# Patient Record
Sex: MALE | Race: WHITE | Employment: OTHER | URBAN - METROPOLITAN AREA
[De-identification: names, ages, dates, MRNs, and addresses within clinical notes are randomized per-mention and may not be internally consistent; named-entity substitution may affect disease eponyms.]

---

## 2018-08-13 ENCOUNTER — TELEPHONE (OUTPATIENT)
Dept: ADMINISTRATIVE | Age: 63
End: 2018-08-13

## 2018-08-13 DIAGNOSIS — E78.5 HYPERLIPIDEMIA, UNSPECIFIED HYPERLIPIDEMIA TYPE: Primary | Chronic | ICD-10-CM

## 2018-08-24 ENCOUNTER — HOSPITAL ENCOUNTER (OUTPATIENT)
Age: 63
Discharge: HOME | End: 2018-08-24
Payer: COMMERCIAL

## 2018-08-24 DIAGNOSIS — E78.5 HYPERLIPIDEMIA, UNSPECIFIED HYPERLIPIDEMIA TYPE: Chronic | ICD-10-CM

## 2018-08-24 LAB
ALBUMIN SERPL-MCNC: 3.9 G/DL (ref 3.5–5.2)
ALP BLD-CCNC: 75 U/L (ref 40–129)
ALT SERPL-CCNC: 24 U/L (ref 0–40)
ANION GAP SERPL CALCULATED.3IONS-SCNC: 9 MMOL/L (ref 7–16)
AST SERPL-CCNC: 21 U/L (ref 0–39)
BILIRUB SERPL-MCNC: 0.5 MG/DL (ref 0–1.2)
BUN BLDV-MCNC: 14 MG/DL (ref 8–23)
CALCIUM SERPL-MCNC: 8.9 MG/DL (ref 8.6–10.2)
CHLORIDE BLD-SCNC: 104 MMOL/L (ref 98–107)
CHOLESTEROL, TOTAL: 305 MG/DL (ref 0–199)
CO2: 27 MMOL/L (ref 22–29)
CREAT SERPL-MCNC: 0.9 MG/DL (ref 0.7–1.2)
GFR AFRICAN AMERICAN: >60
GFR NON-AFRICAN AMERICAN: >60 ML/MIN/1.73
GLUCOSE BLD-MCNC: 105 MG/DL (ref 74–109)
HDLC SERPL-MCNC: 65 MG/DL
LDL CHOLESTEROL CALCULATED: 221 MG/DL (ref 0–99)
POTASSIUM SERPL-SCNC: 4.3 MMOL/L (ref 3.5–5)
SODIUM BLD-SCNC: 140 MMOL/L (ref 132–146)
TOTAL PROTEIN: 6.3 G/DL (ref 6.4–8.3)
TRIGL SERPL-MCNC: 97 MG/DL (ref 0–149)
TSH SERPL DL<=0.05 MIU/L-ACNC: 2.51 UIU/ML (ref 0.27–4.2)
VLDLC SERPL CALC-MCNC: 19 MG/DL

## 2018-08-24 PROCEDURE — 80061 LIPID PANEL: CPT

## 2018-08-24 PROCEDURE — 84443 ASSAY THYROID STIM HORMONE: CPT

## 2018-08-24 PROCEDURE — 80053 COMPREHEN METABOLIC PANEL: CPT

## 2018-08-24 PROCEDURE — 36415 COLL VENOUS BLD VENIPUNCTURE: CPT

## 2018-08-31 ENCOUNTER — HOSPITAL ENCOUNTER (OUTPATIENT)
Age: 63
Discharge: HOME | End: 2018-09-02
Payer: COMMERCIAL

## 2018-08-31 ENCOUNTER — OFFICE VISIT (OUTPATIENT)
Dept: FAMILY MEDICINE CLINIC | Age: 63
End: 2018-08-31
Payer: COMMERCIAL

## 2018-08-31 VITALS
BODY MASS INDEX: 23.34 KG/M2 | HEIGHT: 70 IN | DIASTOLIC BLOOD PRESSURE: 86 MMHG | TEMPERATURE: 97.4 F | RESPIRATION RATE: 16 BRPM | SYSTOLIC BLOOD PRESSURE: 125 MMHG | OXYGEN SATURATION: 98 % | WEIGHT: 163 LBS

## 2018-08-31 DIAGNOSIS — E78.5 HYPERLIPIDEMIA, UNSPECIFIED HYPERLIPIDEMIA TYPE: Chronic | ICD-10-CM

## 2018-08-31 DIAGNOSIS — N28.1 ACQUIRED RENAL CYST OF LEFT KIDNEY: ICD-10-CM

## 2018-08-31 DIAGNOSIS — N42.9 PROSTATIC DISORDER: ICD-10-CM

## 2018-08-31 DIAGNOSIS — M17.11 PRIMARY OSTEOARTHRITIS OF RIGHT KNEE: Primary | ICD-10-CM

## 2018-08-31 DIAGNOSIS — Z23 NEED FOR SHINGLES VACCINE: ICD-10-CM

## 2018-08-31 LAB
BACTERIA: NORMAL /HPF
BILIRUBIN URINE: NEGATIVE
BLOOD, URINE: ABNORMAL
CLARITY: CLEAR
COLOR: YELLOW
GLUCOSE URINE: NEGATIVE MG/DL
KETONES, URINE: NEGATIVE MG/DL
LEUKOCYTE ESTERASE, URINE: NEGATIVE
NITRITE, URINE: NEGATIVE
PH UA: 5.5 (ref 5–9)
PROTEIN UA: NEGATIVE MG/DL
RBC UA: NORMAL /HPF (ref 0–2)
SPECIFIC GRAVITY UA: 1.01 (ref 1–1.03)
UROBILINOGEN, URINE: 0.2 E.U./DL
WBC UA: NORMAL /HPF (ref 0–5)

## 2018-08-31 PROCEDURE — 81001 URINALYSIS AUTO W/SCOPE: CPT

## 2018-08-31 PROCEDURE — 81003 URINALYSIS AUTO W/O SCOPE: CPT | Performed by: FAMILY MEDICINE

## 2018-08-31 PROCEDURE — 99214 OFFICE O/P EST MOD 30 MIN: CPT | Performed by: FAMILY MEDICINE

## 2018-08-31 RX ORDER — TAMSULOSIN HYDROCHLORIDE 0.4 MG/1
0.4 CAPSULE ORAL DAILY
Qty: 90 CAPSULE | Refills: 3 | Status: ON HOLD
Start: 2018-08-31 | End: 2021-02-16 | Stop reason: HOSPADM

## 2018-08-31 RX ORDER — ROSUVASTATIN CALCIUM 40 MG/1
40 TABLET, COATED ORAL DAILY
Qty: 90 TABLET | Refills: 3 | Status: SHIPPED | OUTPATIENT
Start: 2018-08-31 | End: 2019-11-06 | Stop reason: SDUPTHER

## 2018-08-31 ASSESSMENT — PATIENT HEALTH QUESTIONNAIRE - PHQ9
SUM OF ALL RESPONSES TO PHQ QUESTIONS 1-9: 0
2. FEELING DOWN, DEPRESSED OR HOPELESS: 0
1. LITTLE INTEREST OR PLEASURE IN DOING THINGS: 0
SUM OF ALL RESPONSES TO PHQ9 QUESTIONS 1 & 2: 0
SUM OF ALL RESPONSES TO PHQ QUESTIONS 1-9: 0

## 2018-08-31 NOTE — PROGRESS NOTES
History :   Social History     Social History    Marital status:      Spouse name: N/A    Number of children: N/A    Years of education: N/A     Occupational History    Not on file. Social History Main Topics    Smoking status: Former Smoker     Packs/day: 0.10     Types: Cigars     Start date: 8/31/1980     Quit date: 8/31/1990    Smokeless tobacco: Never Used    Alcohol use No    Drug use: No    Sexual activity: Not on file     Other Topics Concern    Not on file     Social History Narrative    No narrative on file        Current Medications    Current Outpatient Prescriptions on File Prior to Visit   Medication Sig Dispense Refill    ibuprofen (ADVIL;MOTRIN) 600 MG tablet Take 1 tablet by mouth every 6 hours as needed for Pain 60 tablet 1    fexofenadine-pseudoephedrine (ALLEGRA-D 12 HOUR)  MG per tablet Take 1 tablet by mouth 2 times daily as needed. 60 tablet 2     No current facility-administered medications on file prior to visit. Allergies : Allergies   Allergen Reactions    Codeine Itching    Lipitor      Muscle cramps    Vytorin [Ezetimibe-Simvastatin]      Muscle cramps         Review of Systems :    Review of Systems - History obtained from the patient  General ROS: negative  Respiratory ROS: no cough, shortness of breath, or wheezing  Cardiovascular ROS: no chest pain or dyspnea on exertion  Gastrointestinal ROS: no abdominal pain, change in bowel habits, or black or bloody stools  Genito-Urinary ROS: positive for - urinary frequency/urgency  negative for - dysuria or nocturia  Musculoskeletal ROS: positive for - joint pain  Neurological ROS: positive for - numbness/tingling      Physical Exam :    VITAL SIGNS : Blood pressure 125/86, temperature 97.4 °F (36.3 °C), temperature source Oral, resp. rate 16, height 5' 10\" (1.778 m), weight 163 lb (73.9 kg), SpO2 98 %.     GENERAL APPEARANCE : NAD, cooperative, appears stated age  EYES : PERRL, EOM's intact, anicteric agreed to try flomax. He was informed of risks and side-effects. We did talk about PSA, and he has chosen to forgo this testing for the time being. His likely arthritis of knee, carpal tunnel, and STS of index finger were discussed, but he will wait before choosing to treat since these are minimally bothersome at this time. I did check urinalysis and will repeat kidney ultrasound for evaluation of previously noted renal cyst.  Also he will obtain shingles vaccine at the pharmacy,    Reviewed indicated health maintenance and answered questions as needed. Advised patient of risks of foregoing needed health maintenance. Patient advised of all findings and recommendations. Questions answered, and instructions provided where indicated, including reasons for contacting office or returning sooner than requested. Reviewed indications and potential side-effects of medications, including any new medications ordered today. No future appointments.     Errol Bruno MD

## 2018-09-11 ENCOUNTER — HOSPITAL ENCOUNTER (OUTPATIENT)
Dept: ULTRASOUND IMAGING | Age: 63
Discharge: HOME | End: 2018-09-11
Payer: COMMERCIAL

## 2018-09-11 DIAGNOSIS — N28.1 ACQUIRED RENAL CYST OF LEFT KIDNEY: ICD-10-CM

## 2018-09-11 PROCEDURE — 76775 US EXAM ABDO BACK WALL LIM: CPT

## 2019-04-28 ENCOUNTER — NURSE TRIAGE (OUTPATIENT)
Dept: OTHER | Facility: CLINIC | Age: 64
End: 2019-04-28

## 2019-04-28 ENCOUNTER — APPOINTMENT (OUTPATIENT)
Dept: CT IMAGING | Age: 64
End: 2019-04-28
Payer: COMMERCIAL

## 2019-04-28 ENCOUNTER — HOSPITAL ENCOUNTER (EMERGENCY)
Age: 64
Discharge: HOME | End: 2019-04-28
Payer: COMMERCIAL

## 2019-04-28 VITALS
HEIGHT: 70 IN | SYSTOLIC BLOOD PRESSURE: 128 MMHG | BODY MASS INDEX: 22.9 KG/M2 | WEIGHT: 160 LBS | HEART RATE: 87 BPM | TEMPERATURE: 97.5 F | RESPIRATION RATE: 16 BRPM | DIASTOLIC BLOOD PRESSURE: 84 MMHG | OXYGEN SATURATION: 97 %

## 2019-04-28 DIAGNOSIS — S32.009A CLOSED FRACTURE OF TRANSVERSE PROCESS OF LUMBAR VERTEBRA, INITIAL ENCOUNTER (HCC): Primary | ICD-10-CM

## 2019-04-28 LAB
BACTERIA: NORMAL /HPF
BASOPHILS ABSOLUTE: 0.05 E9/L (ref 0–0.2)
BASOPHILS RELATIVE PERCENT: 0.9 % (ref 0–2)
BILIRUBIN URINE: NEGATIVE
BLOOD, URINE: ABNORMAL
CLARITY: CLEAR
CO2: 29 MMOL/L (ref 22–29)
COLOR: YELLOW
EOSINOPHILS ABSOLUTE: 0.17 E9/L (ref 0.05–0.5)
EOSINOPHILS RELATIVE PERCENT: 3.1 % (ref 0–6)
GFR AFRICAN AMERICAN: >60
GFR NON-AFRICAN AMERICAN: >60 ML/MIN/1.73
GLUCOSE BLD-MCNC: 84 MG/DL (ref 74–99)
GLUCOSE URINE: NEGATIVE MG/DL
HCT VFR BLD CALC: 47 % (ref 37–54)
HEMOGLOBIN: 15.6 G/DL (ref 12.5–16.5)
IMMATURE GRANULOCYTES #: 0.01 E9/L
IMMATURE GRANULOCYTES %: 0.2 % (ref 0–5)
KETONES, URINE: NEGATIVE MG/DL
LEUKOCYTE ESTERASE, URINE: NEGATIVE
LYMPHOCYTES ABSOLUTE: 1.83 E9/L (ref 1.5–4)
LYMPHOCYTES RELATIVE PERCENT: 33.4 % (ref 20–42)
MCH RBC QN AUTO: 29.7 PG (ref 26–35)
MCHC RBC AUTO-ENTMCNC: 33.2 % (ref 32–34.5)
MCV RBC AUTO: 89.4 FL (ref 80–99.9)
MONOCYTES ABSOLUTE: 0.47 E9/L (ref 0.1–0.95)
MONOCYTES RELATIVE PERCENT: 8.6 % (ref 2–12)
NEUTROPHILS ABSOLUTE: 2.95 E9/L (ref 1.8–7.3)
NEUTROPHILS RELATIVE PERCENT: 53.8 % (ref 43–80)
NITRITE, URINE: NEGATIVE
PDW BLD-RTO: 11.6 FL (ref 11.5–15)
PH UA: 6 (ref 5–9)
PLATELET # BLD: 258 E9/L (ref 130–450)
PMV BLD AUTO: 9.6 FL (ref 7–12)
POC ANION GAP: 8 MMOL/L (ref 7–16)
POC BUN: 13 MG/DL (ref 8–23)
POC CHLORIDE: 104 MMOL/L (ref 100–108)
POC CREATININE: 0.9 MG/DL (ref 0.7–1.2)
POC POTASSIUM: 4.3 MMOL/L (ref 3.5–5)
POC SODIUM: 141 MMOL/L (ref 132–146)
PROTEIN UA: ABNORMAL MG/DL
RBC # BLD: 5.26 E12/L (ref 3.8–5.8)
RBC UA: NORMAL /HPF (ref 0–2)
SPECIFIC GRAVITY UA: <=1.005 (ref 1–1.03)
UROBILINOGEN, URINE: 0.2 E.U./DL
WBC # BLD: 5.5 E9/L (ref 4.5–11.5)
WBC UA: NORMAL /HPF (ref 0–5)

## 2019-04-28 PROCEDURE — 84520 ASSAY OF UREA NITROGEN: CPT

## 2019-04-28 PROCEDURE — 85025 COMPLETE CBC W/AUTO DIFF WBC: CPT

## 2019-04-28 PROCEDURE — 99284 EMERGENCY DEPT VISIT MOD MDM: CPT

## 2019-04-28 PROCEDURE — 74177 CT ABD & PELVIS W/CONTRAST: CPT

## 2019-04-28 PROCEDURE — 81001 URINALYSIS AUTO W/SCOPE: CPT

## 2019-04-28 PROCEDURE — 36415 COLL VENOUS BLD VENIPUNCTURE: CPT

## 2019-04-28 PROCEDURE — 80051 ELECTROLYTE PANEL: CPT

## 2019-04-28 PROCEDURE — 6360000004 HC RX CONTRAST MEDICATION: Performed by: RADIOLOGY

## 2019-04-28 PROCEDURE — 82947 ASSAY GLUCOSE BLOOD QUANT: CPT

## 2019-04-28 PROCEDURE — 82565 ASSAY OF CREATININE: CPT

## 2019-04-28 RX ORDER — HYDROCODONE BITARTRATE AND ACETAMINOPHEN 5; 325 MG/1; MG/1
1 TABLET ORAL EVERY 6 HOURS PRN
Qty: 12 TABLET | Refills: 0 | Status: SHIPPED | OUTPATIENT
Start: 2019-04-28 | End: 2019-04-28

## 2019-04-28 RX ADMIN — IOPAMIDOL 80 ML: 755 INJECTION, SOLUTION INTRAVENOUS at 13:13

## 2019-04-28 ASSESSMENT — PAIN DESCRIPTION - PAIN TYPE: TYPE: ACUTE PAIN

## 2019-04-28 ASSESSMENT — PAIN DESCRIPTION - LOCATION: LOCATION: BACK

## 2019-04-28 ASSESSMENT — PAIN DESCRIPTION - ORIENTATION: ORIENTATION: LOWER

## 2019-04-28 ASSESSMENT — PAIN SCALES - GENERAL: PAINLEVEL_OUTOF10: 8

## 2019-04-28 ASSESSMENT — PAIN DESCRIPTION - DESCRIPTORS: DESCRIPTORS: ACHING

## 2019-04-28 NOTE — TELEPHONE ENCOUNTER
Reason for Disposition   [1] SEVERE PAIN in kidney area (flank) AND [2] follows direct blow to that site    Protocols used: BACK INJURY-ADULT-    Patient fell 4/24/19 and hit his back on the steps. He states he I hit about 6-8 inches up from his butt on the left side of back. His pain is now 8/10 despite taking pain meds. It hurts \"even to take a breath\". It hurt too much even to get dressed this morning. Patient is speaking in strong complete sentences. He will proceed to ER.

## 2019-04-28 NOTE — ED PROVIDER NOTES
[facility]     Department of Emergency Medicine   ED  Provider Note  Admit Date/RoomTime: 4/28/2019 11:54 AM  ED Room: 18/18  Chief Complaint:   Back Pain (lower back pain after falling last Wednesday)    History of Present Illness   Source of history provided by:  patient and spouse/SO. History/Exam Limitations: none. Gerardo Ma is a 60 y.o. old male who has a past medical history of:   Past Medical History:   Diagnosis Date    Derangement of medial meniscus of right knee 12/2016    MRI Right Knee DMXI 2/4/2016    Hyperlipidemia 12/2/2010    Situational syncope 12/2/2010    presents to the emergency department by private vehicle, for low back pain. Patient states that he fell approximately 10 days ago. He states that he slipped on the bottom stair, fell, striking his back on a carpeted wooden stair. He states that he continues to have pain since then. Denies any head injury, no loss of consciousness. Denies being on any blood thinners. He reports severe pain with any movement, nothing makes it feel better. Denies any loss of control of his bowels or bladder, no numbness in the groin area. No fevers or chills. No hematuria. Denies any prior injury to this area. ROS   Pertinent positives and negatives are stated within HPI, all other systems reviewed and are negative. Past Surgical History:   Procedure Laterality Date    HERNIA REPAIR      KNEE SURGERY Right 02/19/2016    SHOULDER ARTHROSCOPY Left 03/23/2001    left    VENTRAL HERNIA REPAIR  1981   Social History:  reports that he quit smoking about 28 years ago. His smoking use included cigars. He started smoking about 38 years ago. He smoked 0.10 packs per day. He has never used smokeless tobacco. He reports that he drinks alcohol. He reports that he does not use drugs. Family History: family history includes Cancer in his mother; Heart Disease in his father and mother; Stroke in his father.   Allergies: Codeine; Lipitor; and Vytorin [ezetimibe-simvastatin]    Physical Exam           ED Triage Vitals   BP Temp Temp src Pulse Resp SpO2 Height Weight   04/28/19 1153 04/28/19 1153 -- 04/28/19 1153 04/28/19 1153 04/28/19 1153 04/28/19 1148 04/28/19 1148   128/84 97.5 °F (36.4 °C)  87 16 97 % 5' 10\" (1.778 m) 160 lb (72.6 kg)      Oxygen Saturation Interpretation: Normal.    Constitutional:  Alert, development consistent with age. HEENT:  NC/NT. Airway patent. Neck:  Normal ROM. Supple. Respiratory:  Clear to auscultation and breath sounds equal.  CV:  Regular rate and rhythm, normal heart sounds, without pathological murmurs, ectopy, gallops, or rubs. GI:  Abdomen Soft, nontender, good bowel sounds. No firm or pulsatile mass. Back: middle lumbar spine left sided. Tenderness: Severe. Swelling: no.              Range of Motion: full range with pain. CVA Tenderness: No.            Straight leg raising:  Bilateral negative. Skin:  Small abrasion present over mid lumbar midline. No surrounding bruising present. Distal Function:              Motor deficit: none. Sensory deficit: none. Pulse deficit: none. 2+ dorsalis pedis pulses b/l. Calf Tenderness:  No Bilateral.               Edema:  none Both lower extremity(s). Reflexes: Bilateral patellar normal.  Gait:  normal.  Integument:  Normal turgor. Warm, dry, without visible rash. Lymphatics: No lymphangitis or adenopathy noted. Neurological:  Oriented. Motor functions intact.     Lab / Imaging Results   (All laboratory and radiology results have been personally reviewed by myself)  Labs:  Results for orders placed or performed during the hospital encounter of 04/28/19   CBC Auto Differential   Result Value Ref Range    WBC 5.5 4.5 - 11.5 E9/L    RBC 5.26 3.80 - 5.80 E12/L    Hemoglobin 15.6 12.5 - 16.5 g/dL    Hematocrit 47.0 37.0 - 54.0 %    MCV 89.4 80.0 - 99.9 fL    MCH 29.7 26.0 - 35.0 pg MCHC 33.2 32.0 - 34.5 %    RDW 11.6 11.5 - 15.0 fL    Platelets 232 803 - 478 E9/L    MPV 9.6 7.0 - 12.0 fL    Neutrophils % 53.8 43.0 - 80.0 %    Immature Granulocytes % 0.2 0.0 - 5.0 %    Lymphocytes % 33.4 20.0 - 42.0 %    Monocytes % 8.6 2.0 - 12.0 %    Eosinophils % 3.1 0.0 - 6.0 %    Basophils % 0.9 0.0 - 2.0 %    Neutrophils # 2.95 1.80 - 7.30 E9/L    Immature Granulocytes # 0.01 E9/L    Lymphocytes # 1.83 1.50 - 4.00 E9/L    Monocytes # 0.47 0.10 - 0.95 E9/L    Eosinophils # 0.17 0.05 - 0.50 E9/L    Basophils # 0.05 0.00 - 0.20 E9/L   Urinalysis   Result Value Ref Range    Color, UA Yellow Straw/Yellow    Clarity, UA Clear Clear    Glucose, Ur Negative Negative mg/dL    Bilirubin Urine Negative Negative    Ketones, Urine Negative Negative mg/dL    Specific Gravity, UA <=1.005 1.005 - 1.030    Blood, Urine TRACE (A) Negative    pH, UA 6.0 5.0 - 9.0    Protein, UA TRACE Negative mg/dL    Urobilinogen, Urine 0.2 <2.0 E.U./dL    Nitrite, Urine Negative Negative    Leukocyte Esterase, Urine Negative Negative   Microscopic Urinalysis   Result Value Ref Range    WBC, UA 0-1 0 - 5 /HPF    RBC, UA 0-1 0 - 2 /HPF    Bacteria, UA NONE /HPF   POCT Venous   Result Value Ref Range    POC Sodium 141 132 - 146 mmol/L    POC Potassium 4.3 3.5 - 5.0 mmol/L    POC Chloride 104 100 - 108 mmol/L    CO2 29 22 - 29 mmol/L    POC Anion Gap 8 7 - 16 mmol/L    POC Glucose 84 74 - 99 mg/dl    POC BUN 13 8 - 23 mg/dL    POC Creatinine 0.9 0.7 - 1.2 mg/dL    GFR Non-African American >60 >=60 mL/min/1.73    GFR  >60        Imaging: All Radiology results interpreted by Radiologist unless otherwise noted. CT ABDOMEN PELVIS W IV CONTRAST Additional Contrast? None   Final Result   1. No renal injury. 2. Nondisplaced fracture of the left L2 transverse process.              ED Course / Medical Decision Making     Medications   iopamidol (ISOVUE-370) 76 % injection 80 mL (80 mLs Intravenous Given 4/28/19 1313) Re-examination:  4/28/19       Time: 1345    Patient updated on results. Continues to refuse anything for pain. .    Consult(s):   None    Procedure(s):   none    Medical Decision Making:    Patient denies any loss of control of bowels or bladder, saddle anesthesia, no Senokot equine's syndrome. Patient has no neurological deficit at this time. Able to ambulate without difficulty. Refuses anything for pain, states that he has good pain control. There is a transverse process fracture of L2 seen on CT scan today. Otherwise, syndrome unremarkable. We'll discharge him at this time. Advised return to ER for any worsening symptoms. Otherwise to follow up with neurosurgery. Counseling: The emergency provider has spoken with the patient and spouse and discussed todays results, in addition to providing specific details for the plan of care and counseling regarding the diagnosis and prognosis. Questions are answered at this time and they are agreeable with the plan. Assessment      1. Closed fracture of transverse process of lumbar vertebra, initial encounter [facility])      Plan   Discharge to home  Patient condition is good    New Medications     Discharge Medication List as of 4/28/2019  1:52 PM        Electronically signed by ALYCE Cramer   DD: 4/28/19  **This report was transcribed using voice recognition software. Every effort was made to ensure accuracy; however, inadvertent computerized transcription errors may be present.   END OF ED PROVIDER NOTE       Enid Cramer  04/28/19 0355

## 2019-05-03 ENCOUNTER — CARE COORDINATION (OUTPATIENT)
Dept: CARE COORDINATION | Age: 64
End: 2019-05-03

## 2019-05-03 NOTE — CARE COORDINATION
Ambulatory Care Coordination ED Follow up Call       Reason for ED Visit:  Back pain  Care Management Risk Score: CMRS 1  How are you feeling? :     improved  Patient Reports the following:  Patient reports he was sore this morning. Now he is moving better and walking good. Spoke with Dr. Krystal Esquivel and was told he really didn't need to make an appointment with him. Did you call your PCP prior to going to the ED? Yes          Post Discharge Status:  What health concerns since you left the Emergency Room? None    Do you have wounds that you are caring for at home? No    Do you have a follow up appt scheduled? no    Review of Instructions:                                 Do you have any questions regarding your discharge instructions?:  No  Medications:    What questions do you have about your medications? None  Are you taking your medications as directed? If not - why? Yes   Can you afford your medications? yes  ADLS:  Do you need assistance of any kind at home? No   What assistance is needed? None      FU appts/Provider:    No future appointments. Health Maintenance Due   Topic Date Due    HIV screen  01/23/1972    Shingles Vaccine (1 of 2) 01/23/2007     Patient advised to contact PCP office to have HM items/records faxed to PCP Office directly?   N/A

## 2019-05-31 ENCOUNTER — EMPLOYEE WELLNESS (OUTPATIENT)
Dept: OTHER | Age: 64
End: 2019-05-31

## 2019-05-31 LAB
CHOLESTEROL, TOTAL: 320 MG/DL (ref 0–199)
GLUCOSE BLD-MCNC: 96 MG/DL (ref 74–107)
HDLC SERPL-MCNC: 74 MG/DL
LDL CHOLESTEROL CALCULATED: 227 MG/DL (ref 0–99)
TRIGL SERPL-MCNC: 93 MG/DL (ref 0–149)

## 2019-06-10 VITALS — WEIGHT: 159 LBS | BODY MASS INDEX: 22.81 KG/M2

## 2019-11-06 DIAGNOSIS — E78.5 HYPERLIPIDEMIA, UNSPECIFIED HYPERLIPIDEMIA TYPE: Chronic | ICD-10-CM

## 2019-11-06 RX ORDER — ROSUVASTATIN CALCIUM 40 MG/1
40 TABLET, COATED ORAL DAILY
Qty: 90 TABLET | Refills: 3 | Status: SHIPPED | OUTPATIENT
Start: 2019-11-06 | End: 2019-11-08 | Stop reason: SDUPTHER

## 2019-11-07 ENCOUNTER — TELEPHONE (OUTPATIENT)
Dept: FAMILY MEDICINE CLINIC | Age: 64
End: 2019-11-07

## 2019-11-08 DIAGNOSIS — E78.5 HYPERLIPIDEMIA, UNSPECIFIED HYPERLIPIDEMIA TYPE: Chronic | ICD-10-CM

## 2019-11-08 RX ORDER — ROSUVASTATIN CALCIUM 40 MG/1
40 TABLET, COATED ORAL DAILY
Qty: 90 TABLET | Refills: 3 | Status: SHIPPED
Start: 2019-11-08 | End: 2020-12-21

## 2019-11-26 ENCOUNTER — OFFICE VISIT (OUTPATIENT)
Dept: ORTHOPEDIC SURGERY | Age: 64
End: 2019-11-26
Payer: COMMERCIAL

## 2019-11-26 VITALS
HEIGHT: 70 IN | BODY MASS INDEX: 22.05 KG/M2 | SYSTOLIC BLOOD PRESSURE: 121 MMHG | TEMPERATURE: 97 F | HEART RATE: 99 BPM | DIASTOLIC BLOOD PRESSURE: 81 MMHG | WEIGHT: 154 LBS

## 2019-11-26 DIAGNOSIS — M25.511 RIGHT SHOULDER PAIN, UNSPECIFIED CHRONICITY: Primary | ICD-10-CM

## 2019-11-26 DIAGNOSIS — S49.91XD INJURY OF RIGHT SHOULDER, SUBSEQUENT ENCOUNTER: ICD-10-CM

## 2019-11-26 PROCEDURE — 99203 OFFICE O/P NEW LOW 30 MIN: CPT | Performed by: ORTHOPAEDIC SURGERY

## 2019-11-26 PROCEDURE — 20610 DRAIN/INJ JOINT/BURSA W/O US: CPT | Performed by: ORTHOPAEDIC SURGERY

## 2019-11-26 RX ORDER — M-VIT,TX,IRON,MINS/CALC/FOLIC 27MG-0.4MG
1 TABLET ORAL DAILY
COMMUNITY

## 2019-11-26 RX ORDER — BUPIVACAINE HYDROCHLORIDE 2.5 MG/ML
2 INJECTION, SOLUTION INFILTRATION; PERINEURAL ONCE
Status: COMPLETED | OUTPATIENT
Start: 2019-11-26 | End: 2019-11-26

## 2019-11-26 RX ORDER — TRIAMCINOLONE ACETONIDE 40 MG/ML
40 INJECTION, SUSPENSION INTRA-ARTICULAR; INTRAMUSCULAR ONCE
Status: COMPLETED | OUTPATIENT
Start: 2019-11-26 | End: 2019-11-26

## 2019-11-26 RX ADMIN — TRIAMCINOLONE ACETONIDE 40 MG: 40 INJECTION, SUSPENSION INTRA-ARTICULAR; INTRAMUSCULAR at 11:00

## 2019-11-26 RX ADMIN — BUPIVACAINE HYDROCHLORIDE 5 MG: 2.5 INJECTION, SOLUTION INFILTRATION; PERINEURAL at 11:00

## 2020-01-07 ENCOUNTER — TELEPHONE (OUTPATIENT)
Dept: ORTHOPEDIC SURGERY | Age: 65
End: 2020-01-07

## 2020-01-07 NOTE — TELEPHONE ENCOUNTER
Patient called to report that injection Rt shoulder 11/26/2019 did not help shoulder pain. [place]. Order pended in [address] [place] [address] Rd.

## 2020-01-13 ENCOUNTER — EVALUATION (OUTPATIENT)
Dept: PHYSICAL THERAPY | Age: 65
End: 2020-01-13
Payer: COMMERCIAL

## 2020-01-13 PROCEDURE — 97163 PT EVAL HIGH COMPLEX 45 MIN: CPT | Performed by: PHYSICAL THERAPIST

## 2020-01-13 NOTE — PROGRESS NOTES
[facility] OUTPATIENT REHABILITATION  PHYSICAL THERAPY INITIAL EVALUATION         Date:  2020   Patient: Sukhdeep Shaikh  : 1955  MRN: [id number]  Referring Provider: Debo Retana MD  [address] [place] [address]     Medical Diagnosis:   M25.511 (ICD-10-CM) - Right shoulder pain, unspecified chronicity     SUBJECTIVE:     Onset date: 2019    Onset[de-identified] day after having flu shot doing flat bench      Patient is left handed. Previous PT: none    Medical Management for Current Problem: injections, Kenalog into [address] joint relieved 50% of pain for 2 weeks    Chief complaint: pain    Behavior: condition is staying the same    Pain: intermittent  Current: 0/10     Best: 0/10     Worst:8/10    Symptom Type/Quality: aching  Location[de-identified] superior, mid-deltoid region      Aggravated by: reaching overhead, reaching out    Relieved by: rest, placing arm in sling position       Imaging results: No results found.     Past Medical History:  Past Medical History:   Diagnosis Date    Derangement of medial meniscus of right knee 2016    MRI Right Knee DMXI 2016    Hyperlipidemia 2010    Situational syncope 2010     Past Surgical History:   Procedure Laterality Date    KNEE SURGERY Right 2016    SHOULDER ARTHROSCOPY Left 2001    left    VENTRAL HERNIA REPAIR         Medications:   Current Outpatient Medications   Medication Sig Dispense Refill    VITAMIN E PO Take 1 capsule by mouth daily      Multiple Vitamins-Minerals (THERAPEUTIC MULTIVITAMIN-MINERALS) tablet Take 1 tablet by mouth daily      Ascorbic Acid (VITAMIN C PO) Take 1 tablet by mouth daily      VITAMIN D PO Take 1 capsule by mouth daily      aspirin 81 MG tablet Take 81 mg by mouth daily      rosuvastatin (CRESTOR) 40 MG tablet Take 1 tablet by mouth daily 90 tablet 3    tamsulosin (FLOMAX) 0.4 MG capsule Take 1 capsule by mouth daily 90 capsule 3    ibuprofen (ADVIL;MOTRIN) 600 MG include. Domestic Concerns:  [x] No  [] Yes:    Short Term goals (2-3 weeks)   Decrease reported pain to 4/10   Increase ROM to WNL w/o pain   QuickDASH (Disorders of the Arm, Shoulder, and Hand) 34% disability    Long Term goals (4-6 weeks)   Decrease reported pain to 2/10   Increase ROM to WNL w/o pain   Increase Strength to 5-/5    QuickDASH 20% disability   Independent with Home Exercise Programs    Rehab Potential: [x] Good  [] Fair  [] Poor    PLAN       Treatment Plan:   [x] Therapeutic Exercise  [x] Therapeutic Activity  [x] Neuromuscular Re-education   [] Gait Training  [] Balance Training  [] Aerobic conditioning  [] Manual Therapy  [] Massage/Fascial release   [x] Work/Sport specific activities    [] Pain Neuroscience [x] Cold/hotpack  [] Vasocompression  [x] Electrical Stimulation  [] Lumbar/Cervical Traction  [] Ultrasound   [] Iontophoresis: 4 mg/mL Dexamethasone Sodium Phosphate 40-80 mAmin        [x] Instruction in HEP      []  Medication allergies reviewed for use of Dexamethasone Sodium Phosphate 4mg/ml  with iontophoresis treatments. Patient is not allergic. The following CPT codes are likely to be used in the care of this patient: 65697 Therapeutic Exercise , 64803 Therapeutic Activities       Suggested Professional Referral: [x] No  [] Yes:     Patient Education:  [x] Plans/Goals, Risks/Benefits discussed  [x] Home exercise program  Method of Education: [x] Verbal  [x] Demo  [x] Written  Comprehension of Education:  [x] Verbalizes understanding. [x] Demonstrates understanding. [] Needs Review. [] Demonstrates/verbalizes understanding of HEP/Ed previously given. Frequency:  1-2 days per week for 4-6 weeks    Patient understands diagnosis/prognosis and consents to treatment, plan and goals: [x] Yes    [] No     Thank you for the opportunity to work with your patient. If you have questions or comments, please contact me at [phone]; fax: [phone].     Electronically signed by: Arlyn Collins, PT    Medicare Patients Only     Please sign Physician's Certification and return to: [facility]  [address] [address]  Dept: [phone]  Dept Fax: [phone] Certification / Comments     Frequency/Duration 1-2 days per week for 4-6 weeks. Certification period from 1/13/2020  to 4/1/2020. I have reviewed the Plan of Care established for skilled therapy services and certify that the services are required and that they will be provided while the patient is under my care.     Physician's Comments/Revisions:               Physician's Printed Name:                                           [de-identified] Signature:                                                               Date:

## 2020-01-15 ENCOUNTER — TREATMENT (OUTPATIENT)
Dept: PHYSICAL THERAPY | Age: 65
End: 2020-01-15
Payer: COMMERCIAL

## 2020-01-15 PROBLEM — M25.511 RIGHT SHOULDER PAIN: Status: ACTIVE | Noted: 2020-01-15

## 2020-01-15 PROCEDURE — 97110 THERAPEUTIC EXERCISES: CPT | Performed by: PHYSICAL THERAPIST

## 2020-01-20 ENCOUNTER — TREATMENT (OUTPATIENT)
Dept: PHYSICAL THERAPY | Age: 65
End: 2020-01-20
Payer: COMMERCIAL

## 2020-01-20 PROCEDURE — 97110 THERAPEUTIC EXERCISES: CPT | Performed by: PHYSICAL THERAPIST

## 2020-01-20 NOTE — PROGRESS NOTES
Physical Therapy Daily Treatment Note    Date: 2020  Patient Name: Erica Marcano  : 1955   MRN: [id number]  DOInjury: DOSx:   Referring Provider:  Greg Marcum MD    Medical Diagnosis:   Medical Diagnosis:   M25.511 (ICD-10-CM) - Right shoulder pain, unspecified chronicity      Outcome Measure:  QuickDASH (Disorders of the Arm, Shoulder, and Hand) % disability    S: pt states shoulder continues to be painful when he moves it above shoulder height and away from his body with exercise  O: Pt given written HEP  Time 5858-0274     Visit  Repeat outcome measure at mid point and end. Pain /10     ROM      Modalities      ice X 15 min     Manual                  Stretch      Table slides      Wall Flexion      Wall ER stretch      Towel IR stretch      IR reaching behind back      Exercise      Shrugs AROM      Pendulum Ex      UBE 2/2 min     Pulleys - flex      Pulleys-IR      Supine wand chest press 2 x 15     Supine wand flex      Supine wand ER/IR      Supine flexion      S-lying ER      Standing wand flex      Standing flexion             Functional activities  To aid in ROM and strength needed for reaching , lifting ,pushing and pulling at home/work    ROWS: H Blue x 20 \"    ROWS: M Blue x 20 \"    ROWS: L      ER Green 2 x 10 \"    IR Green 2 x 10 \"                A:  Tolerated well. Above added to written HEP.   P: Continue with rehab plan  Neil Salamanca PTA    Treatment Charges: Mins Units   Initial Evaluation     Re-Evaluation     Ther Exercise         TE 35 2   Manual Therapy     MT     Ther Activities        TA     Gait Training          GT     Neuro Re-education NR     Modalities ICE  15 min 0   Non-Billable Service Time     Other     Total Time/Units 50 2

## 2020-01-22 ENCOUNTER — TREATMENT (OUTPATIENT)
Dept: PHYSICAL THERAPY | Age: 65
End: 2020-01-22
Payer: COMMERCIAL

## 2020-01-22 PROCEDURE — 97110 THERAPEUTIC EXERCISES: CPT | Performed by: PHYSICAL THERAPIST

## 2020-01-27 ENCOUNTER — TREATMENT (OUTPATIENT)
Dept: PHYSICAL THERAPY | Age: 65
End: 2020-01-27
Payer: COMMERCIAL

## 2020-01-27 PROCEDURE — 97110 THERAPEUTIC EXERCISES: CPT

## 2020-01-27 NOTE — PROGRESS NOTES
Physical Therapy Daily Treatment Note    Date: 2020  Patient Name: Katie Jiang  : 1955   MRN: [id number]  DOInjury: DOSx:   Referring Provider:  Rupert Pollock MD    Medical Diagnosis:   Medical Diagnosis:   M25.511 (ICD-10-CM) - Right shoulder pain, unspecified chronicity      Outcome Measure:  QuickDASH (Disorders of the Arm, Shoulder, and Hand) % disability    S: pt states shoulder continues to be painful when he moves above 45* and away from his body unless he moves slowly and finds the right angle to move UE without pain  O: Pt given written HEP  Time 9287-2677     Visit  Repeat outcome measure at mid point and end. Pain /10     ROM      Modalities      ice X 15 min     Manual                  Stretch      Table slides Flex x 20, hor Abd x 20     Wall Flexion      Wall ER stretch      Towel IR stretch      IR reaching behind back      Exercise      Shrugs AROM      Pendulum Ex      UBE 2/2 min     Pulleys - flex      Pulleys-IR      Supine wand chest press 2 x 20     Supine wand flex 2 x 20     Supine wand ER/IR      Supine flexion      S-lying ER      Standing wand flex      Standing flexion             Functional activities  To aid in ROM and strength needed for reaching , lifting ,pushing and pulling at home/work    ROWS: H Blue 2 x 20 \"    ROWS: M Blue 2 x 20 \"    ROWS: L      ER Green 2 x 20 \"    IR Green 2 x 20 \"                A:  Tolerated well. Above added to written HEP.   P: Continue with rehab plan  Brittney Lizama PTA    Treatment Charges: Mins Units   Initial Evaluation     Re-Evaluation     Ther Exercise         TE 40 3   Manual Therapy     MT     Ther Activities        TA     Gait Training          GT     Neuro Re-education NR     Modalities ICE  15 min 0   Non-Billable Service Time     Other     Total Time/Units 55 3

## 2020-01-29 ENCOUNTER — TREATMENT (OUTPATIENT)
Dept: PHYSICAL THERAPY | Age: 65
End: 2020-01-29
Payer: COMMERCIAL

## 2020-01-29 PROCEDURE — 97530 THERAPEUTIC ACTIVITIES: CPT

## 2020-01-29 PROCEDURE — 97110 THERAPEUTIC EXERCISES: CPT

## 2020-02-04 ENCOUNTER — TREATMENT (OUTPATIENT)
Dept: PHYSICAL THERAPY | Age: 65
End: 2020-02-04
Payer: COMMERCIAL

## 2020-02-04 PROCEDURE — 97530 THERAPEUTIC ACTIVITIES: CPT | Performed by: PHYSICAL THERAPIST

## 2020-02-04 PROCEDURE — 97110 THERAPEUTIC EXERCISES: CPT | Performed by: PHYSICAL THERAPIST

## 2020-02-04 NOTE — PROGRESS NOTES
Physical Therapy Daily Treatment Note    Date: 2020  Patient Name: Zayda Shine  : 1955   MRN: [id number]  DOInjury: DOSx:   Referring Provider:  Maximo Pierre MD    Medical Diagnosis:   Medical Diagnosis:   M25.511 (ICD-10-CM) - Right shoulder pain, unspecified chronicity      Outcome Measure:  QuickDASH (Disorders of the Arm, Shoulder, and Hand) % disability    S:  Pt states shoulder continues to be painful when he moves above 45* and away from his body unless he finds the \"sweet spot\" then he can move UE without pain. O: Pt given written HEP  Time 4257-1731     Visit  Repeat outcome measure at mid point and end. Pain /10     ROM AROM: 125° Forward elevation,  90° ER,  IR to L4  PROM: 140° Forward elevation,  90° ER,  25° IR     Modalities      ice X 15 min     Manual                  Stretch      Table slides  HEP    Wall Flexion      Wall ER stretch      Towel IR stretch      IR reaching behind back      Exercise      Shrugs AROM      Pendulum Ex      UBE 2/2 min     Pulleys - flex      Pulleys-IR      Supine wand chest press 2 x 20     Supine wand flex 2 x 20     Supine wand ER/IR      Supine flexion      S-lying ER      Standing wand flex      Standing flexion             Functional activities  To aid in ROM and strength needed for reaching , lifting ,pushing and pulling at home/work    ROWS: H Blue 2 x 20 \"    ROWS: M Blue 2 x 20 \"    ROWS: L      ER Green 2 x 20 \"    IR Green 2 x 20 \"                A:  Tolerated well.   Pt demonstrates improved pain-free supine ROM when he finds \"the sweet spot\" to move in.  P: Continue with rehab plan  Vijay Worthington PTA    Treatment Charges: Mins Units   Initial Evaluation     Re-Evaluation     Ther Exercise         TE 10 1   Manual Therapy     MT     Ther Activities        TA 20 1   Gait Training          GT     Neuro Re-education NR     Modalities ICE  15 min 0   Non-Billable Service Time     Other     Total Time/Units 45 2

## 2020-02-06 ENCOUNTER — TREATMENT (OUTPATIENT)
Dept: PHYSICAL THERAPY | Age: 65
End: 2020-02-06
Payer: COMMERCIAL

## 2020-02-06 PROCEDURE — 97110 THERAPEUTIC EXERCISES: CPT | Performed by: PHYSICAL THERAPIST

## 2020-02-06 PROCEDURE — 97530 THERAPEUTIC ACTIVITIES: CPT | Performed by: PHYSICAL THERAPIST

## 2020-02-06 NOTE — PROGRESS NOTES
Physical Therapy Daily Treatment Note    Date: 2020  Patient Name: Kimberly Woodard  : 1955   MRN: [id number]  DOInjury: DOSx:   Referring Provider:  Tiara Alfaro MD    Medical Diagnosis:   Medical Diagnosis:   M25.511 (ICD-10-CM) - Right shoulder pain, unspecified chronicity      Outcome Measure:  QuickDASH (Disorders of the Arm, Shoulder, and Hand) 50% disability     QuickDASH 45% disability (midpoint 20)  S:  Pt states shoulder continues to be painful when he moves above 45* and away from his body unless he finds the \"sweet spot\" then he can move UE without pain. O: Pt given written HEP  Time [id number]     Visit  Repeat outcome measure at mid point and end. Pain /10     ROM AROM: 125° Forward elevation,  90° ER,  IR to L4  PROM: 140° Forward elevation,  90° ER,  25° IR     Modalities      ice X 15 min     Manual                  Stretch      Table slides  HEP    Wall Flexion      Wall ER stretch      Towel IR stretch      IR reaching behind back      Exercise      Shrugs AROM      Pendulum Ex      UBE 2/2 min     Pulleys - flex      Pulleys-IR      Supine wand chest press 2 x 20     Supine wand flex 2 x 20     Supine wand ER/IR      Supine flexion      S-lying ER      Standing wand flex      Standing flexion             Functional activities  To aid in ROM and strength needed for reaching , lifting ,pushing and pulling at home/work    ROWS: H 20# 2 x 20 \"    ROWS: M 20# 2 x 20 \"    ROWS: L      ER Green 2 x 20 \"    IR Green 2 x 20 \"                A:  Tolerated well.     P: Continue with rehab plan  Dulce Thrasher PTA    Treatment Charges: Mins Units   Initial Evaluation     Re-Evaluation     Ther Exercise         TE 20 1   Manual Therapy     MT     Ther Activities        TA 15 1   Gait Training          GT     Neuro Re-education NR     Modalities ICE  15 min 0   Non-Billable Service Time     Other     Total Time/Units 50 2

## 2020-02-10 ENCOUNTER — TREATMENT (OUTPATIENT)
Dept: PHYSICAL THERAPY | Age: 65
End: 2020-02-10
Payer: COMMERCIAL

## 2020-02-10 PROCEDURE — 97110 THERAPEUTIC EXERCISES: CPT | Performed by: PHYSICAL THERAPIST

## 2020-02-10 PROCEDURE — 97530 THERAPEUTIC ACTIVITIES: CPT | Performed by: PHYSICAL THERAPIST

## 2020-02-10 NOTE — PROGRESS NOTES
Physical Therapy Daily Treatment Note    Date: 2/10/2020  Patient Name: Erica Marcano  : 1955   MRN: [id number]  DOInjury: DOSx:   Referring Provider:  Greg Marcum MD    Medical Diagnosis:   Medical Diagnosis:   M25.511 (ICD-10-CM) - Right shoulder pain, unspecified chronicity      Outcome Measure:  QuickDASH (Disorders of the Arm, Shoulder, and Hand) 50% disability     QuickDASH 45% disability (midpoint 20)  S:  Pt states shoulder continues to be painful when he moves above 45* and away from his body unless he finds the \"sweet spot\" then he can move UE without pain. O: Pt given written HEP  Time [id number]     Visit  Repeat outcome measure at mid point and end. Pain /10     ROM AROM: 125° Forward elevation,  90° ER,  IR to L4  PROM: 140° Forward elevation,  90° ER,  25° IR     Modalities      ice X 15 min     Manual                  Stretch      Table slides  HEP    Wall Flexion      Wall ER stretch      Towel IR stretch      IR reaching behind back      Exercise      Shrugs AROM      Pendulum Ex      UBE 2/2 min     Pulleys - flex      Pulleys-IR      Supine wand chest press 2 x 20 blk bar     Supine wand flex 2 x 20 blk bar     Supine wand ER/IR      Supine flexion      S-lying ER      Standing wand flex      Standing flexion             Functional activities  To aid in ROM and strength needed for reaching , lifting ,pushing and pulling at home/work    ROWS: H 25# 2 x 20 \"    ROWS: M 25# 2 x 20 \"    ROWS: L      ER Green 2 x 20 \"    IR Green 2 x 20 \"                A:  Tolerated well.     P: Continue with rehab plan  Neil Salamanca PTA    Treatment Charges: Mins Units   Initial Evaluation     Re-Evaluation     Ther Exercise         TE 20 1   Manual Therapy     MT     Ther Activities        TA 15 1   Gait Training          GT     Neuro Re-education NR     Modalities ICE  15 min 0   Non-Billable Service Time     Other     Total Time/Units 50 2

## 2020-02-12 ENCOUNTER — TREATMENT (OUTPATIENT)
Dept: PHYSICAL THERAPY | Age: 65
End: 2020-02-12
Payer: COMMERCIAL

## 2020-02-12 PROCEDURE — 97110 THERAPEUTIC EXERCISES: CPT | Performed by: PHYSICAL THERAPIST

## 2020-02-12 PROCEDURE — 97530 THERAPEUTIC ACTIVITIES: CPT | Performed by: PHYSICAL THERAPIST

## 2020-02-17 ENCOUNTER — TREATMENT (OUTPATIENT)
Dept: PHYSICAL THERAPY | Age: 65
End: 2020-02-17
Payer: COMMERCIAL

## 2020-02-17 PROCEDURE — 97530 THERAPEUTIC ACTIVITIES: CPT | Performed by: PHYSICAL THERAPIST

## 2020-02-17 PROCEDURE — 97110 THERAPEUTIC EXERCISES: CPT | Performed by: PHYSICAL THERAPIST

## 2020-02-17 NOTE — PROGRESS NOTES
Physical Therapy Daily Treatment Note    Date: 2020  Patient Name: Regino Wright  : 1955   MRN: [id number]  DOInjury: DOSx:   Referring Provider:  Adam Delgado MD    Medical Diagnosis:   Medical Diagnosis:   M25.511 (ICD-10-CM) - Right shoulder pain, unspecified chronicity      Outcome Measure:  QuickDASH (Disorders of the Arm, Shoulder, and Hand) 50% disability     QuickDASH 45% disability (midpoint 20)  S:  Pt states shoulder continues to be painful when he moves above 45* and away from his body unless he finds the \"sweet spot\" then he can move UE without pain. O: Pt given written HEP  Time [id number]     Visit  Repeat outcome measure at mid point and end. Pain /10     ROM AROM: 125° Forward elevation,  90° ER,  IR to L4  PROM: 140° Forward elevation,  90° ER,  25° IR     Modalities      ice X 15 min     Manual                  Stretch      Table slides  HEP    Wall Flexion      Wall ER stretch      Towel IR stretch      IR reaching behind back      Exercise      Shrugs AROM      Pendulum Ex      UBE 2/2 min     Pulleys - flex      Pulleys-IR      Supine wand chest press 2 x 20 blk bar     Supine wand flex 2 x 20 blk bar     Close arm press up 8# 2 x 10     Supine wand ER/IR      Supine flexion      S-lying ER      Standing wand flex      Standing flexion             Functional activities  To aid in ROM and strength needed for reaching , lifting ,pushing and pulling at home/work    ROWS: H 25# 2 x 20 \"    ROWS: M 25# 2 x 20 \"    ROWS: L 20# 2 x 20     ER Green 2 x 20 \"    IR Blue 2 x 20 \"                A:  Tolerated well.     P: Continue with rehab plan  Kathy Kwok PTA    Treatment Charges: Mins Units   Initial Evaluation     Re-Evaluation     Ther Exercise         TE 20 1   Manual Therapy     MT     Ther Activities        TA 15 1   Gait Training          GT     Neuro Re-education NR     Modalities ICE  15 min 0   Non-Billable Service Time     Other     Total Time/Units 50 2

## 2020-02-19 ENCOUNTER — TREATMENT (OUTPATIENT)
Dept: PHYSICAL THERAPY | Age: 65
End: 2020-02-19
Payer: COMMERCIAL

## 2020-02-19 PROCEDURE — 97530 THERAPEUTIC ACTIVITIES: CPT

## 2020-02-19 PROCEDURE — 97110 THERAPEUTIC EXERCISES: CPT

## 2020-02-19 NOTE — PROGRESS NOTES
Physical Therapy Daily Treatment Note    Date: 2020  Patient Name: Shital Palomino  : 1955   MRN: [id number]  DOInjury: DOSx:   Referring Provider:  Krissy Taylor MD    Medical Diagnosis:   Medical Diagnosis:   M25.511 (ICD-10-CM) - Right shoulder pain, unspecified chronicity      Outcome Measure:  QuickDASH (Disorders of the Arm, Shoulder, and Hand) 50% disability     QuickDASH 45% disability (midpoint 20)  S:  Pt states shoulder continues to be painful when he moves above 45* and away from his body unless he finds the \"sweet spot\" then he can move UE without pain. O: Pt given written HEP  Time [id number]     Visit  Repeat outcome measure at mid point and end. Pain /10     ROM AROM: 125° Forward elevation,  90° ER,  IR to L4  PROM: 140° Forward elevation,  90° ER,  25° IR     Modalities      ice      Manual                  Stretch      Table slides  HEP    Wall Flexion      Wall ER stretch      Towel IR stretch      IR reaching behind back      Exercise      Shrugs AROM      Pendulum Ex      UBE 2/2 min     Pulleys - flex      Pulleys-IR      Supine wand chest press 2 x 20 blk bar     Supine wand flex 2 x 20 blk bar     Close arm press up 8# 2 x 10     Supine wand ER/IR      Supine flexion      S-lying ER      Standing wand flex      Standing flexion             Functional activities  To aid in ROM and strength needed for reaching , lifting ,pushing and pulling at home/work    ROWS: H 25# 2 x 20 \"    ROWS: M 25# 2 x 20 \"    ROWS: L 25# 2 x 20     ER Green 2 x 20 \"    IR Blue 2 x 20 \"                A:  Tolerated well.     P: Continue with rehab plan  Olivia Reilly PTA    Treatment Charges: Mins Units   Initial Evaluation     Re-Evaluation     Ther Exercise         TE 20 1   Manual Therapy     MT     Ther Activities        TA 15 1   Gait Training          GT     Neuro Re-education NR     Modalities ICE    0   Non-Billable Service Time     Other     Total Time/Units 35 2

## 2020-02-24 ENCOUNTER — TREATMENT (OUTPATIENT)
Dept: PHYSICAL THERAPY | Age: 65
End: 2020-02-24
Payer: COMMERCIAL

## 2020-02-24 PROCEDURE — 97530 THERAPEUTIC ACTIVITIES: CPT

## 2020-02-24 PROCEDURE — 97110 THERAPEUTIC EXERCISES: CPT

## 2020-02-24 NOTE — PROGRESS NOTES
Physical Therapy Daily Treatment Note    Date: 2020  Patient Name: Emelyn Aguirre  : 1955   MRN: [id number]  DOInjury: DOSx:   Referring Provider:  Marylee Connors, MD    Medical Diagnosis:   Medical Diagnosis:   M25.511 (ICD-10-CM) - Right shoulder pain, unspecified chronicity      Outcome Measure:  QuickDASH (Disorders of the Arm, Shoulder, and Hand) 50% disability     QuickDASH 45% disability (midpoint 20)  S:  Pt states shoulder continues to be painful when he moves above 45* and away from his body unless he finds the \"sweet spot\" then he can move UE without pain. O: Pt given written HEP  Time [id number]     Visit  Repeat outcome measure at mid point and end. Pain /10     ROM AROM: 125° Forward elevation,  90° ER,  IR to L4  PROM: 140° Forward elevation,  90° ER,  25° IR     Modalities      ice      Manual                  Stretch      Table slides  HEP    Wall Flexion      Wall ER stretch      Towel IR stretch      IR reaching behind back      Exercise      Shrugs AROM      Pendulum Ex      UBE 2/2 min     Pulleys - flex      Pulleys-IR      Supine wand chest press 2 x 20 blk bar     Supine wand flex 2 x 20 blk bar     Close arm press up 8# 2 x 10     Supine wand ER/IR      Supine flexion      S-lying ER      Standing wand flex      Standing flexion             Functional activities  To aid in ROM and strength needed for reaching , lifting ,pushing and pulling at home/work    ROWS: H 25# 2 x 20 \"    ROWS: M 25# 2 x 20 \"    ROWS: L 25# 2 x 20     ER Green 2 x 20 \"    IR Blue 2 x 20 \"                A:  Tolerated well.     P: Continue with rehab plan  Adryan Comment, PTA    Treatment Charges: Mins Units   Initial Evaluation     Re-Evaluation     Ther Exercise         TE 25 2   Manual Therapy     MT     Ther Activities        TA 15 1   Gait Training          GT     Neuro Re-education NR     Modalities ICE    0   Non-Billable Service Time     Other     Total Time/Units 40 3

## 2020-02-25 ENCOUNTER — TREATMENT (OUTPATIENT)
Dept: PHYSICAL THERAPY | Age: 65
End: 2020-02-25
Payer: COMMERCIAL

## 2020-02-25 PROCEDURE — 97110 THERAPEUTIC EXERCISES: CPT | Performed by: PHYSICAL THERAPIST

## 2020-02-25 PROCEDURE — 97530 THERAPEUTIC ACTIVITIES: CPT | Performed by: PHYSICAL THERAPIST

## 2020-02-25 NOTE — PROGRESS NOTES
Physical Therapy Daily Treatment Note    Date: 2020  Patient Name: Kinsey Mc  : 1955   MRN: [id number]  DOInjury: DOSx:   Referring Provider:  Bridger Torres MD    Medical Diagnosis:   Medical Diagnosis:   M25.511 (ICD-10-CM) - Right shoulder pain, unspecified chronicity      Outcome Measure:  QuickDASH (Disorders of the Arm, Shoulder, and Hand) 50% disability     QuickDASH 45% disability (midpoint 20)     QuickDASH 47% disability (last visit 20)  S:  Pt reports 30% improvement in shoulder function since starting PT however; Pt states shoulder continues to be painful when he moves above 45* and away from his body unless he finds the \"sweet spot\" then he can move UE without pain. O: Pt given written HEP  Time [id number]     Visit  Repeat outcome measure at mid point and end. Pain /10     ROM AROM: 125° Forward elevation,  90° ER,  IR to L4  PROM: 140° Forward elevation,  90° ER,  25° IR     Modalities      ice      Manual                  Stretch      Table slides  HEP    Wall Flexion      Wall ER stretch      Towel IR stretch      IR reaching behind back      Exercise      Shrugs AROM      Pendulum Ex      UBE 2/2 min     Pulleys - flex      Pulleys-IR      Supine wand chest press 2 x 20 blk bar     Supine wand flex 2 x 20 blk bar     Close arm press up 8# 2 x 10     Supine wand ER/IR      Supine flexion      S-lying ER      Standing wand flex      Standing flexion             Functional activities  To aid in ROM and strength needed for reaching , lifting ,pushing and pulling at home/work    ROWS: H 25# 2 x 20 \"    ROWS: M 25# 2 x 20 \"    ROWS: L 25# 2 x 20     ER Green 2 x 20 \"    IR Blue 2 x 20 \"    D1 Blue x 20 ea dir in pain-free ROM           A:  Tolerated well.     P: Last visit  Kaelyn Torres PTA    Treatment Charges: Mins Units   Initial Evaluation     Re-Evaluation     Ther Exercise         TE 25 2   Manual Therapy     MT     Ther Activities        TA 15 1   Gait Training GT     Neuro Re-education NR     Modalities ICE    0   Non-Billable Service Time     Other     Total Time/Units 40 3

## 2020-12-28 ENCOUNTER — OFFICE VISIT (OUTPATIENT)
Dept: FAMILY MEDICINE CLINIC | Age: 65
End: 2020-12-28
Payer: COMMERCIAL

## 2020-12-28 VITALS
HEART RATE: 104 BPM | HEIGHT: 70 IN | BODY MASS INDEX: 23.05 KG/M2 | SYSTOLIC BLOOD PRESSURE: 114 MMHG | TEMPERATURE: 97.3 F | DIASTOLIC BLOOD PRESSURE: 81 MMHG | OXYGEN SATURATION: 95 % | WEIGHT: 161 LBS

## 2020-12-28 DIAGNOSIS — Z11.4 SCREENING FOR HIV (HUMAN IMMUNODEFICIENCY VIRUS): ICD-10-CM

## 2020-12-28 DIAGNOSIS — E78.5 HYPERLIPIDEMIA, UNSPECIFIED HYPERLIPIDEMIA TYPE: Chronic | ICD-10-CM

## 2020-12-28 PROBLEM — M15.9 PRIMARY OSTEOARTHRITIS INVOLVING MULTIPLE JOINTS: Status: ACTIVE | Noted: 2020-12-28

## 2020-12-28 PROBLEM — M15.0 PRIMARY OSTEOARTHRITIS INVOLVING MULTIPLE JOINTS: Status: ACTIVE | Noted: 2020-12-28

## 2020-12-28 LAB
ALBUMIN SERPL-MCNC: 2.9 G/DL (ref 3.5–5.2)
ALP BLD-CCNC: 82 U/L (ref 40–129)
ALT SERPL-CCNC: 30 U/L (ref 0–40)
ANION GAP SERPL CALCULATED.3IONS-SCNC: 4 MMOL/L (ref 7–16)
AST SERPL-CCNC: 32 U/L (ref 0–39)
BILIRUB SERPL-MCNC: <0.2 MG/DL (ref 0–1.2)
BUN BLDV-MCNC: 20 MG/DL (ref 8–23)
CALCIUM SERPL-MCNC: 9.2 MG/DL (ref 8.6–10.2)
CHLORIDE BLD-SCNC: 105 MMOL/L (ref 98–107)
CHOLESTEROL, TOTAL: 380 MG/DL (ref 0–199)
CO2: 30 MMOL/L (ref 22–29)
CREAT SERPL-MCNC: 1.6 MG/DL (ref 0.7–1.2)
GFR AFRICAN AMERICAN: 53
GFR NON-AFRICAN AMERICAN: 44 ML/MIN/1.73
GLUCOSE BLD-MCNC: 74 MG/DL (ref 74–99)
HDLC SERPL-MCNC: 74 MG/DL
LDL CHOLESTEROL CALCULATED: 266 MG/DL (ref 0–99)
POTASSIUM SERPL-SCNC: 5.1 MMOL/L (ref 3.5–5)
SODIUM BLD-SCNC: 139 MMOL/L (ref 132–146)
TOTAL PROTEIN: 5.7 G/DL (ref 6.4–8.3)
TRIGL SERPL-MCNC: 198 MG/DL (ref 0–149)
VLDLC SERPL CALC-MCNC: 40 MG/DL

## 2020-12-28 PROCEDURE — 36415 COLL VENOUS BLD VENIPUNCTURE: CPT | Performed by: FAMILY MEDICINE

## 2020-12-28 PROCEDURE — 99214 OFFICE O/P EST MOD 30 MIN: CPT | Performed by: FAMILY MEDICINE

## 2020-12-28 PROCEDURE — 99212 OFFICE O/P EST SF 10 MIN: CPT | Performed by: FAMILY MEDICINE

## 2020-12-28 RX ORDER — ZOSTER VACCINE RECOMBINANT, ADJUVANTED 50 MCG/0.5
0.5 KIT INTRAMUSCULAR SEE ADMIN INSTRUCTIONS
Qty: 0.5 ML | Refills: 0 | Status: ON HOLD | OUTPATIENT
Start: 2020-12-28 | End: 2021-02-16 | Stop reason: HOSPADM

## 2020-12-28 RX ORDER — FLUTICASONE PROPIONATE 50 MCG
1 SPRAY, SUSPENSION (ML) NASAL DAILY
Qty: 2 BOTTLE | Refills: 1 | Status: SHIPPED
Start: 2020-12-28 | End: 2022-11-03

## 2020-12-28 ASSESSMENT — PATIENT HEALTH QUESTIONNAIRE - PHQ9
2. FEELING DOWN, DEPRESSED OR HOPELESS: 0
SUM OF ALL RESPONSES TO PHQ9 QUESTIONS 1 & 2: 0
SUM OF ALL RESPONSES TO PHQ QUESTIONS 1-9: 0
1. LITTLE INTEREST OR PLEASURE IN DOING THINGS: 0
SUM OF ALL RESPONSES TO PHQ QUESTIONS 1-9: 0
SUM OF ALL RESPONSES TO PHQ QUESTIONS 1-9: 0

## 2020-12-28 NOTE — PROGRESS NOTES
[facility]  FAMILY MEDICINE RESIDENCY PROGRAM   OFFICE PROGRESS NOTE  DATE OF VISIT : 2020    Patient : Chava Santoyo   Sex : male   Age : 63 y.o.  : 1955   MRN : [id number]            Chief Complaint :   Chief Complaint   Patient presents with    Hyperlipidemia     F/U       HPI:   Chava Santoyo comes to clinic today for     F/U of chronic problem(s)     Chronic problems reviewed today include:     Hyperlipidemia and Osteoarthritis    Current status of this/these condition(s):  stable    Tolerating meds: Yes    Additional history: Tolerating Crestor without problems. Doing well, but has frequent nasal drainage and congestion. Occasionally takes OTC antihistamine or decongestant. Also has some irritation of corners of mouth chronically. Has used vaseline and neosporin without improvement. Slight ARTHUR, but improves after walking more. Denies headaches, visual changes, cough, chest pain, palpitations, stomach or bowel complaints.      Patient Active Problem List   Diagnosis    Hyperlipidemia    Situational syncope    Sciatica    Primary osteoarthritis of right knee    Right shoulder pain    Primary osteoarthritis involving multiple joints       Past Medical History:   Diagnosis Date    Derangement of medial meniscus of right knee 2016    MRI Right Knee DMXI 2016    Hyperlipidemia 2010    Situational syncope 2010       Current Outpatient Medications on File Prior to Visit   Medication Sig Dispense Refill    rosuvastatin (CRESTOR) 40 MG tablet TAKE ONE TABLET BY MOUTH DAILY 90 tablet 0    VITAMIN E PO Take 1 capsule by mouth daily      Multiple Vitamins-Minerals (THERAPEUTIC MULTIVITAMIN-MINERALS) tablet Take 1 tablet by mouth daily      Ascorbic Acid (VITAMIN C PO) Take 1 tablet by mouth daily      VITAMIN D PO Take 1 capsule by mouth daily      aspirin 81 MG tablet Take 81 mg by mouth daily      ibuprofen (ADVIL;MOTRIN) 600 MG tablet Take 1 tablet by mouth every 6 hours as needed for Pain 60 tablet 1    fexofenadine-pseudoephedrine (ALLEGRA-D 12 HOUR)  MG per tablet Take 1 tablet by mouth 2 times daily as needed. 60 tablet 2    tamsulosin (FLOMAX) 0.4 MG capsule Take 1 capsule by mouth daily (Patient not taking: Reported on 2020) 90 capsule 3     No current facility-administered medications on file prior to visit. Allergies   Allergen Reactions    Codeine Itching    Lipitor      Muscle cramps    Vytorin [Ezetimibe-Simvastatin]      Muscle cramps         Family History   Problem Relation Age of Onset    Cancer Mother         breast    Heart Disease Mother     Heart Disease Father     Stroke Father        Past Surgical History:   Procedure Laterality Date    KNEE SURGERY Right 2016    SHOULDER ARTHROSCOPY Left 2001    left    VENTRAL HERNIA REPAIR         Social History     Tobacco Use    Smoking status: Former Smoker     Packs/day: 0.10     Types: Cigars     Start date: 1980     Quit date: 1990     Years since quittin.3    Smokeless tobacco: Never Used   Substance Use Topics    Alcohol use: Yes     Comment: occasionally    Drug use: No       Review of Systems - Negative except as per HPI    Objective:    VS:  Blood pressure 114/81, pulse 104, temperature 97.3 °F (36.3 °C), temperature source Temporal, height 5' 10\" (1.778 m), weight 161 lb (73 kg), SpO2 95 %. General Appearance: Well developed, awake, alert, oriented, no acute distress  ENT: Nasal mucosa slightly swollen with minimal mucus discharge. Throat is normal, TMs are clear. Slight irritation noted at the angles of the mouth bilaterally. Neck: Supple, symmetrical, trachea midline. No JVD. Thyroid smooth, not enlarged. Chest wall/Lung: Clear to auscultation bilaterally,  respirations unlabored. No rhonchi/wheezing/rales  Heart[de-identified]  Regular rate and rhythm, S1and S2 normal, no murmur, rub or gallop.   Abdomen: Soft, non-tender, bowel sounds normoactive, no masses, no organomegaly  Extremities:  Extremities normal, atraumatic, no cyanosis. no edema. Skin: angular cheilitis noted (mild(  Musculoskeletal: ROM grossly normal in all joints of extremities, no obvious joint swelling. Lymph nodes: no lymph node enlargement appreciated  Neurologic:    Alert, oriented. Normal gait and coordination   No focal neurologic deficits noted. Psychiatric: has a normal mood and affect. Behavior is normal.     Most recent labs and imaging reviewed. Findings include:     N/A    Naina Dior was seen today for hyperlipidemia. Diagnoses and all orders for this visit:    Hyperlipidemia, unspecified hyperlipidemia type  -     Comprehensive Metabolic Panel; Future  -     Lipid Panel; Future    Primary osteoarthritis involving multiple joints    Angular cheilitis    Non-seasonal allergic rhinitis, unspecified trigger    Screening for HIV (human immunodeficiency virus)  -     HIV Screen; Future    Other orders  -     zoster recombinant adjuvanted vaccine [facility]) 50 MCG/0.5ML SUSR injection; Inject 0.5 mLs into the muscle See Admin Instructions 1 dose now and repeat in 2-6 months  -     fluticasone (FLONASE) 50 MCG/ACT nasal spray; 1 spray by Each Nostril route daily  -     miconazole (MICOTIN) 2 % cream; Apply topically 2 times daily. Additional Plan: We gave him his shingles shot today. I did obtain laboratory testing on him and will notify him of these results. I am going to have him use some miconazole cream on his angular cheilitis to see if this helps him. I did encourage him to follow-up with his dentist as well to see if this might have anything to do with his partial plate, although he denies any recent problems with it. We talked about the options for his chronic rhinitis. He is going to try Flonase nasal spray. I did warn him of the potential risks and side effects.   An alternative would be to use an over-the-counter antihistamine and/or decongestant which is worked well for him in the past.    Bob Cap in in 6 month(s) or sooner for any persistent, new, or worsening symptoms. Please see Patient Instructions for further counseling and information given. Advised to be adherent to the treatment plans discussed today, and please call with any questions or concerns, letting the office know of any reasons that the plans may not be followed. The risks of untreated conditions include worsening illness, injury, disability, and possibly, death. Please call if symptoms change in any way, worsen, or fail to completely resolve, as this could necessitate a change to treatment plans. Patient and/or caregiver expressed understanding. Indications and proper use of medication(s) reviewed. Potential side-effects and risks of medication(s) also explained. Patient and/or caregiver was instructed to call if any new symptoms develop prior to next visit. Health risk factors discussed and addressed. I have personally reviewed labs and/or imaging (if any).       Signed by : Marielle Cid M.D.

## 2020-12-29 ENCOUNTER — TELEPHONE (OUTPATIENT)
Dept: FAMILY MEDICINE CLINIC | Age: 65
End: 2020-12-29

## 2020-12-29 LAB — HIV-1 AND HIV-2 ANTIBODIES: NORMAL

## 2020-12-31 ENCOUNTER — NURSE ONLY (OUTPATIENT)
Dept: FAMILY MEDICINE CLINIC | Age: 65
End: 2020-12-31
Payer: COMMERCIAL

## 2020-12-31 DIAGNOSIS — N17.9 AKI (ACUTE KIDNEY INJURY) (HCC): ICD-10-CM

## 2020-12-31 DIAGNOSIS — E77.8 HYPOPROTEINEMIA (HCC): ICD-10-CM

## 2020-12-31 LAB
ANION GAP SERPL CALCULATED.3IONS-SCNC: 5 MMOL/L (ref 7–16)
BACTERIA: ABNORMAL /HPF
BILIRUBIN URINE: NEGATIVE
BLOOD, URINE: ABNORMAL
BUN BLDV-MCNC: 15 MG/DL (ref 8–23)
CALCIUM SERPL-MCNC: 8.9 MG/DL (ref 8.6–10.2)
CHLORIDE BLD-SCNC: 102 MMOL/L (ref 98–107)
CLARITY: CLEAR
CO2: 29 MMOL/L (ref 22–29)
COLOR: YELLOW
CREAT SERPL-MCNC: 1 MG/DL (ref 0.7–1.2)
CREATININE URINE: 33 MG/DL (ref 40–278)
GFR AFRICAN AMERICAN: >60
GFR NON-AFRICAN AMERICAN: >60 ML/MIN/1.73
GLUCOSE BLD-MCNC: 95 MG/DL (ref 74–99)
GLUCOSE URINE: NEGATIVE MG/DL
KETONES, URINE: NEGATIVE MG/DL
LEUKOCYTE ESTERASE, URINE: NEGATIVE
MAGNESIUM: 2.1 MG/DL (ref 1.6–2.6)
MICROALBUMIN UR-MCNC: 1101.3 MG/L
MICROALBUMIN/CREAT UR-RTO: 3337.3 (ref 0–30)
NITRITE, URINE: NEGATIVE
PH UA: 6.5 (ref 5–9)
POTASSIUM SERPL-SCNC: 4.3 MMOL/L (ref 3.5–5)
PROTEIN UA: 100 MG/DL
RBC UA: ABNORMAL /HPF (ref 0–2)
SODIUM BLD-SCNC: 136 MMOL/L (ref 132–146)
SPECIFIC GRAVITY UA: 1.01 (ref 1–1.03)
UROBILINOGEN, URINE: 0.2 E.U./DL
WBC UA: ABNORMAL /HPF (ref 0–5)

## 2020-12-31 PROCEDURE — 36415 COLL VENOUS BLD VENIPUNCTURE: CPT | Performed by: FAMILY MEDICINE

## 2021-01-04 ENCOUNTER — HOSPITAL ENCOUNTER (OUTPATIENT)
Dept: ULTRASOUND IMAGING | Age: 66
Discharge: HOME | End: 2021-01-04
Payer: COMMERCIAL

## 2021-01-04 DIAGNOSIS — E77.8 HYPOPROTEINEMIA (HCC): Primary | ICD-10-CM

## 2021-01-04 DIAGNOSIS — N17.9 AKI (ACUTE KIDNEY INJURY) (HCC): ICD-10-CM

## 2021-01-04 PROCEDURE — 76775 US EXAM ABDO BACK WALL LIM: CPT

## 2021-01-07 ENCOUNTER — HOSPITAL ENCOUNTER (OUTPATIENT)
Age: 66
Setting detail: SPECIMEN
Discharge: HOME | End: 2021-01-07
Payer: COMMERCIAL

## 2021-01-07 DIAGNOSIS — E77.8 HYPOPROTEINEMIA (HCC): ICD-10-CM

## 2021-01-07 LAB
24HR URINE VOLUME (ML): 2575 ML
CREATININE 24 HOUR URINE: 1501 MG/24H (ref 980–2200)
Lab: 21 HOURS
PROTEIN 24 HOUR URINE: 5.65 G/24HR (ref 0–0.14)

## 2021-01-07 PROCEDURE — 84156 ASSAY OF PROTEIN URINE: CPT

## 2021-01-08 ENCOUNTER — TELEPHONE (OUTPATIENT)
Dept: FAMILY MEDICINE CLINIC | Age: 66
End: 2021-01-08

## 2021-01-08 DIAGNOSIS — R80.9 PROTEINURIA, UNSPECIFIED TYPE: Primary | ICD-10-CM

## 2021-01-08 NOTE — TELEPHONE ENCOUNTER
Spoke with patient about his nephrotic range proteinuria. He has no symptoms and feels well. We agreed to arrange for nephrology referral, and will call him with the info.

## 2021-01-11 ENCOUNTER — TELEPHONE (OUTPATIENT)
Dept: FAMILY MEDICINE CLINIC | Age: 66
End: 2021-01-11

## 2021-01-11 NOTE — TELEPHONE ENCOUNTER
I spoke with patient to get permission to speak with his wife, as she wanted to speak with me. He agreed that I could speak freely with her. I then called Clerance Sandifer (wife) and explained diagnosis. We have not yet heard back from the nephrology office, but have left messages on their machine. If we don't hear back soon, I will attempt to contact nephrologist personally. I did explain my concerns and reasoning behind the consult, and answered all of her questions with out current understanding.

## 2021-01-25 ENCOUNTER — HOSPITAL ENCOUNTER (OUTPATIENT)
Age: 66
Discharge: HOME | End: 2021-01-25
Payer: COMMERCIAL

## 2021-01-25 LAB
ALBUMIN SERPL-MCNC: 2.9 G/DL (ref 3.5–5.2)
ALP BLD-CCNC: 90 U/L (ref 40–129)
ALT SERPL-CCNC: 33 U/L (ref 0–40)
ANION GAP SERPL CALCULATED.3IONS-SCNC: 7 MMOL/L (ref 7–16)
AST SERPL-CCNC: 32 U/L (ref 0–39)
BACTERIA: ABNORMAL /HPF
BILIRUB SERPL-MCNC: 0.3 MG/DL (ref 0–1.2)
BILIRUBIN URINE: NEGATIVE
BLOOD, URINE: ABNORMAL
BUN BLDV-MCNC: 19 MG/DL (ref 8–23)
C3 COMPLEMENT: 157 MG/DL (ref 90–180)
C4 COMPLEMENT: 28 MG/DL (ref 10–40)
CALCIUM SERPL-MCNC: 8.7 MG/DL (ref 8.6–10.2)
CHLORIDE BLD-SCNC: 106 MMOL/L (ref 98–107)
CHOLESTEROL, FASTING: 401 MG/DL (ref 0–199)
CLARITY: ABNORMAL
CO2: 26 MMOL/L (ref 22–29)
COLOR: YELLOW
CREAT SERPL-MCNC: 1 MG/DL (ref 0.7–1.2)
CREATININE URINE: 34 MG/DL (ref 40–278)
EPITHELIAL CELLS, UA: ABNORMAL /HPF
GFR AFRICAN AMERICAN: >60
GFR NON-AFRICAN AMERICAN: >60 ML/MIN/1.73
GLUCOSE FASTING: 85 MG/DL (ref 74–99)
GLUCOSE URINE: NEGATIVE MG/DL
HBA1C MFR BLD: 5.4 % (ref 4–5.6)
HDLC SERPL-MCNC: 84 MG/DL
KETONES, URINE: NEGATIVE MG/DL
LDL CHOLESTEROL CALCULATED: 294 MG/DL (ref 0–99)
LEUKOCYTE ESTERASE, URINE: NEGATIVE
MICROALBUMIN UR-MCNC: 1147 MG/L
MICROALBUMIN/CREAT UR-RTO: 3373.5 (ref 0–30)
NITRITE, URINE: NEGATIVE
PH UA: 6.5 (ref 5–9)
POTASSIUM SERPL-SCNC: 4.1 MMOL/L (ref 3.5–5)
PROTEIN PROTEIN: 146 MG/DL (ref 0–12)
PROTEIN UA: 100 MG/DL
PROTEIN/CREAT RATIO: 4.3
PROTEIN/CREAT RATIO: 4.3 (ref 0–0.2)
RBC UA: ABNORMAL /HPF (ref 0–2)
SEDIMENTATION RATE, ERYTHROCYTE: 33 MM/HR (ref 0–15)
SODIUM BLD-SCNC: 139 MMOL/L (ref 132–146)
SPECIFIC GRAVITY UA: 1.01 (ref 1–1.03)
TOTAL PROTEIN: 5.6 G/DL (ref 6.4–8.3)
TRIGLYCERIDE, FASTING: 113 MG/DL (ref 0–149)
UROBILINOGEN, URINE: 0.2 E.U./DL
VLDLC SERPL CALC-MCNC: 23 MG/DL
WBC UA: ABNORMAL /HPF (ref 0–5)

## 2021-01-25 PROCEDURE — 86162 COMPLEMENT TOTAL (CH50): CPT

## 2021-01-25 PROCEDURE — 80053 COMPREHEN METABOLIC PANEL: CPT

## 2021-01-25 PROCEDURE — 81001 URINALYSIS AUTO W/SCOPE: CPT

## 2021-01-25 PROCEDURE — 84156 ASSAY OF PROTEIN URINE: CPT

## 2021-01-25 PROCEDURE — 80061 LIPID PANEL: CPT

## 2021-01-25 PROCEDURE — 85651 RBC SED RATE NONAUTOMATED: CPT

## 2021-01-25 PROCEDURE — 82570 ASSAY OF URINE CREATININE: CPT

## 2021-01-25 PROCEDURE — 83036 HEMOGLOBIN GLYCOSYLATED A1C: CPT

## 2021-01-25 PROCEDURE — 82044 UR ALBUMIN SEMIQUANTITATIVE: CPT

## 2021-01-25 PROCEDURE — 87340 HEPATITIS B SURFACE AG IA: CPT

## 2021-01-25 PROCEDURE — 86334 IMMUNOFIX E-PHORESIS SERUM: CPT

## 2021-01-25 PROCEDURE — 84165 PROTEIN E-PHORESIS SERUM: CPT

## 2021-01-25 PROCEDURE — 84166 PROTEIN E-PHORESIS/URINE/CSF: CPT

## 2021-01-25 PROCEDURE — 86803 HEPATITIS C AB TEST: CPT

## 2021-01-25 PROCEDURE — 83516 IMMUNOASSAY NONANTIBODY: CPT

## 2021-01-25 PROCEDURE — 86038 ANTINUCLEAR ANTIBODIES: CPT

## 2021-01-25 PROCEDURE — 36415 COLL VENOUS BLD VENIPUNCTURE: CPT

## 2021-01-25 PROCEDURE — 86706 HEP B SURFACE ANTIBODY: CPT

## 2021-01-25 PROCEDURE — 86160 COMPLEMENT ANTIGEN: CPT

## 2021-01-25 PROCEDURE — 86255 FLUORESCENT ANTIBODY SCREEN: CPT

## 2021-01-26 ENCOUNTER — HOSPITAL ENCOUNTER (OUTPATIENT)
Age: 66
Discharge: HOME | End: 2021-01-26
Payer: COMMERCIAL

## 2021-01-26 LAB
24HR URINE VOLUME (ML): 2300 ML
ANTI-NUCLEAR ANTIBODY (ANA): NEGATIVE
BETA GLOBULIN: 0 AU/ML (ref 0–19)
COMPLEMENT ACTIVITY, TOTAL TURBIDIMETRIC: 53.6 U/ML (ref 38.7–89.9)
CREATININE 24 HOUR URINE: 1541 MG/24H (ref 980–2200)
HBV SURFACE AB TITR SER: NORMAL {TITER}
HEPATITIS B SURFACE ANTIGEN INTERPRETATION: NORMAL
HEPATITIS C ANTIBODY INTERPRETATION: NORMAL
Lab: 24 HOURS
PROTEIN 24 HOUR URINE: 5.27 G/24HR (ref 0–0.14)

## 2021-01-26 PROCEDURE — 84156 ASSAY OF PROTEIN URINE: CPT

## 2021-01-27 LAB
ADDENDUM ELECTROPHORESIS URINE RANDOM: NORMAL
ALBUMIN SERPL-MCNC: 2.2 G/DL (ref 3.5–4.7)
ALPHA-1-GLOBULIN: 0.2 G/DL (ref 0.2–0.4)
ALPHA-2-GLOBULIN: 1.1 G/FL (ref 0.5–1)
ANCA IFA: NORMAL
BETA GLOBULIN: 1 G/DL (ref 0.8–1.3)
ELECTROPHORESIS: ABNORMAL
GAMMA GLOBULIN: 0.4 G/DL (ref 0.7–1.6)
IMMUNOFIXATION RESULT, SERUM: NORMAL
IMMUNOFIXATION URINE: NORMAL

## 2021-01-28 LAB
Lab: NORMAL
REPORT: NORMAL
THIS TEST SENT TO: NORMAL

## 2021-02-03 DIAGNOSIS — Z01.818 PREOP TESTING: Primary | ICD-10-CM

## 2021-02-04 ENCOUNTER — HOSPITAL ENCOUNTER (OUTPATIENT)
Age: 66
Discharge: HOME | End: 2021-02-06
Payer: COMMERCIAL

## 2021-02-04 DIAGNOSIS — Z01.818 PREOP TESTING: ICD-10-CM

## 2021-02-04 PROCEDURE — U0003 INFECTIOUS AGENT DETECTION BY NUCLEIC ACID (DNA OR RNA); SEVERE ACUTE RESPIRATORY SYNDROME CORONAVIRUS 2 (SARS-COV-2) (CORONAVIRUS DISEASE [COVID-19]), AMPLIFIED PROBE TECHNIQUE, MAKING USE OF HIGH THROUGHPUT TECHNOLOGIES AS DESCRIBED BY CMS-2020-01-R: HCPCS

## 2021-02-06 LAB
SARS-COV-2: NOT DETECTED
SOURCE: NORMAL

## 2021-02-15 ENCOUNTER — HOSPITAL ENCOUNTER (OUTPATIENT)
Dept: CT IMAGING | Age: 66
Discharge: HOME | End: 2021-02-17
Payer: COMMERCIAL

## 2021-02-15 ENCOUNTER — APPOINTMENT (OUTPATIENT)
Dept: CT IMAGING | Age: 66
End: 2021-02-15
Payer: COMMERCIAL

## 2021-02-15 ENCOUNTER — HOSPITAL ENCOUNTER (OUTPATIENT)
Age: 66
Setting detail: OBSERVATION
Discharge: HOME | End: 2021-02-16
Attending: EMERGENCY MEDICINE | Admitting: FAMILY MEDICINE
Payer: COMMERCIAL

## 2021-02-15 VITALS
RESPIRATION RATE: 16 BRPM | DIASTOLIC BLOOD PRESSURE: 60 MMHG | HEART RATE: 78 BPM | WEIGHT: 158 LBS | BODY MASS INDEX: 22.62 KG/M2 | HEIGHT: 70 IN | OXYGEN SATURATION: 94 % | TEMPERATURE: 97.6 F | SYSTOLIC BLOOD PRESSURE: 85 MMHG

## 2021-02-15 DIAGNOSIS — E88.09 HYPOALBUMINEMIA: ICD-10-CM

## 2021-02-15 DIAGNOSIS — R41.89 SEDATED DUE TO MULTIPLE MEDICATIONS: Primary | ICD-10-CM

## 2021-02-15 DIAGNOSIS — S37.019A PERINEPHRIC HEMATOMA: ICD-10-CM

## 2021-02-15 DIAGNOSIS — R09.02 HYPOXIA: ICD-10-CM

## 2021-02-15 DIAGNOSIS — E78.5 DYSLIPIDEMIA: ICD-10-CM

## 2021-02-15 DIAGNOSIS — R80.9 PROTEINURIA, UNSPECIFIED TYPE: ICD-10-CM

## 2021-02-15 PROBLEM — R55 NEUROCARDIOGENIC PRE-SYNCOPE: Status: ACTIVE | Noted: 2021-02-15

## 2021-02-15 PROBLEM — D47.2 MONOCLONAL GAMMOPATHY: Status: ACTIVE | Noted: 2021-02-15

## 2021-02-15 LAB
ABO/RH: NORMAL
ALBUMIN SERPL-MCNC: 2.2 G/DL (ref 3.5–5.2)
ALP BLD-CCNC: 74 U/L (ref 40–129)
ALT SERPL-CCNC: 31 U/L (ref 0–40)
ANION GAP SERPL CALCULATED.3IONS-SCNC: 6 MMOL/L (ref 7–16)
ANTIBODY SCREEN: NORMAL
AST SERPL-CCNC: 30 U/L (ref 0–39)
BASOPHILS ABSOLUTE: 0.03 E9/L (ref 0–0.2)
BASOPHILS ABSOLUTE: 0.05 E9/L (ref 0–0.2)
BASOPHILS RELATIVE PERCENT: 0.3 % (ref 0–2)
BASOPHILS RELATIVE PERCENT: 0.8 % (ref 0–2)
BILIRUB SERPL-MCNC: 0.2 MG/DL (ref 0–1.2)
BUN BLDV-MCNC: 11 MG/DL (ref 8–23)
CALCIUM SERPL-MCNC: 8 MG/DL (ref 8.6–10.2)
CHLORIDE BLD-SCNC: 106 MMOL/L (ref 98–107)
CO2: 25 MMOL/L (ref 22–29)
CREAT SERPL-MCNC: 0.9 MG/DL (ref 0.7–1.2)
EOSINOPHILS ABSOLUTE: 0.02 E9/L (ref 0.05–0.5)
EOSINOPHILS ABSOLUTE: 0.19 E9/L (ref 0.05–0.5)
EOSINOPHILS RELATIVE PERCENT: 0.2 % (ref 0–6)
EOSINOPHILS RELATIVE PERCENT: 2.9 % (ref 0–6)
GFR AFRICAN AMERICAN: >60
GFR NON-AFRICAN AMERICAN: >60 ML/MIN/1.73
GLUCOSE BLD-MCNC: 120 MG/DL (ref 74–99)
HCT VFR BLD CALC: 43.4 % (ref 37–54)
HCT VFR BLD CALC: 45.9 % (ref 37–54)
HCT VFR BLD CALC: 50.5 % (ref 37–54)
HEMOGLOBIN: 14.5 G/DL (ref 12.5–16.5)
HEMOGLOBIN: 14.6 G/DL (ref 12.5–16.5)
HEMOGLOBIN: 16.6 G/DL (ref 12.5–16.5)
IMMATURE GRANULOCYTES #: 0.01 E9/L
IMMATURE GRANULOCYTES #: 0.05 E9/L
IMMATURE GRANULOCYTES %: 0.2 % (ref 0–5)
IMMATURE GRANULOCYTES %: 0.5 % (ref 0–5)
INR BLD: 0.9
INR BLD: 1
LACTIC ACID: 1.6 MMOL/L (ref 0.5–2.2)
LYMPHOCYTES ABSOLUTE: 1.44 E9/L (ref 1.5–4)
LYMPHOCYTES ABSOLUTE: 1.85 E9/L (ref 1.5–4)
LYMPHOCYTES RELATIVE PERCENT: 13.2 % (ref 20–42)
LYMPHOCYTES RELATIVE PERCENT: 28.3 % (ref 20–42)
MCH RBC QN AUTO: 29.1 PG (ref 26–35)
MCH RBC QN AUTO: 29.7 PG (ref 26–35)
MCHC RBC AUTO-ENTMCNC: 31.8 % (ref 32–34.5)
MCHC RBC AUTO-ENTMCNC: 32.9 % (ref 32–34.5)
MCV RBC AUTO: 90.3 FL (ref 80–99.9)
MCV RBC AUTO: 91.6 FL (ref 80–99.9)
METER GLUCOSE: 95 MG/DL (ref 74–99)
MONOCYTES ABSOLUTE: 0.33 E9/L (ref 0.1–0.95)
MONOCYTES ABSOLUTE: 0.47 E9/L (ref 0.1–0.95)
MONOCYTES RELATIVE PERCENT: 3 % (ref 2–12)
MONOCYTES RELATIVE PERCENT: 7.2 % (ref 2–12)
NEUTROPHILS ABSOLUTE: 3.96 E9/L (ref 1.8–7.3)
NEUTROPHILS ABSOLUTE: 9.02 E9/L (ref 1.8–7.3)
NEUTROPHILS RELATIVE PERCENT: 60.6 % (ref 43–80)
NEUTROPHILS RELATIVE PERCENT: 82.8 % (ref 43–80)
PDW BLD-RTO: 12 FL (ref 11.5–15)
PDW BLD-RTO: 12 FL (ref 11.5–15)
PLATELET # BLD: 263 E9/L (ref 130–450)
PLATELET # BLD: 287 E9/L (ref 130–450)
PMV BLD AUTO: 10.1 FL (ref 7–12)
PMV BLD AUTO: 10.2 FL (ref 7–12)
POTASSIUM SERPL-SCNC: 4.6 MMOL/L (ref 3.5–5)
PROTHROMBIN TIME: 10.2 SEC (ref 9.3–12.4)
PROTHROMBIN TIME: 10.7 SEC (ref 9.3–12.4)
RBC # BLD: 5.01 E12/L (ref 3.8–5.8)
RBC # BLD: 5.59 E12/L (ref 3.8–5.8)
SODIUM BLD-SCNC: 137 MMOL/L (ref 132–146)
TOTAL PROTEIN: 4.6 G/DL (ref 6.4–8.3)
TROPONIN: <0.01 NG/ML (ref 0–0.03)
WBC # BLD: 10.9 E9/L (ref 4.5–11.5)
WBC # BLD: 6.5 E9/L (ref 4.5–11.5)

## 2021-02-15 PROCEDURE — 77012 CT SCAN FOR NEEDLE BIOPSY: CPT | Performed by: RADIOLOGY

## 2021-02-15 PROCEDURE — 85610 PROTHROMBIN TIME: CPT

## 2021-02-15 PROCEDURE — 84484 ASSAY OF TROPONIN QUANT: CPT

## 2021-02-15 PROCEDURE — 36415 COLL VENOUS BLD VENIPUNCTURE: CPT

## 2021-02-15 PROCEDURE — 99284 EMERGENCY DEPT VISIT MOD MDM: CPT

## 2021-02-15 PROCEDURE — 2500000003 HC RX 250 WO HCPCS: Performed by: RADIOLOGY

## 2021-02-15 PROCEDURE — 86900 BLOOD TYPING SEROLOGIC ABO: CPT

## 2021-02-15 PROCEDURE — 6360000004 HC RX CONTRAST MEDICATION: Performed by: RADIOLOGY

## 2021-02-15 PROCEDURE — 86901 BLOOD TYPING SEROLOGIC RH(D): CPT

## 2021-02-15 PROCEDURE — G0378 HOSPITAL OBSERVATION PER HR: HCPCS

## 2021-02-15 PROCEDURE — 93005 ELECTROCARDIOGRAM TRACING: CPT | Performed by: INTERNAL MEDICINE

## 2021-02-15 PROCEDURE — 85018 HEMOGLOBIN: CPT

## 2021-02-15 PROCEDURE — 2580000003 HC RX 258: Performed by: RADIOLOGY

## 2021-02-15 PROCEDURE — 6370000000 HC RX 637 (ALT 250 FOR IP): Performed by: INTERNAL MEDICINE

## 2021-02-15 PROCEDURE — 6370000000 HC RX 637 (ALT 250 FOR IP): Performed by: FAMILY MEDICINE

## 2021-02-15 PROCEDURE — 50200 RENAL BIOPSY PERQ: CPT | Performed by: RADIOLOGY

## 2021-02-15 PROCEDURE — 2580000003 HC RX 258: Performed by: INTERNAL MEDICINE

## 2021-02-15 PROCEDURE — 85025 COMPLETE CBC W/AUTO DIFF WBC: CPT

## 2021-02-15 PROCEDURE — 83605 ASSAY OF LACTIC ACID: CPT

## 2021-02-15 PROCEDURE — 82962 GLUCOSE BLOOD TEST: CPT

## 2021-02-15 PROCEDURE — 77012 CT SCAN FOR NEEDLE BIOPSY: CPT

## 2021-02-15 PROCEDURE — 6360000002 HC RX W HCPCS: Performed by: RADIOLOGY

## 2021-02-15 PROCEDURE — 74177 CT ABD & PELVIS W/CONTRAST: CPT

## 2021-02-15 PROCEDURE — 2580000003 HC RX 258: Performed by: EMERGENCY MEDICINE

## 2021-02-15 PROCEDURE — 80053 COMPREHEN METABOLIC PANEL: CPT

## 2021-02-15 PROCEDURE — 2709999900 CT BIOPSY RENAL

## 2021-02-15 PROCEDURE — 85014 HEMATOCRIT: CPT

## 2021-02-15 PROCEDURE — 86850 RBC ANTIBODY SCREEN: CPT

## 2021-02-15 RX ORDER — ACETAMINOPHEN 650 MG/1
650 SUPPOSITORY RECTAL EVERY 6 HOURS PRN
Status: DISCONTINUED | OUTPATIENT
Start: 2021-02-15 | End: 2021-02-16 | Stop reason: HOSPADM

## 2021-02-15 RX ORDER — SODIUM CHLORIDE 9 MG/ML
INJECTION, SOLUTION INTRAVENOUS CONTINUOUS
Status: DISCONTINUED | OUTPATIENT
Start: 2021-02-15 | End: 2021-02-16 | Stop reason: HOSPADM

## 2021-02-15 RX ORDER — LIDOCAINE 4 G/G
1 PATCH TOPICAL DAILY
Status: DISCONTINUED | OUTPATIENT
Start: 2021-02-15 | End: 2021-02-16 | Stop reason: HOSPADM

## 2021-02-15 RX ORDER — ONDANSETRON 2 MG/ML
4 INJECTION INTRAMUSCULAR; INTRAVENOUS EVERY 6 HOURS PRN
Status: DISCONTINUED | OUTPATIENT
Start: 2021-02-15 | End: 2021-02-16 | Stop reason: HOSPADM

## 2021-02-15 RX ORDER — SODIUM CHLORIDE 0.9 % (FLUSH) 0.9 %
10 SYRINGE (ML) INJECTION PRN
Status: DISCONTINUED | OUTPATIENT
Start: 2021-02-15 | End: 2021-02-16 | Stop reason: HOSPADM

## 2021-02-15 RX ORDER — MIDAZOLAM HYDROCHLORIDE 1 MG/ML
INJECTION INTRAMUSCULAR; INTRAVENOUS
Status: COMPLETED | OUTPATIENT
Start: 2021-02-15 | End: 2021-02-15

## 2021-02-15 RX ORDER — ROSUVASTATIN CALCIUM 20 MG/1
40 TABLET, COATED ORAL DAILY
Status: DISCONTINUED | OUTPATIENT
Start: 2021-02-15 | End: 2021-02-16 | Stop reason: HOSPADM

## 2021-02-15 RX ORDER — FENTANYL CITRATE 50 UG/ML
INJECTION, SOLUTION INTRAMUSCULAR; INTRAVENOUS
Status: COMPLETED | OUTPATIENT
Start: 2021-02-15 | End: 2021-02-15

## 2021-02-15 RX ORDER — PROMETHAZINE HYDROCHLORIDE 25 MG/1
12.5 TABLET ORAL EVERY 6 HOURS PRN
Status: DISCONTINUED | OUTPATIENT
Start: 2021-02-15 | End: 2021-02-16 | Stop reason: HOSPADM

## 2021-02-15 RX ORDER — ACETAMINOPHEN 325 MG/1
650 TABLET ORAL ONCE
Status: COMPLETED | OUTPATIENT
Start: 2021-02-15 | End: 2021-02-15

## 2021-02-15 RX ORDER — POLYETHYLENE GLYCOL 3350 17 G/17G
17 POWDER, FOR SOLUTION ORAL DAILY PRN
Status: DISCONTINUED | OUTPATIENT
Start: 2021-02-15 | End: 2021-02-16 | Stop reason: HOSPADM

## 2021-02-15 RX ORDER — ACETAMINOPHEN 325 MG/1
650 TABLET ORAL EVERY 6 HOURS PRN
Status: DISCONTINUED | OUTPATIENT
Start: 2021-02-15 | End: 2021-02-16 | Stop reason: HOSPADM

## 2021-02-15 RX ORDER — LIDOCAINE HYDROCHLORIDE 20 MG/ML
INJECTION, SOLUTION INFILTRATION; PERINEURAL
Status: COMPLETED | OUTPATIENT
Start: 2021-02-15 | End: 2021-02-15

## 2021-02-15 RX ORDER — SODIUM CHLORIDE 0.9 % (FLUSH) 0.9 %
10 SYRINGE (ML) INJECTION EVERY 12 HOURS SCHEDULED
Status: DISCONTINUED | OUTPATIENT
Start: 2021-02-15 | End: 2021-02-16 | Stop reason: HOSPADM

## 2021-02-15 RX ORDER — 0.9 % SODIUM CHLORIDE 0.9 %
1000 INTRAVENOUS SOLUTION INTRAVENOUS ONCE
Status: COMPLETED | OUTPATIENT
Start: 2021-02-15 | End: 2021-02-15

## 2021-02-15 RX ADMIN — ROSUVASTATIN 40 MG: 20 TABLET, FILM COATED ORAL at 22:09

## 2021-02-15 RX ADMIN — FENTANYL CITRATE 50 MCG: 50 INJECTION, SOLUTION INTRAMUSCULAR; INTRAVENOUS at 12:25

## 2021-02-15 RX ADMIN — ACETAMINOPHEN 650 MG: 325 TABLET ORAL at 22:09

## 2021-02-15 RX ADMIN — Medication 10 ML: at 16:18

## 2021-02-15 RX ADMIN — MIDAZOLAM 1 MG: 1 INJECTION INTRAMUSCULAR; INTRAVENOUS at 12:25

## 2021-02-15 RX ADMIN — IOPAMIDOL 90 ML: 755 INJECTION, SOLUTION INTRAVENOUS at 16:18

## 2021-02-15 RX ADMIN — SODIUM CHLORIDE 1000 ML: 9 INJECTION, SOLUTION INTRAVENOUS at 16:42

## 2021-02-15 RX ADMIN — FENTANYL CITRATE 50 MCG: 50 INJECTION, SOLUTION INTRAMUSCULAR; INTRAVENOUS at 12:42

## 2021-02-15 RX ADMIN — SODIUM CHLORIDE: 9 INJECTION, SOLUTION INTRAVENOUS at 19:47

## 2021-02-15 RX ADMIN — MIDAZOLAM 1 MG: 1 INJECTION INTRAMUSCULAR; INTRAVENOUS at 12:42

## 2021-02-15 RX ADMIN — LIDOCAINE HYDROCHLORIDE 12 ML: 20 INJECTION, SOLUTION INFILTRATION; PERINEURAL at 12:37

## 2021-02-15 RX ADMIN — SODIUM CHLORIDE 1000 ML: 9 INJECTION, SOLUTION INTRAVENOUS at 17:30

## 2021-02-15 ASSESSMENT — PAIN SCALES - GENERAL
PAINLEVEL_OUTOF10: 4
PAINLEVEL_OUTOF10: 4

## 2021-02-15 NOTE — PRE SEDATION
Sedation Pre-Procedure Note    Patient Name: Daisy Lozano   YOB: 1955  Room/Bed: Room/bed info not found  Medical Record Number: [id number]  Date: 2/15/2021   Time: 12:31 PM       Indication:  60 yo M with proteinuria. Here for CT guided renal cortex biopsy. Consent: I have discussed with the patient and/or the patient representative the indication, alternatives, and the possible risks and/or complications of the planned procedure and the anesthesia methods. The patient and/or patient representative appear to understand and agree to proceed. Vital Signs:   Vitals:    02/15/21 1219   BP:    Pulse: 106   Resp: 24   Temp:    SpO2: 100%       Past Medical History:   has a past medical history of Arthritis, Derangement of medial meniscus of right knee, Hyperlipidemia, and Situational syncope. Past Surgical History:   has a past surgical history that includes Shoulder arthroscopy (Left, 03/23/2001); ventral hernia repair (1981); and knee surgery (Right, 02/19/2016). Medications:   Scheduled Meds:   Continuous Infusions:   PRN Meds:   Home Meds:   Prior to Admission medications    Medication Sig Start Date End Date Taking?  Authorizing Provider   rosuvastatin (CRESTOR) 40 MG tablet TAKE ONE TABLET BY MOUTH DAILY 12/21/20  Yes Kaylin Feliciano MD   VITAMIN E PO Take 1 capsule by mouth daily   Yes Historical Provider, MD   Multiple Vitamins-Minerals (THERAPEUTIC MULTIVITAMIN-MINERALS) tablet Take 1 tablet by mouth daily   Yes Historical Provider, MD   Ascorbic Acid (VITAMIN C PO) Take 1 tablet by mouth daily   Yes Historical Provider, MD   VITAMIN D PO Take 1 capsule by mouth daily   Yes Historical Provider, MD   zoster recombinant adjuvanted vaccine [facility]) 50 MCG/0.5ML SUSR injection Inject 0.5 mLs into the muscle See Admin Instructions 1 dose now and repeat in 2-6 months 12/28/20 6/26/21  Kaylin Feliciano MD   fluticasone (FLONASE) 50 MCG/ACT nasal spray 1 spray by Each Nostril route daily 12/28/20   Stefanie Valle MD   miconazole (MICOTIN) 2 % cream Apply topically 2 times daily. 12/28/20   Stefanie Valle MD   aspirin 81 MG tablet Take 81 mg by mouth daily    Historical Provider, MD   tamsulosin (FLOMAX) 0.4 MG capsule Take 1 capsule by mouth daily  Patient not taking: Reported on 12/28/2020 8/31/18   Stefanie Valle MD   ibuprofen (ADVIL;MOTRIN) 600 MG tablet Take 1 tablet by mouth every 6 hours as needed for Pain 7/5/17   Stefanie Valle MD   fexofenadine-pseudoephedrine (ALLEGRA-D 12 HOUR)  MG per tablet Take 1 tablet by mouth 2 times daily as needed. 2/5/13   Raman Smith MD     Coumadin Use Last 7 Days:  no  Antiplatelet drug therapy use last 7 days: no  Other anticoagulant use last 7 days: no  Additional Medication Information:  n/a      Pre-Sedation Documentation and Exam:   I have personally completed a history, physical exam & review of systems for this patient (see notes).     Mallampati Airway Assessment:  Mallampati Class II - (soft palate, fauces & uvula are visible)    Prior History of Anesthesia Complications:   none    ASA Classification:  Class 2 - A normal healthy patient with mild systemic disease    Sedation/ Anesthesia Plan:   intravenous sedation    Medications Planned:   midazolam (Versed) intravenously and fentanyl intravenously    Patient is an appropriate candidate for plan of sedation: yes    Electronically signed by Olivia Mcfadden MD on 2/15/2021 at 12:31 PM

## 2021-02-15 NOTE — H&P
Interventional Radiology  Attending Pre-operative History and Physical    DIAGNOSIS:    Patient Active Problem List   Diagnosis    Hyperlipidemia    Situational syncope    Sciatica    Primary osteoarthritis of right knee    Right shoulder pain    Primary osteoarthritis involving multiple joints       CHIEF COMPLAINT: 60 yo M with proteinuria. Here for CT guided renal cortex biopsy. Current Outpatient Medications:     rosuvastatin (CRESTOR) 40 MG tablet, TAKE ONE TABLET BY MOUTH DAILY, Disp: 90 tablet, Rfl: 0    VITAMIN E PO, Take 1 capsule by mouth daily, Disp: , Rfl:     Multiple Vitamins-Minerals (THERAPEUTIC MULTIVITAMIN-MINERALS) tablet, Take 1 tablet by mouth daily, Disp: , Rfl:     Ascorbic Acid (VITAMIN C PO), Take 1 tablet by mouth daily, Disp: , Rfl:     VITAMIN D PO, Take 1 capsule by mouth daily, Disp: , Rfl:     zoster recombinant adjuvanted vaccine (SHINGRIX) 50 MCG/0.5ML SUSR injection, Inject 0.5 mLs into the muscle See Admin Instructions 1 dose now and repeat in 2-6 months, Disp: 0.5 mL, Rfl: 0    fluticasone (FLONASE) 50 MCG/ACT nasal spray, 1 spray by Each Nostril route daily, Disp: 2 Bottle, Rfl: 1    miconazole (MICOTIN) 2 % cream, Apply topically 2 times daily. , Disp: 28 g, Rfl: 1    aspirin 81 MG tablet, Take 81 mg by mouth daily, Disp: , Rfl:     tamsulosin (FLOMAX) 0.4 MG capsule, Take 1 capsule by mouth daily (Patient not taking: Reported on 12/28/2020), Disp: 90 capsule, Rfl: 3    ibuprofen (ADVIL;MOTRIN) 600 MG tablet, Take 1 tablet by mouth every 6 hours as needed for Pain, Disp: 60 tablet, Rfl: 1    fexofenadine-pseudoephedrine (ALLEGRA-D 12 HOUR)  MG per tablet, Take 1 tablet by mouth 2 times daily as needed. , Disp: 60 tablet, Rfl: 2    Allergies   Allergen Reactions    Codeine Itching    Lipitor      Muscle cramps    Vytorin [Ezetimibe-Simvastatin]      Muscle cramps         Past Medical History:   Diagnosis Date    Arthritis     Derangement of medial meniscus of right knee 2016    MRI Right Knee DMXI 2016    Hyperlipidemia 2010    Situational syncope 2010       Past Surgical History:   Procedure Laterality Date    KNEE SURGERY Right 2016    SHOULDER ARTHROSCOPY Left 2001    left    VENTRAL HERNIA REPAIR  1981       Family History   Problem Relation Age of Onset    Cancer Mother         breast    Heart Disease Mother     Heart Disease Father     Stroke Father        Social History     Socioeconomic History    Marital status:      Spouse name: Not on file    Number of children: Not on file    Years of education: Not on file    Highest education level: Not on file   Occupational History    Not on file   Social Needs    Financial resource strain: Not on file    Food insecurity     Worry: Not on file     Inability: Not on file    Transportation needs     Medical: Not on file     Non-medical: Not on file   Tobacco Use    Smoking status: Former Smoker     Packs/day: 0.10     Types: Cigars     Start date: 1980     Quit date: 1990     Years since quittin.4    Smokeless tobacco: Never Used   Substance and Sexual Activity    Alcohol use: Yes     Comment: occasionally    Drug use: No    Sexual activity: Not on file   Lifestyle    Physical activity     Days per week: Not on file     Minutes per session: Not on file    Stress: Not on file   Relationships    Social connections     Talks on phone: Not on file     Gets together: Not on file     Attends Baptism service: Not on file     Active member of club or organization: Not on file     Attends meetings of clubs or organizations: Not on file     Relationship status: Not on file    Intimate partner violence     Fear of current or ex partner: Not on file     Emotionally abused: Not on file     Physically abused: Not on file     Forced sexual activity: Not on file   Other Topics Concern    Not on file   Social History Narrative    Not on file

## 2021-02-15 NOTE — ED PROVIDER NOTES
reports current alcohol use. He reports that he does not use drugs. Family History: family history includes Cancer in his mother; Heart Disease in his father and mother; Stroke in his father. The patients home medications have been reviewed. Allergies: Codeine, Lipitor, and Vytorin [ezetimibe-simvastatin]        ---------------------------------------------------PHYSICAL EXAM--------------------------------------    Constitutional/General: Alert and oriented x3, well appearing, non toxic in NAD  Head: Normocephalic and atraumatic  Eyes: PERRL, EOMI, conjunctive normal, sclera non icteric  Mouth: Oropharynx clear, handling secretions, no trismus, no asymmetry of the posterior oropharynx or uvular edema  Neck: Supple, full ROM, non tender to palpation in the midline, no stridor, no crepitus, no meningeal signs  Respiratory: Lungs clear to auscultation bilaterally, no wheezes, rales, or rhonchi. Not in respiratory distress  Cardiovascular:  Regular rate. Regular rhythm. No murmurs, gallops, or rubs. 2+ distal pulses  Chest: No chest wall tenderness  GI:  Abdomen Soft, Non tender, Non distended. +BS. No organomegaly, no palpable masses,  No rebound, guarding, or rigidity. L flank biopsy site dressed, clean/dry/intact, no surrounding ecchymosis. Musculoskeletal: Moves all extremities x 4. Warm and well perfused, no clubbing, cyanosis, or edema. Capillary refill <3 seconds  Integument: skin warm and dry. No rashes. Lymphatic: no lymphadenopathy noted  Neurologic: GCS 15, no focal deficits, symmetric strength 5/5 in the upper and lower extremities bilaterally  Psychiatric: Normal Affect    -------------------------------------------------- RESULTS -------------------------------------------------  I have personally reviewed all laboratory and imaging results for this patient. Results are listed below.      LABS:  Results for orders placed or performed during the hospital encounter of 02/15/21   COMPREHENSIVE METABOLIC PANEL   Result Value Ref Range    Sodium 137 132 - 146 mmol/L    Potassium 4.6 3.5 - 5.0 mmol/L    Chloride 106 98 - 107 mmol/L    CO2 25 22 - 29 mmol/L    Anion Gap 6 (L) 7 - 16 mmol/L    Glucose 120 (H) 74 - 99 mg/dL    BUN 11 8 - 23 mg/dL    CREATININE 0.9 0.7 - 1.2 mg/dL    GFR Non-African American >60 >=60 mL/min/1.73    GFR African American >60     Calcium 8.0 (L) 8.6 - 10.2 mg/dL    Total Protein 4.6 (L) 6.4 - 8.3 g/dL    Albumin 2.2 (L) 3.5 - 5.2 g/dL    Total Bilirubin 0.2 0.0 - 1.2 mg/dL    Alkaline Phosphatase 74 40 - 129 U/L    ALT 31 0 - 40 U/L    AST 30 0 - 39 U/L   CBC WITH AUTO DIFFERENTIAL   Result Value Ref Range    WBC 10.9 4.5 - 11.5 E9/L    RBC 5.01 3.80 - 5.80 E12/L    Hemoglobin 14.6 12.5 - 16.5 g/dL    Hematocrit 45.9 37.0 - 54.0 %    MCV 91.6 80.0 - 99.9 fL    MCH 29.1 26.0 - 35.0 pg    MCHC 31.8 (L) 32.0 - 34.5 %    RDW 12.0 11.5 - 15.0 fL    Platelets 378 373 - 965 E9/L    MPV 10.2 7.0 - 12.0 fL    Neutrophils % 82.8 (H) 43.0 - 80.0 %    Immature Granulocytes % 0.5 0.0 - 5.0 %    Lymphocytes % 13.2 (L) 20.0 - 42.0 %    Monocytes % 3.0 2.0 - 12.0 %    Eosinophils % 0.2 0.0 - 6.0 %    Basophils % 0.3 0.0 - 2.0 %    Neutrophils Absolute 9.02 (H) 1.80 - 7.30 E9/L    Immature Granulocytes # 0.05 E9/L    Lymphocytes Absolute 1.44 (L) 1.50 - 4.00 E9/L    Monocytes Absolute 0.33 0.10 - 0.95 E9/L    Eosinophils Absolute 0.02 (L) 0.05 - 0.50 E9/L    Basophils Absolute 0.03 0.00 - 0.20 E9/L   Troponin   Result Value Ref Range    Troponin <0.01 0.00 - 0.03 ng/mL   Lactic acid, plasma   Result Value Ref Range    Lactic Acid 1.6 0.5 - 2.2 mmol/L   Protime-INR   Result Value Ref Range    Protime 10.7 9.3 - 12.4 sec    INR 1.0    POCT Glucose   Result Value Ref Range    Meter Glucose 95 74 - 99 mg/dL   EKG 12 Lead   Result Value Ref Range    Ventricular Rate 84 BPM    Atrial Rate 84 BPM    P-R Interval 186 ms    QRS Duration 80 ms    Q-T Interval 398 ms    QTc Calculation (Bazett) 470 ms    P Axis 66 degrees    R Axis 35 degrees    T Axis 53 degrees   TYPE AND SCREEN   Result Value Ref Range    ABO/Rh O POS     Antibody Screen NEG        RADIOLOGY:  Interpreted by Radiologist.  CT ABDOMEN PELVIS W IV CONTRAST Additional Contrast? None   Final Result   1. Small left perinephric hematoma at the biopsy site measures up to 5.1 cm. No evidence of active contrast extravasation or mass effect on the kidney. 2. Mild left pleural effusion appears simple. 3. Trace fluid in the pelvis appears simple. 4. Trace pericholecystic fluid/edema is nonspecific without pericholecystic   stranding or calcified gallstones to suggest concern for developing   cholecystitis. 5. Mild prostatomegaly. ------------------------- NURSING NOTES AND VITALS REVIEWED ---------------------------   The nursing notes within the ED encounter and vital signs as below have been reviewed by myself. /70   Pulse 97   Temp 97.8 °F (36.6 °C)   Resp 18   Ht 5' 10\" (1.778 m)   Wt 158 lb (71.7 kg)   SpO2 97%   BMI 22.67 kg/m²   Oxygen Saturation Interpretation: Normal    The patients available past medical records and past encounters were reviewed.         ------------------------------ ED COURSE/MEDICAL DECISION MAKING----------------------  Medications   sodium chloride flush 0.9 % injection 10 mL (10 mLs Intravenous Given 2/15/21 1618)   0.9 % sodium chloride infusion ( Intravenous New Bag 2/15/21 1947)   acetaminophen (TYLENOL) tablet 650 mg (has no administration in time range)   lidocaine 4 % external patch 1 patch (has no administration in time range)   0.9 % sodium chloride bolus (1,000 mLs Intravenous New Bag 2/15/21 1642)   iopamidol (ISOVUE-370) 76 % injection 90 mL (90 mLs Intravenous Given 2/15/21 1618)   0.9 % sodium chloride bolus (1,000 mLs Intravenous New Bag 2/15/21 1730)         ED COURSE:       Medical Decision Making:    Patient presenting with post-operative hypoxia, hypotension and altered mental status after receiving fentanyl and Versed intraoperatively. Patient remained hemodynamically stable during ED course and maintained SpO2 of 99% on room air. Abdominal examination without any acute findings. Patient remained alert and oriented and stable for discharge home at this time. Patient was ambulated and subsequently became bradycardic. CT abdomen/pelvis with contrast was obtained revealing small L perinephric hematoma measuring 5.1cm, but no retroperitoneal hemorrhage. Patient was administered 2L IV NS due to borderline blood pressure. Repeat CBC demonstrated slight decline in hemoglobin to 14.6 after IV fluids. Serial abdominal examinations benign. Case discussed with family medicine, they will admit the patient. This patient's ED course included: a personal history and physicial examination, re-evaluation prior to disposition, cardiac monitoring and continuous pulse oximetry    This patient has remained hemodynamically stable during their ED course. Re-Evaluations:             Re-evaluation. Patients symptoms are improving    Re-examination  2/15/21   2:09 PM EST          Vital Signs:   Vitals:    02/15/21 1605 02/15/21 1654 02/15/21 1656 02/15/21 1730   BP: 88/64   101/70   Pulse: (!) 39  99 97   Resp:    18   Temp:  97.7 °F (36.5 °C)  97.8 °F (36.6 °C)   SpO2:    97%   Weight:       Height:         Card/Pulm:  Rhythm: normal rate. Heart Sounds: Normal S1, S2 and no murmurs, gallops, or rubs. clear to auscultation, no wheezes or rales and unlabored breathing. Capillary Refill: normal.  Radial Pulse:  present 2+. Skin:  Dry and Warm. Counseling: The emergency provider has spoken with the patient and spouse/SO and discussed todays results, in addition to providing specific details for the plan of care and counseling regarding the diagnosis and prognosis.   Questions are answered at this time and they are agreeable with the plan.       --------------------------------- IMPRESSION AND DISPOSITION ---------------------------------    IMPRESSION  1. Sedated due to multiple medications    2. Hypoxia    3. Perinephric hematoma        DISPOSITION  Disposition: Discharge to home  Patient condition is stable    NOTE: This report was transcribed using voice recognition software.  Every effort was made to ensure accuracy; however, inadvertent computerized transcription errors may be present       Judith Starks MD  Resident  02/15/21 2000

## 2021-02-15 NOTE — H&P
[facility] - Family Medicine Resident Inpatient  History and Physical    CC: syncope s/p renal biopsy    HPI: History obtained from patient. Shayy Contreras is a 61 y.o. male with a PMH of HLD, Situational Syncope and Osteoarthritis who presents to ED for Altered Mental Status (sent rom angio pt was over sedated with versed and fentanyl pt now a&O x4 after narcan)  Patient initially presented for renal biopsy secondary to proteinuria. Patient tolerated procedure well and then transferred to ED for recovery. When attempting to get up and dressed afterwards, patient had an episode of SOB, nausea, diaphoresis and syncope. RRT was called. At the time SPo2 <90% and BP: 88/64 mmHg. During RRT was given Flumazenil and Narcan    ED Course: The patient remained hemodynamically stable. Patient was given IV bolus 1L NS. EKG Rhythm strip sinus tachycardia, unchanged from previous tracings. The significant laboratory data obtained by ED work up was CT abdomen which showed: Small left perinephric hematoma at the biopsy site measures up to 5.1 cm. No evidence of active contrast extravasation or mass effect on the kidney. Labs pending.     Medications   0.9 % sodium chloride bolus (1,000 mLs Intravenous New Bag 2/15/21 1642)   sodium chloride flush 0.9 % injection 10 mL (10 mLs Intravenous Given 2/15/21 1618)   0.9 % sodium chloride bolus (has no administration in time range)   0.9 % sodium chloride infusion (has no administration in time range)   iopamidol (ISOVUE-370) 76 % injection 90 mL (90 mLs Intravenous Given 2/15/21 1618)        ED orders :   Orders Placed This Encounter   Procedures    CT ABDOMEN PELVIS W IV CONTRAST Additional Contrast? None    COMPREHENSIVE METABOLIC PANEL    CBC WITH AUTO DIFFERENTIAL    Troponin    Lactic acid, plasma    Protime-INR    Ambulate in room    Telemetry Monitoring    Inpatient consult to Family Practice    POCT Glucose    EKG 12 Lead    TYPE AND SCREEN    PATIENT STATUS (FROM ED OR OR/PROCEDURAL) Observation       PMH:  has a past medical history of Arthritis, Derangement of medial meniscus of right knee, Hyperlipidemia, and Situational syncope. PSH:  has a past surgical history that includes Shoulder arthroscopy (Left, 03/23/2001); ventral hernia repair (1981); knee surgery (Right, 02/19/2016); and CT BIOPSY RENAL (2/15/2021). FH: family history includes Cancer in his mother; Heart Disease in his father and mother; Stroke in his father. Social:  reports that he quit smoking about 30 years ago. His smoking use included cigars. He started smoking about 40 years ago. He smoked 0.10 packs per day. He has never used smokeless tobacco. He reports current alcohol use. He reports that he does not use drugs. Allergies: Allergies   Allergen Reactions    Codeine Itching    Lipitor      Muscle cramps    Vytorin [Ezetimibe-Simvastatin]      Muscle cramps          Home Medications:   No current facility-administered medications on file prior to encounter. Current Outpatient Medications on File Prior to Encounter   Medication Sig Dispense Refill    zoster recombinant adjuvanted vaccine [facility]) 50 MCG/0.5ML SUSR injection Inject 0.5 mLs into the muscle See Admin Instructions 1 dose now and repeat in 2-6 months 0.5 mL 0    fluticasone (FLONASE) 50 MCG/ACT nasal spray 1 spray by Each Nostril route daily 2 Bottle 1    miconazole (MICOTIN) 2 % cream Apply topically 2 times daily.  28 g 1    rosuvastatin (CRESTOR) 40 MG tablet TAKE ONE TABLET BY MOUTH DAILY 90 tablet 0    VITAMIN E PO Take 1 capsule by mouth daily      Multiple Vitamins-Minerals (THERAPEUTIC MULTIVITAMIN-MINERALS) tablet Take 1 tablet by mouth daily      Ascorbic Acid (VITAMIN C PO) Take 1 tablet by mouth daily      VITAMIN D PO Take 1 capsule by mouth daily      aspirin 81 MG tablet Take 81 mg by mouth daily      tamsulosin (FLOMAX) 0.4 MG capsule Take 1 capsule by mouth daily (Patient not taking: Reported on 12/28/2020) 90 capsule 3    ibuprofen (ADVIL;MOTRIN) 600 MG tablet Take 1 tablet by mouth every 6 hours as needed for Pain 60 tablet 1    fexofenadine-pseudoephedrine (ALLEGRA-D 12 HOUR)  MG per tablet Take 1 tablet by mouth 2 times daily as needed. 60 tablet 2       ROS:  Const: Experiencing chills. No fever, night sweats, no recent unexplained weight gain/loss  HEENT: No blurred vision, double vision; no URI symptoms  Resp: No cough, no sputum, no pleuritic chest pain, no sob  Cardio: No chest pain, no exertional dyspnea, no PND, no orthopnea, no palpitation, no leg swelling. GI: No dysphagia, no reflux; no abdominal pain, no n/v; no c/d. No hematochezia    : No dysuria, no frequency, hesitancy; no hematuria  MSK: Left flank soreness secondary to procedure. No joint pain, no myalgia, no change in ROM  Neuro: no focal weakness, no slurred speech, no double vision, no numbness or tingling in extremities  Endo: no heat/cold intolerance, no polyphagia, polydipsia or polyuria  Hem: no increased bleeding, no bruising, no lymphadenopathy  Skin: no skin changes  Psych: no depressed mood, no suicidal ideation    PE:  Blood pressure 101/70, pulse 97, temperature 97.7 °F (36.5 °C), resp. rate 18, height 5' 10\" (1.778 m), weight 158 lb (71.7 kg), SpO2 97 %. General: Alert, cooperative, no acute distress. HEENT: Normocephalic, atraumatic. PERRLA, conjunctiva/corneas clear, EOM's intact, no pallor or icterus. Neck: Supple, symmetrical, trachea midline, no cervical LAD. No carotid bruit or JVD  Chest: No tenderness or deformity, full & symmetric excursion  Lung: Clear to auscultation bilaterally,  respirations unlabored. No rales/wheezing/rubs  Heart: RRR, S1 and S2 normal, no murmur, rub or gallop. DP pulses 2/4  Abdomen: SNTND, no masses, no organomegaly, no guarding, rebound or rigidity. Genital/Rectal: deferred  Extremities:  Extremities normal, atraumatic, no cyanosis or edema.  Distal pulses equal bilaterally  Skin: Skin color, texture, turgor normal, no rashes or lesions  Musculoskeletal: No joint swelling, no muscle tenderness. Normal ROM in extremities. Lymph nodes: no lymph node enlargement appreciated  Neurologic: Alert & Oriented; CNII-XII intact;  Normal and symmetric strength in UEs and LEs; DTRs 2+ and symmetric; Sensation intact    Labs:   Results for orders placed or performed during the hospital encounter of 02/15/21   COMPREHENSIVE METABOLIC PANEL   Result Value Ref Range    Sodium 137 132 - 146 mmol/L    Potassium 4.6 3.5 - 5.0 mmol/L    Chloride 106 98 - 107 mmol/L    CO2 25 22 - 29 mmol/L    Anion Gap 6 (L) 7 - 16 mmol/L    Glucose 120 (H) 74 - 99 mg/dL    BUN 11 8 - 23 mg/dL    CREATININE 0.9 0.7 - 1.2 mg/dL    GFR Non-African American >60 >=60 mL/min/1.73    GFR African American >60     Calcium 8.0 (L) 8.6 - 10.2 mg/dL    Total Protein 4.6 (L) 6.4 - 8.3 g/dL    Albumin 2.2 (L) 3.5 - 5.2 g/dL    Total Bilirubin 0.2 0.0 - 1.2 mg/dL    Alkaline Phosphatase 74 40 - 129 U/L    ALT 31 0 - 40 U/L    AST 30 0 - 39 U/L   CBC WITH AUTO DIFFERENTIAL   Result Value Ref Range    WBC 10.9 4.5 - 11.5 E9/L    RBC 5.01 3.80 - 5.80 E12/L    Hemoglobin 14.6 12.5 - 16.5 g/dL    Hematocrit 45.9 37.0 - 54.0 %    MCV 91.6 80.0 - 99.9 fL    MCH 29.1 26.0 - 35.0 pg    MCHC 31.8 (L) 32.0 - 34.5 %    RDW 12.0 11.5 - 15.0 fL    Platelets 068 343 - 517 E9/L    MPV 10.2 7.0 - 12.0 fL    Neutrophils % 82.8 (H) 43.0 - 80.0 %    Immature Granulocytes % 0.5 0.0 - 5.0 %    Lymphocytes % 13.2 (L) 20.0 - 42.0 %    Monocytes % 3.0 2.0 - 12.0 %    Eosinophils % 0.2 0.0 - 6.0 %    Basophils % 0.3 0.0 - 2.0 %    Neutrophils Absolute 9.02 (H) 1.80 - 7.30 E9/L    Immature Granulocytes # 0.05 E9/L    Lymphocytes Absolute 1.44 (L) 1.50 - 4.00 E9/L    Monocytes Absolute 0.33 0.10 - 0.95 E9/L    Eosinophils Absolute 0.02 (L) 0.05 - 0.50 E9/L    Basophils Absolute 0.03 0.00 - 0.20 E9/L   Troponin   Result Value Ref Range    Troponin <0.01 0.00 - 0.03 ng/mL   Lactic acid, plasma   Result Value Ref Range    Lactic Acid 1.6 0.5 - 2.2 mmol/L   Protime-INR   Result Value Ref Range    Protime 10.7 9.3 - 12.4 sec    INR 1.0    POCT Glucose   Result Value Ref Range    Meter Glucose 95 74 - 99 mg/dL   EKG 12 Lead   Result Value Ref Range    Ventricular Rate 84 BPM    Atrial Rate 84 BPM    P-R Interval 186 ms    QRS Duration 80 ms    Q-T Interval 398 ms    QTc Calculation (Bazett) 470 ms    P Axis 66 degrees    R Axis 35 degrees    T Axis 53 degrees       Imaging:  Ct Guided Needle Placement    Result Date: 2/15/2021  Patient MRN:  [id number] : 1955 Age: 61 years Gender: Male Order Date:  2/15/2021 12:41 PM EXAM: CT GUIDED NEEDLE PLACEMENT, CT BIOPSY RENAL NUMBER OF IMAGES:  166 INDICATION: R80.9 Proteinuria, unspecified type What reading provider will be dictating this exam?->[facility] COMPARISON: None After obtaining informed consent and following the routine sterile prep and drape. With CT guidance the appropriate entrance for the biopsy was marked. Lidocaine was then injected into this site for local anesthesia. A biopsy needle was inserted into the left renal cortex with CT guidance. 5 core specimens were obtained. The patient tolerated the procedure well. Postprocedure images were unremarkable The procedure was performed under local anesthesia and moderate sedation with 2 mg of versed IV and 100 mcg of fentanyl IV. A timeout was performed at 12:39 PM hours . Patient was monitored for 60 minutes by registered nurse. Successful uncomplicated CT guided biopsy of the left renal cortex. If there are any physician concerns regarding this report, a Radiologist can be reached by calling the following number [phone].     Ct Abdomen Pelvis W Iv Contrast Additional Contrast? None    Result Date: 2/15/2021  EXAMINATION: CT OF THE ABDOMEN AND PELVIS WITH CONTRAST 2/15/2021 1:18 pm TECHNIQUE: CT of the abdomen and pelvis was performed with the administration of intravenous contrast. Multiplanar reformatted images are provided for review. Dose modulation, iterative reconstruction, and/or weight based adjustment of the mA/kV was utilized to reduce the radiation dose to as low as reasonably achievable. COMPARISON: CT-guided left renal biopsy 02/15/2021. CT abdomen/pelvis with contrast 04/28/2019. HISTORY: ORDERING SYSTEM PROVIDED HISTORY: s/p renal biopsy, r/o bleed TECHNOLOGIST PROVIDED HISTORY: Reason for exam:->s/p renal biopsy, r/o bleed Additional Contrast?->None Decision Support Exception->Emergency Medical Condition (MA) FINDINGS: Lower Chest: The visualized portions of the heart appear normal without significant pericardial effusion. Mild left pleural effusion. Atelectasis/scarring is present in the lung bases. Organs: Liver lesions ranging in size from 0.4 cm to 1.0 cm demonstrate benign imaging features (sharp margins and homogeneous low attenuation </=20 HU); no follow-up is necessary. There is trace pericholecystic fluid/edema. The gallbladder otherwise appears normal.  No biliary ductal dilation. The pancreas appears normal.  The adrenal glands appear normal without identified nodules. The spleen appears normal.  The kidneys appear normal in size without hydronephrosis or nephrolithiasis. A homogeneous structure in the right kidney measures up to 7.1 cm and is compatible with likely benign cyst (with thin/imperceptible wall, no evidence of fat, mural nodularity, septation or calcification, and density measuring -10-20 HU); no follow-up is necessary. Tiny homogeneous structures in the kidneys are too small to characterize (measuring <2L slice thickness); subjectively, these appear to likely represent benign cysts, no follow-up is necessary. A tract of air extending from the left posterior flank to the posterior lower pole of the left kidney is compatible with the recent percutaneous biopsy tract.   A poorly defined hyperattenuating fluid collection adjacent to the kidney at the site of the biopsy is compatible with a perinephric hematoma, measuring 5.1 x 2.8 x 1.8 cm. There is trace adjacent simple appearing fluid. No retroperitoneal hemorrhage is identified elsewhere. No subcapsular hematoma or mass effect on the kidney. No active contrast extravasation is identified. The ureters appear normal. GI/Bowel: No evidence of bowel obstruction or significant inflammatory changes. Mild-moderate volume of fecal material is present in the colon. The appendix is visualized and appears normal. Pelvis: The bladder is mildly distended without significant wall thickening or intraluminal calculi. The prostate measures 4.5 x 4.0 x 3.4 cm with estimated volume of 32 mL (normal is </= 30 mL) with up to 0.8 cm protrusion of the median lobe into the bladder base. Peritoneum/Retroperitoneum: No enlarged mesenteric or retroperitoneal lymph nodes are identified. Trace fluid is present in the pelvis. There is mild-moderate atherosclerosis. No aortic aneurysm or evidence of an acute aortic syndrome. Bones/Soft Tissues: A tiny, fat-containing umbilical hernia is present. Mild osseous degenerative changes are present. 1. Small left perinephric hematoma at the biopsy site measures up to 5.1 cm. No evidence of active contrast extravasation or mass effect on the kidney. 2. Mild left pleural effusion appears simple. 3. Trace fluid in the pelvis appears simple. 4. Trace pericholecystic fluid/edema is nonspecific without pericholecystic stranding or calcified gallstones to suggest concern for developing cholecystitis. 5. Mild prostatomegaly.     Ct Biopsy Renal    Result Date: 2/15/2021  Patient MRN:  [id number] : 1955 Age: 61 years Gender: Male Order Date:  2/15/2021 12:41 PM EXAM: CT GUIDED NEEDLE PLACEMENT, CT BIOPSY RENAL NUMBER OF IMAGES:  166 INDICATION: R80.9 Proteinuria, unspecified type What reading provider will be dictating this exam?->[facility] COMPARISON: None After obtaining informed consent and following the routine sterile prep and drape. With CT guidance the appropriate entrance for the biopsy was marked. Lidocaine was then injected into this site for local anesthesia. A biopsy needle was inserted into the left renal cortex with CT guidance. 5 core specimens were obtained. The patient tolerated the procedure well. Postprocedure images were unremarkable The procedure was performed under local anesthesia and moderate sedation with 2 mg of versed IV and 100 mcg of fentanyl IV. A timeout was performed at 12:39 PM hours . Patient was monitored for 60 minutes by registered nurse. Successful uncomplicated CT guided biopsy of the left renal cortex. If there are any physician concerns regarding this report, a Radiologist can be reached by calling the following number [phone]. Assessment and Plan  Active Problems:    Neurocardiogenic pre-syncope    Monoclonal gammopathy  Resolved Problems:    * No resolved hospital problems. *    Pre-Syncope  Neurocardiogenic vs Secondary to Anesthesia   Patient given Midazolam and Fentanyl for Renal Biopsy  Hypotensive and tachycardic in the ED; BP 88/ 64 mmHg  Given 1L IV bolus in ED , Narcan and Flumazenil  Observation for now  Hemoglobin 16.6 --> 14.6  Repeat H/H to monitor for possible retroperitoneal bleed  F/u troponins, CMP, lactic acid  Telemetry     Monoclonal Gammopathy  Of Renal Significance  Renal biopsy performed today ; pending pathology  CT abdomen: Small left perinephric hematoma at the biopsy site measures up to 5.1 cm. No evidence of active contrast extravasation or mass effect on the kidney.      DVT / GI prophylaxis: Hold for concerns of Retroperitoneal Bleed and Protonix    Dispo - Observation, Telemetry      Electronically signed by Yaima Lopez MD on 2/15/2021 at 5:39 PM  This case was discussed with attending physician, Dr. Hakan Pringle

## 2021-02-15 NOTE — ED NOTES
Pts dressing from procedure is dry and intact. No ecchymosis or erythema.        Holland Hager RN  02/15/21 7918

## 2021-02-15 NOTE — ED NOTES
Name: Denisse Castillo  : 1955  MRN: [id number]    Date: 2/15/2021    Benefits of immediately proceeding with Radiology exam outweigh the risks and therefore the following is being waived:      [] Pregnancy test    [] Protocol for Iodine allergy    [] MRI questionnaire    [x] BUN/Creatinine        MD Beto Melissa MD  02/15/21 0580

## 2021-02-15 NOTE — PROGRESS NOTES
1030Patient arrived   to Radiology department for  Renal biopsy           . Allergies, home medications, H&P and fasting instructions reviewed with patient. Vital signs taken. IV placed, blood obtained, IV flushed and prn adapter attached. Blood sample sent to lab for ordered tests. Procedural instructions given, questions answered, understanding expressed and consent signed.  Patient given fluoroscopy education, no questions at this time

## 2021-02-15 NOTE — SIGNIFICANT EVENT
Rapid Response Team Note  Date of event: 2/15/2021   Time of event: [address] [place] [address] 61y.o. year old male   YOB: 1955   Admit date:  2/15/2021   Location: 05/05   Witnessed? : [x]Yes  [] No  Monitored? : [x]Yes  [] No  Code status: [x] Full  [] DNR-CCA  []DNR-CC  ______________________________________________________________________  Reason for RRT:    [] RR < 8     [] RR > 28   [x] SpO2 <90%   [] HR < 40 bpm   [] HR > 130 bpm  [x] SBP < 90 mmHg    [x] SpO2 <90%   [] LOC   [] Seizures    [] Significant Bleeding Event    [] Other: AMS    Subjective:   CTSP regarding the above event, patient had a renal biopsy this afternoon and developed altered mental status as well as hypoxia and hypotension after the procedure. RRT was called due to the symptoms. Upon discussion with the interventional radiologist as well as nursing assistance at bedside it was found that patient had received opiates as well as benzodiazepines during the procedure. The patient received both flumazenil as well as Narcan which reversed the patient's altered mental status, hypoxia, and hypotension. This was given by the providers prior to the author arriving at the rapid response. When the author arrived at the rapid response the patient was alert and oriented with vital signs in stable position which are recorded below.   Objective:   Vital signs: Temp: 98.4/BP: 110/75/RR: 14/HR: 98  Initial Condition:  Conscious   [x] Yes  [] No     Breathing [x] Yes  [] No     Pulse  [x] Yes  [] No    Airway:   [x] Open/ Clear     Intervention: [x] None  [] Pooled secretions     [] Suctioned  [] Stridor      [] Intubation    Lungs:   [x] Symmetrical chest rise/ CTABL Intervention: [] None  [] Use of accessory muscles    [] NIV (CPAP/BiPAP)  [] Cyanosis      [x] Nasal Oxygen/Mask  [] Wheezing       [] ABG             [] CXR  [] Other:     Circulation:   Rhythm:  [x] Sinus [] Other:   Intervention: [x] None            [] IV Access  [] [] Transfer to monitor floor  [x] Transfer to: [] MICU [] NICU [] CCU [] SICU [x] Emergency Room    Patients family updated: [x] Yes  [] No   Discussed with:  [x] Critical Care Intensivist: Dr. Tadeo Jones      [] Primary Care Provider:       [] Other: ?    De Hua DO PGY-3  2/15/2021 2:26 PM  Attending Physician: Dr Tadeo Jones

## 2021-02-15 NOTE — ED NOTES
Pt started complaining of nausea and weakness. He became diaphoretic, cold and clammy. He is bradycardic and hypotensive. Dr. Sarah Archuleta notified and at bedside.       Jennifer Mendieta RN  02/15/21 5715

## 2021-02-15 NOTE — BRIEF OP NOTE
Brief Postoperative Note    Niki Salcedo  YOB: 1955  [id number]    Pre-operative Diagnosis and Procedure: 60 yo M with proteinuria. Here for CT guided renal cortex biopsy. Post-operative Diagnosis: Same    Anesthesia: Local    Estimated Blood Loss: < 10 cc    Surgeon: Vero Dangelo MD    Complications: none    Specimen obtained: 5 cores    Findings: Successful CT guided renal core biopsy.     Vero Dangelo MD   2/15/2021 12:32 PM

## 2021-02-15 NOTE — PROGRESS NOTES
1316 pt became unresponsive, shallow breathing with a pulse . RRT called . Placed on NRB mask. Documentation per RRT flowsheet.

## 2021-02-16 VITALS
WEIGHT: 158 LBS | HEIGHT: 70 IN | TEMPERATURE: 98 F | BODY MASS INDEX: 22.62 KG/M2 | DIASTOLIC BLOOD PRESSURE: 64 MMHG | OXYGEN SATURATION: 98 % | HEART RATE: 93 BPM | RESPIRATION RATE: 16 BRPM | SYSTOLIC BLOOD PRESSURE: 100 MMHG

## 2021-02-16 LAB
ANION GAP SERPL CALCULATED.3IONS-SCNC: 7 MMOL/L (ref 7–16)
BASOPHILS ABSOLUTE: 0.02 E9/L (ref 0–0.2)
BASOPHILS RELATIVE PERCENT: 0.2 % (ref 0–2)
BUN BLDV-MCNC: 11 MG/DL (ref 8–23)
CALCIUM SERPL-MCNC: 7.9 MG/DL (ref 8.6–10.2)
CHLORIDE BLD-SCNC: 108 MMOL/L (ref 98–107)
CO2: 26 MMOL/L (ref 22–29)
CREAT SERPL-MCNC: 0.9 MG/DL (ref 0.7–1.2)
EKG ATRIAL RATE: 84 BPM
EKG P AXIS: 66 DEGREES
EKG P-R INTERVAL: 186 MS
EKG Q-T INTERVAL: 398 MS
EKG QRS DURATION: 80 MS
EKG QTC CALCULATION (BAZETT): 470 MS
EKG R AXIS: 35 DEGREES
EKG T AXIS: 53 DEGREES
EKG VENTRICULAR RATE: 84 BPM
EOSINOPHILS ABSOLUTE: 0.06 E9/L (ref 0.05–0.5)
EOSINOPHILS RELATIVE PERCENT: 0.6 % (ref 0–6)
GFR AFRICAN AMERICAN: >60
GFR NON-AFRICAN AMERICAN: >60 ML/MIN/1.73
GLUCOSE BLD-MCNC: 85 MG/DL (ref 74–99)
HCT VFR BLD CALC: 41.7 % (ref 37–54)
HEMOGLOBIN: 13.3 G/DL (ref 12.5–16.5)
IMMATURE GRANULOCYTES #: 0.04 E9/L
IMMATURE GRANULOCYTES %: 0.4 % (ref 0–5)
LYMPHOCYTES ABSOLUTE: 1.74 E9/L (ref 1.5–4)
LYMPHOCYTES RELATIVE PERCENT: 17.6 % (ref 20–42)
MCH RBC QN AUTO: 29.6 PG (ref 26–35)
MCHC RBC AUTO-ENTMCNC: 31.9 % (ref 32–34.5)
MCV RBC AUTO: 92.9 FL (ref 80–99.9)
MONOCYTES ABSOLUTE: 1.01 E9/L (ref 0.1–0.95)
MONOCYTES RELATIVE PERCENT: 10.2 % (ref 2–12)
NEUTROPHILS ABSOLUTE: 7 E9/L (ref 1.8–7.3)
NEUTROPHILS RELATIVE PERCENT: 71 % (ref 43–80)
PDW BLD-RTO: 12.3 FL (ref 11.5–15)
PLATELET # BLD: 232 E9/L (ref 130–450)
PMV BLD AUTO: 10.6 FL (ref 7–12)
POTASSIUM REFLEX MAGNESIUM: 4 MMOL/L (ref 3.5–5)
RBC # BLD: 4.49 E12/L (ref 3.8–5.8)
SODIUM BLD-SCNC: 141 MMOL/L (ref 132–146)
WBC # BLD: 9.9 E9/L (ref 4.5–11.5)

## 2021-02-16 PROCEDURE — 93010 ELECTROCARDIOGRAM REPORT: CPT | Performed by: INTERNAL MEDICINE

## 2021-02-16 PROCEDURE — 99235 HOSP IP/OBS SAME DATE MOD 70: CPT | Performed by: FAMILY MEDICINE

## 2021-02-16 PROCEDURE — 85025 COMPLETE CBC W/AUTO DIFF WBC: CPT

## 2021-02-16 PROCEDURE — G0378 HOSPITAL OBSERVATION PER HR: HCPCS

## 2021-02-16 PROCEDURE — 36415 COLL VENOUS BLD VENIPUNCTURE: CPT

## 2021-02-16 PROCEDURE — 6370000000 HC RX 637 (ALT 250 FOR IP): Performed by: INTERNAL MEDICINE

## 2021-02-16 PROCEDURE — 6370000000 HC RX 637 (ALT 250 FOR IP): Performed by: FAMILY MEDICINE

## 2021-02-16 PROCEDURE — 80048 BASIC METABOLIC PNL TOTAL CA: CPT

## 2021-02-16 RX ADMIN — ROSUVASTATIN 40 MG: 20 TABLET, FILM COATED ORAL at 10:03

## 2021-02-16 RX ADMIN — ACETAMINOPHEN 650 MG: 325 TABLET ORAL at 10:04

## 2021-02-16 ASSESSMENT — PAIN SCALES - GENERAL: PAINLEVEL_OUTOF10: 4

## 2021-02-16 ASSESSMENT — PAIN DESCRIPTION - PAIN TYPE: TYPE: SURGICAL PAIN

## 2021-02-16 NOTE — PROGRESS NOTES
[facility] Medicine Inpatient   Resident Progress Note    S:  Hospital day: 0   Brief Synopsis: Daisy Lozano is a 61 y.o. male with pmh of monoclonal gammopathy, situational syncope and osteoarthritis who presented for renal biopsy. After procedure patient was found to be hypotensive and diaphoretic ; RRT was called. Patient was kept overnight for observation to rule out forming hematoma vs vasovagal syncope vs secondary effects of anesthesia. Patient seen and examined this AM  Doing well , complaints of left flank soreness secondary to procedure. EKG wnl    Cont meds:    sodium chloride 100 mL/hr at 02/15/21 1947     Scheduled meds:    rosuvastatin  40 mg Oral Daily    sodium chloride flush  10 mL Intravenous 2 times per day    lidocaine  1 patch Transdermal Daily     PRN meds: sodium chloride flush, sodium chloride flush, promethazine **OR** ondansetron, polyethylene glycol, acetaminophen **OR** acetaminophen     I reviewed the patient's past medical and surgical history, Medications and Allergies. O:  /70   Pulse 92   Temp 98.3 °F (36.8 °C) (Temporal)   Resp 16   Ht 5' 10\" (1.778 m)   Wt 158 lb (71.7 kg)   SpO2 95%   BMI 22.67 kg/m²   24 hour I&O: I/O last 3 completed shifts: In: 1005 [I.V.:1005]  Out: -   No intake/output data recorded. Physical Exam   Constitutional: He is oriented to person, place, and time. He appears well-developed and well-nourished. HENT:   Head: Normocephalic and atraumatic. Cardiovascular: Normal rate, regular rhythm, normal heart sounds and intact distal pulses. Exam reveals no gallop and no friction rub. No murmur heard. Pulmonary/Chest: Effort normal and breath sounds normal. No respiratory distress. He has no wheezes. He has no rales. He exhibits no tenderness. Abdominal: Soft. Bowel sounds are normal. He exhibits no distension. There is no abdominal tenderness. Musculoskeletal: Normal range of motion.          General: No tenderness, deformity or edema. Comments: Left flank ; non tender. Bandages clean dry and intact   Neurological: He is alert and oriented to person, place, and time. Skin: Skin is warm and dry. No rash noted. No erythema. No pallor. Psychiatric: He has a normal mood and affect. His behavior is normal. Judgment and thought content normal.     Labs:  Na/K/Cl/CO2:  137/4.6/106/25 (02/15 1641)  BUN/Cr/glu/ALT/AST/amyl/lip:  11/0.9/--/31/30/--/-- (02/15 1641)  WBC/Hgb/Hct/Plts:  --/14.5/43.4/-- (02/15 2111)  estimated creatinine clearance is 84 mL/min (based on SCr of 0.9 mg/dL). Other pertinent labs as noted below    Radiology:  CT ABDOMEN PELVIS W IV CONTRAST Additional Contrast? None   Final Result   1. Small left perinephric hematoma at the biopsy site measures up to 5.1 cm. No evidence of active contrast extravasation or mass effect on the kidney. 2. Mild left pleural effusion appears simple. 3. Trace fluid in the pelvis appears simple. 4. Trace pericholecystic fluid/edema is nonspecific without pericholecystic   stranding or calcified gallstones to suggest concern for developing   cholecystitis. 5. Mild prostatomegaly. A/P:  Active Problems:    Neurocardiogenic pre-syncope    Monoclonal gammopathy  Resolved Problems:    * No resolved hospital problems. *       Pre-Syncope  Neurocardiogenic vs Secondary to Anesthesia   Patient given Midazolam and Fentanyl for Renal Biopsy  Hypotensive and tachycardic in the ED; BP 88/ 64 mmHg  Given 1L IV bolus in ED , Narcan and Flumazenil  Observation for now  Hemoglobin 16.6 --> 14.6  Troponins negative. Labs wnl, H/h stable. Telemetry      Monoclonal Gammopathy  Of Renal Significance  Renal biopsy performed today ; pending pathology  CT abdomen: Small left perinephric hematoma at the biopsy site measures up to 5.1 cm. No evidence of active contrast extravasation or mass effect on the kidney.     Hyperlipidemia  Continue home meds    GI/DVT ppx: Protonix, PCDs  Diet: General diet    Electronically signed by Yaima Lopez  PGY-1 on 2/16/2021 at 7:10 AM  This case was discussed with attending physician: Dr. Hakan Pringle

## 2021-02-16 NOTE — PLAN OF CARE
Problem: Pain:  Goal: Pain level will decrease  Description: Pain level will decrease  2/16/2021 1030 by Jael Mueller RN  Outcome: Met This Shift  2/16/2021 0219 by Humera Curtis RN  Outcome: Met This Shift  2/15/2021 2041 by Humera Curtis RN  Outcome: Met This Shift  Goal: Control of acute pain  Description: Control of acute pain  2/16/2021 1030 by Jael Mueller RN  Outcome: Met This Shift  2/16/2021 0219 by Humera Curtis RN  Outcome: Met This Shift  2/15/2021 2041 by Humera Curtis RN  Outcome: Met This Shift  Goal: Control of chronic pain  Description: Control of chronic pain  2/16/2021 1030 by Jael Mueller RN  Outcome: Met This Shift  2/16/2021 0219 by Humera Curtis RN  Outcome: Met This Shift  2/15/2021 2041 by Humera Curtis RN  Outcome: Met This Shift     Problem: Falls - Risk of:  Goal: Will remain free from falls  Description: Will remain free from falls  Outcome: Met This Shift  Goal: Absence of physical injury  Description: Absence of physical injury  Outcome: Met This Shift

## 2021-02-16 NOTE — PLAN OF CARE
Problem: Pain:  Goal: Pain level will decrease  Description: Pain level will decrease  2/16/2021 0219 by Julita Lemon RN  Outcome: Met This Shift  2/15/2021 2041 by Julita Lemon RN  Outcome: Met This Shift  Goal: Control of acute pain  Description: Control of acute pain  2/16/2021 0219 by Julita Lemon RN  Outcome: Met This Shift  2/15/2021 2041 by Julita Lemon RN  Outcome: Met This Shift  Goal: Control of chronic pain  Description: Control of chronic pain  2/16/2021 0219 by Julita Lemon RN  Outcome: Met This Shift  2/15/2021 2041 by Julita Lemon RN  Outcome: Met This Shift

## 2021-02-16 NOTE — PROGRESS NOTES
[address] [facility]  Family Medicine Attending    S: 61 y.o. male with PMH of HLD, Situational Syncope and Osteoarthritis who presents to ED for Altered Mental Status. Recently noted to have nephrotic range proteinuria with monoclonal gammopathy and had renal biopsy PTA. Developed hypotension in recovery and admitted for observation. Has had previous episodes of cardiogenic syncope, and this current episode appears similar. Was given flumazenil and narcan in ER. This morning, feels fine. No lightheadedness with going to bathroom. Some flank pain    O: VS- Blood pressure 105/70, pulse 92, temperature 98.3 °F (36.8 °C), temperature source Temporal, resp. rate 16, height 5' 10\" (1.778 m), weight 158 lb (71.7 kg), SpO2 95 %. Exam is as noted by resident with the following changes, additions or corrections:  Awake, alert, NAD  Heart - RRR without murmurs  Lungs- clear    Impressions: Active Problems:    Neurocardiogenic pre-syncope    Monoclonal gammopathy  Resolved Problems:    * No resolved hospital problems. *      Plan: Will have him walk in the hallway. If no further symptoms, OK for discharge     Attending Physician Statement  I have reviewed the chart and seen the patient with the resident(s). I personally reviewed images, EKG's and similar tests, if present. I personally reviewed and performed key elements of the history and exam.  I have reviewed and confirmed student and/or resident history and exam with changes as indicated above. I agree with the assessment, plan and orders as documented by the resident. Please refer to the resident and/or student note for additional information.       Samuel Harrington

## 2021-02-17 NOTE — DISCHARGE SUMMARY
Discharge Summary    Vitor Hobson  :  1955  MRN:  [id number]    Admit date:  2/15/2021  Discharge date:  2021    Admitting Physician:  Javier Núñez MD      Admission Condition:  fair    Discharged Condition:  good    Discharge Diagnoses:   Patient Active Problem List   Diagnosis    Hyperlipidemia    Situational syncope    Sciatica    Primary osteoarthritis of right knee    Right shoulder pain    Primary osteoarthritis involving multiple joints    Neurocardiogenic pre-syncope    Monoclonal gammopathy       Hospital Course:     Vitor Hobson is a 61 y.o. male with pmh of monoclonal gammopathy, osteoarthritis, situational syncope, hyperlipidemia who presented to [place] [id number] for renal biopsy of left kidney. Patient was recovering in the ED, when he became short of breath, hypotensive and diaphoretic. RRT was called and patient was bolused with 1 L NS, given Narcan and Flumazenil. CT abdomen revealed small left perinephric hematoma at the biopsy site which measured 5.1 cm. Troponins, H/H and electrolytes were normal. The patient's course was otherwise uneventful. He progressed well, pain was controlled on PO medications. He was tolerating a regular diet with no nausea or vomiting, and was in a suitable condition for discharge to home with appropriate follow ups with PCP. Discharge Medications:         Medication List      CONTINUE taking these medications    aspirin 81 MG tablet     fexofenadine-pseudoephedrine  MG per extended release tablet  Commonly known as: Allegra-D 12 Hour  Take 1 tablet by mouth 2 times daily as needed. fluticasone 50 MCG/ACT nasal spray  Commonly known as: FLONASE  1 spray by Each Nostril route daily     ibuprofen 600 MG tablet  Commonly known as: ADVIL;MOTRIN  Take 1 tablet by mouth every 6 hours as needed for Pain     miconazole 2 % cream  Commonly known as: MICOTIN  Apply topically 2 times daily.      rosuvastatin 40 MG tablet  Commonly known as: CRESTOR  TAKE ONE TABLET BY MOUTH DAILY     therapeutic multivitamin-minerals tablet     VITAMIN C PO     VITAMIN D PO     VITAMIN E PO        STOP taking these medications    Shingrix 50 MCG/0.5ML Susr injection  Generic drug: zoster recombinant adjuvanted vaccine     tamsulosin 0.4 MG capsule  Commonly known as: FLOMAX            Consults:  none    Significant Diagnostic Studies:      Labs:  Na/K/Cl/CO2:  141/4.0/108/26 ( 6641)  BUN/Cr/glu/ALT/AST/amyl/lip:  11/0.9/--/--/--/--/-- ( 5710)  WBC/Hgb/Hct/Plts:  9.9/13.3/41.7/232 (2162)  [unfilled]  estimated creatinine clearance is 84 mL/min (based on SCr of 0.9 mg/dL). New Imaging:  Ct Guided Needle Placement    Result Date: 2/15/2021  Patient MRN:  [id number] : 1955 Age: 61 years Gender: Male Order Date:  2/15/2021 12:41 PM EXAM: CT GUIDED NEEDLE PLACEMENT, CT BIOPSY RENAL NUMBER OF IMAGES:  166 INDICATION: R80.9 Proteinuria, unspecified type What reading provider will be dictating this exam?->[facility] COMPARISON: None After obtaining informed consent and following the routine sterile prep and drape. With CT guidance the appropriate entrance for the biopsy was marked. Lidocaine was then injected into this site for local anesthesia. A biopsy needle was inserted into the left renal cortex with CT guidance. 5 core specimens were obtained. The patient tolerated the procedure well. Postprocedure images were unremarkable The procedure was performed under local anesthesia and moderate sedation with 2 mg of versed IV and 100 mcg of fentanyl IV. A timeout was performed at 12:39 PM hours . Patient was monitored for 60 minutes by registered nurse. Successful uncomplicated CT guided biopsy of the left renal cortex. If there are any physician concerns regarding this report, a Radiologist can be reached by calling the following number [phone].     Ct Abdomen Pelvis W Iv Contrast Additional Contrast? None    Result Date: 2/15/2021  EXAMINATION: CT OF THE ABDOMEN AND PELVIS WITH CONTRAST 2/15/2021 1:18 pm TECHNIQUE: CT of the abdomen and pelvis was performed with the administration of intravenous contrast. Multiplanar reformatted images are provided for review. Dose modulation, iterative reconstruction, and/or weight based adjustment of the mA/kV was utilized to reduce the radiation dose to as low as reasonably achievable. COMPARISON: CT-guided left renal biopsy 02/15/2021. CT abdomen/pelvis with contrast 04/28/2019. HISTORY: ORDERING SYSTEM PROVIDED HISTORY: s/p renal biopsy, r/o bleed TECHNOLOGIST PROVIDED HISTORY: Reason for exam:->s/p renal biopsy, r/o bleed Additional Contrast?->None Decision Support Exception->Emergency Medical Condition (MA) FINDINGS: Lower Chest: The visualized portions of the heart appear normal without significant pericardial effusion. Mild left pleural effusion. Atelectasis/scarring is present in the lung bases. Organs: Liver lesions ranging in size from 0.4 cm to 1.0 cm demonstrate benign imaging features (sharp margins and homogeneous low attenuation </=20 HU); no follow-up is necessary. There is trace pericholecystic fluid/edema. The gallbladder otherwise appears normal.  No biliary ductal dilation. The pancreas appears normal.  The adrenal glands appear normal without identified nodules. The spleen appears normal.  The kidneys appear normal in size without hydronephrosis or nephrolithiasis. A homogeneous structure in the right kidney measures up to 7.1 cm and is compatible with likely benign cyst (with thin/imperceptible wall, no evidence of fat, mural nodularity, septation or calcification, and density measuring -10-20 HU); no follow-up is necessary. Tiny homogeneous structures in the kidneys are too small to characterize (measuring <1Q slice thickness); subjectively, these appear to likely represent benign cysts, no follow-up is necessary.   A tract of air extending from the left posterior flank to the posterior lower pole of the left kidney is compatible with the recent percutaneous biopsy tract. A poorly defined hyperattenuating fluid collection adjacent to the kidney at the site of the biopsy is compatible with a perinephric hematoma, measuring 5.1 x 2.8 x 1.8 cm. There is trace adjacent simple appearing fluid. No retroperitoneal hemorrhage is identified elsewhere. No subcapsular hematoma or mass effect on the kidney. No active contrast extravasation is identified. The ureters appear normal. GI/Bowel: No evidence of bowel obstruction or significant inflammatory changes. Mild-moderate volume of fecal material is present in the colon. The appendix is visualized and appears normal. Pelvis: The bladder is mildly distended without significant wall thickening or intraluminal calculi. The prostate measures 4.5 x 4.0 x 3.4 cm with estimated volume of 32 mL (normal is </= 30 mL) with up to 0.8 cm protrusion of the median lobe into the bladder base. Peritoneum/Retroperitoneum: No enlarged mesenteric or retroperitoneal lymph nodes are identified. Trace fluid is present in the pelvis. There is mild-moderate atherosclerosis. No aortic aneurysm or evidence of an acute aortic syndrome. Bones/Soft Tissues: A tiny, fat-containing umbilical hernia is present. Mild osseous degenerative changes are present. 1. Small left perinephric hematoma at the biopsy site measures up to 5.1 cm. No evidence of active contrast extravasation or mass effect on the kidney. 2. Mild left pleural effusion appears simple. 3. Trace fluid in the pelvis appears simple. 4. Trace pericholecystic fluid/edema is nonspecific without pericholecystic stranding or calcified gallstones to suggest concern for developing cholecystitis. 5. Mild prostatomegaly.     Ct Biopsy Renal    Result Date: 2/15/2021  Patient MRN:  [id number] : 1955 Age: 61 years Gender: Male Order Date:  2/15/2021 12:41 PM EXAM: CT GUIDED NEEDLE PLACEMENT, CT BIOPSY RENAL NUMBER OF IMAGES:  166 INDICATION: R80.9 Proteinuria, unspecified type What reading provider will be dictating this exam?->[facility] COMPARISON: None After obtaining informed consent and following the routine sterile prep and drape. With CT guidance the appropriate entrance for the biopsy was marked. Lidocaine was then injected into this site for local anesthesia. A biopsy needle was inserted into the left renal cortex with CT guidance. 5 core specimens were obtained. The patient tolerated the procedure well. Postprocedure images were unremarkable The procedure was performed under local anesthesia and moderate sedation with 2 mg of versed IV and 100 mcg of fentanyl IV. A timeout was performed at 12:39 PM hours . Patient was monitored for 60 minutes by registered nurse. Successful uncomplicated CT guided biopsy of the left renal cortex. If there are any physician concerns regarding this report, a Radiologist can be reached by calling the following number [phone]. Treatments:   IV hydration and Narcan and Flumazenil    Discharge Exam:  Per progress note day of discharge    Disposition:   home    Future Appointments   Date Time Provider Ana Simms   2/22/2021  2:00 PM MD [facility]   3/1/2021 11:00 AM [address] [place] [address] [address]   3/1/2021 11:00 AM Kevin Wheatley MD [facility]       More than 30 minutes was spent in preparation of this patient's discharge including, but not limited to, examination, preparation of documents, prescription preparation, counseling and coordination.     Signed:  Lillard Goldmann, MD  2/16/2021, 8:12 PM

## 2021-02-22 ENCOUNTER — OFFICE VISIT (OUTPATIENT)
Dept: FAMILY MEDICINE CLINIC | Age: 66
End: 2021-02-22
Payer: COMMERCIAL

## 2021-02-22 VITALS
HEART RATE: 91 BPM | WEIGHT: 162.3 LBS | HEIGHT: 70 IN | SYSTOLIC BLOOD PRESSURE: 104 MMHG | OXYGEN SATURATION: 96 % | TEMPERATURE: 98 F | DIASTOLIC BLOOD PRESSURE: 65 MMHG | BODY MASS INDEX: 23.24 KG/M2

## 2021-02-22 DIAGNOSIS — D47.2 MONOCLONAL GAMMOPATHY: ICD-10-CM

## 2021-02-22 DIAGNOSIS — E78.5 HYPERLIPIDEMIA, UNSPECIFIED HYPERLIPIDEMIA TYPE: Chronic | ICD-10-CM

## 2021-02-22 DIAGNOSIS — M15.9 PRIMARY OSTEOARTHRITIS INVOLVING MULTIPLE JOINTS: ICD-10-CM

## 2021-02-22 DIAGNOSIS — R55 SITUATIONAL SYNCOPE: Primary | ICD-10-CM

## 2021-02-22 PROCEDURE — 99212 OFFICE O/P EST SF 10 MIN: CPT | Performed by: FAMILY MEDICINE

## 2021-02-22 PROCEDURE — 99215 OFFICE O/P EST HI 40 MIN: CPT | Performed by: FAMILY MEDICINE

## 2021-02-22 PROCEDURE — 1111F DSCHRG MED/CURRENT MED MERGE: CPT | Performed by: FAMILY MEDICINE

## 2021-02-22 ASSESSMENT — PATIENT HEALTH QUESTIONNAIRE - PHQ9
SUM OF ALL RESPONSES TO PHQ QUESTIONS 1-9: 0
SUM OF ALL RESPONSES TO PHQ9 QUESTIONS 1 & 2: 0

## 2021-02-22 NOTE — PROGRESS NOTES
rosuvastatin (CRESTOR) 40 MG tablet  TAKE ONE TABLET BY MOUTH DAILY             VITAMIN D PO  Take 1 capsule by mouth daily             VITAMIN E PO  Take 1 capsule by mouth daily                   Medications marked \"taking\" at this time  Outpatient Medications Marked as Taking for the 2/22/21 encounter (Office Visit) with Aleshia King MD   Medication Sig Dispense Refill    fluticasone (FLONASE) 50 MCG/ACT nasal spray 1 spray by Each Nostril route daily 2 Bottle 1    miconazole (MICOTIN) 2 % cream Apply topically 2 times daily. 28 g 1    rosuvastatin (CRESTOR) 40 MG tablet TAKE ONE TABLET BY MOUTH DAILY 90 tablet 0    VITAMIN E PO Take 1 capsule by mouth daily      Multiple Vitamins-Minerals (THERAPEUTIC MULTIVITAMIN-MINERALS) tablet Take 1 tablet by mouth daily      Ascorbic Acid (VITAMIN C PO) Take 1 tablet by mouth daily      VITAMIN D PO Take 1 capsule by mouth daily      aspirin 81 MG tablet Take 81 mg by mouth daily      ibuprofen (ADVIL;MOTRIN) 600 MG tablet Take 1 tablet by mouth every 6 hours as needed for Pain 60 tablet 1    fexofenadine-pseudoephedrine (ALLEGRA-D 12 HOUR)  MG per tablet Take 1 tablet by mouth 2 times daily as needed. 60 tablet 2        Medications patient taking as of now reconciled against medications ordered at time of hospital discharge: Yes    Chief Complaint   Patient presents with   [address] from Hospital       History of Present illness - Follow up of Hospital diagnosis(es): syncope after kidney biopsy    Inpatient course: Discharge summary reviewed- see chart. Interval history/Current status: Feels fine,  Notes increase swelling in ankles and hands,  No dizziness or lightheadedness    A comprehensive review of systems was negative.      Vitals:    02/22/21 1353   BP: 104/65   Site: Left Upper Arm   Position: Sitting   Cuff Size: Medium Adult   Pulse: 91   Temp: 98 °F (36.7 °C)   TempSrc: Temporal   SpO2: 96%   Weight: 162 lb 4.8 oz (73.6 kg) Height: 5' 10\" (1.778 m)     Body mass index is 23.29 kg/m². Wt Readings from Last 3 Encounters:   02/22/21 162 lb 4.8 oz (73.6 kg)   02/15/21 158 lb (71.7 kg)   02/15/21 158 lb (71.7 kg)     BP Readings from Last 3 Encounters:   02/22/21 104/65   02/15/21 85/60   02/16/21 100/64        Physical Exam:  General Appearance: alert and oriented to person, place and time, well-developed and well-nourished, in no acute distress  Skin: warm and dry, no rash or erythema  Pulmonary/Chest: clear to auscultation bilaterally- no wheezes, rales or rhonchi, normal air movement, no respiratory distress  Cardiovascular: normal rate, normal S1 and S2, no gallops, intact distal pulses and no carotid bruits  Extremities: no cyanosis, no clubbing and 2 + edema-  bilateral , worse on left leg. Right hand with slight swelling    Assessment/Plan:  1. Situational syncope  Likely related to medications, underlying hypotension  No further symptoms    2. Primary osteoarthritis involving multiple joints  stable    3. Hyperlipidemia, unspecified hyperlipidemia type  Stable, but not adequately treated    4. Monoclonal gammopathy  Await kidney biopsy results, appointment with hematology next week        Medical Decision Making: moderate complexity     I spoke with patient and wife, explaining recent hospitalization and current differential.  I explained the potential differential and probable course of exam/treatment.

## 2021-03-01 ENCOUNTER — HOSPITAL ENCOUNTER (OUTPATIENT)
Dept: INFUSION THERAPY | Age: 66
Discharge: HOME | End: 2021-03-01
Payer: COMMERCIAL

## 2021-03-01 ENCOUNTER — HOSPITAL ENCOUNTER (OUTPATIENT)
Age: 66
Discharge: HOME | End: 2021-03-01
Payer: COMMERCIAL

## 2021-03-01 ENCOUNTER — OFFICE VISIT (OUTPATIENT)
Dept: ONCOLOGY | Age: 66
End: 2021-03-01
Payer: COMMERCIAL

## 2021-03-01 VITALS
DIASTOLIC BLOOD PRESSURE: 77 MMHG | BODY MASS INDEX: 22.86 KG/M2 | WEIGHT: 159.7 LBS | RESPIRATION RATE: 18 BRPM | TEMPERATURE: 97.8 F | OXYGEN SATURATION: 94 % | HEIGHT: 70 IN | SYSTOLIC BLOOD PRESSURE: 121 MMHG | HEART RATE: 102 BPM

## 2021-03-01 DIAGNOSIS — E85.81 LIGHT CHAIN (AL) AMYLOIDOSIS (HCC): Primary | ICD-10-CM

## 2021-03-01 DIAGNOSIS — E85.81 LIGHT CHAIN (AL) AMYLOIDOSIS (HCC): ICD-10-CM

## 2021-03-01 LAB
ALBUMIN SERPL-MCNC: 2.6 G/DL (ref 3.5–5.2)
ALP BLD-CCNC: 90 U/L (ref 40–129)
ALT SERPL-CCNC: 33 U/L (ref 0–40)
ANION GAP SERPL CALCULATED.3IONS-SCNC: 9 MMOL/L (ref 7–16)
AST SERPL-CCNC: 47 U/L (ref 0–39)
BASOPHILS ABSOLUTE: 0.05 E9/L (ref 0–0.2)
BASOPHILS RELATIVE PERCENT: 0.8 % (ref 0–2)
BILIRUB SERPL-MCNC: <0.2 MG/DL (ref 0–1.2)
BUN BLDV-MCNC: 14 MG/DL (ref 8–23)
CALCIUM SERPL-MCNC: 8.8 MG/DL (ref 8.6–10.2)
CHLORIDE BLD-SCNC: 107 MMOL/L (ref 98–107)
CO2: 25 MMOL/L (ref 22–29)
CREAT SERPL-MCNC: 0.9 MG/DL (ref 0.7–1.2)
EOSINOPHILS ABSOLUTE: 0.07 E9/L (ref 0.05–0.5)
EOSINOPHILS RELATIVE PERCENT: 1.1 % (ref 0–6)
GFR AFRICAN AMERICAN: >60
GFR NON-AFRICAN AMERICAN: >60 ML/MIN/1.73
GLUCOSE BLD-MCNC: 91 MG/DL (ref 74–99)
HCT VFR BLD CALC: 45.6 % (ref 37–54)
HEMOGLOBIN: 15.3 G/DL (ref 12.5–16.5)
IMMATURE GRANULOCYTES #: 0.01 E9/L
IMMATURE GRANULOCYTES %: 0.2 % (ref 0–5)
INR BLD: 0.9
LACTATE DEHYDROGENASE: 266 U/L (ref 135–225)
LYMPHOCYTES ABSOLUTE: 1.54 E9/L (ref 1.5–4)
LYMPHOCYTES RELATIVE PERCENT: 25 % (ref 20–42)
MCH RBC QN AUTO: 30.2 PG (ref 26–35)
MCHC RBC AUTO-ENTMCNC: 33.6 % (ref 32–34.5)
MCV RBC AUTO: 90.1 FL (ref 80–99.9)
MONOCYTES ABSOLUTE: 0.44 E9/L (ref 0.1–0.95)
MONOCYTES RELATIVE PERCENT: 7.1 % (ref 2–12)
NEUTROPHILS ABSOLUTE: 4.06 E9/L (ref 1.8–7.3)
NEUTROPHILS RELATIVE PERCENT: 65.8 % (ref 43–80)
PDW BLD-RTO: 11.9 FL (ref 11.5–15)
PLATELET # BLD: 310 E9/L (ref 130–450)
PMV BLD AUTO: 9.5 FL (ref 7–12)
POTASSIUM SERPL-SCNC: 4.5 MMOL/L (ref 3.5–5)
PRO-BNP: 491 PG/ML (ref 0–125)
PROTHROMBIN TIME: 10.7 SEC (ref 9.3–12.4)
RBC # BLD: 5.06 E12/L (ref 3.8–5.8)
SODIUM BLD-SCNC: 141 MMOL/L (ref 132–146)
TROPONIN: 0.01 NG/ML (ref 0–0.03)
TSH SERPL DL<=0.05 MIU/L-ACNC: 3.88 UIU/ML (ref 0.27–4.2)
WBC # BLD: 6.2 E9/L (ref 4.5–11.5)

## 2021-03-01 PROCEDURE — 85610 PROTHROMBIN TIME: CPT

## 2021-03-01 PROCEDURE — 83883 ASSAY NEPHELOMETRY NOT SPEC: CPT

## 2021-03-01 PROCEDURE — 36415 COLL VENOUS BLD VENIPUNCTURE: CPT

## 2021-03-01 PROCEDURE — 84484 ASSAY OF TROPONIN QUANT: CPT

## 2021-03-01 PROCEDURE — 86334 IMMUNOFIX E-PHORESIS SERUM: CPT

## 2021-03-01 PROCEDURE — 84443 ASSAY THYROID STIM HORMONE: CPT

## 2021-03-01 PROCEDURE — 83615 LACTATE (LD) (LDH) ENZYME: CPT

## 2021-03-01 PROCEDURE — 80053 COMPREHEN METABOLIC PANEL: CPT

## 2021-03-01 PROCEDURE — 83880 ASSAY OF NATRIURETIC PEPTIDE: CPT

## 2021-03-01 PROCEDURE — 99214 OFFICE O/P EST MOD 30 MIN: CPT

## 2021-03-01 PROCEDURE — 82232 ASSAY OF BETA-2 PROTEIN: CPT

## 2021-03-01 PROCEDURE — 85260 CLOT FACTOR X STUART-POWER: CPT

## 2021-03-01 PROCEDURE — 84165 PROTEIN E-PHORESIS SERUM: CPT

## 2021-03-01 PROCEDURE — 82784 ASSAY IGA/IGD/IGG/IGM EACH: CPT

## 2021-03-01 PROCEDURE — 85025 COMPLETE CBC W/AUTO DIFF WBC: CPT

## 2021-03-01 NOTE — PROGRESS NOTES
[address]  [address] [place] [address]   Hematology/Oncology  Consult      Patient Name: Taina Soliman  YOB: 1955  PCP: Stefanie Valle MD   Referring Provider:      Reason for Consultation:   Chief Complaint   Patient presents with    New Patient     Amyloidsis -AL Lambda Light Chain Type        History of Present Illness: Here for consultation  This patient was referred by Dr. Renata Vaughan with recent diagnosis of amyloidosis AL Lambda Light Chain Type. He was referred to the kidney group for evaluation regarding overt proteinuria. He has a history of benign prostatic hypertrophy. He has osteoarthritis and chronic pain and uses NSAIDs. Lately he started complaining of swelling around the ankles. 24-hour urine collection showed nephrotic range proteinuria with 5.27 g of protein in a 24-hour. He had renal biopsy left kidney on February 15, 2021 and afterwards he became somewhat hypotensive diaphoretic. He had to be revived with Narcan then he was sent to the ER. A CT of the abdomen revealed small left perinephrotic hematoma at the biopsy site. PATHOLOGY:  2/15/2021  Kidney biopsy: Amyloidosis, AL Lambda Light Chain Type.      Comment:    [facility] see outside report for additional details         Diagnostic Data:     Past Medical History:   Diagnosis Date    Arthritis     Derangement of medial meniscus of right knee 12/2016    MRI Right Knee DMXI 2/4/2016    Hyperlipidemia 12/2/2010    Situational syncope 12/2/2010       Patient Active Problem List    Diagnosis Date Noted    Neurocardiogenic pre-syncope 02/15/2021    Monoclonal gammopathy 02/15/2021    Primary osteoarthritis involving multiple joints 12/28/2020    Right shoulder pain 01/15/2020    Primary osteoarthritis of right knee 09/13/2016    Sciatica 12/06/2010    Hyperlipidemia 12/02/2010    Situational syncope 12/02/2010 [Ezetimibe-Simvastatin]      Muscle cramps         Review of Systems:    Constitutional:  No fever chills or rigors. Eyes: No changes in vision, discharge, or pain  ENT: No Headaches, hearing loss or vertigo. No mouth sores or sore throat. No change in taste or smell. Cardiovascular: No chest discomfort, dyspnea on exertion, palpitations or loss of consciousness. or phlebitis. Respiratory: Has no cough or wheezing, Has no sputum production. Has no hemoptysis, Has no pleuritic pain, . Gastrointestinal: No abdominal pain, appetite loss, blood in stools. No change in bowel habits. No hematemesis   Genitourinary: Patient acknowledges no dysuria, trouble voiding, or hematuria. No nocturia or increased frequency. Musculoskeletal: No gait disturbance, weakness or joint complaints. Integumentary: No rash or pruritis. Neurological: No headache, diplopia, change in muscle strength, numbness or tingling. No change in gait, balance, coordination, mood, affect, memory, mentation, behavior. Psychiatric: No anxiety, or depression. Endocrine: No temperature intolerance. No excessive thirst, fluid intake, or urination. No tremor. Hematologic/Lymphatic: No abnormal bruising or bleeding, blood clots or swollen lymph nodes. Allergic/Immunologic: No nasal congestion or hives. Objective  /77   Pulse 102   Temp 97.8 °F (36.6 °C)   Resp 18   Ht 5' 10\" (1.778 m)   Wt 159 lb 11.2 oz (72.4 kg)   SpO2 94%   BMI 22.91 kg/m²     Physical Exam:  General: AAO to person, place, time, cooperative, in no acute distress. Head and neck: PERRLA, EOMI. Sclera non icteric. Oropharynx: Clear. Neck: no JVD,  no adenopathy,   BREASTS : No lumps or palpable masses. Heart: Regular rate and regular rhythm, no murmur. Lungs: Clear to auscultation. Abdomen: Soft, non-tender;no masses, no organomegaly. Extremities: 2+ edema,no cyanosis, no clubbing. Neurologic:Cranial nerves grossly intact.  No focal motor or sensory deficits . Skin:  No rash. Recent Laboratory Data-   Lab Results   Component Value Date    WBC 9.9 2021    HGB 13.3 2021    HCT 41.7 2021    MCV 92.9 2021     2021    LYMPHOPCT 17.6 (L) 2021    RBC 4.49 2021    MCH 29.6 2021    MCHC 31.9 (L) 2021    RDW 12.3 2021    NEUTOPHILPCT 71.0 2021    MONOPCT 10.2 2021    BASOPCT 0.2 2021    NEUTROABS 7.00 2021    LYMPHSABS 1.74 2021    MONOSABS 1.01 (H) 2021    EOSABS 0.06 2021    BASOSABS 0.02 2021       Lab Results   Component Value Date     2021    K 4.0 2021     (H) 2021    CO2 26 2021    BUN 11 2021    CREATININE 0.9 2021    GLUCOSE 85 2021    CALCIUM 7.9 (L) 2021    PROT 4.6 (L) 02/15/2021    LABALBU 2.2 (L) 02/15/2021    BILITOT 0.2 02/15/2021    ALKPHOS 74 02/15/2021    AST 30 02/15/2021    ALT 31 02/15/2021    LABGLOM >60 2021    GFRAA >60 2021       No results found for: IRON, TIBC, FERRITIN        Radiology-  CT Guided Needle Placement  Result Date: 2/15/2021  Patient MRN:  [id number] : 1955 Age: 61 years Gender: Male Order Date:  2/15/2021 12:41 PM EXAM: CT GUIDED NEEDLE PLACEMENT, CT BIOPSY RENAL NUMBER OF IMAGES:  166 INDICATION: R80.9 Proteinuria, unspecified type What reading provider will be dictating this exam?->[facility] COMPARISON: None After obtaining informed consent and following the routine sterile prep and drape. With CT guidance the appropriate entrance for the biopsy was marked. Lidocaine was then injected into this site for local anesthesia. A biopsy needle was inserted into the left renal cortex with CT guidance. 5 core specimens were obtained. The patient tolerated the procedure well. Postprocedure images were unremarkable The procedure was performed under local anesthesia and moderate sedation with 2 mg of versed IV and 100 mcg of fentanyl IV.  A timeout was performed at 12:39 PM hours . Patient was monitored for 60 minutes by registered nurse. Successful uncomplicated CT guided biopsy of the left renal cortex. If there are any physician concerns regarding this report, a Radiologist can be reached by calling the following number [phone]. CT Abdomen Pelvis W Iv Contrast Additional Contrast? None  Result Date: 2/15/2021  EXAMINATION: CT OF THE ABDOMEN AND PELVIS WITH CONTRAST 2/15/2021 1:18 pm TECHNIQUE: CT of the abdomen and pelvis was performed with the administration of intravenous contrast. Multiplanar reformatted images are provided for review. Dose modulation, iterative reconstruction, and/or weight based adjustment of the mA/kV was utilized to reduce the radiation dose to as low as reasonably achievable. COMPARISON: CT-guided left renal biopsy 02/15/2021. CT abdomen/pelvis with contrast 04/28/2019. HISTORY: ORDERING SYSTEM PROVIDED HISTORY: s/p renal biopsy, r/o bleed TECHNOLOGIST PROVIDED HISTORY: Reason for exam:->s/p renal biopsy, r/o bleed Additional Contrast?->None Decision Support Exception->Emergency Medical Condition (MA) FINDINGS: Lower Chest: The visualized portions of the heart appear normal without significant pericardial effusion. Mild left pleural effusion. Atelectasis/scarring is present in the lung bases. Organs: Liver lesions ranging in size from 0.4 cm to 1.0 cm demonstrate benign imaging features (sharp margins and homogeneous low attenuation </=20 HU); no follow-up is necessary. There is trace pericholecystic fluid/edema. The gallbladder otherwise appears normal.  No biliary ductal dilation. The pancreas appears normal.  The adrenal glands appear normal without identified nodules. The spleen appears normal.  The kidneys appear normal in size without hydronephrosis or nephrolithiasis.   A homogeneous structure in the right kidney measures up to 7.1 cm and is compatible with likely benign cyst (with thin/imperceptible wall, no evidence of fat, mural nodularity, septation or calcification, and density measuring -10-20 HU); no follow-up is necessary. Tiny homogeneous structures in the kidneys are too small to characterize (measuring <2C slice thickness); subjectively, these appear to likely represent benign cysts, no follow-up is necessary. A tract of air extending from the left posterior flank to the posterior lower pole of the left kidney is compatible with the recent percutaneous biopsy tract. A poorly defined hyperattenuating fluid collection adjacent to the kidney at the site of the biopsy is compatible with a perinephric hematoma, measuring 5.1 x 2.8 x 1.8 cm. There is trace adjacent simple appearing fluid. No retroperitoneal hemorrhage is identified elsewhere. No subcapsular hematoma or mass effect on the kidney. No active contrast extravasation is identified. The ureters appear normal. GI/Bowel: No evidence of bowel obstruction or significant inflammatory changes. Mild-moderate volume of fecal material is present in the colon. The appendix is visualized and appears normal. Pelvis: The bladder is mildly distended without significant wall thickening or intraluminal calculi. The prostate measures 4.5 x 4.0 x 3.4 cm with estimated volume of 32 mL (normal is </= 30 mL) with up to 0.8 cm protrusion of the median lobe into the bladder base. Peritoneum/Retroperitoneum: No enlarged mesenteric or retroperitoneal lymph nodes are identified. Trace fluid is present in the pelvis. There is mild-moderate atherosclerosis. No aortic aneurysm or evidence of an acute aortic syndrome. Bones/Soft Tissues: A tiny, fat-containing umbilical hernia is present. Mild osseous degenerative changes are present. 1. Small left perinephric hematoma at the biopsy site measures up to 5.1 cm. No evidence of active contrast extravasation or mass effect on the kidney. 2. Mild left pleural effusion appears simple.    3. Trace fluid in the pelvis done to exclude cardiac amyloidosis. TSH  Factor X assay as well as PT and APTT will be done    Will be scheduled for a bone marrow aspirate and biopsy    Once work-up completed options of therapy with bortezomib/daratumumab will be addressed    He is encouraged to receive the COVID-19 vaccine              Meagan Tijerinaor. Deandre Vásquez M.D., F.A.C.P.   Electronically signed 3/1/2021 at 7:09 AM

## 2021-03-03 ENCOUNTER — HOSPITAL ENCOUNTER (OUTPATIENT)
Age: 66
Discharge: HOME | End: 2021-03-03
Payer: COMMERCIAL

## 2021-03-03 ENCOUNTER — IMMUNIZATION (OUTPATIENT)
Dept: PRIMARY CARE CLINIC | Age: 66
End: 2021-03-03
Payer: COMMERCIAL

## 2021-03-03 LAB
ALBUMIN SERPL-MCNC: 2 G/DL (ref 3.5–4.7)
ALPHA-1-GLOBULIN: 0.2 G/DL (ref 0.2–0.4)
ALPHA-2-GLOBULIN: 1.1 G/FL (ref 0.5–1)
BETA GLOBULIN: 1 G/DL (ref 0.8–1.3)
ELECTROPHORESIS: ABNORMAL
GAMMA GLOBULIN: 0.4 G/DL (ref 0.7–1.6)
IGA: 141 MG/DL (ref 70–400)
IGG: 265 MG/DL (ref 700–1600)
IGM: 46 MG/DL (ref 40–230)
IMMUNOFIXATION RESULT, SERUM: NORMAL
TOTAL PROTEIN: 4.7 G/DL (ref 6.4–8.3)

## 2021-03-03 PROCEDURE — 84166 PROTEIN E-PHORESIS/URINE/CSF: CPT

## 2021-03-03 PROCEDURE — 0001A COVID-19, PFIZER VACCINE 30MCG/0.3ML DOSE: CPT | Performed by: NURSE PRACTITIONER

## 2021-03-03 PROCEDURE — 86334 IMMUNOFIX E-PHORESIS SERUM: CPT

## 2021-03-03 PROCEDURE — 91300 COVID-19, PFIZER VACCINE 30MCG/0.3ML DOSE: CPT | Performed by: NURSE PRACTITIONER

## 2021-03-04 LAB
BETA-2 MICROGLOBULIN: 13.6 MG/L (ref 0.6–2.4)
KAPPA FREE LIGHT CHAINS QNT: 14.11 MG/L (ref 3.3–19.4)
KAPPA/LAMBDA FREE LIGHT CHAIN RATIO: 0.05 (ref 0.26–1.65)
LAMBDA FREE LIGHT CHAINS QNT: 311.53 MG/L (ref 5.71–26.3)

## 2021-03-05 LAB
ADDENDUM ELECTROPHORESIS URINE RANDOM: NORMAL
FACTOR X ACTIVITY: 108 % (ref 81–157)
IMMUNOFIXATION URINE: NORMAL

## 2021-03-08 ENCOUNTER — TELEPHONE (OUTPATIENT)
Dept: ONCOLOGY | Age: 66
End: 2021-03-08

## 2021-03-08 NOTE — TELEPHONE ENCOUNTER
Spoke to patient gave appt date 3/24/21 @ [facility] for Echo,advised no lotion or powder to chest area. Patient verbally understood.

## 2021-03-11 ENCOUNTER — HOSPITAL ENCOUNTER (OUTPATIENT)
Age: 66
Discharge: HOME | End: 2021-03-13
Payer: COMMERCIAL

## 2021-03-11 PROCEDURE — U0003 INFECTIOUS AGENT DETECTION BY NUCLEIC ACID (DNA OR RNA); SEVERE ACUTE RESPIRATORY SYNDROME CORONAVIRUS 2 (SARS-COV-2) (CORONAVIRUS DISEASE [COVID-19]), AMPLIFIED PROBE TECHNIQUE, MAKING USE OF HIGH THROUGHPUT TECHNOLOGIES AS DESCRIBED BY CMS-2020-01-R: HCPCS

## 2021-03-12 LAB
SARS-COV-2: NOT DETECTED
SOURCE: NORMAL

## 2021-03-15 ENCOUNTER — OFFICE VISIT (OUTPATIENT)
Dept: ONCOLOGY | Age: 66
End: 2021-03-15
Payer: COMMERCIAL

## 2021-03-15 VITALS
DIASTOLIC BLOOD PRESSURE: 76 MMHG | RESPIRATION RATE: 18 BRPM | SYSTOLIC BLOOD PRESSURE: 113 MMHG | OXYGEN SATURATION: 97 % | HEIGHT: 70 IN | TEMPERATURE: 97.8 F | BODY MASS INDEX: 22.52 KG/M2 | HEART RATE: 99 BPM | WEIGHT: 157.3 LBS

## 2021-03-15 DIAGNOSIS — E85.81 LIGHT CHAIN (AL) AMYLOIDOSIS (HCC): Primary | ICD-10-CM

## 2021-03-15 PROCEDURE — 99212 OFFICE O/P EST SF 10 MIN: CPT

## 2021-03-15 NOTE — PROGRESS NOTES
[address] [facility]        Pt Name: Shan Bran: 1955  Date of evaluation: 3/15/2021  Primary Care Physician: Selena Welch MD  Reason for evaluation:   Chief Complaint   Patient presents with    Other     Light Chain Amyloidosis    Follow-up        Subjective:  Here for follow-up. Doing well, no complaints except for moderate neuropathy in feet. He has received his first dose of the Covid vaccine    OBJECTIVE:  VITALS:  height is 5' 10\" (1.778 m) and weight is 157 lb 4.8 oz (71.4 kg). His temperature is 97.8 °F (36.6 °C). His blood pressure is 113/76 and his pulse is 99. His respiration is 18 and oxygen saturation is 97%. Physical Exam:  Performance Status: Well developed, well nourished male  General: AAO to person, place, time, cooperative, in no acute distress. Head and neck: PERRLA, EOMI. Sclera non icteric. Oropharynx:  Clear. Neck: no JVD,  no adenopathy. BREASTS : No lumps or palpable masses. Heart: Regular rate and regular rhythm, no murmur. Lungs: Clear to auscultation. Abdomen: Soft, non-tender;no masses, no organomegaly. Extremities: 2+ edema,no cyanosis, no clubbing. Neurologic:Cranial nerves grossly intact. No focal motor or sensory deficits . Skin:  No rash. Medications  Prior to Admission medications    Medication Sig Start Date End Date Taking? Authorizing Provider   fluticasone (FLONASE) 50 MCG/ACT nasal spray 1 spray by Each Nostril route daily 12/28/20   Selena Welch MD   miconazole (MICOTIN) 2 % cream Apply topically 2 times daily.  12/28/20   Selena Welch MD   rosuvastatin (CRESTOR) 40 MG tablet TAKE ONE TABLET BY MOUTH DAILY 12/21/20   Selena Welch MD   VITAMIN E PO Take 1 capsule by mouth daily    Historical Provider, MD   Multiple Vitamins-Minerals (THERAPEUTIC MULTIVITAMIN-MINERALS) tablet Take 1 tablet by mouth daily    Historical Provider, MD   Ascorbic Acid (VITAMIN C PO) Take 1 tablet by mouth daily    Historical Provider, MD   VITAMIN D PO Take 1 capsule by mouth daily    Historical Provider, MD   aspirin 81 MG tablet Take 81 mg by mouth daily    Historical Provider, MD   ibuprofen (ADVIL;MOTRIN) 600 MG tablet Take 1 tablet by mouth every 6 hours as needed for Pain 17   Kaylin Feliciano MD   fexofenadine-pseudoephedrine (ALLEGRA-D 12 HOUR)  MG per tablet Take 1 tablet by mouth 2 times daily as needed.  13   Rene Bowens MD    Scheduled Meds:  Continuous Infusions:  PRN Meds:.        Recent Laboratory Data-     Lab Results   Component Value Date    WBC 6.2 2021    HGB 15.3 2021    HCT 45.6 2021    MCV 90.1 2021     2021    LYMPHOPCT 25.0 2021    RBC 5.06 2021    MCH 30.2 2021    MCHC 33.6 2021    RDW 11.9 2021    NEUTOPHILPCT 65.8 2021    MONOPCT 7.1 2021    BASOPCT 0.8 2021    NEUTROABS 4.06 2021    LYMPHSABS 1.54 2021    MONOSABS 0.44 2021    EOSABS 0.07 2021    BASOSABS 0.05 2021       Lab Results   Component Value Date     2021    K 4.5 2021     2021    CO2 25 2021    BUN 14 2021    CREATININE 0.9 2021    GLUCOSE 91 2021    CALCIUM 8.8 2021    PROT 4.7 (L) 2021    LABALBU 2.0 (L) 2021    LABALBU 2.6 (L) 2021    BILITOT <0.2 2021    ALKPHOS 90 2021    AST 47 (H) 2021    ALT 33 2021    LABGLOM >60 2021    GFRAA >60 2021             Radiology-  CT Guided Needle Placement  Result Date: 2/15/2021  Patient MRN:  [id number] : 1955 Age: 61 years Gender: Male Order Date:  2/15/2021 12:41 PM EXAM: CT GUIDED NEEDLE PLACEMENT, CT BIOPSY RENAL NUMBER OF IMAGES:  166 INDICATION: R80.9 Proteinuria, unspecified type What reading provider will be dictating this exam?->[facility] COMPARISON: None After obtaining informed consent and following the routine sterile prep and drape. With CT guidance the appropriate entrance for the biopsy was marked. Lidocaine was then injected into this site for local anesthesia. A biopsy needle was inserted into the left renal cortex with CT guidance. 5 core specimens were obtained. The patient tolerated the procedure well. Postprocedure images were unremarkable The procedure was performed under local anesthesia and moderate sedation with 2 mg of versed IV and 100 mcg of fentanyl IV. A timeout was performed at 12:39 PM hours . Patient was monitored for 60 minutes by registered nurse. Successful uncomplicated CT guided biopsy of the left renal cortex. If there are any physician concerns regarding this report, a Radiologist can be reached by calling the following number [phone]. CT Abdomen Pelvis W Iv Contrast Additional Contrast? None  Result Date: 2/15/2021  EXAMINATION: CT OF THE ABDOMEN AND PELVIS WITH CONTRAST 2/15/2021 1:18 pm TECHNIQUE: CT of the abdomen and pelvis was performed with the administration of intravenous contrast. Multiplanar reformatted images are provided for review. Dose modulation, iterative reconstruction, and/or weight based adjustment of the mA/kV was utilized to reduce the radiation dose to as low as reasonably achievable. COMPARISON: CT-guided left renal biopsy 02/15/2021. CT abdomen/pelvis with contrast 04/28/2019. HISTORY: ORDERING SYSTEM PROVIDED HISTORY: s/p renal biopsy, r/o bleed TECHNOLOGIST PROVIDED HISTORY: Reason for exam:->s/p renal biopsy, r/o bleed Additional Contrast?->None Decision Support Exception->Emergency Medical Condition (MA) FINDINGS: Lower Chest: The visualized portions of the heart appear normal without significant pericardial effusion. Mild left pleural effusion. Atelectasis/scarring is present in the lung bases.  Organs: Liver lesions ranging in size from 0.4 cm to 1.0 cm demonstrate benign imaging features (sharp margins and homogeneous low attenuation </=20 HU); no follow-up is necessary. There is trace pericholecystic fluid/edema. The gallbladder otherwise appears normal.  No biliary ductal dilation. The pancreas appears normal.  The adrenal glands appear normal without identified nodules. The spleen appears normal.  The kidneys appear normal in size without hydronephrosis or nephrolithiasis. A homogeneous structure in the right kidney measures up to 7.1 cm and is compatible with likely benign cyst (with thin/imperceptible wall, no evidence of fat, mural nodularity, septation or calcification, and density measuring -10-20 HU); no follow-up is necessary. Tiny homogeneous structures in the kidneys are too small to characterize (measuring <7K slice thickness); subjectively, these appear to likely represent benign cysts, no follow-up is necessary. A tract of air extending from the left posterior flank to the posterior lower pole of the left kidney is compatible with the recent percutaneous biopsy tract. A poorly defined hyperattenuating fluid collection adjacent to the kidney at the site of the biopsy is compatible with a perinephric hematoma, measuring 5.1 x 2.8 x 1.8 cm. There is trace adjacent simple appearing fluid. No retroperitoneal hemorrhage is identified elsewhere. No subcapsular hematoma or mass effect on the kidney. No active contrast extravasation is identified. The ureters appear normal. GI/Bowel: No evidence of bowel obstruction or significant inflammatory changes. Mild-moderate volume of fecal material is present in the colon. The appendix is visualized and appears normal. Pelvis: The bladder is mildly distended without significant wall thickening or intraluminal calculi. The prostate measures 4.5 x 4.0 x 3.4 cm with estimated volume of 32 mL (normal is </= 30 mL) with up to 0.8 cm protrusion of the median lobe into the bladder base. Peritoneum/Retroperitoneum: No enlarged mesenteric or retroperitoneal lymph nodes are identified.   Trace fluid is present in the pelvis. There is mild-moderate atherosclerosis. No aortic aneurysm or evidence of an acute aortic syndrome. Bones/Soft Tissues: A tiny, fat-containing umbilical hernia is present. Mild osseous degenerative changes are present. 1. Small left perinephric hematoma at the biopsy site measures up to 5.1 cm. No evidence of active contrast extravasation or mass effect on the kidney. 2. Mild left pleural effusion appears simple. 3. Trace fluid in the pelvis appears simple. 4. Trace pericholecystic fluid/edema is nonspecific without pericholecystic stranding or calcified gallstones to suggest concern for developing cholecystitis. 5. Mild prostatomegaly. CT Biopsy Renal  Result Date: 2/15/2021  Patient MRN:  [id number] : 1955 Age: 61 years Gender: Male Order Date:  2/15/2021 12:41 PM EXAM: CT GUIDED NEEDLE PLACEMENT, CT BIOPSY RENAL NUMBER OF IMAGES:  166 INDICATION: R80.9 Proteinuria, unspecified type What reading provider will be dictating this exam?->[facility] COMPARISON: None After obtaining informed consent and following the routine sterile prep and drape. With CT guidance the appropriate entrance for the biopsy was marked. Lidocaine was then injected into this site for local anesthesia. A biopsy needle was inserted into the left renal cortex with CT guidance. 5 core specimens were obtained. The patient tolerated the procedure well. Postprocedure images were unremarkable The procedure was performed under local anesthesia and moderate sedation with 2 mg of versed IV and 100 mcg of fentanyl IV. A timeout was performed at 12:39 PM hours . Patient was monitored for 60 minutes by registered nurse. Successful uncomplicated CT guided biopsy of the left renal cortex. If there are any physician concerns regarding this report, a Radiologist can be reached by calling the following number [phone].           ASSESSMENT/PLAN:   A 82-year-old man with:    Amyloidosis AL lambda light chain status post left kidney biopsy  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 His work-up will be completed to include the following:    CBC, CMP, LDH, B2 microglobulin, CRP, serum protein electrophoresis with reflex to immunofixation, serum free kappa and lambda light chains with ratio, quantitative immunoglobulins, 24-hour urine collection for quantitative immunoglobulins and immunofixation. Serum troponin, proBNP and 2D echo will be done to exclude cardiac amyloidosis. TSH  Factor X assay as well as PT and APTT will be done    Will be scheduled for a bone marrow aspirate and biopsy    Once work-up completed options of therapy with bortezomib/daratumumab will be addressed    He is encouraged to receive the COVID-19 vaccine      3/15/2021  Awaiting his bone marrow aspirate and biopsy and his 2D echo which are both pending. His CBC was in the normal range with a hemoglobin of 15.3, normal WBC count of 6.2, normal MCV of 90 with a normal platelet count of 078. His TSH was normal at 3.88  Serum LDH was elevated at 266. B2 microglobulin was markedly elevated at 13.6. Quantitative immunoglobulins showed suppression of IgG with normal IgA and IgM. Serum electrophoresis showed an M spike faint measuring 0.1. Serum FLC's showed markedly elevated free lambda light chain at 311 with abnormal ratio of 0.05  Serum troponin was normal but proBNP was elevated at 491. 24-hour urine collection showed nephrotic range proteinuria with total proteins of 6.6 g in a 24-hour period with a faint M spike in the urine measuring 1263 mg in a 24-hour timeframe    He will be recommended induction with Daratumumab/Velcade/dexamethasone  His bone marrow aspirate and biopsy will be reviewed. All potential side effects were discussed. Ariana Rayo. Malena Jones M.D., F.A.C.P.   Electronically signed 3/15/2021 at 2:02 PM

## 2021-03-17 ENCOUNTER — HOSPITAL ENCOUNTER (OUTPATIENT)
Dept: CT IMAGING | Age: 66
Discharge: HOME | End: 2021-03-17
Payer: COMMERCIAL

## 2021-03-17 VITALS
HEIGHT: 70 IN | BODY MASS INDEX: 22.49 KG/M2 | SYSTOLIC BLOOD PRESSURE: 109 MMHG | HEART RATE: 85 BPM | RESPIRATION RATE: 16 BRPM | TEMPERATURE: 97.2 F | OXYGEN SATURATION: 96 % | WEIGHT: 157.1 LBS | DIASTOLIC BLOOD PRESSURE: 73 MMHG

## 2021-03-17 DIAGNOSIS — E85.81 LIGHT CHAIN (AL) AMYLOIDOSIS (HCC): ICD-10-CM

## 2021-03-17 LAB
APTT: 28.2 SEC (ref 24.5–35.1)
HCT VFR BLD CALC: 46.1 % (ref 37–54)
HEMOGLOBIN: 15.4 G/DL (ref 12.5–16.5)
INR BLD: 0.9
MCH RBC QN AUTO: 29.9 PG (ref 26–35)
MCHC RBC AUTO-ENTMCNC: 33.4 % (ref 32–34.5)
MCV RBC AUTO: 89.5 FL (ref 80–99.9)
PDW BLD-RTO: 11.9 FL (ref 11.5–15)
PLATELET # BLD: 249 E9/L (ref 130–450)
PMV BLD AUTO: 9.7 FL (ref 7–12)
PROTHROMBIN TIME: 10.5 SEC (ref 9.3–12.4)
RBC # BLD: 5.15 E12/L (ref 3.8–5.8)
WBC # BLD: 7.3 E9/L (ref 4.5–11.5)

## 2021-03-17 PROCEDURE — 38222 DX BONE MARROW BX & ASPIR: CPT

## 2021-03-17 PROCEDURE — 85730 THROMBOPLASTIN TIME PARTIAL: CPT

## 2021-03-17 PROCEDURE — 36415 COLL VENOUS BLD VENIPUNCTURE: CPT

## 2021-03-17 PROCEDURE — 6360000002 HC RX W HCPCS: Performed by: STUDENT IN AN ORGANIZED HEALTH CARE EDUCATION/TRAINING PROGRAM

## 2021-03-17 PROCEDURE — 77012 CT SCAN FOR NEEDLE BIOPSY: CPT

## 2021-03-17 PROCEDURE — 7100000010 HC PHASE II RECOVERY - FIRST 15 MIN

## 2021-03-17 PROCEDURE — 85027 COMPLETE CBC AUTOMATED: CPT

## 2021-03-17 PROCEDURE — 85610 PROTHROMBIN TIME: CPT

## 2021-03-17 PROCEDURE — 7100000011 HC PHASE II RECOVERY - ADDTL 15 MIN

## 2021-03-17 RX ORDER — ACETAMINOPHEN 325 MG/1
650 TABLET ORAL EVERY 4 HOURS PRN
Status: DISCONTINUED | OUTPATIENT
Start: 2021-03-17 | End: 2021-03-18 | Stop reason: HOSPADM

## 2021-03-17 RX ORDER — DIPHENHYDRAMINE HYDROCHLORIDE 50 MG/ML
INJECTION INTRAMUSCULAR; INTRAVENOUS
Status: COMPLETED | OUTPATIENT
Start: 2021-03-17 | End: 2021-03-17

## 2021-03-17 RX ORDER — DIPHENHYDRAMINE HCL 25 MG
25 TABLET ORAL ONCE
Status: DISCONTINUED | OUTPATIENT
Start: 2021-03-17 | End: 2021-03-17 | Stop reason: CLARIF

## 2021-03-17 RX ORDER — SODIUM CHLORIDE 0.9 % (FLUSH) 0.9 %
10 SYRINGE (ML) INJECTION PRN
Status: DISCONTINUED | OUTPATIENT
Start: 2021-03-17 | End: 2021-03-18 | Stop reason: HOSPADM

## 2021-03-17 RX ADMIN — DIPHENHYDRAMINE HYDROCHLORIDE 25 MG: 50 INJECTION INTRAMUSCULAR; INTRAVENOUS at 08:29

## 2021-03-17 ASSESSMENT — PAIN SCALES - GENERAL
PAINLEVEL_OUTOF10: 0
PAINLEVEL_OUTOF10: 0

## 2021-03-17 ASSESSMENT — PAIN - FUNCTIONAL ASSESSMENT: PAIN_FUNCTIONAL_ASSESSMENT: 0-10

## 2021-03-17 NOTE — H&P
66M w/ hx of ALL presented to IR for bone marrow biopsy from his hematologist to eval for poss multiple myeloma. Notably he recently had an RRT event for renal biopsy. PSH  Shoulder arthroscopy (Left, 03/23/2001); ventral hernia repair (1981); knee surgery (Right, 02/19/2016); and CT BIOPSY RENAL (2/15/2021). Past Medical History:   Diagnosis Date    Arthritis     Derangement of medial meniscus of right knee 12/2016    MRI Right Knee DMXI 2/4/2016    Hyperlipidemia 12/2/2010    Light chain (AL) amyloidosis (HCC) 3/15/2021    Situational syncope 12/2/2010     Current Outpatient Medications   Medication Sig Dispense Refill    fexofenadine-pseudoephedrine (ALLEGRA-D 12 HOUR)  MG per tablet Take 1 tablet by mouth 2 times daily as needed. 60 tablet 2    fluticasone (FLONASE) 50 MCG/ACT nasal spray 1 spray by Each Nostril route daily 2 Bottle 1    miconazole (MICOTIN) 2 % cream Apply topically 2 times daily.  28 g 1    rosuvastatin (CRESTOR) 40 MG tablet TAKE ONE TABLET BY MOUTH DAILY 90 tablet 0    VITAMIN E PO Take 1 capsule by mouth daily      Multiple Vitamins-Minerals (THERAPEUTIC MULTIVITAMIN-MINERALS) tablet Take 1 tablet by mouth daily      Ascorbic Acid (VITAMIN C PO) Take 1 tablet by mouth daily      VITAMIN D PO Take 1 capsule by mouth daily      aspirin 81 MG tablet Take 81 mg by mouth daily      ibuprofen (ADVIL;MOTRIN) 600 MG tablet Take 1 tablet by mouth every 6 hours as needed for Pain 60 tablet 1     Current Facility-Administered Medications   Medication Dose Route Frequency Provider Last Rate Last Admin    sodium chloride flush 0.9 % injection 10 mL  10 mL Intravenous PRN Zuleika Trujillo MD        diphenhydrAMINE (BENADRYL) tablet 25 mg  25 mg Oral Once Zuleika Trujillo MD           Vitals:    03/17/21 0720   BP: 113/74   Pulse: 98   Resp: 14   Temp:    SpO2: 95%         66M w/ ALL, IR consulted for bone marrow biopsy  -will proceed w/ CT guided bone marrow bx  -will admin 25mg IV benadryl to start, will start slow w/ sedation kenny Edmonds MD  Vascular and Interventional Radiology ([facility])    Daytime direct number to [phone]: [address]:      [address] [place] [address] Core: [phone]  Outpatient IR [phone]

## 2021-03-17 NOTE — PRE SEDATION
Sedation Pre-Procedure Note    Patient Name: Christiano Jensen   YOB: 1955  Room/Bed: Room/bed info not found  Medical Record Number: [id number]  Date: 3/17/2021   Time: 8:25 AM       Indication:  CT guided bone marrow bx    Consent: I have discussed with the patient and/or the patient representative the indication, alternatives, and the possible risks and/or complications of the planned procedure and the anesthesia methods. The patient and/or patient representative appear to understand and agree to proceed. Vital Signs:   Vitals:    03/17/21 0720   BP: 113/74   Pulse: 98   Resp: 14   Temp:    SpO2: 95%       Past Medical History:   has a past medical history of Arthritis, Derangement of medial meniscus of right knee, Hyperlipidemia, Light chain (AL) amyloidosis ([facility] [address] and Situational syncope. Past Surgical History:   has a past surgical history that includes Shoulder arthroscopy (Left, 03/23/2001); ventral hernia repair (1981); knee surgery (Right, 02/19/2016); and CT BIOPSY RENAL (2/15/2021). Medications:   Scheduled Meds:    diphenhydrAMINE  25 mg Oral Once     Continuous Infusions:   PRN Meds: sodium chloride flush  Home Meds:   Prior to Admission medications    Medication Sig Start Date End Date Taking? Authorizing Provider   fexofenadine-pseudoephedrine (ALLEGRA-D 12 HOUR)  MG per tablet Take 1 tablet by mouth 2 times daily as needed. 2/5/13  Yes Toni Hassan MD   fluticasone [facility]) 50 MCG/ACT nasal spray 1 spray by Each Nostril route daily 12/28/20   Laila Montero MD   miconazole (MICOTIN) 2 % cream Apply topically 2 times daily.  12/28/20   Laila Montero MD   rosuvastatin (CRESTOR) 40 MG tablet TAKE ONE TABLET BY MOUTH DAILY 12/21/20   Laila Montero MD   VITAMIN E PO Take 1 capsule by mouth daily    Historical Provider, MD   Multiple Vitamins-Minerals (THERAPEUTIC MULTIVITAMIN-MINERALS) tablet Take 1 tablet by mouth daily    Historical Provider, MD   Ascorbic Acid (VITAMIN C PO) Take 1 tablet by mouth daily    Historical Provider, MD   VITAMIN D PO Take 1 capsule by mouth daily    Historical Provider, MD   aspirin 81 MG tablet Take 81 mg by mouth daily    Historical Provider, MD   ibuprofen (ADVIL;MOTRIN) 600 MG tablet Take 1 tablet by mouth every 6 hours as needed for Pain 7/5/17   Tim Spaulding MD     Coumadin Use Last 7 Days:  no  Antiplatelet drug therapy use last 7 days: ASA - stopped 6 days ago  Other anticoagulant use last 7 days: no  Additional Medication Information:  none      Pre-Sedation Documentation and Exam:   I have personally completed a history, physical exam & review of systems for this patient (see notes).     Mallampati Airway Assessment:  Mallampati Class I - (soft palate, fauces, uvula & anterior/posterior tonsillar pillars are visible)    Prior History of Anesthesia Complications:   none    ASA Classification:  Class 2 - A normal healthy patient with mild systemic disease    Sedation/ Anesthesia Plan:   intravenous sedation    Medications Planned:   midazolam (Versed) intravenously    Patient is an appropriate candidate for plan of sedation: yes    Electronically signed by Serjio Cottrell MD on 3/17/2021 at 8:25 AM

## 2021-03-17 NOTE — PROGRESS NOTES
Brief Post IR Note      Patient: Jai Gaytan  YOB: 1955  MRN: [id number]    Date of Procedure: 3/17/2021    Diagnosis: ALL    Procedure: CT bone marrow bx    Findings: samples obtained from R iliac bone    Specimen: bone marrow    EBL: min    Complication: none    Plan:   -will hold pt for 1hr, then anticipate d/c home      Helene Duff MD  Vascular and Interventional Radiology ([facility])    Daytime direct number to [phone]: [address]:      [address] [place] [address] Core: [phone]  Outpatient IR [phone]        Electronically signed by Helene Duff MD on 3/17/2021 at 8:40 AM

## 2021-03-24 ENCOUNTER — HOSPITAL ENCOUNTER (OUTPATIENT)
Dept: NON INVASIVE DIAGNOSTICS | Age: 66
Discharge: HOME | End: 2021-03-24
Payer: COMMERCIAL

## 2021-03-24 DIAGNOSIS — E85.81 LIGHT CHAIN (AL) AMYLOIDOSIS (HCC): ICD-10-CM

## 2021-03-24 LAB
LV EF: 66 %
LVEF MODALITY: NORMAL

## 2021-03-24 PROCEDURE — 93306 TTE W/DOPPLER COMPLETE: CPT

## 2021-03-25 DIAGNOSIS — E85.81 LIGHT CHAIN (AL) AMYLOIDOSIS (HCC): Primary | ICD-10-CM

## 2021-03-25 RX ORDER — PROCHLORPERAZINE MALEATE 10 MG
10 TABLET ORAL EVERY 6 HOURS PRN
Qty: 40 TABLET | Refills: 3 | Status: SHIPPED | OUTPATIENT
Start: 2021-03-25 | End: 2022-04-21 | Stop reason: ALTCHOICE

## 2021-03-26 ENCOUNTER — IMMUNIZATION (OUTPATIENT)
Dept: PRIMARY CARE CLINIC | Age: 66
End: 2021-03-26
Payer: COMMERCIAL

## 2021-03-26 ENCOUNTER — HOSPITAL ENCOUNTER (OUTPATIENT)
Dept: INFUSION THERAPY | Age: 66
Discharge: HOME | End: 2021-03-26
Payer: COMMERCIAL

## 2021-03-26 DIAGNOSIS — E85.81 LIGHT CHAIN (AL) AMYLOIDOSIS (HCC): ICD-10-CM

## 2021-03-26 LAB
ALBUMIN SERPL-MCNC: 2.5 G/DL (ref 3.5–5.2)
ALP BLD-CCNC: 79 U/L (ref 40–129)
ALT SERPL-CCNC: 32 U/L (ref 0–40)
ANION GAP SERPL CALCULATED.3IONS-SCNC: 8 MMOL/L (ref 7–16)
AST SERPL-CCNC: 34 U/L (ref 0–39)
BASOPHILS ABSOLUTE: 0.06 E9/L (ref 0–0.2)
BASOPHILS RELATIVE PERCENT: 0.8 % (ref 0–2)
BILIRUB SERPL-MCNC: <0.2 MG/DL (ref 0–1.2)
BUN BLDV-MCNC: 17 MG/DL (ref 8–23)
CALCIUM SERPL-MCNC: 8.3 MG/DL (ref 8.6–10.2)
CHLORIDE BLD-SCNC: 106 MMOL/L (ref 98–107)
CO2: 26 MMOL/L (ref 22–29)
CREAT SERPL-MCNC: 0.9 MG/DL (ref 0.7–1.2)
EOSINOPHILS ABSOLUTE: 0.32 E9/L (ref 0.05–0.5)
EOSINOPHILS RELATIVE PERCENT: 4.1 % (ref 0–6)
GFR AFRICAN AMERICAN: >60
GFR NON-AFRICAN AMERICAN: >60 ML/MIN/1.73
GLUCOSE BLD-MCNC: 118 MG/DL (ref 74–99)
HCT VFR BLD CALC: 44.2 % (ref 37–54)
HEMOGLOBIN: 14.7 G/DL (ref 12.5–16.5)
IMMATURE GRANULOCYTES #: 0.02 E9/L
IMMATURE GRANULOCYTES %: 0.3 % (ref 0–5)
LYMPHOCYTES ABSOLUTE: 1.96 E9/L (ref 1.5–4)
LYMPHOCYTES RELATIVE PERCENT: 25.3 % (ref 20–42)
MCH RBC QN AUTO: 30.4 PG (ref 26–35)
MCHC RBC AUTO-ENTMCNC: 33.3 % (ref 32–34.5)
MCV RBC AUTO: 91.3 FL (ref 80–99.9)
MONOCYTES ABSOLUTE: 0.64 E9/L (ref 0.1–0.95)
MONOCYTES RELATIVE PERCENT: 8.3 % (ref 2–12)
NEUTROPHILS ABSOLUTE: 4.74 E9/L (ref 1.8–7.3)
NEUTROPHILS RELATIVE PERCENT: 61.2 % (ref 43–80)
PDW BLD-RTO: 12 FL (ref 11.5–15)
PLATELET # BLD: 262 E9/L (ref 130–450)
PMV BLD AUTO: 9.8 FL (ref 7–12)
POTASSIUM SERPL-SCNC: 4.3 MMOL/L (ref 3.5–5)
RBC # BLD: 4.84 E12/L (ref 3.8–5.8)
SODIUM BLD-SCNC: 140 MMOL/L (ref 132–146)
TOTAL PROTEIN: 4.8 G/DL (ref 6.4–8.3)
WBC # BLD: 7.7 E9/L (ref 4.5–11.5)

## 2021-03-26 PROCEDURE — 85025 COMPLETE CBC W/AUTO DIFF WBC: CPT

## 2021-03-26 PROCEDURE — 80053 COMPREHEN METABOLIC PANEL: CPT

## 2021-03-26 PROCEDURE — 91300 COVID-19, PFIZER VACCINE 30MCG/0.3ML DOSE: CPT | Performed by: PHYSICIAN ASSISTANT

## 2021-03-26 PROCEDURE — 0001A COVID-19, PFIZER VACCINE 30MCG/0.3ML DOSE: CPT | Performed by: PHYSICIAN ASSISTANT

## 2021-03-26 PROCEDURE — 36415 COLL VENOUS BLD VENIPUNCTURE: CPT

## 2021-03-26 NOTE — PROGRESS NOTES
Spoke with patient about treatment medications (daratumumab, cyclophosphamide, bortezomib). We went over the protocol to be used, what to expect as far as side effects, and when to call with problems. This was intended to reinforce the education done by Dr. Angle Roblero. Prescriptions were sent to patient's local pharmacy for prochlorperazine. Patient was provided medication handouts to take home and patient was told to call if there are any questions once they had a chance to absorb the information. Patient had the opportunity to ask questions with all questions being answered to their satisfaction. The patient expressed understanding and acceptance of instructions.

## 2021-03-26 NOTE — PROGRESS NOTES
[address] [facility]        Pt Name: Belen Feliciano: 1955  Date of evaluation: 3/29/2021  Primary Care Physician: Arun Ramirez MD  Reason for evaluation:   Chief Complaint   Patient presents with    Other     Light chain (AL) amyloidosis    Follow-up    Treatment        Subjective:  Here for chemotherapy and follow-up. Doing well, no complaints except for moderate neuropathy in feet. Very anxious about starting his treatment    He has received his second dose of the Covid vaccine        OBJECTIVE:  VITALS:  height is 5' 10\" (1.778 m) and weight is 158 lb 12.8 oz (72 kg). His temperature is 98.2 °F (36.8 °C). His blood pressure is 116/76 and his pulse is 96. His respiration is 18 and oxygen saturation is 97%. Physical Exam:  Performance Status: Well developed, well nourished male  General: AAO to person, place, time, cooperative, in no acute distress. Head and neck: PERRLA, EOMI. Sclera non icteric. Oropharynx:  Clear. Neck: no JVD,  no adenopathy. BREASTS : No lumps or palpable masses. Heart: Regular rate and regular rhythm, no murmur. Lungs: Clear to auscultation. Abdomen: Soft, non-tender;no masses, no organomegaly. Extremities: 2+ edema,no cyanosis, no clubbing. Neurologic:Cranial nerves grossly intact. No focal motor or sensory deficits . Skin:  No rash. Medications  Prior to Admission medications    Medication Sig Start Date End Date Taking? Authorizing Provider   prochlorperazine (COMPAZINE) 10 MG tablet Take 1 tablet by mouth every 6 hours as needed (nausea/vomiting) 3/25/21  Yes Stefanie Hart MD   fluticasone [facility]) 50 MCG/ACT nasal spray 1 spray by Each Nostril route daily 12/28/20  Yes Arun Ramirez MD   miconazole (MICOTIN) 2 % cream Apply topically 2 times daily.  12/28/20  Yes Arun Ramirez MD   rosuvastatin (CRESTOR) 40 MG tablet TAKE ONE TABLET BY MOUTH DAILY 12/21/20  Yes Arun Ramirez MD VITAMIN E PO Take 1 capsule by mouth daily   Yes Historical Provider, MD   Multiple Vitamins-Minerals (THERAPEUTIC MULTIVITAMIN-MINERALS) tablet Take 1 tablet by mouth daily   Yes Historical Provider, MD   Ascorbic Acid (VITAMIN C PO) Take 1 tablet by mouth daily   Yes Historical Provider, MD   VITAMIN D PO Take 1 capsule by mouth daily   Yes Historical Provider, MD   aspirin 81 MG tablet Take 81 mg by mouth daily   Yes Historical Provider, MD   ibuprofen (ADVIL;MOTRIN) 600 MG tablet Take 1 tablet by mouth every 6 hours as needed for Pain 17  Yes Darryl Strauss MD   fexofenadine-pseudoephedrine (ALLEGRA-D 12 HOUR)  MG per tablet Take 1 tablet by mouth 2 times daily as needed.  13  Yes Rupali Zarate MD    Scheduled Meds:  Continuous Infusions:  PRN Meds:.        Recent Laboratory Data-     Lab Results   Component Value Date    WBC 7.7 2021    HGB 14.7 2021    HCT 44.2 2021    MCV 91.3 2021     2021    LYMPHOPCT 25.3 2021    RBC 4.84 2021    MCH 30.4 2021    MCHC 33.3 2021    RDW 12.0 2021    NEUTOPHILPCT 61.2 2021    MONOPCT 8.3 2021    BASOPCT 0.8 2021    NEUTROABS 4.74 2021    LYMPHSABS 1.96 2021    MONOSABS 0.64 2021    EOSABS 0.32 2021    BASOSABS 0.06 2021       Lab Results   Component Value Date     2021    K 4.3 2021     2021    CO2 26 2021    BUN 17 2021    CREATININE 0.9 2021    GLUCOSE 118 (H) 2021    CALCIUM 8.3 (L) 2021    PROT 4.8 (L) 2021    LABALBU 2.5 (L) 2021    BILITOT <0.2 2021    ALKPHOS 79 2021    AST 34 2021    ALT 32 2021    LABGLOM >60 2021    GFRAA >60 2021             Radiology-  CT Guided Needle Placement  Result Date: 2/15/2021  Patient MRN:  [id number] : 1955 Age: 61 years Gender: Male Order Date:  2/15/2021 12:41 PM EXAM: CT GUIDED NEEDLE PLACEMENT, CT BIOPSY RENAL NUMBER OF IMAGES:  166 INDICATION: R80.9 Proteinuria, unspecified type What reading provider will be dictating this exam?->[facility] COMPARISON: None After obtaining informed consent and following the routine sterile prep and drape. With CT guidance the appropriate entrance for the biopsy was marked. Lidocaine was then injected into this site for local anesthesia. A biopsy needle was inserted into the left renal cortex with CT guidance. 5 core specimens were obtained. The patient tolerated the procedure well. Postprocedure images were unremarkable The procedure was performed under local anesthesia and moderate sedation with 2 mg of versed IV and 100 mcg of fentanyl IV. A timeout was performed at 12:39 PM hours . Patient was monitored for 60 minutes by registered nurse. Successful uncomplicated CT guided biopsy of the left renal cortex. If there are any physician concerns regarding this report, a Radiologist can be reached by calling the following number [phone]. CT Abdomen Pelvis W Iv Contrast Additional Contrast? None  Result Date: 2/15/2021  EXAMINATION: CT OF THE ABDOMEN AND PELVIS WITH CONTRAST 2/15/2021 1:18 pm TECHNIQUE: CT of the abdomen and pelvis was performed with the administration of intravenous contrast. Multiplanar reformatted images are provided for review. Dose modulation, iterative reconstruction, and/or weight based adjustment of the mA/kV was utilized to reduce the radiation dose to as low as reasonably achievable. COMPARISON: CT-guided left renal biopsy 02/15/2021. CT abdomen/pelvis with contrast 04/28/2019. HISTORY: ORDERING SYSTEM PROVIDED HISTORY: s/p renal biopsy, r/o bleed TECHNOLOGIST PROVIDED HISTORY: Reason for exam:->s/p renal biopsy, r/o bleed Additional Contrast?->None Decision Support Exception->Emergency Medical Condition (MA) FINDINGS: Lower Chest: The visualized portions of the heart appear normal without significant pericardial effusion. Mild left pleural effusion. Atelectasis/scarring is present in the lung bases. Organs: Liver lesions ranging in size from 0.4 cm to 1.0 cm demonstrate benign imaging features (sharp margins and homogeneous low attenuation </=20 HU); no follow-up is necessary. There is trace pericholecystic fluid/edema. The gallbladder otherwise appears normal.  No biliary ductal dilation. The pancreas appears normal.  The adrenal glands appear normal without identified nodules. The spleen appears normal.  The kidneys appear normal in size without hydronephrosis or nephrolithiasis. A homogeneous structure in the right kidney measures up to 7.1 cm and is compatible with likely benign cyst (with thin/imperceptible wall, no evidence of fat, mural nodularity, septation or calcification, and density measuring -10-20 HU); no follow-up is necessary. Tiny homogeneous structures in the kidneys are too small to characterize (measuring <6J slice thickness); subjectively, these appear to likely represent benign cysts, no follow-up is necessary. A tract of air extending from the left posterior flank to the posterior lower pole of the left kidney is compatible with the recent percutaneous biopsy tract. A poorly defined hyperattenuating fluid collection adjacent to the kidney at the site of the biopsy is compatible with a perinephric hematoma, measuring 5.1 x 2.8 x 1.8 cm. There is trace adjacent simple appearing fluid. No retroperitoneal hemorrhage is identified elsewhere. No subcapsular hematoma or mass effect on the kidney. No active contrast extravasation is identified. The ureters appear normal. GI/Bowel: No evidence of bowel obstruction or significant inflammatory changes. Mild-moderate volume of fecal material is present in the colon. The appendix is visualized and appears normal. Pelvis: The bladder is mildly distended without significant wall thickening or intraluminal calculi.   The prostate measures 4.5 x 4.0 x 3.4 cm with estimated volume of 32 mL (normal is </= 30 mL) with up to 0.8 cm protrusion of the median lobe into the bladder base. Peritoneum/Retroperitoneum: No enlarged mesenteric or retroperitoneal lymph nodes are identified. Trace fluid is present in the pelvis. There is mild-moderate atherosclerosis. No aortic aneurysm or evidence of an acute aortic syndrome. Bones/Soft Tissues: A tiny, fat-containing umbilical hernia is present. Mild osseous degenerative changes are present. 1. Small left perinephric hematoma at the biopsy site measures up to 5.1 cm. No evidence of active contrast extravasation or mass effect on the kidney. 2. Mild left pleural effusion appears simple. 3. Trace fluid in the pelvis appears simple. 4. Trace pericholecystic fluid/edema is nonspecific without pericholecystic stranding or calcified gallstones to suggest concern for developing cholecystitis. 5. Mild prostatomegaly. CT Biopsy Renal  Result Date: 2/15/2021  Patient MRN:  [id number] : 1955 Age: 61 years Gender: Male Order Date:  2/15/2021 12:41 PM EXAM: CT GUIDED NEEDLE PLACEMENT, CT BIOPSY RENAL NUMBER OF IMAGES:  166 INDICATION: R80.9 Proteinuria, unspecified type What reading provider will be dictating this exam?->[facility] COMPARISON: None After obtaining informed consent and following the routine sterile prep and drape. With CT guidance the appropriate entrance for the biopsy was marked. Lidocaine was then injected into this site for local anesthesia. A biopsy needle was inserted into the left renal cortex with CT guidance. 5 core specimens were obtained. The patient tolerated the procedure well. Postprocedure images were unremarkable The procedure was performed under local anesthesia and moderate sedation with 2 mg of versed IV and 100 mcg of fentanyl IV. A timeout was performed at 12:39 PM hours . Patient was monitored for 60 minutes by registered nurse.     Successful uncomplicated CT guided biopsy of the left renal cortex. If there are any physician concerns regarding this report, a Radiologist can be reached by calling the following number [phone]. 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 His work-up will be completed to include the following:    CBC, CMP, LDH, B2 microglobulin, CRP, serum protein electrophoresis with reflex to immunofixation, serum free kappa and lambda light chains with ratio, quantitative immunoglobulins, 24-hour urine collection for quantitative immunoglobulins and immunofixation. Serum troponin, proBNP and 2D echo will be done to exclude cardiac amyloidosis. TSH  Factor X assay as well as PT and APTT will be done    Will be scheduled for a bone marrow aspirate and biopsy    Once work-up completed options of therapy with bortezomib/daratumumab will be addressed    He is encouraged to receive the COVID-19 vaccine      3/15/2021  Awaiting his bone marrow aspirate and biopsy and his 2D echo which are both pending. His CBC was in the normal range with a hemoglobin of 15.3, normal WBC count of 6.2, normal MCV of 90 with a normal platelet count of 761. His TSH was normal at 3.88  Serum LDH was elevated at 266. B2 microglobulin was markedly elevated at 13.6. Quantitative immunoglobulins showed suppression of IgG with normal IgA and IgM. Serum electrophoresis showed an M spike faint measuring 0.1. Serum FLC's showed markedly elevated free lambda light chain at 311 with abnormal ratio of 0.05  Serum troponin was normal but proBNP was elevated at 491.   24-hour urine collection showed nephrotic range proteinuria with total proteins of 6.6 g in a 24-hour period with a faint M spike in the urine measuring 1263 mg in a 24-hour timeframe    He will be recommended induction with Daratumumab/Velcade/dexamethasone  His bone marrow aspirate and biopsy will be reviewed. All potential side effects were discussed. 3/29/2021  He will be given acyclovir for VZV prophylaxis and Bactrim DS for PCP prophylaxis and as needed Compazine for nausea. He will be initiated today on daratumumab/Velcade/cyclophosphamide/dexamethasone regimen. All potential side effects were discussed. His baby aspirin has been held. Francisco Cruz. Buffy Rosenbaum M.D., F.A.C.P.   Electronically signed 3/29/2021 at 8:27 AM

## 2021-03-29 ENCOUNTER — HOSPITAL ENCOUNTER (OUTPATIENT)
Dept: INFUSION THERAPY | Age: 66
Discharge: HOME | End: 2021-03-29
Payer: COMMERCIAL

## 2021-03-29 ENCOUNTER — OFFICE VISIT (OUTPATIENT)
Dept: ONCOLOGY | Age: 66
End: 2021-03-29
Payer: COMMERCIAL

## 2021-03-29 VITALS
SYSTOLIC BLOOD PRESSURE: 116 MMHG | TEMPERATURE: 98.2 F | HEIGHT: 70 IN | RESPIRATION RATE: 18 BRPM | BODY MASS INDEX: 22.73 KG/M2 | OXYGEN SATURATION: 97 % | DIASTOLIC BLOOD PRESSURE: 76 MMHG | WEIGHT: 158.8 LBS | HEART RATE: 96 BPM

## 2021-03-29 VITALS
DIASTOLIC BLOOD PRESSURE: 65 MMHG | HEART RATE: 100 BPM | RESPIRATION RATE: 16 BRPM | TEMPERATURE: 98.6 F | SYSTOLIC BLOOD PRESSURE: 105 MMHG

## 2021-03-29 DIAGNOSIS — E85.81 LIGHT CHAIN (AL) AMYLOIDOSIS (HCC): Primary | ICD-10-CM

## 2021-03-29 PROCEDURE — 96413 CHEMO IV INFUSION 1 HR: CPT

## 2021-03-29 PROCEDURE — 96401 CHEMO ANTI-NEOPL SQ/IM: CPT

## 2021-03-29 PROCEDURE — 2580000003 HC RX 258: Performed by: INTERNAL MEDICINE

## 2021-03-29 PROCEDURE — 96417 CHEMO IV INFUS EACH ADDL SEQ: CPT

## 2021-03-29 PROCEDURE — 6360000002 HC RX W HCPCS: Performed by: INTERNAL MEDICINE

## 2021-03-29 PROCEDURE — 96375 TX/PRO/DX INJ NEW DRUG ADDON: CPT

## 2021-03-29 PROCEDURE — 96415 CHEMO IV INFUSION ADDL HR: CPT

## 2021-03-29 PROCEDURE — 6370000000 HC RX 637 (ALT 250 FOR IP)

## 2021-03-29 RX ORDER — SODIUM CHLORIDE 9 MG/ML
20 INJECTION, SOLUTION INTRAVENOUS ONCE
Status: CANCELLED | OUTPATIENT
Start: 2021-04-19 | End: 2021-04-19

## 2021-03-29 RX ORDER — DIPHENHYDRAMINE HYDROCHLORIDE 50 MG/ML
50 INJECTION INTRAMUSCULAR; INTRAVENOUS ONCE
Status: CANCELLED | OUTPATIENT
Start: 2021-04-05 | End: 2021-04-05

## 2021-03-29 RX ORDER — HEPARIN SODIUM (PORCINE) LOCK FLUSH IV SOLN 100 UNIT/ML 100 UNIT/ML
500 SOLUTION INTRAVENOUS PRN
Status: CANCELLED | OUTPATIENT
Start: 2021-04-19

## 2021-03-29 RX ORDER — SODIUM CHLORIDE 0.9 % (FLUSH) 0.9 %
10 SYRINGE (ML) INJECTION PRN
Status: CANCELLED | OUTPATIENT
Start: 2021-04-12

## 2021-03-29 RX ORDER — HEPARIN SODIUM (PORCINE) LOCK FLUSH IV SOLN 100 UNIT/ML 100 UNIT/ML
500 SOLUTION INTRAVENOUS PRN
Status: CANCELLED | OUTPATIENT
Start: 2021-04-12

## 2021-03-29 RX ORDER — ACETAMINOPHEN 325 MG/1
650 TABLET ORAL ONCE
Status: CANCELLED | OUTPATIENT
Start: 2021-03-30

## 2021-03-29 RX ORDER — METHYLPREDNISOLONE SODIUM SUCCINATE 125 MG/2ML
125 INJECTION, POWDER, LYOPHILIZED, FOR SOLUTION INTRAMUSCULAR; INTRAVENOUS ONCE
Status: CANCELLED | OUTPATIENT
Start: 2021-03-30 | End: 2021-03-30

## 2021-03-29 RX ORDER — HEPARIN SODIUM (PORCINE) LOCK FLUSH IV SOLN 100 UNIT/ML 100 UNIT/ML
500 SOLUTION INTRAVENOUS PRN
Status: CANCELLED | OUTPATIENT
Start: 2021-03-30

## 2021-03-29 RX ORDER — METHYLPREDNISOLONE SODIUM SUCCINATE 125 MG/2ML
125 INJECTION, POWDER, LYOPHILIZED, FOR SOLUTION INTRAMUSCULAR; INTRAVENOUS ONCE
Status: CANCELLED | OUTPATIENT
Start: 2021-03-29 | End: 2021-03-29

## 2021-03-29 RX ORDER — ACETAMINOPHEN 325 MG/1
650 TABLET ORAL ONCE
Status: CANCELLED | OUTPATIENT
Start: 2021-04-19

## 2021-03-29 RX ORDER — SODIUM CHLORIDE 9 MG/ML
INJECTION, SOLUTION INTRAVENOUS CONTINUOUS
Status: CANCELLED | OUTPATIENT
Start: 2021-03-30

## 2021-03-29 RX ORDER — METHYLPREDNISOLONE SODIUM SUCCINATE 125 MG/2ML
60 INJECTION, POWDER, LYOPHILIZED, FOR SOLUTION INTRAMUSCULAR; INTRAVENOUS ONCE
Status: CANCELLED | OUTPATIENT
Start: 2021-03-30

## 2021-03-29 RX ORDER — DIPHENHYDRAMINE HYDROCHLORIDE 50 MG/ML
50 INJECTION INTRAMUSCULAR; INTRAVENOUS ONCE
Status: CANCELLED | OUTPATIENT
Start: 2021-03-30 | End: 2021-03-30

## 2021-03-29 RX ORDER — SODIUM CHLORIDE 9 MG/ML
20 INJECTION, SOLUTION INTRAVENOUS ONCE
Status: CANCELLED | OUTPATIENT
Start: 2021-04-05 | End: 2021-04-05

## 2021-03-29 RX ORDER — ACETAMINOPHEN 325 MG/1
TABLET ORAL
Status: COMPLETED
Start: 2021-03-29 | End: 2021-03-29

## 2021-03-29 RX ORDER — DIPHENHYDRAMINE HYDROCHLORIDE 50 MG/ML
25 INJECTION INTRAMUSCULAR; INTRAVENOUS ONCE
Status: CANCELLED | OUTPATIENT
Start: 2021-04-05

## 2021-03-29 RX ORDER — DIPHENHYDRAMINE HYDROCHLORIDE 50 MG/ML
25 INJECTION INTRAMUSCULAR; INTRAVENOUS ONCE
Status: CANCELLED | OUTPATIENT
Start: 2021-03-30

## 2021-03-29 RX ORDER — METHYLPREDNISOLONE SODIUM SUCCINATE 125 MG/2ML
60 INJECTION, POWDER, LYOPHILIZED, FOR SOLUTION INTRAMUSCULAR; INTRAVENOUS ONCE
Status: CANCELLED | OUTPATIENT
Start: 2021-03-29

## 2021-03-29 RX ORDER — ACETAMINOPHEN 325 MG/1
650 TABLET ORAL ONCE
Status: COMPLETED | OUTPATIENT
Start: 2021-03-29 | End: 2021-03-29

## 2021-03-29 RX ORDER — SODIUM CHLORIDE 9 MG/ML
INJECTION, SOLUTION INTRAVENOUS CONTINUOUS
Status: CANCELLED | OUTPATIENT
Start: 2021-03-29

## 2021-03-29 RX ORDER — SODIUM CHLORIDE 0.9 % (FLUSH) 0.9 %
5 SYRINGE (ML) INJECTION PRN
Status: CANCELLED | OUTPATIENT
Start: 2021-04-19

## 2021-03-29 RX ORDER — DIPHENHYDRAMINE HYDROCHLORIDE 50 MG/ML
INJECTION INTRAMUSCULAR; INTRAVENOUS
Status: DISCONTINUED
Start: 2021-03-29 | End: 2021-03-29 | Stop reason: WASHOUT

## 2021-03-29 RX ORDER — SODIUM CHLORIDE 9 MG/ML
20 INJECTION, SOLUTION INTRAVENOUS ONCE
Status: COMPLETED | OUTPATIENT
Start: 2021-03-29 | End: 2021-03-29

## 2021-03-29 RX ORDER — SODIUM CHLORIDE 0.9 % (FLUSH) 0.9 %
5 SYRINGE (ML) INJECTION PRN
Status: CANCELLED | OUTPATIENT
Start: 2021-04-05

## 2021-03-29 RX ORDER — DIPHENHYDRAMINE HYDROCHLORIDE 50 MG/ML
25 INJECTION INTRAMUSCULAR; INTRAVENOUS ONCE
Status: COMPLETED | OUTPATIENT
Start: 2021-03-29 | End: 2021-03-29

## 2021-03-29 RX ORDER — HEPARIN SODIUM (PORCINE) LOCK FLUSH IV SOLN 100 UNIT/ML 100 UNIT/ML
500 SOLUTION INTRAVENOUS PRN
Status: CANCELLED | OUTPATIENT
Start: 2021-03-29

## 2021-03-29 RX ORDER — ACYCLOVIR 400 MG/1
400 TABLET ORAL 2 TIMES DAILY
Qty: 180 TABLET | Refills: 0 | Status: SHIPPED
Start: 2021-03-29 | End: 2021-06-21 | Stop reason: SDUPTHER

## 2021-03-29 RX ORDER — PALONOSETRON HYDROCHLORIDE 0.05 MG/ML
0.25 INJECTION, SOLUTION INTRAVENOUS ONCE
Status: COMPLETED | OUTPATIENT
Start: 2021-03-29 | End: 2021-03-29

## 2021-03-29 RX ORDER — SULFAMETHOXAZOLE AND TRIMETHOPRIM 800; 160 MG/1; MG/1
1 TABLET ORAL
Qty: 30 TABLET | Refills: 2 | Status: SHIPPED
Start: 2021-03-29 | End: 2021-07-07 | Stop reason: SDUPTHER

## 2021-03-29 RX ORDER — SODIUM CHLORIDE 9 MG/ML
20 INJECTION, SOLUTION INTRAVENOUS ONCE
Status: CANCELLED | OUTPATIENT
Start: 2021-03-29 | End: 2021-03-29

## 2021-03-29 RX ORDER — DIPHENHYDRAMINE HYDROCHLORIDE 50 MG/ML
50 INJECTION INTRAMUSCULAR; INTRAVENOUS ONCE
Status: CANCELLED | OUTPATIENT
Start: 2021-04-12 | End: 2021-04-12

## 2021-03-29 RX ORDER — SODIUM CHLORIDE 0.9 % (FLUSH) 0.9 %
10 SYRINGE (ML) INJECTION PRN
Status: CANCELLED | OUTPATIENT
Start: 2021-03-30

## 2021-03-29 RX ORDER — ACETAMINOPHEN 325 MG/1
650 TABLET ORAL ONCE
Status: CANCELLED | OUTPATIENT
Start: 2021-04-12

## 2021-03-29 RX ORDER — SODIUM CHLORIDE 0.9 % (FLUSH) 0.9 %
10 SYRINGE (ML) INJECTION PRN
Status: CANCELLED | OUTPATIENT
Start: 2021-04-05

## 2021-03-29 RX ORDER — DEXAMETHASONE 4 MG/1
TABLET ORAL
Qty: 40 TABLET | Refills: 2 | Status: SHIPPED
Start: 2021-03-29 | End: 2021-07-07 | Stop reason: SDUPTHER

## 2021-03-29 RX ORDER — DIPHENHYDRAMINE HYDROCHLORIDE 50 MG/ML
25 INJECTION INTRAMUSCULAR; INTRAVENOUS ONCE
Status: CANCELLED | OUTPATIENT
Start: 2021-04-19

## 2021-03-29 RX ORDER — SODIUM CHLORIDE 0.9 % (FLUSH) 0.9 %
5 SYRINGE (ML) INJECTION PRN
Status: CANCELLED | OUTPATIENT
Start: 2021-03-29

## 2021-03-29 RX ORDER — SODIUM CHLORIDE 9 MG/ML
INJECTION, SOLUTION INTRAVENOUS CONTINUOUS
Status: CANCELLED | OUTPATIENT
Start: 2021-04-12

## 2021-03-29 RX ORDER — SODIUM CHLORIDE 9 MG/ML
INJECTION, SOLUTION INTRAVENOUS CONTINUOUS
Status: CANCELLED | OUTPATIENT
Start: 2021-04-05

## 2021-03-29 RX ORDER — DIPHENHYDRAMINE HYDROCHLORIDE 50 MG/ML
50 INJECTION INTRAMUSCULAR; INTRAVENOUS ONCE
Status: CANCELLED | OUTPATIENT
Start: 2021-03-29 | End: 2021-03-29

## 2021-03-29 RX ORDER — METHYLPREDNISOLONE SODIUM SUCCINATE 125 MG/2ML
125 INJECTION, POWDER, LYOPHILIZED, FOR SOLUTION INTRAMUSCULAR; INTRAVENOUS ONCE
Status: CANCELLED | OUTPATIENT
Start: 2021-04-19 | End: 2021-04-19

## 2021-03-29 RX ORDER — SODIUM CHLORIDE 0.9 % (FLUSH) 0.9 %
5 SYRINGE (ML) INJECTION PRN
Status: CANCELLED | OUTPATIENT
Start: 2021-03-30

## 2021-03-29 RX ORDER — EPINEPHRINE 1 MG/ML
0.3 INJECTION, SOLUTION, CONCENTRATE INTRAVENOUS PRN
Status: CANCELLED | OUTPATIENT
Start: 2021-04-05

## 2021-03-29 RX ORDER — SODIUM CHLORIDE 0.9 % (FLUSH) 0.9 %
10 SYRINGE (ML) INJECTION PRN
Status: CANCELLED | OUTPATIENT
Start: 2021-03-29

## 2021-03-29 RX ORDER — EPINEPHRINE 1 MG/ML
0.3 INJECTION, SOLUTION, CONCENTRATE INTRAVENOUS PRN
Status: CANCELLED | OUTPATIENT
Start: 2021-03-30

## 2021-03-29 RX ORDER — METHYLPREDNISOLONE SODIUM SUCCINATE 125 MG/2ML
60 INJECTION, POWDER, LYOPHILIZED, FOR SOLUTION INTRAMUSCULAR; INTRAVENOUS ONCE
Status: CANCELLED | OUTPATIENT
Start: 2021-04-12

## 2021-03-29 RX ORDER — EPINEPHRINE 1 MG/ML
0.3 INJECTION, SOLUTION, CONCENTRATE INTRAVENOUS PRN
Status: CANCELLED | OUTPATIENT
Start: 2021-03-29

## 2021-03-29 RX ORDER — DIPHENHYDRAMINE HYDROCHLORIDE 50 MG/ML
25 INJECTION INTRAMUSCULAR; INTRAVENOUS ONCE
Status: CANCELLED | OUTPATIENT
Start: 2021-04-12

## 2021-03-29 RX ORDER — DIPHENHYDRAMINE HYDROCHLORIDE 50 MG/ML
25 INJECTION INTRAMUSCULAR; INTRAVENOUS ONCE
Status: CANCELLED | OUTPATIENT
Start: 2021-03-29

## 2021-03-29 RX ORDER — SODIUM CHLORIDE 9 MG/ML
20 INJECTION, SOLUTION INTRAVENOUS ONCE
Status: CANCELLED | OUTPATIENT
Start: 2021-04-12 | End: 2021-04-12

## 2021-03-29 RX ORDER — METHYLPREDNISOLONE SODIUM SUCCINATE 125 MG/2ML
60 INJECTION, POWDER, LYOPHILIZED, FOR SOLUTION INTRAMUSCULAR; INTRAVENOUS ONCE
Status: COMPLETED | OUTPATIENT
Start: 2021-03-29 | End: 2021-03-29

## 2021-03-29 RX ORDER — SODIUM CHLORIDE 0.9 % (FLUSH) 0.9 %
5 SYRINGE (ML) INJECTION PRN
Status: CANCELLED | OUTPATIENT
Start: 2021-04-12

## 2021-03-29 RX ORDER — SODIUM CHLORIDE 9 MG/ML
20 INJECTION, SOLUTION INTRAVENOUS ONCE
Status: CANCELLED | OUTPATIENT
Start: 2021-03-30 | End: 2021-03-30

## 2021-03-29 RX ORDER — SODIUM CHLORIDE 0.9 % (FLUSH) 0.9 %
10 SYRINGE (ML) INJECTION PRN
Status: CANCELLED | OUTPATIENT
Start: 2021-04-19

## 2021-03-29 RX ORDER — PALONOSETRON HYDROCHLORIDE 0.05 MG/ML
0.25 INJECTION, SOLUTION INTRAVENOUS ONCE
Status: CANCELLED
Start: 2021-03-29

## 2021-03-29 RX ORDER — EPINEPHRINE 1 MG/ML
0.3 INJECTION, SOLUTION, CONCENTRATE INTRAVENOUS PRN
Status: CANCELLED | OUTPATIENT
Start: 2021-04-19

## 2021-03-29 RX ORDER — SODIUM CHLORIDE 0.9 % (FLUSH) 0.9 %
10 SYRINGE (ML) INJECTION PRN
Status: DISCONTINUED | OUTPATIENT
Start: 2021-03-29 | End: 2021-03-30 | Stop reason: HOSPADM

## 2021-03-29 RX ORDER — HEPARIN SODIUM (PORCINE) LOCK FLUSH IV SOLN 100 UNIT/ML 100 UNIT/ML
500 SOLUTION INTRAVENOUS PRN
Status: CANCELLED | OUTPATIENT
Start: 2021-04-05

## 2021-03-29 RX ORDER — METHYLPREDNISOLONE SODIUM SUCCINATE 125 MG/2ML
125 INJECTION, POWDER, LYOPHILIZED, FOR SOLUTION INTRAMUSCULAR; INTRAVENOUS ONCE
Status: CANCELLED | OUTPATIENT
Start: 2021-04-05 | End: 2021-04-05

## 2021-03-29 RX ORDER — DIPHENHYDRAMINE HYDROCHLORIDE 50 MG/ML
50 INJECTION INTRAMUSCULAR; INTRAVENOUS ONCE
Status: CANCELLED | OUTPATIENT
Start: 2021-04-19 | End: 2021-04-19

## 2021-03-29 RX ORDER — EPINEPHRINE 1 MG/ML
0.3 INJECTION, SOLUTION, CONCENTRATE INTRAVENOUS PRN
Status: CANCELLED | OUTPATIENT
Start: 2021-04-12

## 2021-03-29 RX ORDER — METHYLPREDNISOLONE SODIUM SUCCINATE 125 MG/2ML
60 INJECTION, POWDER, LYOPHILIZED, FOR SOLUTION INTRAMUSCULAR; INTRAVENOUS ONCE
Status: CANCELLED | OUTPATIENT
Start: 2021-04-05

## 2021-03-29 RX ORDER — ACETAMINOPHEN 325 MG/1
650 TABLET ORAL ONCE
Status: CANCELLED | OUTPATIENT
Start: 2021-03-29

## 2021-03-29 RX ORDER — SODIUM CHLORIDE 9 MG/ML
INJECTION, SOLUTION INTRAVENOUS CONTINUOUS
Status: CANCELLED | OUTPATIENT
Start: 2021-04-19

## 2021-03-29 RX ORDER — ACETAMINOPHEN 325 MG/1
650 TABLET ORAL ONCE
Status: CANCELLED | OUTPATIENT
Start: 2021-04-05

## 2021-03-29 RX ORDER — METHYLPREDNISOLONE SODIUM SUCCINATE 125 MG/2ML
60 INJECTION, POWDER, LYOPHILIZED, FOR SOLUTION INTRAMUSCULAR; INTRAVENOUS ONCE
Status: CANCELLED | OUTPATIENT
Start: 2021-04-19

## 2021-03-29 RX ORDER — METHYLPREDNISOLONE SODIUM SUCCINATE 125 MG/2ML
125 INJECTION, POWDER, LYOPHILIZED, FOR SOLUTION INTRAMUSCULAR; INTRAVENOUS ONCE
Status: CANCELLED | OUTPATIENT
Start: 2021-04-12 | End: 2021-04-12

## 2021-03-29 RX ADMIN — ACETAMINOPHEN 650 MG: 325 TABLET ORAL at 08:54

## 2021-03-29 RX ADMIN — METHYLPREDNISOLONE SODIUM SUCCINATE 60 MG: 125 INJECTION, POWDER, FOR SOLUTION INTRAMUSCULAR; INTRAVENOUS at 09:12

## 2021-03-29 RX ADMIN — DIPHENHYDRAMINE HYDROCHLORIDE 25 MG: 50 INJECTION, SOLUTION INTRAMUSCULAR; INTRAVENOUS at 09:14

## 2021-03-29 RX ADMIN — DARATUMUMAB 600 MG: 100 INJECTION, SOLUTION, CONCENTRATE INTRAVENOUS at 11:11

## 2021-03-29 RX ADMIN — SODIUM CHLORIDE 20 ML/HR: 9 INJECTION, SOLUTION INTRAVENOUS at 09:08

## 2021-03-29 RX ADMIN — PALONOSETRON 0.25 MG: 0.25 INJECTION, SOLUTION INTRAVENOUS at 09:10

## 2021-03-29 RX ADMIN — BORTEZOMIB 2.75 MG: 3.5 INJECTION, POWDER, LYOPHILIZED, FOR SOLUTION INTRAVENOUS; SUBCUTANEOUS at 09:52

## 2021-03-29 RX ADMIN — CYCLOPHOSPHAMIDE: 200 INJECTION, SOLUTION INTRAVENOUS at 09:48

## 2021-03-29 ASSESSMENT — PAIN SCALES - GENERAL: PAINLEVEL_OUTOF10: 0

## 2021-03-29 NOTE — PROGRESS NOTES
The patient states he has hay fever and is currently experiencing increase in sinus drainage and itchy eyes. VS WNL, the patient brought some allegra and I instruct him to go ahead and take one. No chills, no fever, no dyspnea, no tachycardia, no htn, no vomiting, no other pruritus except for his eyes. darzalex rate at 150ml/hr as of 1312.

## 2021-03-29 NOTE — PROGRESS NOTES
The patient tolerated his first dose dose of darzalex very well without reaction. He is instructed to begin prophylaxis with bactrim and zovirax tomorrow. He is instructed to take 40 mgm of decadron every Monday morning with food. he needs to  these three scripts from the pharmacy!

## 2021-03-30 ENCOUNTER — HOSPITAL ENCOUNTER (OUTPATIENT)
Dept: INFUSION THERAPY | Age: 66
Discharge: HOME | End: 2021-03-30
Payer: COMMERCIAL

## 2021-03-30 VITALS
TEMPERATURE: 99.2 F | DIASTOLIC BLOOD PRESSURE: 56 MMHG | RESPIRATION RATE: 16 BRPM | HEART RATE: 84 BPM | SYSTOLIC BLOOD PRESSURE: 94 MMHG

## 2021-03-30 DIAGNOSIS — E85.81 LIGHT CHAIN (AL) AMYLOIDOSIS (HCC): Primary | ICD-10-CM

## 2021-03-30 PROCEDURE — 96375 TX/PRO/DX INJ NEW DRUG ADDON: CPT

## 2021-03-30 PROCEDURE — 6370000000 HC RX 637 (ALT 250 FOR IP): Performed by: INTERNAL MEDICINE

## 2021-03-30 PROCEDURE — 6360000002 HC RX W HCPCS: Performed by: INTERNAL MEDICINE

## 2021-03-30 PROCEDURE — 96415 CHEMO IV INFUSION ADDL HR: CPT

## 2021-03-30 PROCEDURE — 2580000003 HC RX 258: Performed by: INTERNAL MEDICINE

## 2021-03-30 PROCEDURE — 96413 CHEMO IV INFUSION 1 HR: CPT

## 2021-03-30 RX ORDER — SODIUM CHLORIDE 9 MG/ML
20 INJECTION, SOLUTION INTRAVENOUS ONCE
Status: COMPLETED | OUTPATIENT
Start: 2021-03-30 | End: 2021-03-30

## 2021-03-30 RX ORDER — METHYLPREDNISOLONE SODIUM SUCCINATE 125 MG/2ML
60 INJECTION, POWDER, LYOPHILIZED, FOR SOLUTION INTRAMUSCULAR; INTRAVENOUS ONCE
Status: COMPLETED | OUTPATIENT
Start: 2021-03-30 | End: 2021-03-30

## 2021-03-30 RX ORDER — SODIUM CHLORIDE 0.9 % (FLUSH) 0.9 %
10 SYRINGE (ML) INJECTION PRN
Status: DISCONTINUED | OUTPATIENT
Start: 2021-03-30 | End: 2021-03-31 | Stop reason: HOSPADM

## 2021-03-30 RX ORDER — DIPHENHYDRAMINE HYDROCHLORIDE 50 MG/ML
25 INJECTION INTRAMUSCULAR; INTRAVENOUS ONCE
Status: COMPLETED | OUTPATIENT
Start: 2021-03-30 | End: 2021-03-30

## 2021-03-30 RX ORDER — ACETAMINOPHEN 325 MG/1
650 TABLET ORAL ONCE
Status: COMPLETED | OUTPATIENT
Start: 2021-03-30 | End: 2021-03-30

## 2021-03-30 RX ADMIN — DARATUMUMAB 500 MG: 100 INJECTION, SOLUTION, CONCENTRATE INTRAVENOUS at 08:58

## 2021-03-30 RX ADMIN — DIPHENHYDRAMINE HYDROCHLORIDE 25 MG: 50 INJECTION, SOLUTION INTRAMUSCULAR; INTRAVENOUS at 08:31

## 2021-03-30 RX ADMIN — METHYLPREDNISOLONE SODIUM SUCCINATE 60 MG: 125 INJECTION, POWDER, FOR SOLUTION INTRAMUSCULAR; INTRAVENOUS at 08:28

## 2021-03-30 RX ADMIN — ACETAMINOPHEN 650 MG: 325 TABLET ORAL at 08:25

## 2021-03-30 RX ADMIN — Medication 10 ML: at 08:23

## 2021-03-30 RX ADMIN — SODIUM CHLORIDE 20 ML/HR: 9 INJECTION, SOLUTION INTRAVENOUS at 08:23

## 2021-03-30 ASSESSMENT — PAIN SCALES - GENERAL: PAINLEVEL_OUTOF10: 0

## 2021-04-01 PROBLEM — R80.3 BENCE JONES PROTEINURIA: Status: ACTIVE | Noted: 2021-04-01

## 2021-04-01 NOTE — PROGRESS NOTES
[address] [facility]. [facility]        Pt Name: Raghu Gray: 1955  Date of evaluation: 4/5/2021  Primary Care Physician: Yony Noland MD  Reason for evaluation:   Chief Complaint   Patient presents with    Other     Light chain  (AL) number adenosis    Follow-up    Treatment        Subjective:  Here for chemotherapy and follow-up. Doing well, no complaints except for moderate neuropathy in feet. He has received his second dose of the Covid vaccine        OBJECTIVE:  VITALS:  height is 5' 10\" (1.778 m) and weight is 155 lb 3.2 oz (70.4 kg). His temperature is 97.7 °F (36.5 °C). His blood pressure is 95/60 and his pulse is 102. His respiration is 18 and oxygen saturation is 96%. Physical Exam:  Performance Status: Well developed, well nourished male  General: AAO to person, place, time, cooperative, in no acute distress. Head and neck: PERRLA, EOMI. Sclera non icteric. Oropharynx:  Clear. Neck: no JVD,  no adenopathy. Heart: Regular rate and regular rhythm, no murmur. Lungs: Clear to auscultation. Abdomen: Soft, non-tender;no masses, no organomegaly. Extremities: 2+ edema,no cyanosis, no clubbing. Neurologic:Cranial nerves grossly intact. No focal motor or sensory deficits . Skin:  No rash. Medications  Prior to Admission medications    Medication Sig Start Date End Date Taking? Authorizing Provider   rosuvastatin (CRESTOR) 40 MG tablet TAKE ONE TABLET BY MOUTH DAILY 4/2/21  Yes Yony Noland MD   acyclovir (ZOVIRAX) 400 MG tablet Take 1 tablet by mouth 2 times daily 3/29/21 4/28/21 Yes VICTOR MANUEL Connor CNP   dexamethasone (DECADRON) 4 MG tablet TAKE 10 Tables (40 mg dose) EVERY Monday 3/29/21  Yes VICTOR MANUEL Connor CNP   sulfamethoxazole-trimethoprim (BACTRIM DS;SEPTRA DS) 800-160 MG per tablet Take 1 tablet by mouth three times a week Take 1 tablets BID on MONDAYS, [address] [facility], and FRIDAYS.   THESE  Are the CALCIUM 8.3 (L) 2021    PROT 4.8 (L) 2021    LABALBU 2.5 (L) 2021    BILITOT <0.2 2021    ALKPHOS 79 2021    AST 34 2021    ALT 32 2021    LABGLOM >60 2021    GFRAA >60 2021             Radiology-  CT Guided Needle Placement  Result Date: 2/15/2021  Patient MRN:  [id number] : 1955 Age: 61 years Gender: Male Order Date:  2/15/2021 12:41 PM EXAM: CT GUIDED NEEDLE PLACEMENT, CT BIOPSY RENAL NUMBER OF IMAGES:  166 INDICATION: R80.9 Proteinuria, unspecified type What reading provider will be dictating this exam?->[facility] COMPARISON: None After obtaining informed consent and following the routine sterile prep and drape. With CT guidance the appropriate entrance for the biopsy was marked. Lidocaine was then injected into this site for local anesthesia. A biopsy needle was inserted into the left renal cortex with CT guidance. 5 core specimens were obtained. The patient tolerated the procedure well. Postprocedure images were unremarkable The procedure was performed under local anesthesia and moderate sedation with 2 mg of versed IV and 100 mcg of fentanyl IV. A timeout was performed at 12:39 PM hours . Patient was monitored for 60 minutes by registered nurse. Successful uncomplicated CT guided biopsy of the left renal cortex. If there are any physician concerns regarding this report, a Radiologist can be reached by calling the following number [phone]. CT Abdomen Pelvis W Iv Contrast Additional Contrast? None  Result Date: 2/15/2021  EXAMINATION: CT OF THE ABDOMEN AND PELVIS WITH CONTRAST 2/15/2021 1:18 pm TECHNIQUE: CT of the abdomen and pelvis was performed with the administration of intravenous contrast. Multiplanar reformatted images are provided for review. Dose modulation, iterative reconstruction, and/or weight based adjustment of the mA/kV was utilized to reduce the radiation dose to as low as reasonably achievable.  COMPARISON: CT-guided left renal biopsy 02/15/2021. CT abdomen/pelvis with contrast 04/28/2019. HISTORY: ORDERING SYSTEM PROVIDED HISTORY: s/p renal biopsy, r/o bleed TECHNOLOGIST PROVIDED HISTORY: Reason for exam:->s/p renal biopsy, r/o bleed Additional Contrast?->None Decision Support Exception->Emergency Medical Condition (MA) FINDINGS: Lower Chest: The visualized portions of the heart appear normal without significant pericardial effusion. Mild left pleural effusion. Atelectasis/scarring is present in the lung bases. Organs: Liver lesions ranging in size from 0.4 cm to 1.0 cm demonstrate benign imaging features (sharp margins and homogeneous low attenuation </=20 HU); no follow-up is necessary. There is trace pericholecystic fluid/edema. The gallbladder otherwise appears normal.  No biliary ductal dilation. The pancreas appears normal.  The adrenal glands appear normal without identified nodules. The spleen appears normal.  The kidneys appear normal in size without hydronephrosis or nephrolithiasis. A homogeneous structure in the right kidney measures up to 7.1 cm and is compatible with likely benign cyst (with thin/imperceptible wall, no evidence of fat, mural nodularity, septation or calcification, and density measuring -10-20 HU); no follow-up is necessary. Tiny homogeneous structures in the kidneys are too small to characterize (measuring <3J slice thickness); subjectively, these appear to likely represent benign cysts, no follow-up is necessary. A tract of air extending from the left posterior flank to the posterior lower pole of the left kidney is compatible with the recent percutaneous biopsy tract. A poorly defined hyperattenuating fluid collection adjacent to the kidney at the site of the biopsy is compatible with a perinephric hematoma, measuring 5.1 x 2.8 x 1.8 cm. There is trace adjacent simple appearing fluid. No retroperitoneal hemorrhage is identified elsewhere. No subcapsular hematoma or mass effect on the kidney. No active contrast extravasation is identified. The ureters appear normal. GI/Bowel: No evidence of bowel obstruction or significant inflammatory changes. Mild-moderate volume of fecal material is present in the colon. The appendix is visualized and appears normal. Pelvis: The bladder is mildly distended without significant wall thickening or intraluminal calculi. The prostate measures 4.5 x 4.0 x 3.4 cm with estimated volume of 32 mL (normal is </= 30 mL) with up to 0.8 cm protrusion of the median lobe into the bladder base. Peritoneum/Retroperitoneum: No enlarged mesenteric or retroperitoneal lymph nodes are identified. Trace fluid is present in the pelvis. There is mild-moderate atherosclerosis. No aortic aneurysm or evidence of an acute aortic syndrome. Bones/Soft Tissues: A tiny, fat-containing umbilical hernia is present. Mild osseous degenerative changes are present. 1. Small left perinephric hematoma at the biopsy site measures up to 5.1 cm. No evidence of active contrast extravasation or mass effect on the kidney. 2. Mild left pleural effusion appears simple. 3. Trace fluid in the pelvis appears simple. 4. Trace pericholecystic fluid/edema is nonspecific without pericholecystic stranding or calcified gallstones to suggest concern for developing cholecystitis. 5. Mild prostatomegaly. CT Biopsy Renal  Result Date: 2/15/2021  Patient MRN:  [id number] : 1955 Age: 61 years Gender: Male Order Date:  2/15/2021 12:41 PM EXAM: CT GUIDED NEEDLE PLACEMENT, CT BIOPSY RENAL NUMBER OF IMAGES:  166 INDICATION: R80.9 Proteinuria, unspecified type What reading provider will be dictating this exam?->[facility] COMPARISON: None After obtaining informed consent and following the routine sterile prep and drape. With CT guidance the appropriate entrance for the biopsy was marked. Lidocaine was then injected into this site for local anesthesia.  A biopsy needle was inserted into the left renal cortex with markedly elevated free lambda light chain at 311 with abnormal ratio of 0.05  Serum troponin was normal but proBNP was elevated at 491. 24-hour urine collection showed nephrotic range proteinuria with total proteins of 6.6 g in a 24-hour period with a faint M spike in the urine measuring 1263 mg in a 24-hour timeframe    He will be recommended induction with Daratumumab/Velcade/dexamethasone  His bone marrow aspirate and biopsy will be reviewed. All potential side effects were discussed. 3/29/2021  He will be given acyclovir for VZV prophylaxis and Bactrim DS for PCP prophylaxis and as needed Compazine for nausea. He will be initiated today on daratumumab/Velcade/cyclophosphamide/dexamethasone regimen. All potential side effects were discussed. His baby aspirin has been held. 4/05/2021  He tolerated his treatment with daratumumab well. He reports constipation. Few days later he had some chills and body aches and it is likely related to his second dose of the Covid vaccine. No signs of peripheral neuropathy. To receive week 2 of daratumumab/Velcade/dexamethasone/Cytoxan    Attempt with future weeks to switch to [employer] LISA Nagy M.D., F.A.C.P.   Electronically signed 4/5/2021 at 8:33 AM

## 2021-04-02 DIAGNOSIS — E78.5 HYPERLIPIDEMIA, UNSPECIFIED HYPERLIPIDEMIA TYPE: Chronic | ICD-10-CM

## 2021-04-02 RX ORDER — ROSUVASTATIN CALCIUM 40 MG/1
TABLET, COATED ORAL
Qty: 90 TABLET | Refills: 3 | Status: SHIPPED
Start: 2021-04-02 | End: 2022-04-21 | Stop reason: SDUPTHER

## 2021-04-02 NOTE — TELEPHONE ENCOUNTER
Last Appointment:  2/22/2021  Future Appointments   Date Time Provider Ana Simms   4/5/2021  8:15 AM Marlen Núñez MD [facility]   4/5/2021  8:30 AM [facility] CHAIR Liana 12 Caroline Cardona

## 2021-04-05 ENCOUNTER — HOSPITAL ENCOUNTER (OUTPATIENT)
Dept: INFUSION THERAPY | Age: 66
Discharge: HOME | End: 2021-04-05
Payer: COMMERCIAL

## 2021-04-05 ENCOUNTER — OFFICE VISIT (OUTPATIENT)
Dept: ONCOLOGY | Age: 66
End: 2021-04-05
Payer: COMMERCIAL

## 2021-04-05 VITALS
TEMPERATURE: 97.7 F | DIASTOLIC BLOOD PRESSURE: 60 MMHG | HEIGHT: 70 IN | RESPIRATION RATE: 18 BRPM | BODY MASS INDEX: 22.22 KG/M2 | OXYGEN SATURATION: 96 % | SYSTOLIC BLOOD PRESSURE: 95 MMHG | WEIGHT: 155.2 LBS | HEART RATE: 102 BPM

## 2021-04-05 VITALS — DIASTOLIC BLOOD PRESSURE: 64 MMHG | SYSTOLIC BLOOD PRESSURE: 93 MMHG | RESPIRATION RATE: 18 BRPM | HEART RATE: 87 BPM

## 2021-04-05 DIAGNOSIS — E85.81 LIGHT CHAIN (AL) AMYLOIDOSIS (HCC): Primary | ICD-10-CM

## 2021-04-05 LAB
ALBUMIN SERPL-MCNC: 2.2 G/DL (ref 3.5–5.2)
ALP BLD-CCNC: 95 U/L (ref 40–129)
ALT SERPL-CCNC: 24 U/L (ref 0–40)
ANION GAP SERPL CALCULATED.3IONS-SCNC: 9 MMOL/L (ref 7–16)
AST SERPL-CCNC: 21 U/L (ref 0–39)
BASOPHILS ABSOLUTE: 0.03 E9/L (ref 0–0.2)
BASOPHILS RELATIVE PERCENT: 0.4 % (ref 0–2)
BILIRUB SERPL-MCNC: <0.2 MG/DL (ref 0–1.2)
BUN BLDV-MCNC: 20 MG/DL (ref 8–23)
CALCIUM SERPL-MCNC: 8.5 MG/DL (ref 8.6–10.2)
CHLORIDE BLD-SCNC: 101 MMOL/L (ref 98–107)
CO2: 24 MMOL/L (ref 22–29)
CREAT SERPL-MCNC: 0.9 MG/DL (ref 0.7–1.2)
EOSINOPHILS ABSOLUTE: 0.26 E9/L (ref 0.05–0.5)
EOSINOPHILS RELATIVE PERCENT: 3.7 % (ref 0–6)
GFR AFRICAN AMERICAN: >60
GFR NON-AFRICAN AMERICAN: >60 ML/MIN/1.73
GLUCOSE BLD-MCNC: 100 MG/DL (ref 74–99)
HCT VFR BLD CALC: 44.6 % (ref 37–54)
HEMOGLOBIN: 14.9 G/DL (ref 12.5–16.5)
IMMATURE GRANULOCYTES #: 0.03 E9/L
IMMATURE GRANULOCYTES %: 0.4 % (ref 0–5)
LYMPHOCYTES ABSOLUTE: 1.28 E9/L (ref 1.5–4)
LYMPHOCYTES RELATIVE PERCENT: 18.3 % (ref 20–42)
MCH RBC QN AUTO: 29.6 PG (ref 26–35)
MCHC RBC AUTO-ENTMCNC: 33.4 % (ref 32–34.5)
MCV RBC AUTO: 88.5 FL (ref 80–99.9)
MONOCYTES ABSOLUTE: 0.67 E9/L (ref 0.1–0.95)
MONOCYTES RELATIVE PERCENT: 9.6 % (ref 2–12)
NEUTROPHILS ABSOLUTE: 4.72 E9/L (ref 1.8–7.3)
NEUTROPHILS RELATIVE PERCENT: 67.6 % (ref 43–80)
PDW BLD-RTO: 11.7 FL (ref 11.5–15)
PLATELET # BLD: 320 E9/L (ref 130–450)
PMV BLD AUTO: 9.6 FL (ref 7–12)
POTASSIUM SERPL-SCNC: 4.2 MMOL/L (ref 3.5–5)
RBC # BLD: 5.04 E12/L (ref 3.8–5.8)
SODIUM BLD-SCNC: 134 MMOL/L (ref 132–146)
TOTAL PROTEIN: 4.9 G/DL (ref 6.4–8.3)
WBC # BLD: 7 E9/L (ref 4.5–11.5)

## 2021-04-05 PROCEDURE — 6360000002 HC RX W HCPCS: Performed by: INTERNAL MEDICINE

## 2021-04-05 PROCEDURE — 2580000003 HC RX 258: Performed by: INTERNAL MEDICINE

## 2021-04-05 PROCEDURE — 85025 COMPLETE CBC W/AUTO DIFF WBC: CPT

## 2021-04-05 PROCEDURE — 96401 CHEMO ANTI-NEOPL SQ/IM: CPT

## 2021-04-05 PROCEDURE — 96413 CHEMO IV INFUSION 1 HR: CPT

## 2021-04-05 PROCEDURE — 36415 COLL VENOUS BLD VENIPUNCTURE: CPT

## 2021-04-05 PROCEDURE — 96417 CHEMO IV INFUS EACH ADDL SEQ: CPT

## 2021-04-05 PROCEDURE — 80053 COMPREHEN METABOLIC PANEL: CPT

## 2021-04-05 PROCEDURE — 96375 TX/PRO/DX INJ NEW DRUG ADDON: CPT

## 2021-04-05 PROCEDURE — 96415 CHEMO IV INFUSION ADDL HR: CPT

## 2021-04-05 PROCEDURE — 6370000000 HC RX 637 (ALT 250 FOR IP): Performed by: INTERNAL MEDICINE

## 2021-04-05 RX ORDER — METHYLPREDNISOLONE SODIUM SUCCINATE 125 MG/2ML
60 INJECTION, POWDER, LYOPHILIZED, FOR SOLUTION INTRAMUSCULAR; INTRAVENOUS ONCE
Status: COMPLETED | OUTPATIENT
Start: 2021-04-05 | End: 2021-04-05

## 2021-04-05 RX ORDER — SODIUM CHLORIDE 9 MG/ML
20 INJECTION, SOLUTION INTRAVENOUS ONCE
Status: COMPLETED | OUTPATIENT
Start: 2021-04-05 | End: 2021-04-05

## 2021-04-05 RX ORDER — ACETAMINOPHEN 325 MG/1
650 TABLET ORAL ONCE
Status: COMPLETED | OUTPATIENT
Start: 2021-04-05 | End: 2021-04-05

## 2021-04-05 RX ORDER — ONDANSETRON 2 MG/ML
8 INJECTION INTRAMUSCULAR; INTRAVENOUS ONCE
Status: COMPLETED | OUTPATIENT
Start: 2021-04-05 | End: 2021-04-05

## 2021-04-05 RX ORDER — DIPHENHYDRAMINE HYDROCHLORIDE 50 MG/ML
25 INJECTION INTRAMUSCULAR; INTRAVENOUS ONCE
Status: COMPLETED | OUTPATIENT
Start: 2021-04-05 | End: 2021-04-05

## 2021-04-05 RX ADMIN — CYCLOPHOSPHAMIDE: 200 INJECTION, SOLUTION INTRAVENOUS at 10:25

## 2021-04-05 RX ADMIN — METHYLPREDNISOLONE SODIUM SUCCINATE 60 MG: 125 INJECTION, POWDER, FOR SOLUTION INTRAMUSCULAR; INTRAVENOUS at 09:51

## 2021-04-05 RX ADMIN — DIPHENHYDRAMINE HYDROCHLORIDE 25 MG: 50 INJECTION, SOLUTION INTRAMUSCULAR; INTRAVENOUS at 09:51

## 2021-04-05 RX ADMIN — DARATUMUMAB 1100 MG: 100 INJECTION, SOLUTION, CONCENTRATE INTRAVENOUS at 11:08

## 2021-04-05 RX ADMIN — SODIUM CHLORIDE 20 ML/HR: 9 INJECTION, SOLUTION INTRAVENOUS at 09:51

## 2021-04-05 RX ADMIN — ONDANSETRON 8 MG: 2 INJECTION INTRAMUSCULAR; INTRAVENOUS at 09:51

## 2021-04-05 RX ADMIN — BORTEZOMIB 2.75 MG: 3.5 INJECTION, POWDER, LYOPHILIZED, FOR SOLUTION INTRAVENOUS; SUBCUTANEOUS at 10:25

## 2021-04-05 RX ADMIN — ACETAMINOPHEN 650 MG: 325 TABLET ORAL at 09:51

## 2021-04-05 ASSESSMENT — PAIN SCALES - GENERAL: PAINLEVEL_OUTOF10: 0

## 2021-04-09 NOTE — PROGRESS NOTES
[address] [facility]. [facility]        Pt Name: Evan Kendrick: 1955  Date of evaluation: 4/9/2021  Primary Care Physician: Cindy Aj MD  Reason for evaluation:   Chief Complaint   Patient presents with    Other     Light chain (AL) amyloidosis    Follow-up    Chemotherapy        Subjective:  Here for chemotherapy and follow-up. Doing well, no complaints except for moderate neuropathy in feet. He has generalized body aches and heartburn following his chemo last week      OBJECTIVE:  VITALS:  height is 5' 10\" (1.778 m) and weight is 152 lb 14.4 oz (69.4 kg). His temperature is 98 °F (36.7 °C). His blood pressure is 112/62 and his pulse is 99. His oxygen saturation is 97%. Physical Exam:  Performance Status: Well developed, well nourished male  General: AAO to person, place, time, cooperative, in no acute distress. Head and neck: PERRLA, EOMI. Sclera non icteric. Oropharynx:  Clear. Neck: no JVD,  no adenopathy. Heart: Regular rate and regular rhythm, no murmur. Lungs: Clear to auscultation. Abdomen: Soft, non-tender;no masses, no organomegaly. Extremities: 2+ edema,no cyanosis, no clubbing. Neurologic:Cranial nerves grossly intact. No focal motor or sensory deficits . Skin:  No rash. Medications  Prior to Admission medications    Medication Sig Start Date End Date Taking? Authorizing Provider   rosuvastatin (CRESTOR) 40 MG tablet TAKE ONE TABLET BY MOUTH DAILY 4/2/21   Cindy Aj MD   acyclovir (ZOVIRAX) 400 MG tablet Take 1 tablet by mouth 2 times daily 3/29/21 4/28/21  [place] Henry, APRN - CNP   dexamethasone (DECADRON) 4 MG tablet TAKE 10 Tables (40 mg dose) EVERY Monday 3/29/21   Yara Figueroa APRN - CNP   sulfamethoxazole-trimethoprim (BACTRIM DS;SEPTRA DS) 800-160 MG per tablet Take 1 tablet by mouth three times a week Take 1 tablets BID on MONDAYS, [address] [facility], and FRIDAYS.   THESE  Are the DIRECTIONS.  [facility] 3/29/21 4/28/21  Noemi Dao, APRN - CNP   prochlorperazine (COMPAZINE) 10 MG tablet Take 1 tablet by mouth every 6 hours as needed (nausea/vomiting) 3/25/21   Kike Gonzalez MD   fluticasone [facility]) 50 MCG/ACT nasal spray 1 spray by Each Nostril route daily 12/28/20   Betsy Martell MD   miconazole (MICOTIN) 2 % cream Apply topically 2 times daily. 12/28/20   Betsy Martell MD   VITAMIN E PO Take 1 capsule by mouth daily    Historical Provider, MD   Multiple Vitamins-Minerals (THERAPEUTIC MULTIVITAMIN-MINERALS) tablet Take 1 tablet by mouth daily    Historical Provider, MD   Ascorbic Acid (VITAMIN C PO) Take 1 tablet by mouth daily    Historical Provider, MD   VITAMIN D PO Take 1 capsule by mouth daily    Historical Provider, MD   aspirin 81 MG tablet Take 81 mg by mouth daily    Historical Provider, MD   ibuprofen (ADVIL;MOTRIN) 600 MG tablet Take 1 tablet by mouth every 6 hours as needed for Pain 7/5/17   Betsy Martell MD   fexofenadine-pseudoephedrine (ALLEGRA-D 12 HOUR)  MG per tablet Take 1 tablet by mouth 2 times daily as needed.  2/5/13   Reese Fierro MD    Scheduled Meds:  Continuous Infusions:  PRN Meds:.        Recent Laboratory Data-     Lab Results   Component Value Date    WBC 7.0 04/05/2021    HGB 14.9 04/05/2021    HCT 44.6 04/05/2021    MCV 88.5 04/05/2021     04/05/2021    LYMPHOPCT 18.3 (L) 04/05/2021    RBC 5.04 04/05/2021    MCH 29.6 04/05/2021    MCHC 33.4 04/05/2021    RDW 11.7 04/05/2021    NEUTOPHILPCT 67.6 04/05/2021    MONOPCT 9.6 04/05/2021    BASOPCT 0.4 04/05/2021    NEUTROABS 4.72 04/05/2021    LYMPHSABS 1.28 (L) 04/05/2021    MONOSABS 0.67 04/05/2021    EOSABS 0.26 04/05/2021    BASOSABS 0.03 04/05/2021       Lab Results   Component Value Date     04/05/2021    K 4.2 04/05/2021     04/05/2021    CO2 24 04/05/2021    BUN 20 04/05/2021    CREATININE 0.9 04/05/2021    GLUCOSE 100 (H) 04/05/2021    CALCIUM 8.5 (L) 04/05/2021    PROT 4.9 (L) 2021    LABALBU 2.2 (L) 2021    BILITOT <0.2 2021    ALKPHOS 95 2021    AST 21 2021    ALT 24 2021    LABGLOM >60 2021    GFRAA >60 2021             Radiology-  CT Guided Needle Placement  Result Date: 2/15/2021  Patient MRN:  [id number] : 1955 Age: 61 years Gender: Male Order Date:  2/15/2021 12:41 PM EXAM: CT GUIDED NEEDLE PLACEMENT, CT BIOPSY RENAL NUMBER OF IMAGES:  166 INDICATION: R80.9 Proteinuria, unspecified type What reading provider will be dictating this exam?->[facility] COMPARISON: None After obtaining informed consent and following the routine sterile prep and drape. With CT guidance the appropriate entrance for the biopsy was marked. Lidocaine was then injected into this site for local anesthesia. A biopsy needle was inserted into the left renal cortex with CT guidance. 5 core specimens were obtained. The patient tolerated the procedure well. Postprocedure images were unremarkable The procedure was performed under local anesthesia and moderate sedation with 2 mg of versed IV and 100 mcg of fentanyl IV. A timeout was performed at 12:39 PM hours . Patient was monitored for 60 minutes by registered nurse. Successful uncomplicated CT guided biopsy of the left renal cortex. If there are any physician concerns regarding this report, a Radiologist can be reached by calling the following number [phone]. CT Abdomen Pelvis W Iv Contrast Additional Contrast? None  Result Date: 2/15/2021  EXAMINATION: CT OF THE ABDOMEN AND PELVIS WITH CONTRAST 2/15/2021 1:18 pm TECHNIQUE: CT of the abdomen and pelvis was performed with the administration of intravenous contrast. Multiplanar reformatted images are provided for review. Dose modulation, iterative reconstruction, and/or weight based adjustment of the mA/kV was utilized to reduce the radiation dose to as low as reasonably achievable. COMPARISON: CT-guided left renal biopsy 02/15/2021.   CT abdomen/pelvis with contrast 04/28/2019. HISTORY: ORDERING SYSTEM PROVIDED HISTORY: s/p renal biopsy, r/o bleed TECHNOLOGIST PROVIDED HISTORY: Reason for exam:->s/p renal biopsy, r/o bleed Additional Contrast?->None Decision Support Exception->Emergency Medical Condition (MA) FINDINGS: Lower Chest: The visualized portions of the heart appear normal without significant pericardial effusion. Mild left pleural effusion. Atelectasis/scarring is present in the lung bases. Organs: Liver lesions ranging in size from 0.4 cm to 1.0 cm demonstrate benign imaging features (sharp margins and homogeneous low attenuation </=20 HU); no follow-up is necessary. There is trace pericholecystic fluid/edema. The gallbladder otherwise appears normal.  No biliary ductal dilation. The pancreas appears normal.  The adrenal glands appear normal without identified nodules. The spleen appears normal.  The kidneys appear normal in size without hydronephrosis or nephrolithiasis. A homogeneous structure in the right kidney measures up to 7.1 cm and is compatible with likely benign cyst (with thin/imperceptible wall, no evidence of fat, mural nodularity, septation or calcification, and density measuring -10-20 HU); no follow-up is necessary. Tiny homogeneous structures in the kidneys are too small to characterize (measuring <2H slice thickness); subjectively, these appear to likely represent benign cysts, no follow-up is necessary. A tract of air extending from the left posterior flank to the posterior lower pole of the left kidney is compatible with the recent percutaneous biopsy tract. A poorly defined hyperattenuating fluid collection adjacent to the kidney at the site of the biopsy is compatible with a perinephric hematoma, measuring 5.1 x 2.8 x 1.8 cm. There is trace adjacent simple appearing fluid. No retroperitoneal hemorrhage is identified elsewhere. No subcapsular hematoma or mass effect on the kidney.   No active contrast extravasation is identified. The ureters appear normal. GI/Bowel: No evidence of bowel obstruction or significant inflammatory changes. Mild-moderate volume of fecal material is present in the colon. The appendix is visualized and appears normal. Pelvis: The bladder is mildly distended without significant wall thickening or intraluminal calculi. The prostate measures 4.5 x 4.0 x 3.4 cm with estimated volume of 32 mL (normal is </= 30 mL) with up to 0.8 cm protrusion of the median lobe into the bladder base. Peritoneum/Retroperitoneum: No enlarged mesenteric or retroperitoneal lymph nodes are identified. Trace fluid is present in the pelvis. There is mild-moderate atherosclerosis. No aortic aneurysm or evidence of an acute aortic syndrome. Bones/Soft Tissues: A tiny, fat-containing umbilical hernia is present. Mild osseous degenerative changes are present. 1. Small left perinephric hematoma at the biopsy site measures up to 5.1 cm. No evidence of active contrast extravasation or mass effect on the kidney. 2. Mild left pleural effusion appears simple. 3. Trace fluid in the pelvis appears simple. 4. Trace pericholecystic fluid/edema is nonspecific without pericholecystic stranding or calcified gallstones to suggest concern for developing cholecystitis. 5. Mild prostatomegaly. CT Biopsy Renal  Result Date: 2/15/2021  Patient MRN:  [id number] : 1955 Age: 61 years Gender: Male Order Date:  2/15/2021 12:41 PM EXAM: CT GUIDED NEEDLE PLACEMENT, CT BIOPSY RENAL NUMBER OF IMAGES:  166 INDICATION: R80.9 Proteinuria, unspecified type What reading provider will be dictating this exam?->[facility] COMPARISON: None After obtaining informed consent and following the routine sterile prep and drape. With CT guidance the appropriate entrance for the biopsy was marked. Lidocaine was then injected into this site for local anesthesia. A biopsy needle was inserted into the left renal cortex with CT guidance.  5 core specimens were obtained. The patient tolerated the procedure well. Postprocedure images were unremarkable The procedure was performed under local anesthesia and moderate sedation with 2 mg of versed IV and 100 mcg of fentanyl IV. A timeout was performed at 12:39 PM hours . Patient was monitored for 60 minutes by registered nurse. Successful uncomplicated CT guided biopsy of the left renal cortex. If there are any physician concerns regarding this report, a Radiologist can be reached by calling the following number [phone]. 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 His work-up will be completed to include the following:    CBC, CMP, LDH, B2 microglobulin, CRP, serum protein electrophoresis with reflex to immunofixation, serum free kappa and lambda light chains with ratio, quantitative immunoglobulins, 24-hour urine collection for quantitative immunoglobulins and immunofixation. Serum troponin, proBNP and 2D echo will be done to exclude cardiac amyloidosis. TSH  Factor X assay as well as PT and APTT will be done    Will be scheduled for a bone marrow aspirate and biopsy    Once work-up completed options of therapy with bortezomib/daratumumab will be addressed    He is encouraged to receive the COVID-19 vaccine      3/15/2021  Awaiting his bone marrow aspirate and biopsy and his 2D echo which are both pending. His CBC was in the normal range with a hemoglobin of 15.3, normal WBC count of 6.2, normal MCV of 90 with a normal platelet count of 592. His TSH was normal at 3.88  Serum LDH was elevated at 266. B2 microglobulin was markedly elevated at 13.6. Quantitative immunoglobulins showed suppression of IgG with normal IgA and IgM. Serum electrophoresis showed an M spike faint measuring 0.1.   Serum FLC's showed markedly elevated free lambda light

## 2021-04-12 ENCOUNTER — OFFICE VISIT (OUTPATIENT)
Dept: ONCOLOGY | Age: 66
End: 2021-04-12
Payer: COMMERCIAL

## 2021-04-12 ENCOUNTER — HOSPITAL ENCOUNTER (OUTPATIENT)
Dept: INFUSION THERAPY | Age: 66
Discharge: HOME | End: 2021-04-12
Payer: COMMERCIAL

## 2021-04-12 VITALS — RESPIRATION RATE: 18 BRPM | SYSTOLIC BLOOD PRESSURE: 88 MMHG | HEART RATE: 97 BPM | DIASTOLIC BLOOD PRESSURE: 59 MMHG

## 2021-04-12 VITALS
DIASTOLIC BLOOD PRESSURE: 62 MMHG | HEIGHT: 70 IN | SYSTOLIC BLOOD PRESSURE: 112 MMHG | TEMPERATURE: 98 F | WEIGHT: 152.9 LBS | OXYGEN SATURATION: 97 % | BODY MASS INDEX: 21.89 KG/M2 | HEART RATE: 99 BPM

## 2021-04-12 DIAGNOSIS — E85.81 LIGHT CHAIN (AL) AMYLOIDOSIS (HCC): Primary | ICD-10-CM

## 2021-04-12 LAB
ALBUMIN SERPL-MCNC: 2.4 G/DL (ref 3.5–5.2)
ALP BLD-CCNC: 76 U/L (ref 40–129)
ALT SERPL-CCNC: 21 U/L (ref 0–40)
ANION GAP SERPL CALCULATED.3IONS-SCNC: 6 MMOL/L (ref 7–16)
AST SERPL-CCNC: 18 U/L (ref 0–39)
BASOPHILS ABSOLUTE: 0.02 E9/L (ref 0–0.2)
BASOPHILS RELATIVE PERCENT: 0.3 % (ref 0–2)
BILIRUB SERPL-MCNC: 0.3 MG/DL (ref 0–1.2)
BUN BLDV-MCNC: 19 MG/DL (ref 8–23)
CALCIUM SERPL-MCNC: 8.3 MG/DL (ref 8.6–10.2)
CHLORIDE BLD-SCNC: 104 MMOL/L (ref 98–107)
CO2: 27 MMOL/L (ref 22–29)
CREAT SERPL-MCNC: 1 MG/DL (ref 0.7–1.2)
EOSINOPHILS ABSOLUTE: 0.29 E9/L (ref 0.05–0.5)
EOSINOPHILS RELATIVE PERCENT: 5.1 % (ref 0–6)
GFR AFRICAN AMERICAN: >60
GFR NON-AFRICAN AMERICAN: >60 ML/MIN/1.73
GLUCOSE BLD-MCNC: 113 MG/DL (ref 74–99)
HCT VFR BLD CALC: 43.7 % (ref 37–54)
HEMOGLOBIN: 14.4 G/DL (ref 12.5–16.5)
IMMATURE GRANULOCYTES #: 0.02 E9/L
IMMATURE GRANULOCYTES %: 0.3 % (ref 0–5)
LYMPHOCYTES ABSOLUTE: 1.22 E9/L (ref 1.5–4)
LYMPHOCYTES RELATIVE PERCENT: 21.3 % (ref 20–42)
MCH RBC QN AUTO: 30.1 PG (ref 26–35)
MCHC RBC AUTO-ENTMCNC: 33 % (ref 32–34.5)
MCV RBC AUTO: 91.2 FL (ref 80–99.9)
MONOCYTES ABSOLUTE: 0.52 E9/L (ref 0.1–0.95)
MONOCYTES RELATIVE PERCENT: 9.1 % (ref 2–12)
NEUTROPHILS ABSOLUTE: 3.65 E9/L (ref 1.8–7.3)
NEUTROPHILS RELATIVE PERCENT: 63.9 % (ref 43–80)
PDW BLD-RTO: 11.9 FL (ref 11.5–15)
PLATELET # BLD: 298 E9/L (ref 130–450)
PMV BLD AUTO: 9.4 FL (ref 7–12)
POTASSIUM SERPL-SCNC: 4.5 MMOL/L (ref 3.5–5)
RBC # BLD: 4.79 E12/L (ref 3.8–5.8)
SODIUM BLD-SCNC: 137 MMOL/L (ref 132–146)
TOTAL PROTEIN: 4.8 G/DL (ref 6.4–8.3)
WBC # BLD: 5.7 E9/L (ref 4.5–11.5)

## 2021-04-12 PROCEDURE — 96401 CHEMO ANTI-NEOPL SQ/IM: CPT

## 2021-04-12 PROCEDURE — 80053 COMPREHEN METABOLIC PANEL: CPT

## 2021-04-12 PROCEDURE — 85025 COMPLETE CBC W/AUTO DIFF WBC: CPT

## 2021-04-12 PROCEDURE — 6370000000 HC RX 637 (ALT 250 FOR IP)

## 2021-04-12 PROCEDURE — 96413 CHEMO IV INFUSION 1 HR: CPT

## 2021-04-12 PROCEDURE — 6360000002 HC RX W HCPCS: Performed by: INTERNAL MEDICINE

## 2021-04-12 PROCEDURE — 96375 TX/PRO/DX INJ NEW DRUG ADDON: CPT

## 2021-04-12 PROCEDURE — 36415 COLL VENOUS BLD VENIPUNCTURE: CPT

## 2021-04-12 PROCEDURE — 2580000003 HC RX 258: Performed by: INTERNAL MEDICINE

## 2021-04-12 PROCEDURE — 6360000002 HC RX W HCPCS

## 2021-04-12 RX ORDER — TRAMADOL HYDROCHLORIDE 50 MG/1
50 TABLET ORAL EVERY 6 HOURS PRN
Qty: 40 TABLET | Refills: 0 | Status: SHIPPED | OUTPATIENT
Start: 2021-04-12 | End: 2021-04-30

## 2021-04-12 RX ORDER — DIPHENHYDRAMINE HYDROCHLORIDE 50 MG/ML
INJECTION INTRAMUSCULAR; INTRAVENOUS
Status: COMPLETED
Start: 2021-04-12 | End: 2021-04-12

## 2021-04-12 RX ORDER — ACETAMINOPHEN 325 MG/1
TABLET ORAL
Status: COMPLETED
Start: 2021-04-12 | End: 2021-04-12

## 2021-04-12 RX ORDER — ONDANSETRON 2 MG/ML
8 INJECTION INTRAMUSCULAR; INTRAVENOUS ONCE
Status: CANCELLED
Start: 2021-04-12 | End: 2021-04-12

## 2021-04-12 RX ORDER — ACETAMINOPHEN 325 MG/1
650 TABLET ORAL ONCE
Status: COMPLETED | OUTPATIENT
Start: 2021-04-12 | End: 2021-04-12

## 2021-04-12 RX ORDER — METHYLPREDNISOLONE SODIUM SUCCINATE 125 MG/2ML
60 INJECTION, POWDER, LYOPHILIZED, FOR SOLUTION INTRAMUSCULAR; INTRAVENOUS ONCE
Status: COMPLETED | OUTPATIENT
Start: 2021-04-12 | End: 2021-04-12

## 2021-04-12 RX ORDER — ONDANSETRON 2 MG/ML
INJECTION INTRAMUSCULAR; INTRAVENOUS
Status: COMPLETED
Start: 2021-04-12 | End: 2021-04-12

## 2021-04-12 RX ORDER — OMEPRAZOLE 20 MG/1
20 CAPSULE, DELAYED RELEASE ORAL
Qty: 60 CAPSULE | Refills: 0 | Status: SHIPPED
Start: 2021-04-12 | End: 2021-04-19 | Stop reason: SDUPTHER

## 2021-04-12 RX ORDER — METHYLPREDNISOLONE SODIUM SUCCINATE 125 MG/2ML
INJECTION, POWDER, LYOPHILIZED, FOR SOLUTION INTRAMUSCULAR; INTRAVENOUS
Status: COMPLETED
Start: 2021-04-12 | End: 2021-04-12

## 2021-04-12 RX ORDER — ONDANSETRON 2 MG/ML
8 INJECTION INTRAMUSCULAR; INTRAVENOUS ONCE
Status: COMPLETED | OUTPATIENT
Start: 2021-04-12 | End: 2021-04-12

## 2021-04-12 RX ORDER — DIPHENHYDRAMINE HYDROCHLORIDE 50 MG/ML
25 INJECTION INTRAMUSCULAR; INTRAVENOUS ONCE
Status: COMPLETED | OUTPATIENT
Start: 2021-04-12 | End: 2021-04-12

## 2021-04-12 RX ORDER — SODIUM CHLORIDE 9 MG/ML
20 INJECTION, SOLUTION INTRAVENOUS ONCE
Status: COMPLETED | OUTPATIENT
Start: 2021-04-12 | End: 2021-04-12

## 2021-04-12 RX ADMIN — DARATUMUMAB AND HYALURONIDASE-FIHJ (HUMAN RECOMBINANT) 15 ML: 1800; 30000 INJECTION SUBCUTANEOUS at 10:30

## 2021-04-12 RX ADMIN — SODIUM CHLORIDE 20 ML/HR: 9 INJECTION, SOLUTION INTRAVENOUS at 09:38

## 2021-04-12 RX ADMIN — ONDANSETRON 8 MG: 2 INJECTION INTRAMUSCULAR; INTRAVENOUS at 09:44

## 2021-04-12 RX ADMIN — ACETAMINOPHEN 650 MG: 325 TABLET ORAL at 09:38

## 2021-04-12 RX ADMIN — DIPHENHYDRAMINE HYDROCHLORIDE 25 MG: 50 INJECTION, SOLUTION INTRAMUSCULAR; INTRAVENOUS at 09:49

## 2021-04-12 RX ADMIN — METHYLPREDNISOLONE SODIUM SUCCINATE 60 MG: 125 INJECTION, POWDER, LYOPHILIZED, FOR SOLUTION INTRAMUSCULAR; INTRAVENOUS at 09:47

## 2021-04-12 RX ADMIN — CYCLOPHOSPHAMIDE: 200 INJECTION, SOLUTION INTRAVENOUS at 11:07

## 2021-04-12 RX ADMIN — BORTEZOMIB 2.75 MG: 3.5 INJECTION, POWDER, LYOPHILIZED, FOR SOLUTION INTRAVENOUS; SUBCUTANEOUS at 10:35

## 2021-04-12 RX ADMIN — METHYLPREDNISOLONE SODIUM SUCCINATE 60 MG: 125 INJECTION, POWDER, FOR SOLUTION INTRAMUSCULAR; INTRAVENOUS at 09:47

## 2021-04-12 RX ADMIN — DIPHENHYDRAMINE HYDROCHLORIDE 25 MG: 50 INJECTION INTRAMUSCULAR; INTRAVENOUS at 09:49

## 2021-04-12 ASSESSMENT — PAIN SCALES - GENERAL: PAINLEVEL_OUTOF10: 0

## 2021-04-12 NOTE — PROGRESS NOTES
CLINICAL PHARMACY NOTE: MEDS TO [address] [place] [address] Drive Select Patient?: Yes  Total # of Prescriptions Filled: 2   The following medications were delivered to the patient:  · ULTRAM 50 MG   · OMEPRAZOLE 20 MG   Total # of Interventions Completed: 3  Time Spent (min): 15    Additional Documentation:

## 2021-04-12 NOTE — PROGRESS NOTES
Patient  Says he experienced shooting pains 4/8 through 4/9. Extreme sensitivity from his chest to his head. [place] like he could not breathe as well. On 4/7 he said it felt like ice was running through his veins. Felt extremely cold in his chest, back, and neck. Said pain was an 8/10.

## 2021-04-16 NOTE — PROGRESS NOTES
[address] [facility]. [facility]        Pt Name: Ariel Dumont: 1955  Date of evaluation: 4/19/2021  Primary Care Physician: Collins Dalal MD  Reason for evaluation:   Chief Complaint   Patient presents with    Other     Light chain (AL) amyloidosis     Follow-up    Treatment        Subjective:  Here for chemotherapy and follow-up. Doing better. Called into the office on Friday, April 16, 2021 approximately 9 AM complaining of achiness in his joints, chills and he feels like he is getting the flu. He also states the back of his head hurts. I told him to try to drink some fluids or broth, lay down. I told him to call me by 12 noon if he did not feel any better. He return the call at 3  PM. Stated---------------------------is symptoms increased. He has generalized body aches and heartburn following his chemo last week  Complaints of moderate  Neuropathy in his feet. OBJECTIVE:  VITALS:  height is 5' 10\" (1.778 m) and weight is 155 lb 1.6 oz (70.4 kg). His infrared temperature is 97.7 °F (36.5 °C). His blood pressure is 111/66 and his pulse is 106. His respiration is 18. Physical Exam:  Performance Status: Well developed, well nourished male  General: AAO to person, place, time, cooperative, in no acute distress. Head and neck: PERRLA, EOMI. Sclera non icteric. Oropharynx:  Clear. Neck: no JVD,  no adenopathy. Heart: Regular rate and regular rhythm, no murmur. Lungs: Clear to auscultation. Abdomen: Soft, non-tender;no masses, no organomegaly. Extremities: 2+ edema,no cyanosis, no clubbing. Neurologic:Cranial nerves grossly intact. No focal motor or sensory deficits Skin:  No rash. Medications  Prior to Admission medications    Medication Sig Start Date End Date Taking? Authorizing Provider   traMADol (ULTRAM) 50 MG tablet Take 1 tablet by mouth every 6 hours as needed for Pain for up to 40 doses.  ICD CODE:  E89.81 4/12/21 4/30/21 Yes VICTOR MANUEL Dominguez CNP   omeprazole (PRILOSEC) 20 MG delayed release capsule Take 1 capsule by mouth 2 times daily (before meals) 4/12/21 5/12/21 Yes VICTOR MANUEL Dominguez CNP   rosuvastatin (CRESTOR) 40 MG tablet TAKE ONE TABLET BY MOUTH DAILY 4/2/21  Yes Inez Kessler MD   acyclovir (ZOVIRAX) 400 MG tablet Take 1 tablet by mouth 2 times daily 3/29/21 4/28/21 Yes VICTOR MANUEL Dominguez CNP   dexamethasone (DECADRON) 4 MG tablet TAKE 10 Tables (40 mg dose) EVERY Monday 3/29/21  Yes VICTOR MANUEL Dominguez CNP   sulfamethoxazole-trimethoprim (BACTRIM DS;SEPTRA DS) 800-160 MG per tablet Take 1 tablet by mouth three times a week Take 1 tablets BID on MONDAYS, [address], and FRIDAYS. THESE  Are the DIRECTIONS.  [address] [facility] 3/29/21 4/28/21 Yes VICTOR MANUEL Dominguez CNP   prochlorperazine (COMPAZINE) 10 MG tablet Take 1 tablet by mouth every 6 hours as needed (nausea/vomiting) 3/25/21  Yes Zach Huffman MD   fluticasone [facility]) 50 MCG/ACT nasal spray 1 spray by Each Nostril route daily 12/28/20  Yes Inez Kessler MD   miconazole (MICOTIN) 2 % cream Apply topically 2 times daily. 12/28/20  Yes Inez Kessler MD   VITAMIN E PO Take 1 capsule by mouth daily   Yes Historical Provider, MD   Multiple Vitamins-Minerals (THERAPEUTIC MULTIVITAMIN-MINERALS) tablet Take 1 tablet by mouth daily   Yes Historical Provider, MD   Ascorbic Acid (VITAMIN C PO) Take 1 tablet by mouth daily   Yes Historical Provider, MD   VITAMIN D PO Take 1 capsule by mouth daily   Yes Historical Provider, MD   aspirin 81 MG tablet Take 81 mg by mouth daily   Yes Historical Provider, MD   ibuprofen (ADVIL;MOTRIN) 600 MG tablet Take 1 tablet by mouth every 6 hours as needed for Pain 7/5/17  Yes Inez Kessler MD   fexofenadine-pseudoephedrine (ALLEGRA-D 12 HOUR)  MG per tablet Take 1 tablet by mouth 2 times daily as needed.  2/5/13  Yes Ml Tan MD    Scheduled Meds:  Continuous Infusions:  PRN Meds:.        Recent Laboratory Data-     Lab Results   Component Value Date    WBC 5.3 2021    HGB 14.7 2021    HCT 44.2 2021    MCV 90.9 2021     2021    LYMPHOPCT 27.2 2021    RBC 4.86 2021    MCH 30.2 2021    MCHC 33.3 2021    RDW 12.1 2021    NEUTOPHILPCT 58.6 2021    MONOPCT 10.7 2021    BASOPCT 0.8 2021    NEUTROABS 3.13 2021    LYMPHSABS 1.45 (L) 2021    MONOSABS 0.57 2021    EOSABS 0.11 2021    BASOSABS 0.04 2021       Lab Results   Component Value Date     2021    K 4.2 2021    CL 97 (L) 2021    CO2 27 2021    BUN 15 2021    CREATININE 1.0 2021    GLUCOSE 104 (H) 2021    CALCIUM 8.6 2021    PROT 4.8 (L) 2021    LABALBU 2.4 (L) 2021    BILITOT 0.3 2021    ALKPHOS 75 2021    AST 21 2021    ALT 21 2021    LABGLOM >60 2021    GFRAA >60 2021             Radiology-  CT Guided Needle Placement  Result Date: 2/15/2021  Patient MRN:  [id number] : 1955 Age: 61 years Gender: Male Order Date:  2/15/2021 12:41 PM EXAM: CT GUIDED NEEDLE PLACEMENT, CT BIOPSY RENAL NUMBER OF IMAGES:  166 INDICATION: R80.9 Proteinuria, unspecified type What reading provider will be dictating this exam?->[facility] COMPARISON: None After obtaining informed consent and following the routine sterile prep and drape. With CT guidance the appropriate entrance for the biopsy was marked. Lidocaine was then injected into this site for local anesthesia. A biopsy needle was inserted into the left renal cortex with CT guidance. 5 core specimens were obtained. The patient tolerated the procedure well. Postprocedure images were unremarkable The procedure was performed under local anesthesia and moderate sedation with 2 mg of versed IV and 100 mcg of fentanyl IV. A timeout was performed at 12:39 PM hours .  Patient was monitored for 60 minutes by registered nurse. Successful uncomplicated CT guided biopsy of the left renal cortex. If there are any physician concerns regarding this report, a Radiologist can be reached by calling the following number [phone]. CT Abdomen Pelvis W Iv Contrast Additional Contrast? None  Result Date: 2/15/2021  EXAMINATION: CT OF THE ABDOMEN AND PELVIS WITH CONTRAST 2/15/2021 1:18 pm TECHNIQUE: CT of the abdomen and pelvis was performed with the administration of intravenous contrast. Multiplanar reformatted images are provided for review. Dose modulation, iterative reconstruction, and/or weight based adjustment of the mA/kV was utilized to reduce the radiation dose to as low as reasonably achievable. COMPARISON: CT-guided left renal biopsy 02/15/2021. CT abdomen/pelvis with contrast 04/28/2019. HISTORY: ORDERING SYSTEM PROVIDED HISTORY: s/p renal biopsy, r/o bleed TECHNOLOGIST PROVIDED HISTORY: Reason for exam:->s/p renal biopsy, r/o bleed Additional Contrast?->None Decision Support Exception->Emergency Medical Condition (MA) FINDINGS: Lower Chest: The visualized portions of the heart appear normal without significant pericardial effusion. Mild left pleural effusion. Atelectasis/scarring is present in the lung bases. Organs: Liver lesions ranging in size from 0.4 cm to 1.0 cm demonstrate benign imaging features (sharp margins and homogeneous low attenuation </=20 HU); no follow-up is necessary. There is trace pericholecystic fluid/edema. The gallbladder otherwise appears normal.  No biliary ductal dilation. The pancreas appears normal.  The adrenal glands appear normal without identified nodules. The spleen appears normal.  The kidneys appear normal in size without hydronephrosis or nephrolithiasis.   A homogeneous structure in the right kidney measures up to 7.1 cm and is compatible with likely benign cyst (with thin/imperceptible wall, no evidence of fat, mural nodularity, septation or calcification, and density measuring -10-20 HU); no follow-up is necessary. Tiny homogeneous structures in the kidneys are too small to characterize (measuring <6O slice thickness); subjectively, these appear to likely represent benign cysts, no follow-up is necessary. A tract of air extending from the left posterior flank to the posterior lower pole of the left kidney is compatible with the recent percutaneous biopsy tract. A poorly defined hyperattenuating fluid collection adjacent to the kidney at the site of the biopsy is compatible with a perinephric hematoma, measuring 5.1 x 2.8 x 1.8 cm. There is trace adjacent simple appearing fluid. No retroperitoneal hemorrhage is identified elsewhere. No subcapsular hematoma or mass effect on the kidney. No active contrast extravasation is identified. The ureters appear normal. GI/Bowel: No evidence of bowel obstruction or significant inflammatory changes. Mild-moderate volume of fecal material is present in the colon. The appendix is visualized and appears normal. Pelvis: The bladder is mildly distended without significant wall thickening or intraluminal calculi. The prostate measures 4.5 x 4.0 x 3.4 cm with estimated volume of 32 mL (normal is </= 30 mL) with up to 0.8 cm protrusion of the median lobe into the bladder base. Peritoneum/Retroperitoneum: No enlarged mesenteric or retroperitoneal lymph nodes are identified. Trace fluid is present in the pelvis. There is mild-moderate atherosclerosis. No aortic aneurysm or evidence of an acute aortic syndrome. Bones/Soft Tissues: A tiny, fat-containing umbilical hernia is present. Mild osseous degenerative changes are present. 1. Small left perinephric hematoma at the biopsy site measures up to 5.1 cm. No evidence of active contrast extravasation or mass effect on the kidney. 2. Mild left pleural effusion appears simple. 3. Trace fluid in the pelvis appears simple.    4. Trace pericholecystic fluid/edema is nonspecific without pericholecystic stranding or calcified gallstones to suggest concern for developing cholecystitis. 5. Mild prostatomegaly. CT Biopsy Renal  Result Date: 2/15/2021  Patient MRN:  [id number] : 1955 Age: 61 years Gender: Male Order Date:  2/15/2021 12:41 PM EXAM: CT GUIDED NEEDLE PLACEMENT, CT BIOPSY RENAL NUMBER OF IMAGES:  166 INDICATION: R80.9 Proteinuria, unspecified type What reading provider will be dictating this exam?->[facility] COMPARISON: None After obtaining informed consent and following the routine sterile prep and drape. With CT guidance the appropriate entrance for the biopsy was marked. Lidocaine was then injected into this site for local anesthesia. A biopsy needle was inserted into the left renal cortex with CT guidance. 5 core specimens were obtained. The patient tolerated the procedure well. Postprocedure images were unremarkable The procedure was performed under local anesthesia and moderate sedation with 2 mg of versed IV and 100 mcg of fentanyl IV. A timeout was performed at 12:39 PM hours . Patient was monitored for 60 minutes by registered nurse. Successful uncomplicated CT guided biopsy of the left renal cortex. If there are any physician concerns regarding this report, a Radiologist can be reached by calling the following number [phone]. 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 His work-up will be completed to include the following:    CBC, CMP, LDH, B2 microglobulin, CRP, serum protein electrophoresis with reflex to immunofixation, serum free kappa and lambda light chains with ratio, quantitative immunoglobulins, 24-hour urine collection for quantitative immunoglobulins and immunofixation. Serum troponin, proBNP and 2D echo will be done to exclude cardiac amyloidosis.   TSH  Factor X assay as well as PT and APTT will be done    Will be scheduled for a bone marrow aspirate and biopsy    Once work-up completed options of therapy with bortezomib/daratumumab will be addressed    He is encouraged to receive the COVID-19 vaccine      3/15/2021  Awaiting his bone marrow aspirate and biopsy and his 2D echo which are both pending. His CBC was in the normal range with a hemoglobin of 15.3, normal WBC count of 6.2, normal MCV of 90 with a normal platelet count of 054. His TSH was normal at 3.88  Serum LDH was elevated at 266. B2 microglobulin was markedly elevated at 13.6. Quantitative immunoglobulins showed suppression of IgG with normal IgA and IgM. Serum electrophoresis showed an M spike faint measuring 0.1. Serum FLC's showed markedly elevated free lambda light chain at 311 with abnormal ratio of 0.05  Serum troponin was normal but proBNP was elevated at 491. 24-hour urine collection showed nephrotic range proteinuria with total proteins of 6.6 g in a 24-hour period with a faint M spike in the urine measuring 1263 mg in a 24-hour timeframe    He will be recommended induction with Daratumumab/Velcade/dexamethasone  His bone marrow aspirate and biopsy will be reviewed. All potential side effects were discussed. 3/29/2021  He will be given acyclovir for VZV prophylaxis and Bactrim DS for PCP prophylaxis and as needed Compazine for nausea. He will be initiated today on daratumumab/Velcade/cyclophosphamide/dexamethasone regimen. All potential side effects were discussed. His baby aspirin has been held. 4/05/2021  He tolerated his treatment with daratumumab well. He reports constipation. Few days later he had some chills and body aches and it is likely related to his second dose of the Covid vaccine. No signs of peripheral neuropathy.   To receive Week # 2 of daratumumab/Velcade/dexamethasone/Cytoxan    Attempt with future weeks to switch to Darzalex Faspro      4/12/2021  He will be switched to Darzalex faspro this week. He will be given as needed tramadol for pain and omeprazole 20 mg daily for heartburn. To receive week 3 of chemotherapy regimen consisting of daratumumab/Velcade/dexamethasone/Cytoxan      4/19/2021  He has been experiencing significant side effects from daratumumab 2 to 3 days later in terms of headaches myalgias arthralgias. He will be given prednisone 20 mg daily starting on 4/21/2021 and he will skip it Monday the day he gets the high-dose steroids prior to his Darzalex Faspro. He will take as needed Tylenol or Advil and he will continue on his omeprazole daily      Lana Ivan E. [facility], M.D., F.A.C.P.   Electronically signed 4/19/2021 at 10:23 AM

## 2021-04-19 ENCOUNTER — HOSPITAL ENCOUNTER (OUTPATIENT)
Dept: INFUSION THERAPY | Age: 66
Discharge: HOME | End: 2021-04-19
Payer: COMMERCIAL

## 2021-04-19 ENCOUNTER — OFFICE VISIT (OUTPATIENT)
Dept: ONCOLOGY | Age: 66
End: 2021-04-19
Payer: COMMERCIAL

## 2021-04-19 VITALS
DIASTOLIC BLOOD PRESSURE: 66 MMHG | HEIGHT: 70 IN | RESPIRATION RATE: 18 BRPM | TEMPERATURE: 97.7 F | BODY MASS INDEX: 22.2 KG/M2 | WEIGHT: 155.1 LBS | HEART RATE: 106 BPM | SYSTOLIC BLOOD PRESSURE: 111 MMHG

## 2021-04-19 VITALS
HEART RATE: 87 BPM | DIASTOLIC BLOOD PRESSURE: 58 MMHG | RESPIRATION RATE: 18 BRPM | SYSTOLIC BLOOD PRESSURE: 89 MMHG | TEMPERATURE: 97.7 F

## 2021-04-19 DIAGNOSIS — E85.81 LIGHT CHAIN (AL) AMYLOIDOSIS (HCC): Primary | ICD-10-CM

## 2021-04-19 LAB
ALBUMIN SERPL-MCNC: 2.4 G/DL (ref 3.5–5.2)
ALP BLD-CCNC: 75 U/L (ref 40–129)
ALT SERPL-CCNC: 21 U/L (ref 0–40)
ANION GAP SERPL CALCULATED.3IONS-SCNC: 9 MMOL/L (ref 7–16)
AST SERPL-CCNC: 21 U/L (ref 0–39)
BASOPHILS ABSOLUTE: 0.04 E9/L (ref 0–0.2)
BASOPHILS RELATIVE PERCENT: 0.8 % (ref 0–2)
BILIRUB SERPL-MCNC: 0.3 MG/DL (ref 0–1.2)
BUN BLDV-MCNC: 15 MG/DL (ref 8–23)
CALCIUM SERPL-MCNC: 8.6 MG/DL (ref 8.6–10.2)
CHLORIDE BLD-SCNC: 97 MMOL/L (ref 98–107)
CO2: 27 MMOL/L (ref 22–29)
CREAT SERPL-MCNC: 1 MG/DL (ref 0.7–1.2)
EOSINOPHILS ABSOLUTE: 0.11 E9/L (ref 0.05–0.5)
EOSINOPHILS RELATIVE PERCENT: 2.1 % (ref 0–6)
GFR AFRICAN AMERICAN: >60
GFR NON-AFRICAN AMERICAN: >60 ML/MIN/1.73
GLUCOSE BLD-MCNC: 104 MG/DL (ref 74–99)
HCT VFR BLD CALC: 44.2 % (ref 37–54)
HEMOGLOBIN: 14.7 G/DL (ref 12.5–16.5)
IMMATURE GRANULOCYTES #: 0.03 E9/L
IMMATURE GRANULOCYTES %: 0.6 % (ref 0–5)
LYMPHOCYTES ABSOLUTE: 1.45 E9/L (ref 1.5–4)
LYMPHOCYTES RELATIVE PERCENT: 27.2 % (ref 20–42)
MCH RBC QN AUTO: 30.2 PG (ref 26–35)
MCHC RBC AUTO-ENTMCNC: 33.3 % (ref 32–34.5)
MCV RBC AUTO: 90.9 FL (ref 80–99.9)
MONOCYTES ABSOLUTE: 0.57 E9/L (ref 0.1–0.95)
MONOCYTES RELATIVE PERCENT: 10.7 % (ref 2–12)
NEUTROPHILS ABSOLUTE: 3.13 E9/L (ref 1.8–7.3)
NEUTROPHILS RELATIVE PERCENT: 58.6 % (ref 43–80)
PDW BLD-RTO: 12.1 FL (ref 11.5–15)
PLATELET # BLD: 249 E9/L (ref 130–450)
PMV BLD AUTO: 9.4 FL (ref 7–12)
POTASSIUM SERPL-SCNC: 4.2 MMOL/L (ref 3.5–5)
RBC # BLD: 4.86 E12/L (ref 3.8–5.8)
SODIUM BLD-SCNC: 133 MMOL/L (ref 132–146)
TOTAL PROTEIN: 4.8 G/DL (ref 6.4–8.3)
WBC # BLD: 5.3 E9/L (ref 4.5–11.5)

## 2021-04-19 PROCEDURE — 6360000002 HC RX W HCPCS: Performed by: NURSE PRACTITIONER

## 2021-04-19 PROCEDURE — 6360000002 HC RX W HCPCS: Performed by: INTERNAL MEDICINE

## 2021-04-19 PROCEDURE — 96413 CHEMO IV INFUSION 1 HR: CPT

## 2021-04-19 PROCEDURE — 85025 COMPLETE CBC W/AUTO DIFF WBC: CPT

## 2021-04-19 PROCEDURE — 96401 CHEMO ANTI-NEOPL SQ/IM: CPT

## 2021-04-19 PROCEDURE — 6370000000 HC RX 637 (ALT 250 FOR IP): Performed by: INTERNAL MEDICINE

## 2021-04-19 PROCEDURE — 96375 TX/PRO/DX INJ NEW DRUG ADDON: CPT

## 2021-04-19 PROCEDURE — 36415 COLL VENOUS BLD VENIPUNCTURE: CPT

## 2021-04-19 PROCEDURE — 2580000003 HC RX 258: Performed by: INTERNAL MEDICINE

## 2021-04-19 PROCEDURE — 80053 COMPREHEN METABOLIC PANEL: CPT

## 2021-04-19 RX ORDER — OMEPRAZOLE 20 MG/1
20 CAPSULE, DELAYED RELEASE ORAL
Qty: 60 CAPSULE | Refills: 0 | Status: SHIPPED
Start: 2021-04-19 | End: 2021-04-19

## 2021-04-19 RX ORDER — PREDNISONE 20 MG/1
20 TABLET ORAL DAILY
Qty: 30 TABLET | Refills: 0 | Status: SHIPPED
Start: 2021-04-19 | End: 2021-04-19

## 2021-04-19 RX ORDER — ONDANSETRON 2 MG/ML
8 INJECTION INTRAMUSCULAR; INTRAVENOUS ONCE
Status: COMPLETED | OUTPATIENT
Start: 2021-04-19 | End: 2021-04-19

## 2021-04-19 RX ORDER — DIPHENHYDRAMINE HYDROCHLORIDE 50 MG/ML
25 INJECTION INTRAMUSCULAR; INTRAVENOUS ONCE
Status: COMPLETED | OUTPATIENT
Start: 2021-04-19 | End: 2021-04-19

## 2021-04-19 RX ORDER — ACETAMINOPHEN 325 MG/1
650 TABLET ORAL ONCE
Status: COMPLETED | OUTPATIENT
Start: 2021-04-19 | End: 2021-04-19

## 2021-04-19 RX ORDER — OMEPRAZOLE 20 MG/1
20 CAPSULE, DELAYED RELEASE ORAL
Qty: 60 CAPSULE | Refills: 0 | Status: SHIPPED
Start: 2021-04-19 | End: 2021-06-21 | Stop reason: SDUPTHER

## 2021-04-19 RX ORDER — PREDNISONE 20 MG/1
20 TABLET ORAL DAILY
Qty: 30 TABLET | Refills: 0 | Status: SHIPPED
Start: 2021-04-19 | End: 2021-05-10 | Stop reason: SDUPTHER

## 2021-04-19 RX ORDER — METHYLPREDNISOLONE SODIUM SUCCINATE 125 MG/2ML
60 INJECTION, POWDER, LYOPHILIZED, FOR SOLUTION INTRAMUSCULAR; INTRAVENOUS ONCE
Status: COMPLETED | OUTPATIENT
Start: 2021-04-19 | End: 2021-04-19

## 2021-04-19 RX ORDER — SODIUM CHLORIDE 9 MG/ML
20 INJECTION, SOLUTION INTRAVENOUS ONCE
Status: COMPLETED | OUTPATIENT
Start: 2021-04-19 | End: 2021-04-19

## 2021-04-19 RX ADMIN — CYCLOPHOSPHAMIDE 560 MG: 200 INJECTION, SOLUTION INTRAVENOUS at 13:20

## 2021-04-19 RX ADMIN — ACETAMINOPHEN 650 MG: 325 TABLET ORAL at 11:29

## 2021-04-19 RX ADMIN — BORTEZOMIB 2.75 MG: 3.5 INJECTION, POWDER, LYOPHILIZED, FOR SOLUTION INTRAVENOUS; SUBCUTANEOUS at 14:20

## 2021-04-19 RX ADMIN — SODIUM CHLORIDE 20 ML/HR: 9 INJECTION, SOLUTION INTRAVENOUS at 11:20

## 2021-04-19 RX ADMIN — DARATUMUMAB AND HYALURONIDASE-FIHJ (HUMAN RECOMBINANT) 15 ML: 1800; 30000 INJECTION SUBCUTANEOUS at 12:51

## 2021-04-19 RX ADMIN — METHYLPREDNISOLONE SODIUM SUCCINATE 60 MG: 125 INJECTION, POWDER, FOR SOLUTION INTRAMUSCULAR; INTRAVENOUS at 11:34

## 2021-04-19 RX ADMIN — ONDANSETRON 8 MG: 2 INJECTION INTRAMUSCULAR; INTRAVENOUS at 11:46

## 2021-04-19 RX ADMIN — DIPHENHYDRAMINE HYDROCHLORIDE 25 MG: 50 INJECTION, SOLUTION INTRAMUSCULAR; INTRAVENOUS at 11:51

## 2021-04-19 ASSESSMENT — PAIN SCALES - GENERAL: PAINLEVEL_OUTOF10: 0

## 2021-04-19 NOTE — PROGRESS NOTES
CLINICAL PHARMACY NOTE: MEDS TO [address] Select Patient?: Yes  Total # of Prescriptions Filled: 1   The following medications were delivered to the patient:  · Prednisone 20 mg  Total # of Interventions Completed: 2  Time Spent (min): 30    Additional Documentation:

## 2021-04-23 ENCOUNTER — HOSPITAL ENCOUNTER (OUTPATIENT)
Dept: INFUSION THERAPY | Age: 66
Discharge: HOME | End: 2021-04-23
Payer: COMMERCIAL

## 2021-04-23 DIAGNOSIS — E85.81 LIGHT CHAIN (AL) AMYLOIDOSIS (HCC): ICD-10-CM

## 2021-04-23 LAB
ALBUMIN SERPL-MCNC: 2.5 G/DL (ref 3.5–5.2)
ALP BLD-CCNC: 71 U/L (ref 40–129)
ALT SERPL-CCNC: 32 U/L (ref 0–40)
ANION GAP SERPL CALCULATED.3IONS-SCNC: 7 MMOL/L (ref 7–16)
AST SERPL-CCNC: 37 U/L (ref 0–39)
BASOPHILS ABSOLUTE: 0.01 E9/L (ref 0–0.2)
BASOPHILS RELATIVE PERCENT: 0.2 % (ref 0–2)
BILIRUB SERPL-MCNC: <0.2 MG/DL (ref 0–1.2)
BUN BLDV-MCNC: 16 MG/DL (ref 6–23)
CALCIUM SERPL-MCNC: 8.2 MG/DL (ref 8.6–10.2)
CHLORIDE BLD-SCNC: 101 MMOL/L (ref 98–107)
CO2: 27 MMOL/L (ref 22–29)
CREAT SERPL-MCNC: 1.1 MG/DL (ref 0.7–1.2)
EOSINOPHILS ABSOLUTE: 0.02 E9/L (ref 0.05–0.5)
EOSINOPHILS RELATIVE PERCENT: 0.3 % (ref 0–6)
GFR AFRICAN AMERICAN: >60
GFR NON-AFRICAN AMERICAN: >60 ML/MIN/1.73
GLUCOSE BLD-MCNC: 103 MG/DL (ref 74–99)
HCT VFR BLD CALC: 40.9 % (ref 37–54)
HEMOGLOBIN: 13.8 G/DL (ref 12.5–16.5)
IMMATURE GRANULOCYTES #: 0.02 E9/L
IMMATURE GRANULOCYTES %: 0.3 % (ref 0–5)
LACTATE DEHYDROGENASE: 198 U/L (ref 135–225)
LYMPHOCYTES ABSOLUTE: 0.9 E9/L (ref 1.5–4)
LYMPHOCYTES RELATIVE PERCENT: 13.6 % (ref 20–42)
MCH RBC QN AUTO: 30.7 PG (ref 26–35)
MCHC RBC AUTO-ENTMCNC: 33.7 % (ref 32–34.5)
MCV RBC AUTO: 91.1 FL (ref 80–99.9)
MONOCYTES ABSOLUTE: 0.36 E9/L (ref 0.1–0.95)
MONOCYTES RELATIVE PERCENT: 5.4 % (ref 2–12)
NEUTROPHILS ABSOLUTE: 5.31 E9/L (ref 1.8–7.3)
NEUTROPHILS RELATIVE PERCENT: 80.2 % (ref 43–80)
PDW BLD-RTO: 12.3 FL (ref 11.5–15)
PLATELET # BLD: 216 E9/L (ref 130–450)
PMV BLD AUTO: 10.3 FL (ref 7–12)
POTASSIUM SERPL-SCNC: 4.2 MMOL/L (ref 3.5–5)
PRO-BNP: 467 PG/ML (ref 0–125)
RBC # BLD: 4.49 E12/L (ref 3.8–5.8)
SODIUM BLD-SCNC: 135 MMOL/L (ref 132–146)
TROPONIN: <0.01 NG/ML (ref 0–0.03)
TSH SERPL DL<=0.05 MIU/L-ACNC: 2.01 UIU/ML (ref 0.27–4.2)
WBC # BLD: 6.6 E9/L (ref 4.5–11.5)

## 2021-04-23 PROCEDURE — 85025 COMPLETE CBC W/AUTO DIFF WBC: CPT

## 2021-04-23 PROCEDURE — 84484 ASSAY OF TROPONIN QUANT: CPT

## 2021-04-23 PROCEDURE — 84443 ASSAY THYROID STIM HORMONE: CPT

## 2021-04-23 PROCEDURE — 83615 LACTATE (LD) (LDH) ENZYME: CPT

## 2021-04-23 PROCEDURE — 36415 COLL VENOUS BLD VENIPUNCTURE: CPT

## 2021-04-23 PROCEDURE — 80053 COMPREHEN METABOLIC PANEL: CPT

## 2021-04-23 PROCEDURE — 82784 ASSAY IGA/IGD/IGG/IGM EACH: CPT

## 2021-04-23 PROCEDURE — 83880 ASSAY OF NATRIURETIC PEPTIDE: CPT

## 2021-04-23 PROCEDURE — 86334 IMMUNOFIX E-PHORESIS SERUM: CPT

## 2021-04-23 PROCEDURE — 83883 ASSAY NEPHELOMETRY NOT SPEC: CPT

## 2021-04-23 PROCEDURE — 84165 PROTEIN E-PHORESIS SERUM: CPT

## 2021-04-23 PROCEDURE — 82232 ASSAY OF BETA-2 PROTEIN: CPT

## 2021-04-23 NOTE — PROGRESS NOTES
[address] [facility]. [facility]        Pt Name: Shiv Chavis: 1955  Date of evaluation: 4/26/2021  Primary Care Physician: Cesia Macedo MD  Reason for evaluation:   Chief Complaint   Patient presents with    Other     Light chain (AL) amyloidosis    Follow-up    Chemotherapy    Edema     Lower legs and feet        Subjective:  Here for chemotherapy and follow-up. Doing better. Prednisone significantly helped his headaches myalgias fever and fatigue    He has generalized body aches and heartburn following his chemo last week  Complaints of moderate  Neuropathy in his feet. OBJECTIVE:  VITALS:  height is 5' 10\" (1.778 m) and weight is 159 lb 11.2 oz (72.4 kg). His temperature is 97.7 °F (36.5 °C). His blood pressure is 108/69 and his pulse is 94. His respiration is 18 and oxygen saturation is 98%. Physical Exam:  Performance Status: Well developed, well nourished male  General: AAO to person, place, time, cooperative, in no acute distress. Head and neck: PERRLA, EOMI. Sclera non icteric. Oropharynx:  Clear. Neck: no JVD,  no adenopathy. Heart: Regular rate and regular rhythm, no murmur. Lungs: Clear to auscultation. Abdomen: Soft, non-tender;no masses, no organomegaly. Extremities: 2+ edema,no cyanosis, no clubbing. Neurologic:Cranial nerves grossly intact. No focal motor or sensory deficits   Skin:  No rash. Medications  Prior to Admission medications    Medication Sig Start Date End Date Taking? Authorizing Provider   predniSONE (DELTASONE) 20 MG tablet Take 1 tablet by mouth daily for 10 days 4/19/21 4/29/21 Yes VICTOR MANUEL Singh CNP   omeprazole (PRILOSEC) 20 MG delayed release capsule Take 1 capsule by mouth 2 times daily (before meals) 4/19/21 5/19/21 Yes VICTOR MANUEL Singh CNP   traMADol (ULTRAM) 50 MG tablet Take 1 tablet by mouth every 6 hours as needed for Pain for up to 40 doses.  ICD CODE:  E89.81 4/12/21 4/30/21 Yes VICTOR MANUEL Orona CNP   rosuvastatin (CRESTOR) 40 MG tablet TAKE ONE TABLET BY MOUTH DAILY 4/2/21  Yes Tato Barnes MD   acyclovir (ZOVIRAX) 400 MG tablet Take 1 tablet by mouth 2 times daily 3/29/21 4/28/21 Yes VICTOR MANUEL Orona CNP   dexamethasone (DECADRON) 4 MG tablet TAKE 10 Tables (40 mg dose) EVERY Monday 3/29/21  Yes VICTOR MANUEL Orona CNP   sulfamethoxazole-trimethoprim (BACTRIM DS;SEPTRA DS) 800-160 MG per tablet Take 1 tablet by mouth three times a week Take 1 tablets BID on MONDAYS, [address] [facility], and FRIDAYS. THESE  Are the DIRECTIONS.  [facility] 3/29/21 4/28/21 Yes VICTOR MANUEL Orona CNP   prochlorperazine (COMPAZINE) 10 MG tablet Take 1 tablet by mouth every 6 hours as needed (nausea/vomiting) 3/25/21  Yes Harrison Andre MD   fluticasone [facility]) 50 MCG/ACT nasal spray 1 spray by Each Nostril route daily 12/28/20  Yes Tato Barnes MD   miconazole (MICOTIN) 2 % cream Apply topically 2 times daily. 12/28/20  Yes Tato Barnes MD   VITAMIN E PO Take 1 capsule by mouth daily   Yes Historical Provider, MD   Multiple Vitamins-Minerals (THERAPEUTIC MULTIVITAMIN-MINERALS) tablet Take 1 tablet by mouth daily   Yes Historical Provider, MD   Ascorbic Acid (VITAMIN C PO) Take 1 tablet by mouth daily   Yes Historical Provider, MD   VITAMIN D PO Take 1 capsule by mouth daily   Yes Historical Provider, MD   aspirin 81 MG tablet Take 81 mg by mouth daily   Yes Historical Provider, MD   ibuprofen (ADVIL;MOTRIN) 600 MG tablet Take 1 tablet by mouth every 6 hours as needed for Pain 7/5/17  Yes Tato Barnes MD   fexofenadine-pseudoephedrine (ALLEGRA-D 12 HOUR)  MG per tablet Take 1 tablet by mouth 2 times daily as needed.  2/5/13  Yes Jossie Rowan MD    Scheduled Meds:  Continuous Infusions:  PRN Meds:.        Recent Laboratory Data-     Lab Results   Component Value Date    WBC 6.6 04/23/2021    HGB 13.8 04/23/2021    HCT 40.9 04/23/2021    MCV 91.1 04/23/2021  2021    LYMPHOPCT 13.6 (L) 2021    RBC 4.49 2021    MCH 30.7 2021    MCHC 33.7 2021    RDW 12.3 2021    NEUTOPHILPCT 80.2 (H) 2021    MONOPCT 5.4 2021    BASOPCT 0.2 2021    NEUTROABS 5.31 2021    LYMPHSABS 0.90 (L) 2021    MONOSABS 0.36 2021    EOSABS 0.02 (L) 2021    BASOSABS 0.01 2021       Lab Results   Component Value Date     2021    K 4.2 2021     2021    CO2 27 2021    BUN 16 2021    CREATININE 1.1 2021    GLUCOSE 103 (H) 2021    CALCIUM 8.2 (L) 2021    PROT 4.8 (L) 2021    LABALBU 2.5 (L) 2021    BILITOT <0.2 2021    ALKPHOS 71 2021    AST 37 2021    ALT 32 2021    LABGLOM >60 2021    GFRAA >60 2021             Radiology-  CT Guided Needle Placement  Result Date: 2/15/2021  Patient MRN:  [id number] : 1955 Age: 61 years Gender: Male Order Date:  2/15/2021 12:41 PM EXAM: CT GUIDED NEEDLE PLACEMENT, CT BIOPSY RENAL NUMBER OF IMAGES:  166 INDICATION: R80.9 Proteinuria, unspecified type What reading provider will be dictating this exam?->[facility] COMPARISON: None After obtaining informed consent and following the routine sterile prep and drape. With CT guidance the appropriate entrance for the biopsy was marked. Lidocaine was then injected into this site for local anesthesia. A biopsy needle was inserted into the left renal cortex with CT guidance. 5 core specimens were obtained. The patient tolerated the procedure well. Postprocedure images were unremarkable The procedure was performed under local anesthesia and moderate sedation with 2 mg of versed IV and 100 mcg of fentanyl IV. A timeout was performed at 12:39 PM hours . Patient was monitored for 60 minutes by registered nurse. Successful uncomplicated CT guided biopsy of the left renal cortex.  If there are any physician concerns regarding this report, a thickness); subjectively, these appear to likely represent benign cysts, no follow-up is necessary. A tract of air extending from the left posterior flank to the posterior lower pole of the left kidney is compatible with the recent percutaneous biopsy tract. A poorly defined hyperattenuating fluid collection adjacent to the kidney at the site of the biopsy is compatible with a perinephric hematoma, measuring 5.1 x 2.8 x 1.8 cm. There is trace adjacent simple appearing fluid. No retroperitoneal hemorrhage is identified elsewhere. No subcapsular hematoma or mass effect on the kidney. No active contrast extravasation is identified. The ureters appear normal. GI/Bowel: No evidence of bowel obstruction or significant inflammatory changes. Mild-moderate volume of fecal material is present in the colon. The appendix is visualized and appears normal. Pelvis: The bladder is mildly distended without significant wall thickening or intraluminal calculi. The prostate measures 4.5 x 4.0 x 3.4 cm with estimated volume of 32 mL (normal is </= 30 mL) with up to 0.8 cm protrusion of the median lobe into the bladder base. Peritoneum/Retroperitoneum: No enlarged mesenteric or retroperitoneal lymph nodes are identified. Trace fluid is present in the pelvis. There is mild-moderate atherosclerosis. No aortic aneurysm or evidence of an acute aortic syndrome. Bones/Soft Tissues: A tiny, fat-containing umbilical hernia is present. Mild osseous degenerative changes are present. 1. Small left perinephric hematoma at the biopsy site measures up to 5.1 cm. No evidence of active contrast extravasation or mass effect on the kidney. 2. Mild left pleural effusion appears simple. 3. Trace fluid in the pelvis appears simple. 4. Trace pericholecystic fluid/edema is nonspecific without pericholecystic stranding or calcified gallstones to suggest concern for developing cholecystitis. 5. Mild prostatomegaly.       CT Biopsy bortezomib/daratumumab will be addressed    He is encouraged to receive the COVID-19 vaccine      3/15/2021  Awaiting his bone marrow aspirate and biopsy and his 2D echo which are both pending. His CBC was in the normal range with a hemoglobin of 15.3, normal WBC count of 6.2, normal MCV of 90 with a normal platelet count of 784. His TSH was normal at 3.88  Serum LDH was elevated at 266. B2 microglobulin was markedly elevated at 13.6. Quantitative immunoglobulins showed suppression of IgG with normal IgA and IgM. Serum electrophoresis showed an M spike faint measuring 0.1. Serum FLC's showed markedly elevated free lambda light chain at 311 with abnormal ratio of 0.05  Serum troponin was normal but proBNP was elevated at 491. 24-hour urine collection showed nephrotic range proteinuria with total proteins of 6.6 g in a 24-hour period with a faint M spike in the urine measuring 1263 mg in a 24-hour timeframe    He will be recommended induction with Daratumumab/Velcade/dexamethasone  His bone marrow aspirate and biopsy will be reviewed. All potential side effects were discussed. 3/29/2021  He will be given acyclovir for VZV prophylaxis and Bactrim DS for PCP prophylaxis and as needed Compazine for nausea. He will be initiated today on daratumumab/Velcade/cyclophosphamide/dexamethasone regimen. All potential side effects were discussed. His baby aspirin has been held. 4/05/2021  He tolerated his treatment with daratumumab well. He reports constipation. Few days later he had some chills and body aches and it is likely related to his second dose of the Covid vaccine. No signs of peripheral neuropathy. To receive Week # 2 of daratumumab/Velcade/dexamethasone/Cytoxan    Attempt with future weeks to switch to Darzalex Faspro      4/12/2021  He will be switched to Darzalex faspro this week. He will be given as needed tramadol for pain and omeprazole 20 mg daily for heartburn.   To receive week 3 of chemotherapy regimen consisting of daratumumab/Velcade/dexamethasone/Cytoxan      4/19/2021  He has been experiencing significant side effects from daratumumab 2 to 3 days later in terms of headaches myalgias arthralgias. He will be given prednisone 20 mg daily starting on 4/21/2021 and he will skip it Monday the day he gets the high-dose steroids prior to his Darzalex Faspro. He will take as needed Tylenol or Advil and he will continue on his omeprazole daily. 4/26/2021  He has gained few pounds and has fluid retention from steroids and will be initiated on Maxide 37.5 mg daily. He will continue on prednisone except on Mondays. Received Darzalex Faspro today. Vangie Headley. Jarek Yuen M.D., F.A.C.P.   Electronically signed 4/26/2021 at 10:34 AM

## 2021-04-25 LAB
KAPPA FREE LIGHT CHAINS QNT: 8.76 MG/L (ref 3.3–19.4)
KAPPA/LAMBDA FREE LIGHT CHAIN RATIO: 0.11 (ref 0.26–1.65)
LAMBDA FREE LIGHT CHAINS QNT: 76.21 MG/L (ref 5.71–26.3)

## 2021-04-26 ENCOUNTER — OFFICE VISIT (OUTPATIENT)
Dept: ONCOLOGY | Age: 66
End: 2021-04-26
Payer: COMMERCIAL

## 2021-04-26 ENCOUNTER — HOSPITAL ENCOUNTER (OUTPATIENT)
Dept: INFUSION THERAPY | Age: 66
Discharge: HOME | End: 2021-04-26
Payer: COMMERCIAL

## 2021-04-26 VITALS
OXYGEN SATURATION: 98 % | RESPIRATION RATE: 18 BRPM | TEMPERATURE: 97.7 F | SYSTOLIC BLOOD PRESSURE: 108 MMHG | DIASTOLIC BLOOD PRESSURE: 69 MMHG | BODY MASS INDEX: 22.86 KG/M2 | HEART RATE: 94 BPM | WEIGHT: 159.7 LBS | HEIGHT: 70 IN

## 2021-04-26 VITALS — HEART RATE: 86 BPM | SYSTOLIC BLOOD PRESSURE: 95 MMHG | DIASTOLIC BLOOD PRESSURE: 62 MMHG

## 2021-04-26 DIAGNOSIS — E85.81 LIGHT CHAIN (AL) AMYLOIDOSIS (HCC): Primary | ICD-10-CM

## 2021-04-26 LAB
ALBUMIN SERPL-MCNC: 1.8 G/DL (ref 3.5–4.7)
ALPHA-1-GLOBULIN: 0.2 G/DL (ref 0.2–0.4)
ALPHA-2-GLOBULIN: 1 G/FL (ref 0.5–1)
BETA GLOBULIN: 0.9 G/DL (ref 0.8–1.3)
ELECTROPHORESIS: ABNORMAL
GAMMA GLOBULIN: 0.2 G/DL (ref 0.7–1.6)
IGA: 45 MG/DL (ref 70–400)
IGG: 181 MG/DL (ref 700–1600)
IGM: 28 MG/DL (ref 40–230)
IMMUNOFIXATION RESULT, SERUM: NORMAL
TOTAL PROTEIN: 4.1 G/DL (ref 6.4–8.3)

## 2021-04-26 PROCEDURE — 96401 CHEMO ANTI-NEOPL SQ/IM: CPT

## 2021-04-26 PROCEDURE — 2580000003 HC RX 258: Performed by: INTERNAL MEDICINE

## 2021-04-26 PROCEDURE — 96375 TX/PRO/DX INJ NEW DRUG ADDON: CPT

## 2021-04-26 PROCEDURE — 6360000002 HC RX W HCPCS: Performed by: INTERNAL MEDICINE

## 2021-04-26 PROCEDURE — 6360000002 HC RX W HCPCS

## 2021-04-26 PROCEDURE — 96413 CHEMO IV INFUSION 1 HR: CPT

## 2021-04-26 PROCEDURE — 96411 CHEMO IV PUSH ADDL DRUG: CPT

## 2021-04-26 PROCEDURE — 6370000000 HC RX 637 (ALT 250 FOR IP)

## 2021-04-26 RX ORDER — DIPHENHYDRAMINE HYDROCHLORIDE 50 MG/ML
50 INJECTION INTRAMUSCULAR; INTRAVENOUS ONCE
Status: CANCELLED | OUTPATIENT
Start: 2021-05-10 | End: 2021-05-10

## 2021-04-26 RX ORDER — EPINEPHRINE 1 MG/ML
0.3 INJECTION, SOLUTION, CONCENTRATE INTRAVENOUS PRN
Status: CANCELLED | OUTPATIENT
Start: 2021-05-03

## 2021-04-26 RX ORDER — DIPHENHYDRAMINE HYDROCHLORIDE 50 MG/ML
50 INJECTION INTRAMUSCULAR; INTRAVENOUS ONCE
Status: CANCELLED | OUTPATIENT
Start: 2021-04-26 | End: 2021-04-26

## 2021-04-26 RX ORDER — SODIUM CHLORIDE 9 MG/ML
INJECTION, SOLUTION INTRAVENOUS CONTINUOUS
Status: CANCELLED | OUTPATIENT
Start: 2021-04-26

## 2021-04-26 RX ORDER — SODIUM CHLORIDE 9 MG/ML
20 INJECTION, SOLUTION INTRAVENOUS ONCE
Status: CANCELLED | OUTPATIENT
Start: 2021-05-03 | End: 2021-05-03

## 2021-04-26 RX ORDER — ACETAMINOPHEN 325 MG/1
650 TABLET ORAL ONCE
Status: CANCELLED | OUTPATIENT
Start: 2021-05-10

## 2021-04-26 RX ORDER — PALONOSETRON HYDROCHLORIDE 0.05 MG/ML
0.25 INJECTION, SOLUTION INTRAVENOUS ONCE
Status: CANCELLED
Start: 2021-04-26 | End: 2021-04-26

## 2021-04-26 RX ORDER — DIPHENHYDRAMINE HYDROCHLORIDE 50 MG/ML
25 INJECTION INTRAMUSCULAR; INTRAVENOUS ONCE
Status: COMPLETED | OUTPATIENT
Start: 2021-04-26 | End: 2021-04-26

## 2021-04-26 RX ORDER — SODIUM CHLORIDE 0.9 % (FLUSH) 0.9 %
10 SYRINGE (ML) INJECTION PRN
Status: CANCELLED | OUTPATIENT
Start: 2021-05-17

## 2021-04-26 RX ORDER — ONDANSETRON 2 MG/ML
8 INJECTION INTRAMUSCULAR; INTRAVENOUS ONCE
Status: CANCELLED
Start: 2021-04-26 | End: 2021-04-26

## 2021-04-26 RX ORDER — EPINEPHRINE 1 MG/ML
0.3 INJECTION, SOLUTION, CONCENTRATE INTRAVENOUS PRN
Status: CANCELLED | OUTPATIENT
Start: 2021-04-26

## 2021-04-26 RX ORDER — METHYLPREDNISOLONE SODIUM SUCCINATE 125 MG/2ML
125 INJECTION, POWDER, LYOPHILIZED, FOR SOLUTION INTRAMUSCULAR; INTRAVENOUS ONCE
Status: CANCELLED | OUTPATIENT
Start: 2021-05-03 | End: 2021-05-03

## 2021-04-26 RX ORDER — HEPARIN SODIUM (PORCINE) LOCK FLUSH IV SOLN 100 UNIT/ML 100 UNIT/ML
500 SOLUTION INTRAVENOUS PRN
Status: CANCELLED | OUTPATIENT
Start: 2021-05-17

## 2021-04-26 RX ORDER — SODIUM CHLORIDE 9 MG/ML
20 INJECTION, SOLUTION INTRAVENOUS ONCE
Status: CANCELLED | OUTPATIENT
Start: 2021-05-10 | End: 2021-05-10

## 2021-04-26 RX ORDER — ACETAMINOPHEN 325 MG/1
TABLET ORAL
Status: COMPLETED
Start: 2021-04-26 | End: 2021-04-26

## 2021-04-26 RX ORDER — METHYLPREDNISOLONE SODIUM SUCCINATE 125 MG/2ML
125 INJECTION, POWDER, LYOPHILIZED, FOR SOLUTION INTRAMUSCULAR; INTRAVENOUS ONCE
Status: CANCELLED | OUTPATIENT
Start: 2021-04-26 | End: 2021-04-26

## 2021-04-26 RX ORDER — DIPHENHYDRAMINE HYDROCHLORIDE 50 MG/ML
50 INJECTION INTRAMUSCULAR; INTRAVENOUS ONCE
Status: CANCELLED | OUTPATIENT
Start: 2021-05-03 | End: 2021-05-03

## 2021-04-26 RX ORDER — DIPHENHYDRAMINE HYDROCHLORIDE 50 MG/ML
25 INJECTION INTRAMUSCULAR; INTRAVENOUS ONCE
Status: CANCELLED | OUTPATIENT
Start: 2021-05-10

## 2021-04-26 RX ORDER — SODIUM CHLORIDE 0.9 % (FLUSH) 0.9 %
10 SYRINGE (ML) INJECTION PRN
Status: CANCELLED | OUTPATIENT
Start: 2021-05-03

## 2021-04-26 RX ORDER — METHYLPREDNISOLONE SODIUM SUCCINATE 125 MG/2ML
125 INJECTION, POWDER, LYOPHILIZED, FOR SOLUTION INTRAMUSCULAR; INTRAVENOUS ONCE
Status: CANCELLED | OUTPATIENT
Start: 2021-05-10 | End: 2021-05-10

## 2021-04-26 RX ORDER — ONDANSETRON 2 MG/ML
8 INJECTION INTRAMUSCULAR; INTRAVENOUS ONCE
Status: CANCELLED
Start: 2021-05-03 | End: 2021-05-03

## 2021-04-26 RX ORDER — DIPHENHYDRAMINE HYDROCHLORIDE 50 MG/ML
INJECTION INTRAMUSCULAR; INTRAVENOUS
Status: COMPLETED
Start: 2021-04-26 | End: 2021-04-26

## 2021-04-26 RX ORDER — DIPHENHYDRAMINE HYDROCHLORIDE 50 MG/ML
25 INJECTION INTRAMUSCULAR; INTRAVENOUS ONCE
Status: CANCELLED | OUTPATIENT
Start: 2021-05-03

## 2021-04-26 RX ORDER — PALONOSETRON HYDROCHLORIDE 0.05 MG/ML
0.25 INJECTION, SOLUTION INTRAVENOUS ONCE
Status: COMPLETED | OUTPATIENT
Start: 2021-04-26 | End: 2021-04-26

## 2021-04-26 RX ORDER — DIPHENHYDRAMINE HYDROCHLORIDE 50 MG/ML
25 INJECTION INTRAMUSCULAR; INTRAVENOUS ONCE
Status: CANCELLED | OUTPATIENT
Start: 2021-04-26

## 2021-04-26 RX ORDER — TRIAMTERENE AND HYDROCHLOROTHIAZIDE 37.5; 25 MG/1; MG/1
1 TABLET ORAL DAILY
Qty: 30 TABLET | Refills: 0 | Status: SHIPPED
Start: 2021-04-26 | End: 2021-07-07 | Stop reason: SDUPTHER

## 2021-04-26 RX ORDER — ONDANSETRON 2 MG/ML
8 INJECTION INTRAMUSCULAR; INTRAVENOUS ONCE
Status: COMPLETED | OUTPATIENT
Start: 2021-04-26 | End: 2021-04-26

## 2021-04-26 RX ORDER — SODIUM CHLORIDE 0.9 % (FLUSH) 0.9 %
10 SYRINGE (ML) INJECTION PRN
Status: CANCELLED | OUTPATIENT
Start: 2021-05-10

## 2021-04-26 RX ORDER — ACETAMINOPHEN 325 MG/1
650 TABLET ORAL ONCE
Status: CANCELLED | OUTPATIENT
Start: 2021-05-03

## 2021-04-26 RX ORDER — DIPHENHYDRAMINE HYDROCHLORIDE 50 MG/ML
25 INJECTION INTRAMUSCULAR; INTRAVENOUS ONCE
Status: CANCELLED | OUTPATIENT
Start: 2021-05-17

## 2021-04-26 RX ORDER — SODIUM CHLORIDE 9 MG/ML
20 INJECTION, SOLUTION INTRAVENOUS ONCE
Status: CANCELLED | OUTPATIENT
Start: 2021-04-26 | End: 2021-04-26

## 2021-04-26 RX ORDER — SODIUM CHLORIDE 0.9 % (FLUSH) 0.9 %
5 SYRINGE (ML) INJECTION PRN
Status: CANCELLED | OUTPATIENT
Start: 2021-05-10

## 2021-04-26 RX ORDER — METHYLPREDNISOLONE SODIUM SUCCINATE 125 MG/2ML
125 INJECTION, POWDER, LYOPHILIZED, FOR SOLUTION INTRAMUSCULAR; INTRAVENOUS ONCE
Status: CANCELLED | OUTPATIENT
Start: 2021-05-17 | End: 2021-05-17

## 2021-04-26 RX ORDER — SODIUM CHLORIDE 0.9 % (FLUSH) 0.9 %
5 SYRINGE (ML) INJECTION PRN
Status: CANCELLED | OUTPATIENT
Start: 2021-05-03

## 2021-04-26 RX ORDER — EPINEPHRINE 1 MG/ML
0.3 INJECTION, SOLUTION, CONCENTRATE INTRAVENOUS PRN
Status: CANCELLED | OUTPATIENT
Start: 2021-05-17

## 2021-04-26 RX ORDER — ONDANSETRON 2 MG/ML
8 INJECTION INTRAMUSCULAR; INTRAVENOUS ONCE
Status: CANCELLED
Start: 2021-05-10 | End: 2021-05-10

## 2021-04-26 RX ORDER — HEPARIN SODIUM (PORCINE) LOCK FLUSH IV SOLN 100 UNIT/ML 100 UNIT/ML
500 SOLUTION INTRAVENOUS PRN
Status: CANCELLED | OUTPATIENT
Start: 2021-05-03

## 2021-04-26 RX ORDER — SODIUM CHLORIDE 0.9 % (FLUSH) 0.9 %
5 SYRINGE (ML) INJECTION PRN
Status: CANCELLED | OUTPATIENT
Start: 2021-05-17

## 2021-04-26 RX ORDER — ONDANSETRON 2 MG/ML
INJECTION INTRAMUSCULAR; INTRAVENOUS
Status: COMPLETED
Start: 2021-04-26 | End: 2021-04-26

## 2021-04-26 RX ORDER — SODIUM CHLORIDE 9 MG/ML
INJECTION, SOLUTION INTRAVENOUS CONTINUOUS
Status: CANCELLED | OUTPATIENT
Start: 2021-05-03

## 2021-04-26 RX ORDER — SODIUM CHLORIDE 9 MG/ML
20 INJECTION, SOLUTION INTRAVENOUS ONCE
Status: COMPLETED | OUTPATIENT
Start: 2021-04-26 | End: 2021-04-26

## 2021-04-26 RX ORDER — SODIUM CHLORIDE 9 MG/ML
INJECTION, SOLUTION INTRAVENOUS CONTINUOUS
Status: CANCELLED | OUTPATIENT
Start: 2021-05-17

## 2021-04-26 RX ORDER — SODIUM CHLORIDE 9 MG/ML
INJECTION, SOLUTION INTRAVENOUS CONTINUOUS
Status: CANCELLED | OUTPATIENT
Start: 2021-05-10

## 2021-04-26 RX ORDER — ACETAMINOPHEN 325 MG/1
650 TABLET ORAL ONCE
Status: CANCELLED | OUTPATIENT
Start: 2021-04-26

## 2021-04-26 RX ORDER — ACETAMINOPHEN 325 MG/1
650 TABLET ORAL ONCE
Status: CANCELLED | OUTPATIENT
Start: 2021-05-17

## 2021-04-26 RX ORDER — DIPHENHYDRAMINE HYDROCHLORIDE 50 MG/ML
50 INJECTION INTRAMUSCULAR; INTRAVENOUS ONCE
Status: CANCELLED | OUTPATIENT
Start: 2021-05-17 | End: 2021-05-17

## 2021-04-26 RX ORDER — ONDANSETRON 2 MG/ML
8 INJECTION INTRAMUSCULAR; INTRAVENOUS ONCE
Status: CANCELLED
Start: 2021-05-17 | End: 2021-05-17

## 2021-04-26 RX ORDER — SODIUM CHLORIDE 9 MG/ML
20 INJECTION, SOLUTION INTRAVENOUS ONCE
Status: CANCELLED | OUTPATIENT
Start: 2021-05-17 | End: 2021-05-17

## 2021-04-26 RX ORDER — EPINEPHRINE 1 MG/ML
0.3 INJECTION, SOLUTION, CONCENTRATE INTRAVENOUS PRN
Status: CANCELLED | OUTPATIENT
Start: 2021-05-10

## 2021-04-26 RX ORDER — SODIUM CHLORIDE 0.9 % (FLUSH) 0.9 %
5 SYRINGE (ML) INJECTION PRN
Status: CANCELLED | OUTPATIENT
Start: 2021-04-26

## 2021-04-26 RX ORDER — PALONOSETRON HYDROCHLORIDE 0.05 MG/ML
INJECTION, SOLUTION INTRAVENOUS
Status: COMPLETED
Start: 2021-04-26 | End: 2021-04-26

## 2021-04-26 RX ORDER — HEPARIN SODIUM (PORCINE) LOCK FLUSH IV SOLN 100 UNIT/ML 100 UNIT/ML
500 SOLUTION INTRAVENOUS PRN
Status: CANCELLED | OUTPATIENT
Start: 2021-04-26

## 2021-04-26 RX ORDER — SODIUM CHLORIDE 0.9 % (FLUSH) 0.9 %
10 SYRINGE (ML) INJECTION PRN
Status: CANCELLED | OUTPATIENT
Start: 2021-04-26

## 2021-04-26 RX ORDER — ACETAMINOPHEN 325 MG/1
650 TABLET ORAL ONCE
Status: COMPLETED | OUTPATIENT
Start: 2021-04-26 | End: 2021-04-26

## 2021-04-26 RX ORDER — HEPARIN SODIUM (PORCINE) LOCK FLUSH IV SOLN 100 UNIT/ML 100 UNIT/ML
500 SOLUTION INTRAVENOUS PRN
Status: CANCELLED | OUTPATIENT
Start: 2021-05-10

## 2021-04-26 RX ADMIN — PALONOSETRON HYDROCHLORIDE 0.25 MG: 0.05 INJECTION, SOLUTION INTRAVENOUS at 11:12

## 2021-04-26 RX ADMIN — SODIUM CHLORIDE 20 ML/HR: 9 INJECTION, SOLUTION INTRAVENOUS at 11:08

## 2021-04-26 RX ADMIN — ONDANSETRON 8 MG: 2 INJECTION INTRAMUSCULAR; INTRAVENOUS at 11:15

## 2021-04-26 RX ADMIN — BORTEZOMIB 2.75 MG: 3.5 INJECTION, POWDER, LYOPHILIZED, FOR SOLUTION INTRAVENOUS; SUBCUTANEOUS at 13:03

## 2021-04-26 RX ADMIN — PALONOSETRON 0.25 MG: 0.25 INJECTION, SOLUTION INTRAVENOUS at 11:12

## 2021-04-26 RX ADMIN — ACETAMINOPHEN 650 MG: 325 TABLET ORAL at 11:11

## 2021-04-26 RX ADMIN — DIPHENHYDRAMINE HYDROCHLORIDE 25 MG: 50 INJECTION, SOLUTION INTRAMUSCULAR; INTRAVENOUS at 11:22

## 2021-04-26 RX ADMIN — DIPHENHYDRAMINE HYDROCHLORIDE 25 MG: 50 INJECTION INTRAMUSCULAR; INTRAVENOUS at 11:22

## 2021-04-26 RX ADMIN — DARATUMUMAB AND HYALURONIDASE-FIHJ (HUMAN RECOMBINANT) 15 ML: 1800; 30000 INJECTION SUBCUTANEOUS at 12:20

## 2021-04-26 RX ADMIN — CYCLOPHOSPHAMIDE 560 MG: 200 INJECTION, SOLUTION INTRAVENOUS at 12:26

## 2021-04-26 ASSESSMENT — PAIN SCALES - GENERAL: PAINLEVEL_OUTOF10: 0

## 2021-04-26 NOTE — PROGRESS NOTES
CLINICAL PHARMACY NOTE: MEDS TO [address] [facility] Patient?: Yes  Total # of Prescriptions Filled: 1   The following medications were delivered to the patient:  · Triamterene hctz 37.5-25 mg  Total # of Interventions Completed: 2  Time Spent (min): 30    Additional Documentation:

## 2021-04-27 ENCOUNTER — CLINICAL DOCUMENTATION (OUTPATIENT)
Dept: ONCOLOGY | Age: 66
End: 2021-04-27

## 2021-04-27 LAB — BETA-2 MICROGLOBULIN: 1.8 MG/L (ref 0.6–2.4)

## 2021-04-27 NOTE — PROGRESS NOTES
Patient came into the office today complaining the area of where he got his Darzalex injection on his right upper arm was reddened. We brought him into the exam room. There was a large round 4 cm area of erythema; It was not painful or itchy. He did not even notice it;  his wife saw it and asked him about this area. We outlined the area with a magic marker and told him if that erythema went beyond the lines he was to call us immediately or if he had any other symptoms associated with this he was to call. He will come into the office Friday for lab work and we will be examined at that time if he has not called before then.

## 2021-04-30 ENCOUNTER — HOSPITAL ENCOUNTER (OUTPATIENT)
Dept: INFUSION THERAPY | Age: 66
Discharge: HOME | End: 2021-04-30
Payer: COMMERCIAL

## 2021-04-30 DIAGNOSIS — E85.81 LIGHT CHAIN (AL) AMYLOIDOSIS (HCC): ICD-10-CM

## 2021-04-30 LAB
ALBUMIN SERPL-MCNC: 2.4 G/DL (ref 3.5–5.2)
ALP BLD-CCNC: 75 U/L (ref 40–129)
ALT SERPL-CCNC: 45 U/L (ref 0–40)
ANION GAP SERPL CALCULATED.3IONS-SCNC: 7 MMOL/L (ref 7–16)
AST SERPL-CCNC: 39 U/L (ref 0–39)
BASOPHILS ABSOLUTE: 0.01 E9/L (ref 0–0.2)
BASOPHILS RELATIVE PERCENT: 0.2 % (ref 0–2)
BILIRUB SERPL-MCNC: <0.2 MG/DL (ref 0–1.2)
BUN BLDV-MCNC: 17 MG/DL (ref 6–23)
CALCIUM SERPL-MCNC: 8.4 MG/DL (ref 8.6–10.2)
CHLORIDE BLD-SCNC: 100 MMOL/L (ref 98–107)
CO2: 29 MMOL/L (ref 22–29)
CREAT SERPL-MCNC: 1.1 MG/DL (ref 0.7–1.2)
EOSINOPHILS ABSOLUTE: 0.01 E9/L (ref 0.05–0.5)
EOSINOPHILS RELATIVE PERCENT: 0.2 % (ref 0–6)
GFR AFRICAN AMERICAN: >60
GFR NON-AFRICAN AMERICAN: >60 ML/MIN/1.73
GLUCOSE BLD-MCNC: 107 MG/DL (ref 74–99)
HCT VFR BLD CALC: 43.7 % (ref 37–54)
HEMOGLOBIN: 14.1 G/DL (ref 12.5–16.5)
IMMATURE GRANULOCYTES #: 0.03 E9/L
IMMATURE GRANULOCYTES %: 0.5 % (ref 0–5)
LYMPHOCYTES ABSOLUTE: 1.34 E9/L (ref 1.5–4)
LYMPHOCYTES RELATIVE PERCENT: 20.2 % (ref 20–42)
MCH RBC QN AUTO: 30 PG (ref 26–35)
MCHC RBC AUTO-ENTMCNC: 32.3 % (ref 32–34.5)
MCV RBC AUTO: 93 FL (ref 80–99.9)
MONOCYTES ABSOLUTE: 0.42 E9/L (ref 0.1–0.95)
MONOCYTES RELATIVE PERCENT: 6.3 % (ref 2–12)
NEUTROPHILS ABSOLUTE: 4.83 E9/L (ref 1.8–7.3)
NEUTROPHILS RELATIVE PERCENT: 72.6 % (ref 43–80)
PDW BLD-RTO: 12.8 FL (ref 11.5–15)
PLATELET # BLD: 234 E9/L (ref 130–450)
PMV BLD AUTO: 10.3 FL (ref 7–12)
POTASSIUM SERPL-SCNC: 4.3 MMOL/L (ref 3.5–5)
RBC # BLD: 4.7 E12/L (ref 3.8–5.8)
SODIUM BLD-SCNC: 136 MMOL/L (ref 132–146)
TOTAL PROTEIN: 4.5 G/DL (ref 6.4–8.3)
WBC # BLD: 6.6 E9/L (ref 4.5–11.5)

## 2021-04-30 PROCEDURE — 80053 COMPREHEN METABOLIC PANEL: CPT

## 2021-04-30 PROCEDURE — 85025 COMPLETE CBC W/AUTO DIFF WBC: CPT

## 2021-04-30 PROCEDURE — 36415 COLL VENOUS BLD VENIPUNCTURE: CPT

## 2021-04-30 NOTE — PROGRESS NOTES
(before meals) 4/19/21 5/19/21 Yes VICTOR MANUEL Fletcher CNP   traMADol (ULTRAM) 50 MG tablet Take 1 tablet by mouth every 6 hours as needed for Pain for up to 40 doses. ICD CODE:  E89.81 4/12/21 4/30/21 Yes VICTOR MANUEL Fletcher CNP   rosuvastatin (CRESTOR) 40 MG tablet TAKE ONE TABLET BY MOUTH DAILY 4/2/21  Yes Ivon Traylor MD   acyclovir (ZOVIRAX) 400 MG tablet Take 1 tablet by mouth 2 times daily 3/29/21 4/28/21 Yes VICTOR MANUEL Fletcher CNP   dexamethasone (DECADRON) 4 MG tablet TAKE 10 Tables (40 mg dose) EVERY Monday 3/29/21  Yes VICTOR MANUEL Fletcher CNP   sulfamethoxazole-trimethoprim (BACTRIM DS;SEPTRA DS) 800-160 MG per tablet Take 1 tablet by mouth three times a week Take 1 tablets BID on MONDAYS, [facility], and FRIDAYS. THESE  Are the DIRECTIONS.  [facility] 3/29/21 4/28/21 Yes VICTOR MANUEL Fletcher CNP   prochlorperazine (COMPAZINE) 10 MG tablet Take 1 tablet by mouth every 6 hours as needed (nausea/vomiting) 3/25/21  Yes Elvin Mcelroy MD   fluticasone [facility]) 50 MCG/ACT nasal spray 1 spray by Each Nostril route daily 12/28/20  Yes Ivon Traylor MD   miconazole (MICOTIN) 2 % cream Apply topically 2 times daily. 12/28/20  Yes Ivon Traylor MD   VITAMIN E PO Take 1 capsule by mouth daily   Yes Historical Provider, MD   Multiple Vitamins-Minerals (THERAPEUTIC MULTIVITAMIN-MINERALS) tablet Take 1 tablet by mouth daily   Yes Historical Provider, MD   Ascorbic Acid (VITAMIN C PO) Take 1 tablet by mouth daily   Yes Historical Provider, MD   VITAMIN D PO Take 1 capsule by mouth daily   Yes Historical Provider, MD   aspirin 81 MG tablet Take 81 mg by mouth daily   Yes Historical Provider, MD   ibuprofen (ADVIL;MOTRIN) 600 MG tablet Take 1 tablet by mouth every 6 hours as needed for Pain 7/5/17  Yes Ivon Traylor MD   fexofenadine-pseudoephedrine (ALLEGRA-D 12 HOUR)  MG per tablet Take 1 tablet by mouth 2 times daily as needed.  2/5/13  Yes Doc Shah MD    Scheduled Meds:  Continuous Infusions:  PRN Meds:.        Recent Laboratory Data-     Lab Results   Component Value Date    WBC 6.6 2021    HGB 13.8 2021    HCT 40.9 2021    MCV 91.1 2021     2021    LYMPHOPCT 13.6 (L) 2021    RBC 4.49 2021    MCH 30.7 2021    MCHC 33.7 2021    RDW 12.3 2021    NEUTOPHILPCT 80.2 (H) 2021    MONOPCT 5.4 2021    BASOPCT 0.2 2021    NEUTROABS 5.31 2021    LYMPHSABS 0.90 (L) 2021    MONOSABS 0.36 2021    EOSABS 0.02 (L) 2021    BASOSABS 0.01 2021       Lab Results   Component Value Date     2021    K 4.2 2021     2021    CO2 27 2021    BUN 16 2021    CREATININE 1.1 2021    GLUCOSE 103 (H) 2021    CALCIUM 8.2 (L) 2021    PROT 4.8 (L) 2021    LABALBU 2.5 (L) 2021    BILITOT <0.2 2021    ALKPHOS 71 2021    AST 37 2021    ALT 32 2021    LABGLOM >60 2021    GFRAA >60 2021             Radiology-  CT Guided Needle Placement  Result Date: 2/15/2021  Patient MRN:  [id number] : 1955 Age: 61 years Gender: Male Order Date:  2/15/2021 12:41 PM EXAM: CT GUIDED NEEDLE PLACEMENT, CT BIOPSY RENAL NUMBER OF IMAGES:  166 INDICATION: R80.9 Proteinuria, unspecified type What reading provider will be dictating this exam?->[facility] COMPARISON: None After obtaining informed consent and following the routine sterile prep and drape. With CT guidance the appropriate entrance for the biopsy was marked. Lidocaine was then injected into this site for local anesthesia. A biopsy needle was inserted into the left renal cortex with CT guidance. 5 core specimens were obtained. The patient tolerated the procedure well. Postprocedure images were unremarkable The procedure was performed under local anesthesia and moderate sedation with 2 mg of versed IV and 100 mcg of fentanyl IV.  A timeout was performed at Trace pericholecystic fluid/edema is nonspecific without pericholecystic stranding or calcified gallstones to suggest concern for developing cholecystitis. 5. Mild prostatomegaly. CT Biopsy Renal  Result Date: 2/15/2021  Patient MRN:  [id number] : 1955 Age: 61 years Gender: Male Order Date:  2/15/2021 12:41 PM EXAM: CT GUIDED NEEDLE PLACEMENT, CT BIOPSY RENAL NUMBER OF IMAGES:  166 INDICATION: R80.9 Proteinuria, unspecified type What reading provider will be dictating this exam?->[facility] COMPARISON: None After obtaining informed consent and following the routine sterile prep and drape. With CT guidance the appropriate entrance for the biopsy was marked. Lidocaine was then injected into this site for local anesthesia. A biopsy needle was inserted into the left renal cortex with CT guidance. 5 core specimens were obtained. The patient tolerated the procedure well. Postprocedure images were unremarkable The procedure was performed under local anesthesia and moderate sedation with 2 mg of versed IV and 100 mcg of fentanyl IV. A timeout was performed at 12:39 PM hours . Patient was monitored for 60 minutes by registered nurse. Successful uncomplicated CT guided biopsy of the left renal cortex. If there are any physician concerns regarding this report, a Radiologist can be reached by calling the following number [phone]. 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 His work-up will be completed to include the following:    CBC, CMP, LDH, B2 microglobulin, CRP, serum protein electrophoresis with reflex to immunofixation, serum free kappa and lambda light chains with ratio, quantitative immunoglobulins, 24-hour urine collection for quantitative immunoglobulins and immunofixation.     Serum troponin, proBNP and 2D echo will be done to

## 2021-05-03 ENCOUNTER — HOSPITAL ENCOUNTER (OUTPATIENT)
Dept: INFUSION THERAPY | Age: 66
Discharge: HOME | End: 2021-05-03
Payer: COMMERCIAL

## 2021-05-03 ENCOUNTER — OFFICE VISIT (OUTPATIENT)
Dept: ONCOLOGY | Age: 66
End: 2021-05-03
Payer: COMMERCIAL

## 2021-05-03 VITALS
DIASTOLIC BLOOD PRESSURE: 72 MMHG | OXYGEN SATURATION: 96 % | WEIGHT: 151.5 LBS | BODY MASS INDEX: 21.69 KG/M2 | TEMPERATURE: 97.7 F | HEIGHT: 70 IN | SYSTOLIC BLOOD PRESSURE: 103 MMHG | HEART RATE: 105 BPM

## 2021-05-03 VITALS — RESPIRATION RATE: 18 BRPM | HEART RATE: 85 BPM | DIASTOLIC BLOOD PRESSURE: 71 MMHG | SYSTOLIC BLOOD PRESSURE: 109 MMHG

## 2021-05-03 DIAGNOSIS — E85.81 LIGHT CHAIN (AL) AMYLOIDOSIS (HCC): Primary | ICD-10-CM

## 2021-05-03 PROCEDURE — 96375 TX/PRO/DX INJ NEW DRUG ADDON: CPT

## 2021-05-03 PROCEDURE — 6370000000 HC RX 637 (ALT 250 FOR IP)

## 2021-05-03 PROCEDURE — 96401 CHEMO ANTI-NEOPL SQ/IM: CPT

## 2021-05-03 PROCEDURE — 6360000002 HC RX W HCPCS: Performed by: INTERNAL MEDICINE

## 2021-05-03 PROCEDURE — 2580000003 HC RX 258: Performed by: INTERNAL MEDICINE

## 2021-05-03 PROCEDURE — 96413 CHEMO IV INFUSION 1 HR: CPT

## 2021-05-03 PROCEDURE — 6360000002 HC RX W HCPCS

## 2021-05-03 RX ORDER — ONDANSETRON 2 MG/ML
INJECTION INTRAMUSCULAR; INTRAVENOUS
Status: COMPLETED
Start: 2021-05-03 | End: 2021-05-03

## 2021-05-03 RX ORDER — DIPHENHYDRAMINE HYDROCHLORIDE 50 MG/ML
INJECTION INTRAMUSCULAR; INTRAVENOUS
Status: COMPLETED
Start: 2021-05-03 | End: 2021-05-03

## 2021-05-03 RX ORDER — SODIUM CHLORIDE 9 MG/ML
20 INJECTION, SOLUTION INTRAVENOUS ONCE
Status: DISCONTINUED | OUTPATIENT
Start: 2021-05-03 | End: 2021-05-04 | Stop reason: HOSPADM

## 2021-05-03 RX ORDER — ONDANSETRON 2 MG/ML
8 INJECTION INTRAMUSCULAR; INTRAVENOUS ONCE
Status: COMPLETED | OUTPATIENT
Start: 2021-05-03 | End: 2021-05-03

## 2021-05-03 RX ORDER — ACETAMINOPHEN 325 MG/1
650 TABLET ORAL ONCE
Status: COMPLETED | OUTPATIENT
Start: 2021-05-03 | End: 2021-05-03

## 2021-05-03 RX ORDER — DIPHENHYDRAMINE HYDROCHLORIDE 50 MG/ML
25 INJECTION INTRAMUSCULAR; INTRAVENOUS ONCE
Status: COMPLETED | OUTPATIENT
Start: 2021-05-03 | End: 2021-05-03

## 2021-05-03 RX ORDER — ACETAMINOPHEN 325 MG/1
TABLET ORAL
Status: COMPLETED
Start: 2021-05-03 | End: 2021-05-03

## 2021-05-03 RX ADMIN — SODIUM CHLORIDE 20 ML/HR: 9 INJECTION, SOLUTION INTRAVENOUS at 12:30

## 2021-05-03 RX ADMIN — DIPHENHYDRAMINE HYDROCHLORIDE 25 MG: 50 INJECTION INTRAMUSCULAR; INTRAVENOUS at 12:40

## 2021-05-03 RX ADMIN — DARATUMUMAB AND HYALURONIDASE-FIHJ (HUMAN RECOMBINANT) 15 ML: 1800; 30000 INJECTION SUBCUTANEOUS at 13:13

## 2021-05-03 RX ADMIN — ONDANSETRON 8 MG: 2 INJECTION INTRAMUSCULAR; INTRAVENOUS at 12:40

## 2021-05-03 RX ADMIN — CYCLOPHOSPHAMIDE 560 MG: 200 INJECTION, SOLUTION INTRAVENOUS at 13:20

## 2021-05-03 RX ADMIN — BORTEZOMIB 2.75 MG: 3.5 INJECTION, POWDER, LYOPHILIZED, FOR SOLUTION INTRAVENOUS; SUBCUTANEOUS at 14:06

## 2021-05-03 RX ADMIN — ACETAMINOPHEN 650 MG: 325 TABLET ORAL at 12:33

## 2021-05-03 ASSESSMENT — PAIN SCALES - GENERAL: PAINLEVEL_OUTOF10: 0

## 2021-05-07 ENCOUNTER — HOSPITAL ENCOUNTER (OUTPATIENT)
Dept: INFUSION THERAPY | Age: 66
Discharge: HOME | End: 2021-05-07
Payer: COMMERCIAL

## 2021-05-07 DIAGNOSIS — E85.81 LIGHT CHAIN (AL) AMYLOIDOSIS (HCC): ICD-10-CM

## 2021-05-07 LAB
ALBUMIN SERPL-MCNC: 2.4 G/DL (ref 3.5–5.2)
ALP BLD-CCNC: 74 U/L (ref 40–129)
ALT SERPL-CCNC: 51 U/L (ref 0–40)
ANION GAP SERPL CALCULATED.3IONS-SCNC: 6 MMOL/L (ref 7–16)
AST SERPL-CCNC: 48 U/L (ref 0–39)
BASOPHILS ABSOLUTE: 0.01 E9/L (ref 0–0.2)
BASOPHILS RELATIVE PERCENT: 0.1 % (ref 0–2)
BILIRUB SERPL-MCNC: <0.2 MG/DL (ref 0–1.2)
BUN BLDV-MCNC: 27 MG/DL (ref 6–23)
CALCIUM SERPL-MCNC: 8.6 MG/DL (ref 8.6–10.2)
CHLORIDE BLD-SCNC: 103 MMOL/L (ref 98–107)
CO2: 26 MMOL/L (ref 22–29)
CREAT SERPL-MCNC: 0.9 MG/DL (ref 0.7–1.2)
EOSINOPHILS ABSOLUTE: 0.01 E9/L (ref 0.05–0.5)
EOSINOPHILS RELATIVE PERCENT: 0.1 % (ref 0–6)
GFR AFRICAN AMERICAN: >60
GFR NON-AFRICAN AMERICAN: >60 ML/MIN/1.73
GLUCOSE BLD-MCNC: 95 MG/DL (ref 74–99)
HCT VFR BLD CALC: 45.9 % (ref 37–54)
HEMOGLOBIN: 15.1 G/DL (ref 12.5–16.5)
IMMATURE GRANULOCYTES #: 0.04 E9/L
IMMATURE GRANULOCYTES %: 0.5 % (ref 0–5)
LYMPHOCYTES ABSOLUTE: 0.72 E9/L (ref 1.5–4)
LYMPHOCYTES RELATIVE PERCENT: 8.2 % (ref 20–42)
MCH RBC QN AUTO: 30.6 PG (ref 26–35)
MCHC RBC AUTO-ENTMCNC: 32.9 % (ref 32–34.5)
MCV RBC AUTO: 92.9 FL (ref 80–99.9)
MONOCYTES ABSOLUTE: 0.41 E9/L (ref 0.1–0.95)
MONOCYTES RELATIVE PERCENT: 4.6 % (ref 2–12)
NEUTROPHILS ABSOLUTE: 7.63 E9/L (ref 1.8–7.3)
NEUTROPHILS RELATIVE PERCENT: 86.5 % (ref 43–80)
PDW BLD-RTO: 13.3 FL (ref 11.5–15)
PLATELET # BLD: 220 E9/L (ref 130–450)
PMV BLD AUTO: 10.2 FL (ref 7–12)
POTASSIUM SERPL-SCNC: 4.3 MMOL/L (ref 3.5–5)
RBC # BLD: 4.94 E12/L (ref 3.8–5.8)
SODIUM BLD-SCNC: 135 MMOL/L (ref 132–146)
TOTAL PROTEIN: 4.7 G/DL (ref 6.4–8.3)
WBC # BLD: 8.8 E9/L (ref 4.5–11.5)

## 2021-05-07 PROCEDURE — 80053 COMPREHEN METABOLIC PANEL: CPT

## 2021-05-07 PROCEDURE — 85025 COMPLETE CBC W/AUTO DIFF WBC: CPT

## 2021-05-07 PROCEDURE — 36415 COLL VENOUS BLD VENIPUNCTURE: CPT

## 2021-05-07 NOTE — PROGRESS NOTES
[address] [facility]. [facility]inal[facility]        Pt Name: Osvaldo Teran: 1955  Date of evaluation: 5/10/2021  Primary Care Physician: Tato Barnes MD  Reason for evaluation:   Chief Complaint   Patient presents with    Ovarian Cancer     Light chain (AL) amyloidosis      Follow-up    Treatment        Subjective:   Here for treatment and follow-up. Feels well today      OBJECTIVE:  VITALS:  height is 5' 10\" (1.778 m) and weight is 157 lb 9.6 oz (71.5 kg). His temperature is 98.6 °F (37 °C). His blood pressure is 102/67 and his pulse is 97. His respiration is 18 and oxygen saturation is 95%. Physical Exam:  Performance Status: Well developed, well nourished male    General: AAO to person, place, time, cooperative, in no acute distress. Head and neck: PERRLA, EOMI. Sclera non icteric. Oropharynx:  Clear. Neck: no JVD,  no adenopathy. Heart: Regular rate and regular rhythm, no murmur. Lungs: Clear to auscultation.   Abdomen: Soft, non-tender;no masses, no organomegaly. Extremities : No edema,no cyanosis, no clubbing. Neurologic:Cranial nerves grossly intact. No focal motor or sensory deficits   Skin:  No rash. Medications  Prior to Admission medications    Medication Sig Start Date End Date Taking?  Authorizing Provider   triamterene-hydroCHLOROthiazide (MAXZIDE-25) 37.5-25 MG per tablet Take 1 tablet by mouth daily 4/26/21  Yes Harrison Andre MD   omeprazole (PRILOSEC) 20 MG delayed release capsule Take 1 capsule by mouth 2 times daily (before meals) 4/19/21 5/19/21 Yes VICTOR MANUEL Orona CNP   rosuvastatin (CRESTOR) 40 MG tablet TAKE ONE TABLET BY MOUTH DAILY 4/2/21  Yes Tato Barnes MD   dexamethasone (DECADRON) 4 MG tablet TAKE 10 Tables (40 mg dose) EVERY Monday 3/29/21  Yes VICTOR MANUEL Orona CNP   prochlorperazine (COMPAZINE) 10 MG tablet Take 1 tablet by mouth every 6 hours as needed (nausea/vomiting) 3/25/21  Yes Stacy Aguirre Tate Kenney MD   fluticasone (FLONASE) 50 MCG/ACT nasal spray 1 spray by Each Nostril route daily 12/28/20  Yes Sonam Mauricio MD   miconazole (MICOTIN) 2 % cream Apply topically 2 times daily. 12/28/20  Yes Sonam Mauricio MD   VITAMIN E PO Take 1 capsule by mouth daily   Yes Historical Provider, MD   Multiple Vitamins-Minerals (THERAPEUTIC MULTIVITAMIN-MINERALS) tablet Take 1 tablet by mouth daily   Yes Historical Provider, MD   Ascorbic Acid (VITAMIN C PO) Take 1 tablet by mouth daily   Yes Historical Provider, MD   VITAMIN D PO Take 1 capsule by mouth daily   Yes Historical Provider, MD   aspirin 81 MG tablet Take 81 mg by mouth daily   Yes Historical Provider, MD   ibuprofen (ADVIL;MOTRIN) 600 MG tablet Take 1 tablet by mouth every 6 hours as needed for Pain 7/5/17  Yes Sonam Mauricio MD   fexofenadine-pseudoephedrine (ALLEGRA-D 12 HOUR)  MG per tablet Take 1 tablet by mouth 2 times daily as needed.  2/5/13  Yes Jluis Godoy MD    Scheduled Meds:  Continuous Infusions:  PRN Meds:.        Recent Laboratory Data-     Lab Results   Component Value Date    WBC 8.8 05/07/2021    HGB 15.1 05/07/2021    HCT 45.9 05/07/2021    MCV 92.9 05/07/2021     05/07/2021    LYMPHOPCT 8.2 (L) 05/07/2021    RBC 4.94 05/07/2021    MCH 30.6 05/07/2021    MCHC 32.9 05/07/2021    RDW 13.3 05/07/2021    NEUTOPHILPCT 86.5 (H) 05/07/2021    MONOPCT 4.6 05/07/2021    BASOPCT 0.1 05/07/2021    NEUTROABS 7.63 (H) 05/07/2021    LYMPHSABS 0.72 (L) 05/07/2021    MONOSABS 0.41 05/07/2021    EOSABS 0.01 (L) 05/07/2021    BASOSABS 0.01 05/07/2021       Lab Results   Component Value Date     05/07/2021    K 4.3 05/07/2021     05/07/2021    CO2 26 05/07/2021    BUN 27 (H) 05/07/2021    CREATININE 0.9 05/07/2021    GLUCOSE 95 05/07/2021    CALCIUM 8.6 05/07/2021    PROT 4.7 (L) 05/07/2021    LABALBU 2.4 (L) 05/07/2021    BILITOT <0.2 05/07/2021    ALKPHOS 74 05/07/2021    AST 48 (H) 05/07/2021    ALT 51 (H) 05/07/2021

## 2021-05-10 ENCOUNTER — HOSPITAL ENCOUNTER (OUTPATIENT)
Dept: INFUSION THERAPY | Age: 66
Discharge: HOME | End: 2021-05-10
Payer: COMMERCIAL

## 2021-05-10 ENCOUNTER — OFFICE VISIT (OUTPATIENT)
Dept: ONCOLOGY | Age: 66
End: 2021-05-10
Payer: COMMERCIAL

## 2021-05-10 VITALS
OXYGEN SATURATION: 95 % | BODY MASS INDEX: 22.56 KG/M2 | RESPIRATION RATE: 18 BRPM | SYSTOLIC BLOOD PRESSURE: 102 MMHG | DIASTOLIC BLOOD PRESSURE: 67 MMHG | TEMPERATURE: 98.6 F | WEIGHT: 157.6 LBS | HEIGHT: 70 IN | HEART RATE: 97 BPM

## 2021-05-10 VITALS — DIASTOLIC BLOOD PRESSURE: 59 MMHG | HEART RATE: 88 BPM | SYSTOLIC BLOOD PRESSURE: 92 MMHG | RESPIRATION RATE: 18 BRPM

## 2021-05-10 DIAGNOSIS — E85.81 LIGHT CHAIN (AL) AMYLOIDOSIS (HCC): Primary | ICD-10-CM

## 2021-05-10 PROCEDURE — 2580000003 HC RX 258: Performed by: INTERNAL MEDICINE

## 2021-05-10 PROCEDURE — 96401 CHEMO ANTI-NEOPL SQ/IM: CPT

## 2021-05-10 PROCEDURE — 6360000002 HC RX W HCPCS: Performed by: INTERNAL MEDICINE

## 2021-05-10 PROCEDURE — 6370000000 HC RX 637 (ALT 250 FOR IP): Performed by: INTERNAL MEDICINE

## 2021-05-10 PROCEDURE — 96375 TX/PRO/DX INJ NEW DRUG ADDON: CPT

## 2021-05-10 PROCEDURE — 96413 CHEMO IV INFUSION 1 HR: CPT

## 2021-05-10 RX ORDER — ACETAMINOPHEN 325 MG/1
650 TABLET ORAL ONCE
Status: COMPLETED | OUTPATIENT
Start: 2021-05-10 | End: 2021-05-10

## 2021-05-10 RX ORDER — ONDANSETRON 2 MG/ML
8 INJECTION INTRAMUSCULAR; INTRAVENOUS ONCE
Status: COMPLETED | OUTPATIENT
Start: 2021-05-10 | End: 2021-05-10

## 2021-05-10 RX ORDER — SODIUM CHLORIDE 9 MG/ML
20 INJECTION, SOLUTION INTRAVENOUS ONCE
Status: COMPLETED | OUTPATIENT
Start: 2021-05-10 | End: 2021-05-10

## 2021-05-10 RX ORDER — PREDNISONE 20 MG/1
20 TABLET ORAL DAILY
Qty: 30 TABLET | Refills: 0 | Status: SHIPPED | OUTPATIENT
Start: 2021-05-10 | End: 2021-05-20

## 2021-05-10 RX ORDER — DIPHENHYDRAMINE HYDROCHLORIDE 50 MG/ML
25 INJECTION INTRAMUSCULAR; INTRAVENOUS ONCE
Status: COMPLETED | OUTPATIENT
Start: 2021-05-10 | End: 2021-05-10

## 2021-05-10 RX ADMIN — SODIUM CHLORIDE 20 ML/HR: 9 INJECTION, SOLUTION INTRAVENOUS at 12:56

## 2021-05-10 RX ADMIN — ACETAMINOPHEN 650 MG: 325 TABLET ORAL at 13:00

## 2021-05-10 RX ADMIN — DIPHENHYDRAMINE HYDROCHLORIDE 25 MG: 50 INJECTION, SOLUTION INTRAMUSCULAR; INTRAVENOUS at 13:03

## 2021-05-10 RX ADMIN — BORTEZOMIB 2.75 MG: 3.5 INJECTION, POWDER, LYOPHILIZED, FOR SOLUTION INTRAVENOUS; SUBCUTANEOUS at 14:24

## 2021-05-10 RX ADMIN — ONDANSETRON 8 MG: 2 INJECTION INTRAMUSCULAR; INTRAVENOUS at 13:02

## 2021-05-10 RX ADMIN — DARATUMUMAB AND HYALURONIDASE-FIHJ (HUMAN RECOMBINANT) 15 ML: 1800; 30000 INJECTION SUBCUTANEOUS at 13:30

## 2021-05-10 RX ADMIN — CYCLOPHOSPHAMIDE 560 MG: 200 INJECTION, SOLUTION INTRAVENOUS at 13:40

## 2021-05-10 ASSESSMENT — PAIN SCALES - GENERAL: PAINLEVEL_OUTOF10: 0

## 2021-05-14 ENCOUNTER — HOSPITAL ENCOUNTER (OUTPATIENT)
Dept: INFUSION THERAPY | Age: 66
Discharge: HOME | End: 2021-05-14
Payer: COMMERCIAL

## 2021-05-14 DIAGNOSIS — E85.81 LIGHT CHAIN (AL) AMYLOIDOSIS (HCC): ICD-10-CM

## 2021-05-14 LAB
ALBUMIN SERPL-MCNC: 2.1 G/DL (ref 3.5–5.2)
ALP BLD-CCNC: 62 U/L (ref 40–129)
ALT SERPL-CCNC: 51 U/L (ref 0–40)
ANION GAP SERPL CALCULATED.3IONS-SCNC: 9 MMOL/L (ref 7–16)
AST SERPL-CCNC: 38 U/L (ref 0–39)
BASOPHILS ABSOLUTE: 0.02 E9/L (ref 0–0.2)
BASOPHILS RELATIVE PERCENT: 0.3 % (ref 0–2)
BILIRUB SERPL-MCNC: <0.2 MG/DL (ref 0–1.2)
BUN BLDV-MCNC: 24 MG/DL (ref 6–23)
CALCIUM SERPL-MCNC: 8.4 MG/DL (ref 8.6–10.2)
CHLORIDE BLD-SCNC: 103 MMOL/L (ref 98–107)
CO2: 26 MMOL/L (ref 22–29)
CREAT SERPL-MCNC: 0.9 MG/DL (ref 0.7–1.2)
EOSINOPHILS ABSOLUTE: 0.05 E9/L (ref 0.05–0.5)
EOSINOPHILS RELATIVE PERCENT: 0.6 % (ref 0–6)
GFR AFRICAN AMERICAN: >60
GFR NON-AFRICAN AMERICAN: >60 ML/MIN/1.73
GLUCOSE BLD-MCNC: 102 MG/DL (ref 74–99)
HCT VFR BLD CALC: 42.3 % (ref 37–54)
HEMOGLOBIN: 14.2 G/DL (ref 12.5–16.5)
IMMATURE GRANULOCYTES #: 0.03 E9/L
IMMATURE GRANULOCYTES %: 0.4 % (ref 0–5)
LYMPHOCYTES ABSOLUTE: 1.43 E9/L (ref 1.5–4)
LYMPHOCYTES RELATIVE PERCENT: 18.5 % (ref 20–42)
MCH RBC QN AUTO: 31.1 PG (ref 26–35)
MCHC RBC AUTO-ENTMCNC: 33.6 % (ref 32–34.5)
MCV RBC AUTO: 92.8 FL (ref 80–99.9)
MONOCYTES ABSOLUTE: 0.62 E9/L (ref 0.1–0.95)
MONOCYTES RELATIVE PERCENT: 8 % (ref 2–12)
NEUTROPHILS ABSOLUTE: 5.57 E9/L (ref 1.8–7.3)
NEUTROPHILS RELATIVE PERCENT: 72.2 % (ref 43–80)
PDW BLD-RTO: 13.9 FL (ref 11.5–15)
PLATELET # BLD: 205 E9/L (ref 130–450)
PMV BLD AUTO: 10.2 FL (ref 7–12)
POTASSIUM SERPL-SCNC: 4 MMOL/L (ref 3.5–5)
RBC # BLD: 4.56 E12/L (ref 3.8–5.8)
SODIUM BLD-SCNC: 138 MMOL/L (ref 132–146)
TOTAL PROTEIN: 4.6 G/DL (ref 6.4–8.3)
WBC # BLD: 7.7 E9/L (ref 4.5–11.5)

## 2021-05-14 PROCEDURE — 36415 COLL VENOUS BLD VENIPUNCTURE: CPT

## 2021-05-14 PROCEDURE — 80053 COMPREHEN METABOLIC PANEL: CPT

## 2021-05-14 PROCEDURE — 85025 COMPLETE CBC W/AUTO DIFF WBC: CPT

## 2021-05-14 NOTE — PROGRESS NOTES
Take 1 tablet by mouth every 6 hours as needed (nausea/vomiting) 3/25/21   Luana Catalan MD   fluticasone [facility]) 50 MCG/ACT nasal spray 1 spray by Each Nostril route daily 12/28/20   Mukund Leavitt MD   miconazole (MICOTIN) 2 % cream Apply topically 2 times daily. 12/28/20   Mukund Leavitt MD   VITAMIN E PO Take 1 capsule by mouth daily    Historical Provider, MD   Multiple Vitamins-Minerals (THERAPEUTIC MULTIVITAMIN-MINERALS) tablet Take 1 tablet by mouth daily    Historical Provider, MD   Ascorbic Acid (VITAMIN C PO) Take 1 tablet by mouth daily    Historical Provider, MD   VITAMIN D PO Take 1 capsule by mouth daily    Historical Provider, MD   aspirin 81 MG tablet Take 81 mg by mouth daily    Historical Provider, MD   ibuprofen (ADVIL;MOTRIN) 600 MG tablet Take 1 tablet by mouth every 6 hours as needed for Pain 7/5/17   Mukund Leavitt MD   fexofenadine-pseudoephedrine (ALLEGRA-D 12 HOUR)  MG per tablet Take 1 tablet by mouth 2 times daily as needed.  2/5/13   Aleta Almonte MD    Scheduled Meds:  Continuous Infusions:  PRN Meds:.        Recent Laboratory Data-     Lab Results   Component Value Date    WBC 7.7 05/14/2021    HGB 14.2 05/14/2021    HCT 42.3 05/14/2021    MCV 92.8 05/14/2021     05/14/2021    LYMPHOPCT 18.5 (L) 05/14/2021    RBC 4.56 05/14/2021    MCH 31.1 05/14/2021    MCHC 33.6 05/14/2021    RDW 13.9 05/14/2021    NEUTOPHILPCT 72.2 05/14/2021    MONOPCT 8.0 05/14/2021    BASOPCT 0.3 05/14/2021    NEUTROABS 5.57 05/14/2021    LYMPHSABS 1.43 (L) 05/14/2021    MONOSABS 0.62 05/14/2021    EOSABS 0.05 05/14/2021    BASOSABS 0.02 05/14/2021       Lab Results   Component Value Date     05/14/2021    K 4.0 05/14/2021     05/14/2021    CO2 26 05/14/2021    BUN 24 (H) 05/14/2021    CREATININE 0.9 05/14/2021    GLUCOSE 102 (H) 05/14/2021    CALCIUM 8.4 (L) 05/14/2021    PROT 4.6 (L) 05/14/2021    LABALBU 2.1 (L) 05/14/2021    BILITOT <0.2 05/14/2021    ALKPHOS 62 05/14/2021    AST 38 05/14/2021    ALT 51 (H) 05/14/2021    LABGLOM >60 05/14/2021    GFRAA >60 05/14/2021           Radiology-  No results found. 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     His work-up will be completed to include the following:     CBC, CMP, LDH, B2 microglobulin, CRP, serum protein electrophoresis with reflex to immunofixation, serum free kappa and lambda light chains with ratio, quantitative immunoglobulins, 24-hour urine collection for quantitative immunoglobulins and immunofixation.     Serum troponin, proBNP and 2D echo will be done to exclude cardiac amyloidosis. TSH  Factor X assay as well as PT and APTT will be done     Will be scheduled for a bone marrow aspirate and biopsy     Once work-up completed options of therapy with bortezomib/daratumumab will be addressed     He is encouraged to receive the COVID-19 vaccine        3/15/2021  Awaiting his bone marrow aspirate and biopsy and his 2D echo which are both pending. His CBC was in the normal range with a hemoglobin of 15.3, normal WBC count of 6.2, normal MCV of 90 with a normal platelet count of 130. His TSH was normal at 3.88  Serum LDH was elevated at 266. B2 microglobulin was markedly elevated at 13.6. Quantitative immunoglobulins showed suppression of IgG with normal IgA and IgM. Serum electrophoresis showed an M spike faint measuring 0.1. Serum FLC's showed markedly elevated free lambda light chain at 311 with abnormal ratio of 0.05  Serum troponin was normal but proBNP was elevated at 491.   24-hour urine collection showed nephrotic range proteinuria with total proteins of 6.6 g in a 24-hour period with a faint M spike in the urine measuring 1263 mg in a 24-hour timeframe     He will be recommended induction with Daratumumab/Velcade/dexamethasone  His bone marrow aspirate and biopsy will be reviewed.     All potential side effects were discussed.        3/29/2021  He will be given acyclovir for VZV prophylaxis and Bactrim DS for PCP prophylaxis and as needed Compazine for nausea. He will be initiated today on daratumumab/Velcade/cyclophosphamide/dexamethasone regimen. All potential side effects were discussed. His baby aspirin has been held.        4/05/2021  He tolerated his treatment with daratumumab well. He reports constipation. Few days later he had some chills and body aches and it is likely related to his second dose of the Covid vaccine. No signs of peripheral neuropathy. To receive Week # 2 of daratumumab/Velcade/dexamethasone/Cytoxan     Attempt with future weeks to switch to Darzalex Faspro        4/12/2021  He will be switched to Darzalex faspro this week. He will be given as needed tramadol for pain and omeprazole 20 mg daily for heartburn. To receive week 3 of chemotherapy regimen consisting of daratumumab/Velcade/dexamethasone/Cytoxan        4/19/2021  He has been experiencing significant side effects from daratumumab 2 to 3 days later in terms of headaches myalgias arthralgias. He will be given prednisone 20 mg daily starting on 4/21/2021 and he will skip it Monday the day he gets the high-dose steroids prior to his Darzalex Faspro.     He will take as needed Tylenol or Advil and he will continue on his omeprazole daily.        4/26/2021  He has gained few pounds and has fluid retention from steroids and will be initiated on Maxide 37.5 mg daily. He will continue on prednisone except on Mondays. Received Darzalex Faspro today.        5/03/2021  He diuresed very well on Maxide and lost 6 pounds and it has been discontinued. He has had no reactions from Darzalex while maintained on prednisone  To receive week 6 of Darzalex Faspro today       5/10/2021  To receive week 7 of Darzalex Faspro . Continue Prednisone.       5/17/2021  To receive week 8 of Darzalex Faspro. He will have a repeat myeloma panel and urinary collection after week 9. Lisa Greer. Liliane Braun M.D., F.A.C.P.   Electronically signed 5/14/2021 at 1:04 PM

## 2021-05-17 ENCOUNTER — HOSPITAL ENCOUNTER (OUTPATIENT)
Dept: INFUSION THERAPY | Age: 66
Discharge: HOME | End: 2021-05-17
Payer: COMMERCIAL

## 2021-05-17 ENCOUNTER — OFFICE VISIT (OUTPATIENT)
Dept: ONCOLOGY | Age: 66
End: 2021-05-17
Payer: COMMERCIAL

## 2021-05-17 VITALS
HEART RATE: 88 BPM | RESPIRATION RATE: 18 BRPM | SYSTOLIC BLOOD PRESSURE: 111 MMHG | DIASTOLIC BLOOD PRESSURE: 76 MMHG | BODY MASS INDEX: 22.52 KG/M2 | HEIGHT: 70 IN | WEIGHT: 157.3 LBS | TEMPERATURE: 97.8 F | OXYGEN SATURATION: 97 %

## 2021-05-17 VITALS — SYSTOLIC BLOOD PRESSURE: 105 MMHG | DIASTOLIC BLOOD PRESSURE: 69 MMHG | HEART RATE: 86 BPM

## 2021-05-17 DIAGNOSIS — E85.81 LIGHT CHAIN (AL) AMYLOIDOSIS (HCC): Primary | ICD-10-CM

## 2021-05-17 PROCEDURE — 96375 TX/PRO/DX INJ NEW DRUG ADDON: CPT

## 2021-05-17 PROCEDURE — 6360000002 HC RX W HCPCS

## 2021-05-17 PROCEDURE — 2580000003 HC RX 258: Performed by: INTERNAL MEDICINE

## 2021-05-17 PROCEDURE — 96401 CHEMO ANTI-NEOPL SQ/IM: CPT

## 2021-05-17 PROCEDURE — 6370000000 HC RX 637 (ALT 250 FOR IP)

## 2021-05-17 PROCEDURE — 6360000002 HC RX W HCPCS: Performed by: INTERNAL MEDICINE

## 2021-05-17 PROCEDURE — 96413 CHEMO IV INFUSION 1 HR: CPT

## 2021-05-17 RX ORDER — ONDANSETRON 2 MG/ML
INJECTION INTRAMUSCULAR; INTRAVENOUS
Status: COMPLETED
Start: 2021-05-17 | End: 2021-05-17

## 2021-05-17 RX ORDER — DIPHENHYDRAMINE HYDROCHLORIDE 50 MG/ML
INJECTION INTRAMUSCULAR; INTRAVENOUS
Status: COMPLETED
Start: 2021-05-17 | End: 2021-05-17

## 2021-05-17 RX ORDER — ACETAMINOPHEN 325 MG/1
TABLET ORAL
Status: COMPLETED
Start: 2021-05-17 | End: 2021-05-17

## 2021-05-17 RX ORDER — DIPHENHYDRAMINE HYDROCHLORIDE 50 MG/ML
25 INJECTION INTRAMUSCULAR; INTRAVENOUS ONCE
Status: COMPLETED | OUTPATIENT
Start: 2021-05-17 | End: 2021-05-17

## 2021-05-17 RX ORDER — SODIUM CHLORIDE 9 MG/ML
20 INJECTION, SOLUTION INTRAVENOUS ONCE
Status: COMPLETED | OUTPATIENT
Start: 2021-05-17 | End: 2021-05-17

## 2021-05-17 RX ORDER — ACETAMINOPHEN 325 MG/1
650 TABLET ORAL ONCE
Status: COMPLETED | OUTPATIENT
Start: 2021-05-17 | End: 2021-05-17

## 2021-05-17 RX ORDER — ONDANSETRON 2 MG/ML
8 INJECTION INTRAMUSCULAR; INTRAVENOUS ONCE
Status: COMPLETED | OUTPATIENT
Start: 2021-05-17 | End: 2021-05-17

## 2021-05-17 RX ADMIN — BORTEZOMIB 2.75 MG: 3.5 INJECTION, POWDER, LYOPHILIZED, FOR SOLUTION INTRAVENOUS; SUBCUTANEOUS at 12:50

## 2021-05-17 RX ADMIN — DARATUMUMAB AND HYALURONIDASE-FIHJ (HUMAN RECOMBINANT) 15 ML: 1800; 30000 INJECTION SUBCUTANEOUS at 11:52

## 2021-05-17 RX ADMIN — DIPHENHYDRAMINE HYDROCHLORIDE 25 MG: 50 INJECTION INTRAMUSCULAR; INTRAVENOUS at 11:22

## 2021-05-17 RX ADMIN — ONDANSETRON 8 MG: 2 INJECTION INTRAMUSCULAR; INTRAVENOUS at 11:18

## 2021-05-17 RX ADMIN — ACETAMINOPHEN 650 MG: 325 TABLET ORAL at 11:17

## 2021-05-17 RX ADMIN — CYCLOPHOSPHAMIDE 560 MG: 200 INJECTION, SOLUTION INTRAVENOUS at 12:04

## 2021-05-17 RX ADMIN — DIPHENHYDRAMINE HYDROCHLORIDE 25 MG: 50 INJECTION, SOLUTION INTRAMUSCULAR; INTRAVENOUS at 11:22

## 2021-05-17 RX ADMIN — SODIUM CHLORIDE 20 ML/HR: 9 INJECTION, SOLUTION INTRAVENOUS at 11:15

## 2021-05-17 ASSESSMENT — PAIN SCALES - GENERAL: PAINLEVEL_OUTOF10: 0

## 2021-05-21 ENCOUNTER — HOSPITAL ENCOUNTER (OUTPATIENT)
Dept: INFUSION THERAPY | Age: 66
Discharge: HOME | End: 2021-05-21
Payer: COMMERCIAL

## 2021-05-21 DIAGNOSIS — E85.81 LIGHT CHAIN (AL) AMYLOIDOSIS (HCC): ICD-10-CM

## 2021-05-21 LAB
ALBUMIN SERPL-MCNC: 2.5 G/DL (ref 3.5–5.2)
ALP BLD-CCNC: 60 U/L (ref 40–129)
ALT SERPL-CCNC: 44 U/L (ref 0–40)
ANION GAP SERPL CALCULATED.3IONS-SCNC: 8 MMOL/L (ref 7–16)
AST SERPL-CCNC: 36 U/L (ref 0–39)
BASOPHILS ABSOLUTE: 0.03 E9/L (ref 0–0.2)
BASOPHILS RELATIVE PERCENT: 0.4 % (ref 0–2)
BILIRUB SERPL-MCNC: <0.2 MG/DL (ref 0–1.2)
BUN BLDV-MCNC: 23 MG/DL (ref 6–23)
CALCIUM SERPL-MCNC: 8.4 MG/DL (ref 8.6–10.2)
CHLORIDE BLD-SCNC: 104 MMOL/L (ref 98–107)
CO2: 24 MMOL/L (ref 22–29)
CREAT SERPL-MCNC: 1 MG/DL (ref 0.7–1.2)
EOSINOPHILS ABSOLUTE: 0.05 E9/L (ref 0.05–0.5)
EOSINOPHILS RELATIVE PERCENT: 0.6 % (ref 0–6)
GFR AFRICAN AMERICAN: >60
GFR NON-AFRICAN AMERICAN: >60 ML/MIN/1.73
GLUCOSE BLD-MCNC: 114 MG/DL (ref 74–99)
HCT VFR BLD CALC: 42.6 % (ref 37–54)
HEMOGLOBIN: 14.1 G/DL (ref 12.5–16.5)
IMMATURE GRANULOCYTES #: 0.04 E9/L
IMMATURE GRANULOCYTES %: 0.5 % (ref 0–5)
LYMPHOCYTES ABSOLUTE: 1.68 E9/L (ref 1.5–4)
LYMPHOCYTES RELATIVE PERCENT: 20.2 % (ref 20–42)
MCH RBC QN AUTO: 31 PG (ref 26–35)
MCHC RBC AUTO-ENTMCNC: 33.1 % (ref 32–34.5)
MCV RBC AUTO: 93.6 FL (ref 80–99.9)
MONOCYTES ABSOLUTE: 0.7 E9/L (ref 0.1–0.95)
MONOCYTES RELATIVE PERCENT: 8.4 % (ref 2–12)
NEUTROPHILS ABSOLUTE: 5.83 E9/L (ref 1.8–7.3)
NEUTROPHILS RELATIVE PERCENT: 69.9 % (ref 43–80)
PDW BLD-RTO: 14.2 FL (ref 11.5–15)
PLATELET # BLD: 252 E9/L (ref 130–450)
PMV BLD AUTO: 9.7 FL (ref 7–12)
POTASSIUM SERPL-SCNC: 4.1 MMOL/L (ref 3.5–5)
RBC # BLD: 4.55 E12/L (ref 3.8–5.8)
SODIUM BLD-SCNC: 136 MMOL/L (ref 132–146)
TOTAL PROTEIN: 4.3 G/DL (ref 6.4–8.3)
WBC # BLD: 8.3 E9/L (ref 4.5–11.5)

## 2021-05-21 PROCEDURE — 85025 COMPLETE CBC W/AUTO DIFF WBC: CPT

## 2021-05-21 PROCEDURE — 36415 COLL VENOUS BLD VENIPUNCTURE: CPT

## 2021-05-21 PROCEDURE — 80053 COMPREHEN METABOLIC PANEL: CPT

## 2021-05-21 NOTE — PROGRESS NOTES
[address] [facility]. [facility] [place]        Pt Name: Ariel Dumont: 1955  Date of evaluation: 5/24/2021  Primary Care Physician: Collins Dalal MD  Reason for evaluation:   Chief Complaint   Patient presents with    Other     Light chain ((AL) amyloidosis    Follow-up    Chemotherapy        Subjective:   Here for treatment and follow-up. Feels well today      OBJECTIVE:  VITALS:  height is 5' 10\" (1.778 m) and weight is 156 lb 4.8 oz (70.9 kg). His temperature is 97.7 °F (36.5 °C). His blood pressure is 107/75 and his pulse is 91. His respiration is 18 and oxygen saturation is 95%. Physical Exam:  Performance Status: 0  Well developed, well nourished male  General: AAO to person, place, time, cooperative, in no acute distress. Head and neck: PERRLA, EOMI. Sclera non icteric. Oropharynx:  Clear. Neck: no JVD,  no adenopathy. Heart: Regular rate and regular rhythm, no murmur. Lungs: Clear to auscultation.   Abdomen: Soft, non-tender;no masses, no organomegaly. Extremities : No edema,no cyanosis, no clubbing. Neurologic:Cranial nerves grossly intact. No focal motor or sensory deficits   Skin:  No rash. Medications  Prior to Admission medications    Medication Sig Start Date End Date Taking?  Authorizing Provider   triamterene-hydroCHLOROthiazide (MAXZIDE-25) 37.5-25 MG per tablet Take 1 tablet by mouth daily 4/26/21  Yes Julio Townsend MD   rosuvastatin (CRESTOR) 40 MG tablet TAKE ONE TABLET BY MOUTH DAILY 4/2/21  Yes Collins Dalal MD   dexamethasone (DECADRON) 4 MG tablet TAKE 10 Tables (40 mg dose) EVERY Monday 3/29/21  Yes VICTOR MANUEL Akins CNP   prochlorperazine (COMPAZINE) 10 MG tablet Take 1 tablet by mouth every 6 hours as needed (nausea/vomiting) 3/25/21  Yes Julio Townsend MD   fluticasone [facility]) 50 MCG/ACT nasal spray 1 spray by Each Nostril route daily 12/28/20  Yes Collins Dalal MD   miconazole (MICOTIN) 2 % cream Apply topically 2 times daily. 12/28/20  Yes Joanne Fan MD   VITAMIN E PO Take 1 capsule by mouth daily   Yes Historical Provider, MD   Multiple Vitamins-Minerals (THERAPEUTIC MULTIVITAMIN-MINERALS) tablet Take 1 tablet by mouth daily   Yes Historical Provider, MD   Ascorbic Acid (VITAMIN C PO) Take 1 tablet by mouth daily   Yes Historical Provider, MD   VITAMIN D PO Take 1 capsule by mouth daily   Yes Historical Provider, MD   aspirin 81 MG tablet Take 81 mg by mouth daily   Yes Historical Provider, MD   ibuprofen (ADVIL;MOTRIN) 600 MG tablet Take 1 tablet by mouth every 6 hours as needed for Pain 7/5/17  Yes Joanne Fan MD   fexofenadine-pseudoephedrine (ALLEGRA-D 12 HOUR)  MG per tablet Take 1 tablet by mouth 2 times daily as needed.  2/5/13  Yes Gil Aj MD   omeprazole (PRILOSEC) 20 MG delayed release capsule Take 1 capsule by mouth 2 times daily (before meals) 4/19/21 5/19/21  VICTOR MANUEL Estrada - CNP    Scheduled Meds:  Continuous Infusions:  PRN Meds:.        Recent Laboratory Data-     Lab Results   Component Value Date    WBC 8.3 05/21/2021    HGB 14.1 05/21/2021    HCT 42.6 05/21/2021    MCV 93.6 05/21/2021     05/21/2021    LYMPHOPCT 20.2 05/21/2021    RBC 4.55 05/21/2021    MCH 31.0 05/21/2021    MCHC 33.1 05/21/2021    RDW 14.2 05/21/2021    NEUTOPHILPCT 69.9 05/21/2021    MONOPCT 8.4 05/21/2021    BASOPCT 0.4 05/21/2021    NEUTROABS 5.83 05/21/2021    LYMPHSABS 1.68 05/21/2021    MONOSABS 0.70 05/21/2021    EOSABS 0.05 05/21/2021    BASOSABS 0.03 05/21/2021       Lab Results   Component Value Date     05/21/2021    K 4.1 05/21/2021     05/21/2021    CO2 24 05/21/2021    BUN 23 05/21/2021    CREATININE 1.0 05/21/2021    GLUCOSE 114 (H) 05/21/2021    CALCIUM 8.4 (L) 05/21/2021    PROT 4.3 (L) 05/21/2021    LABALBU 2.5 (L) 05/21/2021    BILITOT <0.2 05/21/2021    ALKPHOS 60 05/21/2021    AST 36 05/21/2021    ALT 44 (H) 05/21/2021    LABGLOM >60 05/21/2021    GFRAA >60 05/21/2021           Radiology-  No results found. 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     His work-up will be completed to include the following:     CBC, CMP, LDH, B2 microglobulin, CRP, serum protein electrophoresis with reflex to immunofixation, serum free kappa and lambda light chains with ratio, quantitative immunoglobulins, 24-hour urine collection for quantitative immunoglobulins and immunofixation.     Serum troponin, proBNP and 2D echo will be done to exclude cardiac amyloidosis. TSH  Factor X assay as well as PT and APTT will be done     Will be scheduled for a bone marrow aspirate and biopsy     Once work-up completed options of therapy with bortezomib/daratumumab will be addressed     He is encouraged to receive the COVID-19 vaccine        3/15/2021  Awaiting his bone marrow aspirate and biopsy and his 2D echo which are both pending. His CBC was in the normal range with a hemoglobin of 15.3, normal WBC count of 6.2, normal MCV of 90 with a normal platelet count of 367. His TSH was normal at 3.88  Serum LDH was elevated at 266. B2 microglobulin was markedly elevated at 13.6. Quantitative immunoglobulins showed suppression of IgG with normal IgA and IgM. Serum electrophoresis showed an M spike faint measuring 0.1. Serum FLC's showed markedly elevated free lambda light chain at 311 with abnormal ratio of 0.05  Serum troponin was normal but proBNP was elevated at 491.   24-hour urine collection showed nephrotic range proteinuria with total proteins of 6.6 g in a 24-hour period with a faint M spike in the urine measuring 1263 mg in a 24-hour timeframe     He will be recommended induction with Daratumumab/Velcade/dexamethasone  His bone marrow aspirate and biopsy will be reviewed.     All potential side effects were discussed.        3/29/2021  He will be given acyclovir for VZV prophylaxis and Bactrim DS for PCP prophylaxis and as needed Compazine for nausea. He will be initiated today on daratumumab/Velcade/cyclophosphamide/dexamethasone regimen. All potential side effects were discussed. His baby aspirin has been held.        4/05/2021  He tolerated his treatment with daratumumab well. He reports constipation. Few days later he had some chills and body aches and it is likely related to his second dose of the Covid vaccine. No signs of peripheral neuropathy. To receive Week # 2 of daratumumab/Velcade/dexamethasone/Cytoxan     Attempt with future weeks to switch to Darzalex Faspro        4/12/2021  He will be switched to Darzalex faspro this week. He will be given as needed tramadol for pain and omeprazole 20 mg daily for heartburn. To receive week 3 of chemotherapy regimen consisting of daratumumab/Velcade/dexamethasone/Cytoxan        4/19/2021  He has been experiencing significant side effects from daratumumab 2 to 3 days later in terms of headaches myalgias arthralgias. He will be given prednisone 20 mg daily starting on 4/21/2021 and he will skip it Monday the day he gets the high-dose steroids prior to his Darzalex Faspro.     He will take as needed Tylenol or Advil and he will continue on his omeprazole daily.        4/26/2021  He has gained few pounds and has fluid retention from steroids and will be initiated on Maxide 37.5 mg daily. He will continue on prednisone except on Mondays. Received Darzalex Faspro today.        5/03/2021  He diuresed very well on Maxide and lost 6 pounds and it has been discontinued. He has had no reactions from Darzalex while maintained on prednisone  To receive week 6 of Darzalex Faspro today       5/10/2021  To receive week 7 of Darzalex Faspro . Continue Prednisone. 5/17/2021  To receive week 8 of Darzalex Faspro.   He will have a repeat myeloma panel and urinary collection after week 9.      5/24/2021  To receive week 9 of Darzalex Faspro. He will continue on Cytoxan and Velcade    He will have a repeat myeloma panel and urinary collection after week 9. He will return for follow-up in 2 weeks    Stacy Aguirre. Emilia Santiago M.D., F.A.C.P.   Electronically signed 5/24/2021 at 10:49 AM

## 2021-05-24 ENCOUNTER — HOSPITAL ENCOUNTER (OUTPATIENT)
Dept: INFUSION THERAPY | Age: 66
Discharge: HOME | End: 2021-05-24
Payer: COMMERCIAL

## 2021-05-24 ENCOUNTER — OFFICE VISIT (OUTPATIENT)
Dept: ONCOLOGY | Age: 66
End: 2021-05-24
Payer: COMMERCIAL

## 2021-05-24 VITALS
OXYGEN SATURATION: 95 % | SYSTOLIC BLOOD PRESSURE: 107 MMHG | TEMPERATURE: 97.7 F | HEIGHT: 70 IN | RESPIRATION RATE: 18 BRPM | HEART RATE: 91 BPM | BODY MASS INDEX: 22.38 KG/M2 | WEIGHT: 156.3 LBS | DIASTOLIC BLOOD PRESSURE: 75 MMHG

## 2021-05-24 VITALS — SYSTOLIC BLOOD PRESSURE: 104 MMHG | HEART RATE: 84 BPM | DIASTOLIC BLOOD PRESSURE: 73 MMHG | RESPIRATION RATE: 18 BRPM

## 2021-05-24 DIAGNOSIS — E85.81 LIGHT CHAIN (AL) AMYLOIDOSIS (HCC): Primary | ICD-10-CM

## 2021-05-24 PROCEDURE — 96375 TX/PRO/DX INJ NEW DRUG ADDON: CPT

## 2021-05-24 PROCEDURE — 96413 CHEMO IV INFUSION 1 HR: CPT

## 2021-05-24 PROCEDURE — 96365 THER/PROPH/DIAG IV INF INIT: CPT

## 2021-05-24 PROCEDURE — 6370000000 HC RX 637 (ALT 250 FOR IP): Performed by: INTERNAL MEDICINE

## 2021-05-24 PROCEDURE — 6360000002 HC RX W HCPCS: Performed by: INTERNAL MEDICINE

## 2021-05-24 PROCEDURE — 96401 CHEMO ANTI-NEOPL SQ/IM: CPT

## 2021-05-24 PROCEDURE — 2580000003 HC RX 258: Performed by: INTERNAL MEDICINE

## 2021-05-24 RX ORDER — ONDANSETRON 2 MG/ML
8 INJECTION INTRAMUSCULAR; INTRAVENOUS ONCE
Status: CANCELLED
Start: 2021-05-24 | End: 2021-05-24

## 2021-05-24 RX ORDER — SODIUM CHLORIDE 0.9 % (FLUSH) 0.9 %
10 SYRINGE (ML) INJECTION PRN
Status: CANCELLED | OUTPATIENT
Start: 2021-05-24

## 2021-05-24 RX ORDER — SODIUM CHLORIDE 9 MG/ML
INJECTION, SOLUTION INTRAVENOUS CONTINUOUS
Status: CANCELLED | OUTPATIENT
Start: 2021-05-24

## 2021-05-24 RX ORDER — EPINEPHRINE 1 MG/ML
0.3 INJECTION, SOLUTION, CONCENTRATE INTRAVENOUS PRN
Status: CANCELLED | OUTPATIENT
Start: 2021-05-24

## 2021-05-24 RX ORDER — DIPHENHYDRAMINE HYDROCHLORIDE 50 MG/ML
25 INJECTION INTRAMUSCULAR; INTRAVENOUS ONCE
Status: CANCELLED | OUTPATIENT
Start: 2021-05-24

## 2021-05-24 RX ORDER — ACETAMINOPHEN 325 MG/1
650 TABLET ORAL ONCE
Status: COMPLETED | OUTPATIENT
Start: 2021-05-24 | End: 2021-05-24

## 2021-05-24 RX ORDER — ACETAMINOPHEN 325 MG/1
650 TABLET ORAL ONCE
Status: CANCELLED | OUTPATIENT
Start: 2021-05-24

## 2021-05-24 RX ORDER — SODIUM CHLORIDE 0.9 % (FLUSH) 0.9 %
5 SYRINGE (ML) INJECTION PRN
Status: CANCELLED | OUTPATIENT
Start: 2021-05-24

## 2021-05-24 RX ORDER — PALONOSETRON HYDROCHLORIDE 0.05 MG/ML
0.25 INJECTION, SOLUTION INTRAVENOUS ONCE
Status: COMPLETED | OUTPATIENT
Start: 2021-05-24 | End: 2021-05-24

## 2021-05-24 RX ORDER — SODIUM CHLORIDE 9 MG/ML
20 INJECTION, SOLUTION INTRAVENOUS ONCE
Status: CANCELLED | OUTPATIENT
Start: 2021-05-24 | End: 2021-05-24

## 2021-05-24 RX ORDER — SODIUM CHLORIDE 9 MG/ML
20 INJECTION, SOLUTION INTRAVENOUS ONCE
Status: COMPLETED | OUTPATIENT
Start: 2021-05-24 | End: 2021-05-24

## 2021-05-24 RX ORDER — DIPHENHYDRAMINE HYDROCHLORIDE 50 MG/ML
25 INJECTION INTRAMUSCULAR; INTRAVENOUS ONCE
Status: COMPLETED | OUTPATIENT
Start: 2021-05-24 | End: 2021-05-24

## 2021-05-24 RX ORDER — DIPHENHYDRAMINE HYDROCHLORIDE 50 MG/ML
50 INJECTION INTRAMUSCULAR; INTRAVENOUS ONCE
Status: CANCELLED | OUTPATIENT
Start: 2021-05-24 | End: 2021-05-24

## 2021-05-24 RX ORDER — HEPARIN SODIUM (PORCINE) LOCK FLUSH IV SOLN 100 UNIT/ML 100 UNIT/ML
500 SOLUTION INTRAVENOUS PRN
Status: CANCELLED | OUTPATIENT
Start: 2021-05-24

## 2021-05-24 RX ORDER — ONDANSETRON 2 MG/ML
8 INJECTION INTRAMUSCULAR; INTRAVENOUS ONCE
Status: COMPLETED | OUTPATIENT
Start: 2021-05-24 | End: 2021-05-24

## 2021-05-24 RX ORDER — PALONOSETRON HYDROCHLORIDE 0.05 MG/ML
0.25 INJECTION, SOLUTION INTRAVENOUS ONCE
Status: CANCELLED
Start: 2021-05-24 | End: 2021-05-24

## 2021-05-24 RX ORDER — METHYLPREDNISOLONE SODIUM SUCCINATE 125 MG/2ML
125 INJECTION, POWDER, LYOPHILIZED, FOR SOLUTION INTRAMUSCULAR; INTRAVENOUS ONCE
Status: CANCELLED | OUTPATIENT
Start: 2021-05-24 | End: 2021-05-24

## 2021-05-24 RX ADMIN — DIPHENHYDRAMINE HYDROCHLORIDE 25 MG: 50 INJECTION, SOLUTION INTRAMUSCULAR; INTRAVENOUS at 11:54

## 2021-05-24 RX ADMIN — ONDANSETRON 8 MG: 2 INJECTION INTRAMUSCULAR; INTRAVENOUS at 12:15

## 2021-05-24 RX ADMIN — BORTEZOMIB 2.75 MG: 3.5 INJECTION, POWDER, LYOPHILIZED, FOR SOLUTION INTRAVENOUS; SUBCUTANEOUS at 12:57

## 2021-05-24 RX ADMIN — PALONOSETRON 0.25 MG: 0.25 INJECTION, SOLUTION INTRAVENOUS at 11:55

## 2021-05-24 RX ADMIN — SODIUM CHLORIDE 20 ML/HR: 9 INJECTION, SOLUTION INTRAVENOUS at 11:45

## 2021-05-24 RX ADMIN — ACETAMINOPHEN 650 MG: 325 TABLET ORAL at 11:57

## 2021-05-24 RX ADMIN — DARATUMUMAB AND HYALURONIDASE-FIHJ (HUMAN RECOMBINANT) 15 ML: 1800; 30000 INJECTION SUBCUTANEOUS at 12:56

## 2021-05-24 RX ADMIN — CYCLOPHOSPHAMIDE 560 MG: 200 INJECTION, SOLUTION INTRAVENOUS at 12:28

## 2021-05-28 ENCOUNTER — TELEPHONE (OUTPATIENT)
Dept: INFUSION THERAPY | Age: 66
End: 2021-05-28

## 2021-06-01 ENCOUNTER — HOSPITAL ENCOUNTER (OUTPATIENT)
Age: 66
Discharge: HOME | End: 2021-06-01
Payer: COMMERCIAL

## 2021-06-01 ENCOUNTER — HOSPITAL ENCOUNTER (OUTPATIENT)
Dept: INFUSION THERAPY | Age: 66
Discharge: HOME | End: 2021-06-01
Payer: COMMERCIAL

## 2021-06-01 DIAGNOSIS — E85.81 LIGHT CHAIN (AL) AMYLOIDOSIS (HCC): ICD-10-CM

## 2021-06-01 DIAGNOSIS — E85.81 LIGHT CHAIN (AL) AMYLOIDOSIS (HCC): Primary | ICD-10-CM

## 2021-06-01 LAB
ALBUMIN SERPL-MCNC: 2.5 G/DL (ref 3.5–5.2)
ALP BLD-CCNC: 63 U/L (ref 40–129)
ALT SERPL-CCNC: 41 U/L (ref 0–40)
ANION GAP SERPL CALCULATED.3IONS-SCNC: 8 MMOL/L (ref 7–16)
AST SERPL-CCNC: 40 U/L (ref 0–39)
BASOPHILS ABSOLUTE: 0.03 E9/L (ref 0–0.2)
BASOPHILS RELATIVE PERCENT: 0.4 % (ref 0–2)
BILIRUB SERPL-MCNC: 0.2 MG/DL (ref 0–1.2)
BUN BLDV-MCNC: 22 MG/DL (ref 6–23)
CALCIUM SERPL-MCNC: 8.5 MG/DL (ref 8.6–10.2)
CHLORIDE BLD-SCNC: 104 MMOL/L (ref 98–107)
CO2: 26 MMOL/L (ref 22–29)
CREAT SERPL-MCNC: 1 MG/DL (ref 0.7–1.2)
EOSINOPHILS ABSOLUTE: 0.08 E9/L (ref 0.05–0.5)
EOSINOPHILS RELATIVE PERCENT: 0.9 % (ref 0–6)
GFR AFRICAN AMERICAN: >60
GFR NON-AFRICAN AMERICAN: >60 ML/MIN/1.73
GLUCOSE BLD-MCNC: 123 MG/DL (ref 74–99)
HCT VFR BLD CALC: 44.2 % (ref 37–54)
HEMOGLOBIN: 14.4 G/DL (ref 12.5–16.5)
IMMATURE GRANULOCYTES #: 0.07 E9/L
IMMATURE GRANULOCYTES %: 0.8 % (ref 0–5)
LYMPHOCYTES ABSOLUTE: 0.93 E9/L (ref 1.5–4)
LYMPHOCYTES RELATIVE PERCENT: 10.9 % (ref 20–42)
MCH RBC QN AUTO: 31.4 PG (ref 26–35)
MCHC RBC AUTO-ENTMCNC: 32.6 % (ref 32–34.5)
MCV RBC AUTO: 96.3 FL (ref 80–99.9)
MONOCYTES ABSOLUTE: 0.61 E9/L (ref 0.1–0.95)
MONOCYTES RELATIVE PERCENT: 7.2 % (ref 2–12)
NEUTROPHILS ABSOLUTE: 6.79 E9/L (ref 1.8–7.3)
NEUTROPHILS RELATIVE PERCENT: 79.8 % (ref 43–80)
PDW BLD-RTO: 14.8 FL (ref 11.5–15)
PLATELET # BLD: 192 E9/L (ref 130–450)
PMV BLD AUTO: 9.9 FL (ref 7–12)
POTASSIUM SERPL-SCNC: 4 MMOL/L (ref 3.5–5)
RBC # BLD: 4.59 E12/L (ref 3.8–5.8)
SODIUM BLD-SCNC: 138 MMOL/L (ref 132–146)
TOTAL PROTEIN: 4.5 G/DL (ref 6.4–8.3)
WBC # BLD: 8.5 E9/L (ref 4.5–11.5)

## 2021-06-01 PROCEDURE — 36415 COLL VENOUS BLD VENIPUNCTURE: CPT

## 2021-06-01 PROCEDURE — 83883 ASSAY NEPHELOMETRY NOT SPEC: CPT

## 2021-06-01 PROCEDURE — 82784 ASSAY IGA/IGD/IGG/IGM EACH: CPT

## 2021-06-01 PROCEDURE — 85025 COMPLETE CBC W/AUTO DIFF WBC: CPT

## 2021-06-01 PROCEDURE — 80053 COMPREHEN METABOLIC PANEL: CPT

## 2021-06-02 ENCOUNTER — HOSPITAL ENCOUNTER (OUTPATIENT)
Age: 66
Discharge: HOME | End: 2021-06-02
Payer: COMMERCIAL

## 2021-06-02 LAB
KAPPA FREE LIGHT CHAINS QNT: 7.83 MG/L (ref 3.3–19.4)
KAPPA/LAMBDA FREE LIGHT CHAIN RATIO: 0.34 (ref 0.26–1.65)
LAMBDA FREE LIGHT CHAINS QNT: 23.11 MG/L (ref 5.71–26.3)

## 2021-06-02 PROCEDURE — 84166 PROTEIN E-PHORESIS/URINE/CSF: CPT

## 2021-06-02 PROCEDURE — 86334 IMMUNOFIX E-PHORESIS SERUM: CPT

## 2021-06-03 LAB
IGA: 36 MG/DL (ref 70–400)
IGG: 131 MG/DL (ref 700–1600)
IGM: 17 MG/DL (ref 40–230)

## 2021-06-04 ENCOUNTER — APPOINTMENT (OUTPATIENT)
Dept: INFUSION THERAPY | Age: 66
End: 2021-06-04
Payer: COMMERCIAL

## 2021-06-04 LAB
ADDENDUM ELECTROPHORESIS URINE RANDOM: NORMAL
IMMUNOFIXATION URINE: NORMAL

## 2021-06-04 NOTE — PROGRESS NOTES
[address] [facility]. [facility] [place]        Pt Name: Shiv Chavis: 1955  Date of evaluation: 6/7/2021  Primary Care Physician: Cesia Macedo MD  Reason for evaluation:   Chief Complaint   Patient presents with    Other     Light chain (AL) amyloidosis    Follow-up    Chemotherapy        Subjective:   Here for treatment and follow-up. Feels well today      OBJECTIVE:  VITALS:  height is 5' 10\" (1.778 m) and weight is 159 lb 14.4 oz (72.5 kg). His temporal temperature is 98.2 °F (36.8 °C). His blood pressure is 105/87 and his pulse is 102. His oxygen saturation is 97%. Physical Exam:  Performance Status: 0  Well developed, well nourished male  General: AAO to person, place, time, cooperative, in no acute distress. Head and neck: PERRLA, EOMI. Sclera non icteric. Oropharynx:  Clear. Neck: no JVD,  no adenopathy. Heart: Regular rate and regular rhythm, no murmur. Lungs: Clear to auscultation.   Abdomen: Soft, non-tender;no masses, no organomegaly. Extremities : No edema,no cyanosis, no clubbing. Neurologic:Cranial nerves grossly intact. No focal motor or sensory deficits   Skin:  No rash. Medications  Prior to Admission medications    Medication Sig Start Date End Date Taking?  Authorizing Provider   triamterene-hydroCHLOROthiazide (MAXZIDE-25) 37.5-25 MG per tablet Take 1 tablet by mouth daily 4/26/21  Yes eDrik Senior MD   rosuvastatin (CRESTOR) 40 MG tablet TAKE ONE TABLET BY MOUTH DAILY 4/2/21  Yes Cesia Macedo MD   dexamethasone (DECADRON) 4 MG tablet TAKE 10 Tables (40 mg dose) EVERY Monday 3/29/21  Yes VICTOR MANUEL Singh CNP   prochlorperazine (COMPAZINE) 10 MG tablet Take 1 tablet by mouth every 6 hours as needed (nausea/vomiting) 3/25/21  Yes Derik Senior MD   fluticasone [facility]) 50 MCG/ACT nasal spray 1 spray by Each Nostril route daily 12/28/20  Yes Cesia Macedo MD   miconazole (MICOTIN) 2 % cream Apply topically 2 times daily. 12/28/20  Yes Joy Smith MD   VITAMIN E PO Take 1 capsule by mouth daily   Yes Historical Provider, MD   Multiple Vitamins-Minerals (THERAPEUTIC MULTIVITAMIN-MINERALS) tablet Take 1 tablet by mouth daily   Yes Historical Provider, MD   Ascorbic Acid (VITAMIN C PO) Take 1 tablet by mouth daily   Yes Historical Provider, MD   VITAMIN D PO Take 1 capsule by mouth daily   Yes Historical Provider, MD   aspirin 81 MG tablet Take 81 mg by mouth daily   Yes Historical Provider, MD   ibuprofen (ADVIL;MOTRIN) 600 MG tablet Take 1 tablet by mouth every 6 hours as needed for Pain 7/5/17  Yes Joy Smith MD   fexofenadine-pseudoephedrine (ALLEGRA-D 12 HOUR)  MG per tablet Take 1 tablet by mouth 2 times daily as needed.  2/5/13  Yes Carlos A Fagan MD   omeprazole (PRILOSEC) 20 MG delayed release capsule Take 1 capsule by mouth 2 times daily (before meals) 4/19/21 5/19/21  VICTOR MANUEL [place] - CNP    Scheduled Meds:  Continuous Infusions:  PRN Meds:.        Recent Laboratory Data-     Lab Results   Component Value Date    WBC 8.5 06/01/2021    HGB 14.4 06/01/2021    HCT 44.2 06/01/2021    MCV 96.3 06/01/2021     06/01/2021    LYMPHOPCT 10.9 (L) 06/01/2021    RBC 4.59 06/01/2021    MCH 31.4 06/01/2021    MCHC 32.6 06/01/2021    RDW 14.8 06/01/2021    NEUTOPHILPCT 79.8 06/01/2021    MONOPCT 7.2 06/01/2021    BASOPCT 0.4 06/01/2021    NEUTROABS 6.79 06/01/2021    LYMPHSABS 0.93 (L) 06/01/2021    MONOSABS 0.61 06/01/2021    EOSABS 0.08 06/01/2021    BASOSABS 0.03 06/01/2021       Lab Results   Component Value Date     06/01/2021    K 4.0 06/01/2021     06/01/2021    CO2 26 06/01/2021    BUN 22 06/01/2021    CREATININE 1.0 06/01/2021    GLUCOSE 123 (H) 06/01/2021    CALCIUM 8.5 (L) 06/01/2021    PROT 4.5 (L) 06/01/2021    LABALBU 2.5 (L) 06/01/2021    BILITOT 0.2 06/01/2021    ALKPHOS 63 06/01/2021    AST 40 (H) 06/01/2021    ALT 41 (H) 06/01/2021    LABGLOM >60 06/01/2021    GFRAA >60 06/01/2021           Radiology-  No results found. 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     His work-up will be completed to include the following:     CBC, CMP, LDH, B2 microglobulin, CRP, serum protein electrophoresis with reflex to immunofixation, serum free kappa and lambda light chains with ratio, quantitative immunoglobulins, 24-hour urine collection for quantitative immunoglobulins and immunofixation.     Serum troponin, proBNP and 2D echo will be done to exclude cardiac amyloidosis. TSH  Factor X assay as well as PT and APTT will be done     Will be scheduled for a bone marrow aspirate and biopsy     Once work-up completed options of therapy with bortezomib/daratumumab will be addressed     He is encouraged to receive the COVID-19 vaccine        3/15/2021  Awaiting his bone marrow aspirate and biopsy and his 2D echo which are both pending. His CBC was in the normal range with a hemoglobin of 15.3, normal WBC count of 6.2, normal MCV of 90 with a normal platelet count of 652. His TSH was normal at 3.88  Serum LDH was elevated at 266. B2 microglobulin was markedly elevated at 13.6. Quantitative immunoglobulins showed suppression of IgG with normal IgA and IgM. Serum electrophoresis showed an M spike faint measuring 0.1. Serum FLC's showed markedly elevated free lambda light chain at 311 with abnormal ratio of 0.05  Serum troponin was normal but proBNP was elevated at 491.   24-hour urine collection showed nephrotic range proteinuria with total proteins of 6.6 g in a 24-hour period with a faint M spike in the urine measuring 1263 mg in a 24-hour timeframe     He will be recommended induction with Daratumumab/Velcade/dexamethasone  His bone marrow aspirate and biopsy will be reviewed.     All potential side effects were discussed.        3/29/2021  He will be given acyclovir for VZV prophylaxis and Bactrim DS for PCP prophylaxis and as needed Compazine for nausea. He will be initiated today on daratumumab/Velcade/cyclophosphamide/dexamethasone regimen. All potential side effects were discussed. His baby aspirin has been held.        4/05/2021  He tolerated his treatment with daratumumab well. He reports constipation. Few days later he had some chills and body aches and it is likely related to his second dose of the Covid vaccine. No signs of peripheral neuropathy. To receive Week # 2 of daratumumab/Velcade/dexamethasone/Cytoxan     Attempt with future weeks to switch to Darzalex Faspro        4/12/2021  He will be switched to Darzalex faspro this week. He will be given as needed tramadol for pain and omeprazole 20 mg daily for heartburn. To receive week 3 of chemotherapy regimen consisting of daratumumab/Velcade/dexamethasone/Cytoxan        4/19/2021  He has been experiencing significant side effects from daratumumab 2 to 3 days later in terms of headaches myalgias arthralgias. He will be given prednisone 20 mg daily starting on 4/21/2021 and he will skip it Monday the day he gets the high-dose steroids prior to his Darzalex Faspro.     He will take as needed Tylenol or Advil and he will continue on his omeprazole daily.        4/26/2021  He has gained few pounds and has fluid retention from steroids and will be initiated on Maxide 37.5 mg daily. He will continue on prednisone except on Mondays. Received Darzalex Faspro today.        5/03/2021  He diuresed very well on Maxide and lost 6 pounds and it has been discontinued. He has had no reactions from Darzalex while maintained on prednisone  To receive week 6 of Darzalex Faspro today       5/10/2021  To receive week 7 of Darzalex Faspro . Continue Prednisone. 5/17/2021  To receive week 8 of Darzalex Faspro.   He will have a repeat myeloma panel and urinary collection after week 9.      5/24/2021  To receive week 9 of Darzalex Faspro. He will continue on Cytoxan and Velcade    He will have a repeat myeloma panel and urinary collection after week 9. He will return for follow-up in 2 weeks. 6/07/2021  To receive Week #10 of Darzalex Faspro. On Cytoxan and Velcade        Sirena Wong. Jeffery Bajwa M.D., F.A.C.P.   Electronically signed 6/7/2021 at 11:27 AM

## 2021-06-07 ENCOUNTER — OFFICE VISIT (OUTPATIENT)
Dept: ONCOLOGY | Age: 66
End: 2021-06-07
Payer: COMMERCIAL

## 2021-06-07 ENCOUNTER — HOSPITAL ENCOUNTER (OUTPATIENT)
Dept: INFUSION THERAPY | Age: 66
Discharge: HOME | End: 2021-06-07
Payer: COMMERCIAL

## 2021-06-07 VITALS
OXYGEN SATURATION: 97 % | BODY MASS INDEX: 22.89 KG/M2 | WEIGHT: 159.9 LBS | HEIGHT: 70 IN | HEART RATE: 102 BPM | DIASTOLIC BLOOD PRESSURE: 87 MMHG | SYSTOLIC BLOOD PRESSURE: 105 MMHG | TEMPERATURE: 98.2 F

## 2021-06-07 VITALS — HEART RATE: 87 BPM | DIASTOLIC BLOOD PRESSURE: 74 MMHG | SYSTOLIC BLOOD PRESSURE: 113 MMHG

## 2021-06-07 DIAGNOSIS — E85.81 LIGHT CHAIN (AL) AMYLOIDOSIS (HCC): Primary | ICD-10-CM

## 2021-06-07 PROCEDURE — 96413 CHEMO IV INFUSION 1 HR: CPT

## 2021-06-07 PROCEDURE — 96375 TX/PRO/DX INJ NEW DRUG ADDON: CPT

## 2021-06-07 PROCEDURE — 6360000002 HC RX W HCPCS: Performed by: INTERNAL MEDICINE

## 2021-06-07 PROCEDURE — 6370000000 HC RX 637 (ALT 250 FOR IP): Performed by: INTERNAL MEDICINE

## 2021-06-07 PROCEDURE — 96401 CHEMO ANTI-NEOPL SQ/IM: CPT

## 2021-06-07 PROCEDURE — 2580000003 HC RX 258: Performed by: INTERNAL MEDICINE

## 2021-06-07 RX ORDER — ONDANSETRON 2 MG/ML
8 INJECTION INTRAMUSCULAR; INTRAVENOUS ONCE
Status: CANCELLED
Start: 2021-06-07 | End: 2021-06-07

## 2021-06-07 RX ORDER — SODIUM CHLORIDE 9 MG/ML
INJECTION, SOLUTION INTRAVENOUS CONTINUOUS
Status: CANCELLED | OUTPATIENT
Start: 2021-06-07

## 2021-06-07 RX ORDER — METHYLPREDNISOLONE SODIUM SUCCINATE 125 MG/2ML
125 INJECTION, POWDER, LYOPHILIZED, FOR SOLUTION INTRAMUSCULAR; INTRAVENOUS ONCE
Status: CANCELLED | OUTPATIENT
Start: 2021-06-07 | End: 2021-06-07

## 2021-06-07 RX ORDER — SODIUM CHLORIDE 9 MG/ML
20 INJECTION, SOLUTION INTRAVENOUS ONCE
Status: COMPLETED | OUTPATIENT
Start: 2021-06-07 | End: 2021-06-07

## 2021-06-07 RX ORDER — ACETAMINOPHEN 325 MG/1
650 TABLET ORAL ONCE
Status: COMPLETED | OUTPATIENT
Start: 2021-06-07 | End: 2021-06-07

## 2021-06-07 RX ORDER — EPINEPHRINE 1 MG/ML
0.3 INJECTION, SOLUTION, CONCENTRATE INTRAVENOUS PRN
Status: CANCELLED | OUTPATIENT
Start: 2021-06-07

## 2021-06-07 RX ORDER — DIPHENHYDRAMINE HYDROCHLORIDE 50 MG/ML
50 INJECTION INTRAMUSCULAR; INTRAVENOUS ONCE
Status: CANCELLED | OUTPATIENT
Start: 2021-06-07 | End: 2021-06-07

## 2021-06-07 RX ORDER — DIPHENHYDRAMINE HYDROCHLORIDE 50 MG/ML
25 INJECTION INTRAMUSCULAR; INTRAVENOUS ONCE
Status: CANCELLED | OUTPATIENT
Start: 2021-06-07

## 2021-06-07 RX ORDER — ACETAMINOPHEN 325 MG/1
650 TABLET ORAL ONCE
Status: CANCELLED | OUTPATIENT
Start: 2021-06-07

## 2021-06-07 RX ORDER — SODIUM CHLORIDE 9 MG/ML
20 INJECTION, SOLUTION INTRAVENOUS ONCE
Status: CANCELLED | OUTPATIENT
Start: 2021-06-07 | End: 2021-06-07

## 2021-06-07 RX ORDER — HEPARIN SODIUM (PORCINE) LOCK FLUSH IV SOLN 100 UNIT/ML 100 UNIT/ML
500 SOLUTION INTRAVENOUS PRN
Status: CANCELLED | OUTPATIENT
Start: 2021-06-07

## 2021-06-07 RX ORDER — SODIUM CHLORIDE 0.9 % (FLUSH) 0.9 %
10 SYRINGE (ML) INJECTION PRN
Status: CANCELLED | OUTPATIENT
Start: 2021-06-07

## 2021-06-07 RX ORDER — SODIUM CHLORIDE 0.9 % (FLUSH) 0.9 %
5 SYRINGE (ML) INJECTION PRN
Status: CANCELLED | OUTPATIENT
Start: 2021-06-07

## 2021-06-07 RX ORDER — DIPHENHYDRAMINE HYDROCHLORIDE 50 MG/ML
25 INJECTION INTRAMUSCULAR; INTRAVENOUS ONCE
Status: COMPLETED | OUTPATIENT
Start: 2021-06-07 | End: 2021-06-07

## 2021-06-07 RX ORDER — ONDANSETRON 2 MG/ML
8 INJECTION INTRAMUSCULAR; INTRAVENOUS ONCE
Status: COMPLETED | OUTPATIENT
Start: 2021-06-07 | End: 2021-06-07

## 2021-06-07 RX ADMIN — DARATUMUMAB AND HYALURONIDASE-FIHJ (HUMAN RECOMBINANT) 15 ML: 1800; 30000 INJECTION SUBCUTANEOUS at 13:36

## 2021-06-07 RX ADMIN — BORTEZOMIB 2.75 MG: 3.5 INJECTION, POWDER, LYOPHILIZED, FOR SOLUTION INTRAVENOUS; SUBCUTANEOUS at 14:00

## 2021-06-07 RX ADMIN — SODIUM CHLORIDE 20 ML/HR: 9 INJECTION, SOLUTION INTRAVENOUS at 13:12

## 2021-06-07 RX ADMIN — ACETAMINOPHEN 650 MG: 325 TABLET ORAL at 13:14

## 2021-06-07 RX ADMIN — ONDANSETRON 8 MG: 2 INJECTION INTRAMUSCULAR; INTRAVENOUS at 13:16

## 2021-06-07 RX ADMIN — CYCLOPHOSPHAMIDE 560 MG: 200 INJECTION, SOLUTION INTRAVENOUS at 14:00

## 2021-06-07 RX ADMIN — DIPHENHYDRAMINE HYDROCHLORIDE 25 MG: 50 INJECTION, SOLUTION INTRAMUSCULAR; INTRAVENOUS at 13:20

## 2021-06-07 ASSESSMENT — PAIN SCALES - GENERAL: PAINLEVEL_OUTOF10: 0

## 2021-06-18 ENCOUNTER — HOSPITAL ENCOUNTER (OUTPATIENT)
Dept: INFUSION THERAPY | Age: 66
Discharge: HOME | End: 2021-06-18
Payer: COMMERCIAL

## 2021-06-18 DIAGNOSIS — E85.81 LIGHT CHAIN (AL) AMYLOIDOSIS (HCC): ICD-10-CM

## 2021-06-18 LAB
ALBUMIN SERPL-MCNC: 2.4 G/DL (ref 3.5–5.2)
ALP BLD-CCNC: 75 U/L (ref 40–129)
ALT SERPL-CCNC: 34 U/L (ref 0–40)
ANION GAP SERPL CALCULATED.3IONS-SCNC: 8 MMOL/L (ref 7–16)
AST SERPL-CCNC: 31 U/L (ref 0–39)
BASOPHILS ABSOLUTE: 0.02 E9/L (ref 0–0.2)
BASOPHILS RELATIVE PERCENT: 0.2 % (ref 0–2)
BILIRUB SERPL-MCNC: <0.2 MG/DL (ref 0–1.2)
BUN BLDV-MCNC: 14 MG/DL (ref 6–23)
CALCIUM SERPL-MCNC: 8.4 MG/DL (ref 8.6–10.2)
CHLORIDE BLD-SCNC: 107 MMOL/L (ref 98–107)
CO2: 23 MMOL/L (ref 22–29)
CREAT SERPL-MCNC: 0.9 MG/DL (ref 0.7–1.2)
EOSINOPHILS ABSOLUTE: 0.05 E9/L (ref 0.05–0.5)
EOSINOPHILS RELATIVE PERCENT: 0.6 % (ref 0–6)
GFR AFRICAN AMERICAN: >60
GFR NON-AFRICAN AMERICAN: >60 ML/MIN/1.73
GLUCOSE BLD-MCNC: 111 MG/DL (ref 74–99)
HCT VFR BLD CALC: 42.9 % (ref 37–54)
HEMOGLOBIN: 14.5 G/DL (ref 12.5–16.5)
IMMATURE GRANULOCYTES #: 0.04 E9/L
IMMATURE GRANULOCYTES %: 0.5 % (ref 0–5)
LYMPHOCYTES ABSOLUTE: 1.08 E9/L (ref 1.5–4)
LYMPHOCYTES RELATIVE PERCENT: 12.2 % (ref 20–42)
MCH RBC QN AUTO: 31.7 PG (ref 26–35)
MCHC RBC AUTO-ENTMCNC: 33.8 % (ref 32–34.5)
MCV RBC AUTO: 93.7 FL (ref 80–99.9)
MONOCYTES ABSOLUTE: 0.64 E9/L (ref 0.1–0.95)
MONOCYTES RELATIVE PERCENT: 7.2 % (ref 2–12)
NEUTROPHILS ABSOLUTE: 7.03 E9/L (ref 1.8–7.3)
NEUTROPHILS RELATIVE PERCENT: 79.3 % (ref 43–80)
PDW BLD-RTO: 14.5 FL (ref 11.5–15)
PLATELET # BLD: 298 E9/L (ref 130–450)
PMV BLD AUTO: 9.4 FL (ref 7–12)
POTASSIUM SERPL-SCNC: 4 MMOL/L (ref 3.5–5)
RBC # BLD: 4.58 E12/L (ref 3.8–5.8)
SODIUM BLD-SCNC: 138 MMOL/L (ref 132–146)
TOTAL PROTEIN: 4.8 G/DL (ref 6.4–8.3)
WBC # BLD: 8.9 E9/L (ref 4.5–11.5)

## 2021-06-18 PROCEDURE — 36415 COLL VENOUS BLD VENIPUNCTURE: CPT

## 2021-06-18 PROCEDURE — 80053 COMPREHEN METABOLIC PANEL: CPT

## 2021-06-18 PROCEDURE — 85025 COMPLETE CBC W/AUTO DIFF WBC: CPT

## 2021-06-18 NOTE — PROGRESS NOTES
ONCOLOGY  NP VISIT         6/18/2021      NAME:  Brittany Novak    YOB: 1955      ALLERGIES:  Codeine, Lipitor, and Vytorin [ezetimibe-simvastatin]         Current Outpatient Medications   Medication Sig Dispense Refill    triamterene-hydroCHLOROthiazide (MAXZIDE-25) 37.5-25 MG per tablet Take 1 tablet by mouth daily 30 tablet 0    omeprazole (PRILOSEC) 20 MG delayed release capsule Take 1 capsule by mouth 2 times daily (before meals) 60 capsule 0    rosuvastatin (CRESTOR) 40 MG tablet TAKE ONE TABLET BY MOUTH DAILY 90 tablet 3    dexamethasone (DECADRON) 4 MG tablet TAKE 10 Tables (40 mg dose) EVERY Monday 40 tablet 2    prochlorperazine (COMPAZINE) 10 MG tablet Take 1 tablet by mouth every 6 hours as needed (nausea/vomiting) 40 tablet 3    fluticasone (FLONASE) 50 MCG/ACT nasal spray 1 spray by Each Nostril route daily 2 Bottle 1    miconazole (MICOTIN) 2 % cream Apply topically 2 times daily. 28 g 1    VITAMIN E PO Take 1 capsule by mouth daily      Multiple Vitamins-Minerals (THERAPEUTIC MULTIVITAMIN-MINERALS) tablet Take 1 tablet by mouth daily      Ascorbic Acid (VITAMIN C PO) Take 1 tablet by mouth daily      VITAMIN D PO Take 1 capsule by mouth daily      aspirin 81 MG tablet Take 81 mg by mouth daily      ibuprofen (ADVIL;MOTRIN) 600 MG tablet Take 1 tablet by mouth every 6 hours as needed for Pain 60 tablet 1    fexofenadine-pseudoephedrine (ALLEGRA-D 12 HOUR)  MG per tablet Take 1 tablet by mouth 2 times daily as needed. 60 tablet 2     No current facility-administered medications for this visit. SUBJECTIVE:   Here for chemotherapy and follow up. Feels well today. No c/o's.       OBJECTIVE:   /75   Pulse 100   Temp 97.7 °F (36.5 °C)   Resp 18   Ht 5' 10\" (1.778 m)   Wt 161 lb 6.4 oz (73.2 kg)   SpO2 97%   BMI 23.16 kg/m²   Physical Examination:  Performance Status: 0  Well developed, well nourished male  General: AAO to person, place, time, cooperative, in no acute distress. Head and neck: PERRLA, EOMI. Sclera non icteric. Oropharynx:  Clear. Neck: no JVD,  no adenopathy. Heart: Regular rate and regular rhythm, no murmur. Lungs: Clear to auscultation.   Abdomen: Soft, non-tender;no masses, no organomegaly. Extremities : No edema,no cyanosis, no clubbing. Neurologic:Cranial nerves grossly intact. No focal motor or sensory deficits   Skin:  No rash.         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     His work-up will be completed to include the following:     CBC, CMP, LDH, B2 microglobulin, CRP, serum protein electrophoresis with reflex to immunofixation, serum free kappa and lambda light chains with ratio, quantitative immunoglobulins, 24-hour urine collection for quantitative immunoglobulins and immunofixation.     Serum troponin, proBNP and 2D echo will be done to exclude cardiac amyloidosis.   TSH  Factor X assay as well as PT and APTT will be done     Will be scheduled for a bone marrow aspirate and biopsy     Once work-up completed options of therapy with bortezomib/daratumumab will be addressed     He is encouraged to receive the COVID-19 vaccine        3/15/2021  Awaiting his bone marrow aspirate and biopsy and his 2D echo which are both pending. His CBC was in the normal range with a hemoglobin of 15.3, normal WBC count of 6.2, normal MCV of 90 with a normal platelet count of 086.  His TSH was normal at 3.88  Serum LDH was elevated at 266. B2 microglobulin was markedly elevated at 13.6. Quantitative immunoglobulins showed suppression of IgG with normal IgA and IgM. Serum electrophoresis showed an M spike faint measuring 0.1. Serum FLC's showed markedly elevated free lambda light chain at 311 with abnormal ratio of 0.05  Serum troponin was normal but proBNP was elevated at 491. 24-hour urine collection showed nephrotic range proteinuria with total proteins of 6.6 g in a 24-hour period with a faint M spike in the urine measuring 1263 mg in a 24-hour timeframe     He will be recommended induction with Daratumumab/Velcade/dexamethasone  His bone marrow aspirate and biopsy will be reviewed.     All potential side effects were discussed.        3/29/2021  He will be given acyclovir for VZV prophylaxis and Bactrim DS for PCP prophylaxis and as needed Compazine for nausea. Jeri Strickland will be initiated today on daratumumab/Velcade/cyclophosphamide/dexamethasone regimen.  All potential side effects were discussed.  His baby aspirin has been held.        4/05/2021  He tolerated his treatment with daratumumab well.  He reports constipation.  Few days later he had some chills and body aches and it is likely related to his second dose of the Covid vaccine. No signs of peripheral neuropathy. To receive Week # 2 of daratumumab/Velcade/dexamethasone/Cytoxan     Attempt with future weeks to switch to Darzalex Faspro        4/12/2021  He will be switched to Darzalex faspro this week. He will be given as needed tramadol for pain and omeprazole 20 mg daily for heartburn.   To receive week 3 of chemotherapy regimen consisting of daratumumab/Velcade/dexamethasone/Cytoxan        4/19/2021  He has been experiencing significant side effects from daratumumab 2 to 3 days later in terms of headaches myalgias arthralgias. He will be given prednisone 20 mg daily starting on 4/21/2021 and he will skip it Monday the day he gets the high-dose steroids prior to his Darzalex Faspro.     He will take as needed Tylenol or Advil and he will continue on his omeprazole daily.        4/26/2021  He has gained few pounds and has fluid retention from steroids and will be initiated on Maxide 37.5 mg daily. He will continue on prednisone except on Mondays. Received Darzalex Faspro today.        5/03/2021  He diuresed very well on Maxide and lost 6 pounds and it has been discontinued. He has had no reactions from Darzalex while maintained on prednisone  To receive week 6 of Darzalex Faspro today        5/10/2021  To receive week 7 of Darzalex Faspro . Continue Prednisone.        5/17/2021  To receive week 8 of Darzalex Faspro. He will have a repeat myeloma panel and urinary collection after week 9.        5/24/2021  To receive week 9 of Darzalex Faspro. He will continue on Cytoxan and Velcade     He will have a repeat myeloma panel and urinary collection after week 9. He will return for follow-up in 2 weeks.        6/07/2021  To receive Week #10 of Darzalex/Faspro. and Velcade   On Cytoxan        6/21/2021  To receive Week #11 of Darzalex/Faspro. and Velcade   On Cytoxan   Lab work reviewed, okay to proceed with chemo treatment. To return 7/7/2021 for  Week #12 of Darzalex/Faspro. and Velcade   On Cytoxan   Labs on 7/6/2021      Andreina Quigley, [address]:  [phone]

## 2021-06-21 ENCOUNTER — OFFICE VISIT (OUTPATIENT)
Dept: ONCOLOGY | Age: 66
End: 2021-06-21
Payer: COMMERCIAL

## 2021-06-21 ENCOUNTER — HOSPITAL ENCOUNTER (OUTPATIENT)
Dept: INFUSION THERAPY | Age: 66
Discharge: HOME | End: 2021-06-21
Payer: COMMERCIAL

## 2021-06-21 VITALS
DIASTOLIC BLOOD PRESSURE: 75 MMHG | TEMPERATURE: 97.7 F | BODY MASS INDEX: 23.11 KG/M2 | HEIGHT: 70 IN | HEART RATE: 100 BPM | WEIGHT: 161.4 LBS | RESPIRATION RATE: 18 BRPM | OXYGEN SATURATION: 97 % | SYSTOLIC BLOOD PRESSURE: 106 MMHG

## 2021-06-21 VITALS — HEART RATE: 79 BPM | RESPIRATION RATE: 18 BRPM | DIASTOLIC BLOOD PRESSURE: 70 MMHG | SYSTOLIC BLOOD PRESSURE: 99 MMHG

## 2021-06-21 DIAGNOSIS — E85.81 LIGHT CHAIN (AL) AMYLOIDOSIS (HCC): Primary | ICD-10-CM

## 2021-06-21 PROCEDURE — 96375 TX/PRO/DX INJ NEW DRUG ADDON: CPT

## 2021-06-21 PROCEDURE — 2580000003 HC RX 258: Performed by: NURSE PRACTITIONER

## 2021-06-21 PROCEDURE — 6360000002 HC RX W HCPCS: Performed by: NURSE PRACTITIONER

## 2021-06-21 PROCEDURE — 96401 CHEMO ANTI-NEOPL SQ/IM: CPT

## 2021-06-21 PROCEDURE — 96413 CHEMO IV INFUSION 1 HR: CPT

## 2021-06-21 PROCEDURE — 6370000000 HC RX 637 (ALT 250 FOR IP): Performed by: NURSE PRACTITIONER

## 2021-06-21 RX ORDER — ACETAMINOPHEN 325 MG/1
650 TABLET ORAL ONCE
Status: CANCELLED | OUTPATIENT
Start: 2021-06-21

## 2021-06-21 RX ORDER — METHYLPREDNISOLONE SODIUM SUCCINATE 125 MG/2ML
125 INJECTION, POWDER, LYOPHILIZED, FOR SOLUTION INTRAMUSCULAR; INTRAVENOUS ONCE
Status: CANCELLED | OUTPATIENT
Start: 2021-06-21 | End: 2021-06-21

## 2021-06-21 RX ORDER — SODIUM CHLORIDE 9 MG/ML
20 INJECTION, SOLUTION INTRAVENOUS ONCE
Status: CANCELLED | OUTPATIENT
Start: 2021-06-21 | End: 2021-06-21

## 2021-06-21 RX ORDER — ONDANSETRON 2 MG/ML
8 INJECTION INTRAMUSCULAR; INTRAVENOUS ONCE
Status: CANCELLED
Start: 2021-06-21 | End: 2021-06-21

## 2021-06-21 RX ORDER — ACETAMINOPHEN 325 MG/1
650 TABLET ORAL ONCE
Status: COMPLETED | OUTPATIENT
Start: 2021-06-21 | End: 2021-06-21

## 2021-06-21 RX ORDER — SODIUM CHLORIDE 0.9 % (FLUSH) 0.9 %
10 SYRINGE (ML) INJECTION PRN
Status: CANCELLED | OUTPATIENT
Start: 2021-06-21

## 2021-06-21 RX ORDER — DIPHENHYDRAMINE HYDROCHLORIDE 50 MG/ML
25 INJECTION INTRAMUSCULAR; INTRAVENOUS ONCE
Status: COMPLETED | OUTPATIENT
Start: 2021-06-21 | End: 2021-06-21

## 2021-06-21 RX ORDER — EPINEPHRINE 1 MG/ML
0.3 INJECTION, SOLUTION, CONCENTRATE INTRAVENOUS PRN
Status: CANCELLED | OUTPATIENT
Start: 2021-06-21

## 2021-06-21 RX ORDER — DIPHENHYDRAMINE HYDROCHLORIDE 50 MG/ML
25 INJECTION INTRAMUSCULAR; INTRAVENOUS ONCE
Status: CANCELLED | OUTPATIENT
Start: 2021-06-21

## 2021-06-21 RX ORDER — DIPHENHYDRAMINE HYDROCHLORIDE 50 MG/ML
50 INJECTION INTRAMUSCULAR; INTRAVENOUS ONCE
Status: CANCELLED | OUTPATIENT
Start: 2021-06-21 | End: 2021-06-21

## 2021-06-21 RX ORDER — HEPARIN SODIUM (PORCINE) LOCK FLUSH IV SOLN 100 UNIT/ML 100 UNIT/ML
500 SOLUTION INTRAVENOUS PRN
Status: CANCELLED | OUTPATIENT
Start: 2021-06-21

## 2021-06-21 RX ORDER — SODIUM CHLORIDE 9 MG/ML
INJECTION, SOLUTION INTRAVENOUS CONTINUOUS
Status: CANCELLED | OUTPATIENT
Start: 2021-06-21

## 2021-06-21 RX ORDER — OMEPRAZOLE 20 MG/1
20 CAPSULE, DELAYED RELEASE ORAL
Qty: 60 CAPSULE | Refills: 0 | Status: SHIPPED
Start: 2021-06-21 | End: 2021-08-10 | Stop reason: SDUPTHER

## 2021-06-21 RX ORDER — SODIUM CHLORIDE 0.9 % (FLUSH) 0.9 %
5 SYRINGE (ML) INJECTION PRN
Status: CANCELLED | OUTPATIENT
Start: 2021-06-21

## 2021-06-21 RX ORDER — SODIUM CHLORIDE 9 MG/ML
20 INJECTION, SOLUTION INTRAVENOUS ONCE
Status: COMPLETED | OUTPATIENT
Start: 2021-06-21 | End: 2021-06-21

## 2021-06-21 RX ORDER — PREDNISONE 1 MG/1
20 TABLET ORAL DAILY
COMMUNITY
End: 2022-04-21 | Stop reason: ALTCHOICE

## 2021-06-21 RX ORDER — ACYCLOVIR 400 MG/1
400 TABLET ORAL 2 TIMES DAILY
Qty: 180 TABLET | Refills: 0 | Status: SHIPPED
Start: 2021-06-21 | End: 2021-09-29 | Stop reason: SDUPTHER

## 2021-06-21 RX ORDER — ONDANSETRON 2 MG/ML
8 INJECTION INTRAMUSCULAR; INTRAVENOUS ONCE
Status: COMPLETED | OUTPATIENT
Start: 2021-06-21 | End: 2021-06-21

## 2021-06-21 RX ORDER — PREDNISONE 20 MG/1
20 TABLET ORAL DAILY
Qty: 30 TABLET | Refills: 0 | Status: SHIPPED | OUTPATIENT
Start: 2021-06-21 | End: 2021-07-21

## 2021-06-21 RX ADMIN — ACETAMINOPHEN 650 MG: 325 TABLET ORAL at 11:12

## 2021-06-21 RX ADMIN — DIPHENHYDRAMINE HYDROCHLORIDE 25 MG: 50 INJECTION, SOLUTION INTRAMUSCULAR; INTRAVENOUS at 11:12

## 2021-06-21 RX ADMIN — CYCLOPHOSPHAMIDE 560 MG: 200 INJECTION, SOLUTION INTRAVENOUS at 11:50

## 2021-06-21 RX ADMIN — BORTEZOMIB 2.75 MG: 3.5 INJECTION, POWDER, LYOPHILIZED, FOR SOLUTION INTRAVENOUS; SUBCUTANEOUS at 11:43

## 2021-06-21 RX ADMIN — ONDANSETRON 8 MG: 2 INJECTION INTRAMUSCULAR; INTRAVENOUS at 11:12

## 2021-06-21 RX ADMIN — DARATUMUMAB AND HYALURONIDASE-FIHJ (HUMAN RECOMBINANT) 15 ML: 1800; 30000 INJECTION SUBCUTANEOUS at 11:45

## 2021-06-21 RX ADMIN — SODIUM CHLORIDE 20 ML/HR: 9 INJECTION, SOLUTION INTRAVENOUS at 11:10

## 2021-06-21 ASSESSMENT — PAIN SCALES - GENERAL: PAINLEVEL_OUTOF10: 0

## 2021-07-06 ENCOUNTER — HOSPITAL ENCOUNTER (OUTPATIENT)
Dept: INFUSION THERAPY | Age: 66
Discharge: HOME | End: 2021-07-06
Payer: COMMERCIAL

## 2021-07-06 DIAGNOSIS — E85.81 LIGHT CHAIN (AL) AMYLOIDOSIS (HCC): ICD-10-CM

## 2021-07-06 LAB
ALBUMIN SERPL-MCNC: 2.5 G/DL (ref 3.5–5.2)
ALP BLD-CCNC: 65 U/L (ref 40–129)
ALT SERPL-CCNC: 31 U/L (ref 0–40)
ANION GAP SERPL CALCULATED.3IONS-SCNC: 8 MMOL/L (ref 7–16)
AST SERPL-CCNC: 31 U/L (ref 0–39)
BASOPHILS ABSOLUTE: 0.03 E9/L (ref 0–0.2)
BASOPHILS RELATIVE PERCENT: 0.3 % (ref 0–2)
BILIRUB SERPL-MCNC: 0.2 MG/DL (ref 0–1.2)
BUN BLDV-MCNC: 18 MG/DL (ref 6–23)
CALCIUM SERPL-MCNC: 8.3 MG/DL (ref 8.6–10.2)
CHLORIDE BLD-SCNC: 106 MMOL/L (ref 98–107)
CO2: 24 MMOL/L (ref 22–29)
CREAT SERPL-MCNC: 1 MG/DL (ref 0.7–1.2)
EOSINOPHILS ABSOLUTE: 0.11 E9/L (ref 0.05–0.5)
EOSINOPHILS RELATIVE PERCENT: 1.2 % (ref 0–6)
GFR AFRICAN AMERICAN: >60
GFR NON-AFRICAN AMERICAN: >60 ML/MIN/1.73
GLUCOSE BLD-MCNC: 141 MG/DL (ref 74–99)
HCT VFR BLD CALC: 43.8 % (ref 37–54)
HEMOGLOBIN: 14.3 G/DL (ref 12.5–16.5)
IMMATURE GRANULOCYTES #: 0.04 E9/L
IMMATURE GRANULOCYTES %: 0.4 % (ref 0–5)
LYMPHOCYTES ABSOLUTE: 1.67 E9/L (ref 1.5–4)
LYMPHOCYTES RELATIVE PERCENT: 18.4 % (ref 20–42)
MCH RBC QN AUTO: 31.6 PG (ref 26–35)
MCHC RBC AUTO-ENTMCNC: 32.6 % (ref 32–34.5)
MCV RBC AUTO: 96.9 FL (ref 80–99.9)
MONOCYTES ABSOLUTE: 0.77 E9/L (ref 0.1–0.95)
MONOCYTES RELATIVE PERCENT: 8.5 % (ref 2–12)
NEUTROPHILS ABSOLUTE: 6.46 E9/L (ref 1.8–7.3)
NEUTROPHILS RELATIVE PERCENT: 71.2 % (ref 43–80)
PDW BLD-RTO: 14.6 FL (ref 11.5–15)
PLATELET # BLD: 200 E9/L (ref 130–450)
PMV BLD AUTO: 9.7 FL (ref 7–12)
POTASSIUM SERPL-SCNC: 3.8 MMOL/L (ref 3.5–5)
RBC # BLD: 4.52 E12/L (ref 3.8–5.8)
SODIUM BLD-SCNC: 138 MMOL/L (ref 132–146)
TOTAL PROTEIN: 4.6 G/DL (ref 6.4–8.3)
WBC # BLD: 9.1 E9/L (ref 4.5–11.5)

## 2021-07-06 PROCEDURE — 36415 COLL VENOUS BLD VENIPUNCTURE: CPT

## 2021-07-06 PROCEDURE — 80053 COMPREHEN METABOLIC PANEL: CPT

## 2021-07-06 PROCEDURE — 85025 COMPLETE CBC W/AUTO DIFF WBC: CPT

## 2021-07-07 ENCOUNTER — OFFICE VISIT (OUTPATIENT)
Dept: ONCOLOGY | Age: 66
End: 2021-07-07
Payer: COMMERCIAL

## 2021-07-07 ENCOUNTER — HOSPITAL ENCOUNTER (OUTPATIENT)
Dept: INFUSION THERAPY | Age: 66
Discharge: HOME | End: 2021-07-07
Payer: COMMERCIAL

## 2021-07-07 VITALS — HEART RATE: 83 BPM | RESPIRATION RATE: 16 BRPM | DIASTOLIC BLOOD PRESSURE: 81 MMHG | SYSTOLIC BLOOD PRESSURE: 115 MMHG

## 2021-07-07 VITALS
SYSTOLIC BLOOD PRESSURE: 107 MMHG | HEART RATE: 99 BPM | HEIGHT: 70 IN | BODY MASS INDEX: 23.02 KG/M2 | DIASTOLIC BLOOD PRESSURE: 68 MMHG | WEIGHT: 160.8 LBS | TEMPERATURE: 97.7 F | OXYGEN SATURATION: 95 %

## 2021-07-07 DIAGNOSIS — E85.81 LIGHT CHAIN (AL) AMYLOIDOSIS (HCC): Primary | ICD-10-CM

## 2021-07-07 PROCEDURE — 96401 CHEMO ANTI-NEOPL SQ/IM: CPT

## 2021-07-07 PROCEDURE — 2580000003 HC RX 258: Performed by: INTERNAL MEDICINE

## 2021-07-07 PROCEDURE — 6370000000 HC RX 637 (ALT 250 FOR IP): Performed by: INTERNAL MEDICINE

## 2021-07-07 PROCEDURE — 96413 CHEMO IV INFUSION 1 HR: CPT

## 2021-07-07 PROCEDURE — 6360000002 HC RX W HCPCS: Performed by: INTERNAL MEDICINE

## 2021-07-07 PROCEDURE — 96375 TX/PRO/DX INJ NEW DRUG ADDON: CPT

## 2021-07-07 PROCEDURE — 96409 CHEMO IV PUSH SNGL DRUG: CPT

## 2021-07-07 RX ORDER — TRIAMTERENE AND HYDROCHLOROTHIAZIDE 37.5; 25 MG/1; MG/1
1 TABLET ORAL DAILY
Qty: 30 TABLET | Refills: 0 | Status: SHIPPED
Start: 2021-07-07 | End: 2022-04-21

## 2021-07-07 RX ORDER — PALONOSETRON HYDROCHLORIDE 0.05 MG/ML
0.25 INJECTION, SOLUTION INTRAVENOUS ONCE
Status: COMPLETED | OUTPATIENT
Start: 2021-07-07 | End: 2021-07-07

## 2021-07-07 RX ORDER — DIPHENHYDRAMINE HYDROCHLORIDE 50 MG/ML
50 INJECTION INTRAMUSCULAR; INTRAVENOUS ONCE
Status: CANCELLED | OUTPATIENT
Start: 2021-07-07 | End: 2021-07-07

## 2021-07-07 RX ORDER — EPINEPHRINE 1 MG/ML
0.3 INJECTION, SOLUTION, CONCENTRATE INTRAVENOUS PRN
Status: CANCELLED | OUTPATIENT
Start: 2021-07-07

## 2021-07-07 RX ORDER — SODIUM CHLORIDE 9 MG/ML
20 INJECTION, SOLUTION INTRAVENOUS ONCE
Status: CANCELLED | OUTPATIENT
Start: 2021-07-07 | End: 2021-07-07

## 2021-07-07 RX ORDER — ONDANSETRON 2 MG/ML
8 INJECTION INTRAMUSCULAR; INTRAVENOUS ONCE
Status: CANCELLED
Start: 2021-07-07 | End: 2021-07-07

## 2021-07-07 RX ORDER — SODIUM CHLORIDE 0.9 % (FLUSH) 0.9 %
10 SYRINGE (ML) INJECTION PRN
Status: DISCONTINUED | OUTPATIENT
Start: 2021-07-07 | End: 2021-07-08 | Stop reason: HOSPADM

## 2021-07-07 RX ORDER — HEPARIN SODIUM (PORCINE) LOCK FLUSH IV SOLN 100 UNIT/ML 100 UNIT/ML
500 SOLUTION INTRAVENOUS PRN
Status: CANCELLED | OUTPATIENT
Start: 2021-07-07

## 2021-07-07 RX ORDER — DEXAMETHASONE 4 MG/1
TABLET ORAL
Qty: 40 TABLET | Refills: 2 | Status: SHIPPED
Start: 2021-07-07 | End: 2022-04-21 | Stop reason: ALTCHOICE

## 2021-07-07 RX ORDER — SODIUM CHLORIDE 0.9 % (FLUSH) 0.9 %
5 SYRINGE (ML) INJECTION PRN
Status: CANCELLED | OUTPATIENT
Start: 2021-07-07

## 2021-07-07 RX ORDER — METHYLPREDNISOLONE SODIUM SUCCINATE 125 MG/2ML
125 INJECTION, POWDER, LYOPHILIZED, FOR SOLUTION INTRAMUSCULAR; INTRAVENOUS ONCE
Status: CANCELLED | OUTPATIENT
Start: 2021-07-07 | End: 2021-07-07

## 2021-07-07 RX ORDER — ACETAMINOPHEN 325 MG/1
650 TABLET ORAL ONCE
Status: COMPLETED | OUTPATIENT
Start: 2021-07-07 | End: 2021-07-07

## 2021-07-07 RX ORDER — SODIUM CHLORIDE 9 MG/ML
INJECTION, SOLUTION INTRAVENOUS CONTINUOUS
Status: CANCELLED | OUTPATIENT
Start: 2021-07-07

## 2021-07-07 RX ORDER — SULFAMETHOXAZOLE AND TRIMETHOPRIM 800; 160 MG/1; MG/1
1 TABLET ORAL
Qty: 30 TABLET | Refills: 2 | Status: SHIPPED | OUTPATIENT
Start: 2021-07-07 | End: 2021-08-06

## 2021-07-07 RX ORDER — DIPHENHYDRAMINE HYDROCHLORIDE 50 MG/ML
25 INJECTION INTRAMUSCULAR; INTRAVENOUS ONCE
Status: COMPLETED | OUTPATIENT
Start: 2021-07-07 | End: 2021-07-07

## 2021-07-07 RX ORDER — SODIUM CHLORIDE 9 MG/ML
20 INJECTION, SOLUTION INTRAVENOUS ONCE
Status: COMPLETED | OUTPATIENT
Start: 2021-07-07 | End: 2021-07-07

## 2021-07-07 RX ORDER — ONDANSETRON 2 MG/ML
8 INJECTION INTRAMUSCULAR; INTRAVENOUS ONCE
Status: COMPLETED | OUTPATIENT
Start: 2021-07-07 | End: 2021-07-07

## 2021-07-07 RX ORDER — SODIUM CHLORIDE 0.9 % (FLUSH) 0.9 %
10 SYRINGE (ML) INJECTION PRN
Status: CANCELLED | OUTPATIENT
Start: 2021-07-07

## 2021-07-07 RX ORDER — DIPHENHYDRAMINE HYDROCHLORIDE 50 MG/ML
25 INJECTION INTRAMUSCULAR; INTRAVENOUS ONCE
Status: CANCELLED | OUTPATIENT
Start: 2021-07-07

## 2021-07-07 RX ORDER — PALONOSETRON HYDROCHLORIDE 0.05 MG/ML
0.25 INJECTION, SOLUTION INTRAVENOUS ONCE
Status: CANCELLED
Start: 2021-07-07 | End: 2021-07-07

## 2021-07-07 RX ORDER — ACETAMINOPHEN 325 MG/1
650 TABLET ORAL ONCE
Status: CANCELLED | OUTPATIENT
Start: 2021-07-07

## 2021-07-07 RX ADMIN — BORTEZOMIB 2.75 MG: 3.5 INJECTION, POWDER, LYOPHILIZED, FOR SOLUTION INTRAVENOUS; SUBCUTANEOUS at 12:08

## 2021-07-07 RX ADMIN — DIPHENHYDRAMINE HYDROCHLORIDE 25 MG: 50 INJECTION INTRAMUSCULAR; INTRAVENOUS at 11:20

## 2021-07-07 RX ADMIN — ACETAMINOPHEN 650 MG: 325 TABLET ORAL at 11:20

## 2021-07-07 RX ADMIN — PALONOSETRON 0.25 MG: 0.25 INJECTION, SOLUTION INTRAVENOUS at 11:15

## 2021-07-07 RX ADMIN — CYCLOPHOSPHAMIDE 560 MG: 200 INJECTION, SOLUTION INTRAVENOUS at 12:09

## 2021-07-07 RX ADMIN — SODIUM CHLORIDE 20 ML/HR: 9 INJECTION, SOLUTION INTRAVENOUS at 11:16

## 2021-07-07 RX ADMIN — DARATUMUMAB AND HYALURONIDASE-FIHJ (HUMAN RECOMBINANT) 15 ML: 1800; 30000 INJECTION SUBCUTANEOUS at 11:59

## 2021-07-07 RX ADMIN — ONDANSETRON 8 MG: 2 INJECTION INTRAMUSCULAR; INTRAVENOUS at 11:21

## 2021-07-07 ASSESSMENT — PAIN SCALES - GENERAL: PAINLEVEL_OUTOF10: 0

## 2021-07-07 NOTE — PROGRESS NOTES
Sitting, Cuff Size: Medium Adult)   Pulse 99   Temp 97.7 °F (36.5 °C) (Temporal)   Ht 5' 10\" (1.778 m)   Wt 160 lb 12.8 oz (72.9 kg)   SpO2 95%   BMI 23.07 kg/m²   Physical Examination:  Performance Status: 0  Well developed, well nourished male  General: AAO to person, place, time, cooperative, in no acute distress. Head and neck: PERRLA, EOMI. Sclera non icteric. Oropharynx:  Clear. Neck: no JVD,  no adenopathy. Heart: Regular rate and regular rhythm, no murmur. Lungs: Clear to auscultation.   Abdomen: Soft, non-tender;no masses, no organomegaly. Extremities : No edema,no cyanosis, no clubbing. Neurologic:Cranial nerves grossly intact. No focal motor or sensory deficits   Skin:  No rash.         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     His work-up will be completed to include the following:     CBC, CMP, LDH, B2 microglobulin, CRP, serum protein electrophoresis with reflex to immunofixation, serum free kappa and lambda light chains with ratio, quantitative immunoglobulins, 24-hour urine collection for quantitative immunoglobulins and immunofixation.     Serum troponin, proBNP and 2D echo will be done to exclude cardiac amyloidosis.   TSH  Factor X assay as well as PT and APTT will be done     Will be scheduled for a bone marrow aspirate and biopsy     Once work-up completed options of therapy with bortezomib/daratumumab will be addressed     He is encouraged to receive the COVID-19 vaccine        3/15/2021  Awaiting his bone marrow aspirate and biopsy and his 2D echo which are both pending. His CBC was in the normal range with a hemoglobin of 15.3, normal WBC count of 6.2, normal MCV of 90 with a normal platelet count of 133.  His TSH was normal at 3.88  Serum LDH was elevated at 266. B2 microglobulin was markedly elevated at 13.6. Quantitative immunoglobulins showed suppression of IgG with normal IgA and IgM. Serum electrophoresis showed an M spike faint measuring 0.1. Serum FLC's showed markedly elevated free lambda light chain at 311 with abnormal ratio of 0.05  Serum troponin was normal but proBNP was elevated at 491. 24-hour urine collection showed nephrotic range proteinuria with total proteins of 6.6 g in a 24-hour period with a faint M spike in the urine measuring 1263 mg in a 24-hour timeframe     He will be recommended induction with Daratumumab/Velcade/dexamethasone  His bone marrow aspirate and biopsy will be reviewed.     All potential side effects were discussed.        3/29/2021  He will be given acyclovir for VZV prophylaxis and Bactrim DS for PCP prophylaxis and as needed Compazine for nausea. David Salgado will be initiated today on daratumumab/Velcade/cyclophosphamide/dexamethasone regimen.  All potential side effects were discussed.  His baby aspirin has been held.        4/05/2021  He tolerated his treatment with daratumumab well.  He reports constipation.  Few days later he had some chills and body aches and it is likely related to his second dose of the Covid vaccine. No signs of peripheral neuropathy. To receive Week # 2 of daratumumab/Velcade/dexamethasone/Cytoxan     Attempt with future weeks to switch to Darzalex Faspro        4/12/2021  He will be switched to Darzalex faspro this week.   He will be microglobulin is down in the normal range. Quantitative immunoglobulins still show low IgA, low IgG and low IgM. His 24-hour urine collection showed still nephrotic range proteinuria with 3.8 g of proteins in a 24-hour collection  Immunofixation showed faint free monoclonal lambda light chains. His last SPEP from 4/26 had shown an M spike of 0.1 but results from June not available and will be repeated  Back in March his 24-hour UPEP showed 6.6 g of protein in 24 hours.     He has achieved an excellent clinical response  To continue on present regimen  He will now go on biweekly Gergoria

## 2021-07-12 NOTE — PROGRESS NOTES
CLINICAL PHARMACY NOTE: MEDS TO BEDS    Total # of Prescriptions Filled: 3   The following medications were delivered to the patient:  · Dexamethasone 4 mg  · Triamterene hctz 37.5-25 mg  · Bactrim ds    Additional Documentation:

## 2021-07-16 ENCOUNTER — HOSPITAL ENCOUNTER (OUTPATIENT)
Dept: INFUSION THERAPY | Age: 66
Discharge: HOME | End: 2021-07-16
Payer: COMMERCIAL

## 2021-07-16 DIAGNOSIS — E85.81 LIGHT CHAIN (AL) AMYLOIDOSIS (HCC): ICD-10-CM

## 2021-07-16 LAB
ALBUMIN SERPL-MCNC: 2.4 G/DL (ref 3.5–5.2)
ALP BLD-CCNC: 71 U/L (ref 40–129)
ALT SERPL-CCNC: 41 U/L (ref 0–40)
ANION GAP SERPL CALCULATED.3IONS-SCNC: 10 MMOL/L (ref 7–16)
AST SERPL-CCNC: 57 U/L (ref 0–39)
BASOPHILS ABSOLUTE: 0.03 E9/L (ref 0–0.2)
BASOPHILS RELATIVE PERCENT: 0.3 % (ref 0–2)
BILIRUB SERPL-MCNC: 0.2 MG/DL (ref 0–1.2)
BUN BLDV-MCNC: 16 MG/DL (ref 6–23)
CALCIUM SERPL-MCNC: 8.2 MG/DL (ref 8.6–10.2)
CHLORIDE BLD-SCNC: 105 MMOL/L (ref 98–107)
CO2: 25 MMOL/L (ref 22–29)
CREAT SERPL-MCNC: 1.1 MG/DL (ref 0.7–1.2)
EOSINOPHILS ABSOLUTE: 0.11 E9/L (ref 0.05–0.5)
EOSINOPHILS RELATIVE PERCENT: 1.2 % (ref 0–6)
GFR AFRICAN AMERICAN: >60
GFR NON-AFRICAN AMERICAN: >60 ML/MIN/1.73
GLUCOSE BLD-MCNC: 101 MG/DL (ref 74–99)
HCT VFR BLD CALC: 43.6 % (ref 37–54)
HEMOGLOBIN: 14.3 G/DL (ref 12.5–16.5)
IMMATURE GRANULOCYTES #: 0.06 E9/L
IMMATURE GRANULOCYTES %: 0.7 % (ref 0–5)
LYMPHOCYTES ABSOLUTE: 1.57 E9/L (ref 1.5–4)
LYMPHOCYTES RELATIVE PERCENT: 17.2 % (ref 20–42)
MCH RBC QN AUTO: 32.1 PG (ref 26–35)
MCHC RBC AUTO-ENTMCNC: 32.8 % (ref 32–34.5)
MCV RBC AUTO: 98 FL (ref 80–99.9)
MONOCYTES ABSOLUTE: 0.78 E9/L (ref 0.1–0.95)
MONOCYTES RELATIVE PERCENT: 8.5 % (ref 2–12)
NEUTROPHILS ABSOLUTE: 6.6 E9/L (ref 1.8–7.3)
NEUTROPHILS RELATIVE PERCENT: 72.1 % (ref 43–80)
PDW BLD-RTO: 14.5 FL (ref 11.5–15)
PLATELET # BLD: 241 E9/L (ref 130–450)
PMV BLD AUTO: 9.9 FL (ref 7–12)
POTASSIUM SERPL-SCNC: 4.2 MMOL/L (ref 3.5–5)
RBC # BLD: 4.45 E12/L (ref 3.8–5.8)
SODIUM BLD-SCNC: 140 MMOL/L (ref 132–146)
WBC # BLD: 9.2 E9/L (ref 4.5–11.5)

## 2021-07-16 PROCEDURE — 85025 COMPLETE CBC W/AUTO DIFF WBC: CPT

## 2021-07-16 PROCEDURE — 36415 COLL VENOUS BLD VENIPUNCTURE: CPT

## 2021-07-16 PROCEDURE — 80053 COMPREHEN METABOLIC PANEL: CPT

## 2021-07-16 PROCEDURE — 84165 PROTEIN E-PHORESIS SERUM: CPT

## 2021-07-16 PROCEDURE — 86334 IMMUNOFIX E-PHORESIS SERUM: CPT

## 2021-07-16 NOTE — PROGRESS NOTES
7/16/2021      NAME:  Lorene Ledesma    YOB: 1955      ALLERGIES:  Codeine, Lipitor, and Vytorin [ezetimibe-simvastatin]         Current Outpatient Medications   Medication Sig Dispense Refill    triamterene-hydroCHLOROthiazide (MAXZIDE-25) 37.5-25 MG per tablet Take 1 tablet by mouth daily 30 tablet 0    dexamethasone (DECADRON) 4 MG tablet TAKE 10 Tables (40 mg dose) EVERY Monday 40 tablet 2    sulfamethoxazole-trimethoprim (BACTRIM DS;SEPTRA DS) 800-160 MG per tablet Take 1 tablet by mouth three times a week Take 1 tablets BID on MONDAYS, WEDNESDAYS, and FRIDAYS. THESE  Are the DIRECTIONS.  BRK 30 tablet 2    predniSONE (DELTASONE) 1 MG tablet Take 20 mg by mouth daily      omeprazole (PRILOSEC) 20 MG delayed release capsule Take 1 capsule by mouth 2 times daily (before meals) 60 capsule 0    predniSONE (DELTASONE) 20 MG tablet Take 1 tablet by mouth daily 30 tablet 0    acyclovir (ZOVIRAX) 400 MG tablet Take 1 tablet by mouth 2 times daily 180 tablet 0    rosuvastatin (CRESTOR) 40 MG tablet TAKE ONE TABLET BY MOUTH DAILY 90 tablet 3    prochlorperazine (COMPAZINE) 10 MG tablet Take 1 tablet by mouth every 6 hours as needed (nausea/vomiting) 40 tablet 3    fluticasone (FLONASE) 50 MCG/ACT nasal spray 1 spray by Each Nostril route daily 2 Bottle 1    miconazole (MICOTIN) 2 % cream Apply topically 2 times daily. 28 g 1    VITAMIN E PO Take 1 capsule by mouth daily      Multiple Vitamins-Minerals (THERAPEUTIC MULTIVITAMIN-MINERALS) tablet Take 1 tablet by mouth daily      Ascorbic Acid (VITAMIN C PO) Take 1 tablet by mouth daily      VITAMIN D PO Take 1 capsule by mouth daily      aspirin 81 MG tablet Take 81 mg by mouth daily      ibuprofen (ADVIL;MOTRIN) 600 MG tablet Take 1 tablet by mouth every 6 hours as needed for Pain 60 tablet 1    fexofenadine-pseudoephedrine (ALLEGRA-D 12 HOUR)  MG per tablet Take 1 tablet by mouth 2 times daily as needed.  60 tablet 2 No current facility-administered medications for this visit. SUBJECTIVE:   Here for chemotherapy and follow up. Feels well today. No c/o's. OBJECTIVE:   /68   Pulse 97   Temp 97.7 °F (36.5 °C)   Ht 5' 10\" (1.778 m)   Wt 161 lb 1.6 oz (73.1 kg)   SpO2 97%   BMI 23.12 kg/m²   Physical Examination:  Performance Status: 0  Well developed, well nourished male  General: AAO to person, place, time, cooperative, in no acute distress. Head and neck: PERRLA, EOMI. Sclera non icteric. Oropharynx:  Clear. Neck: no JVD,  no adenopathy. Heart: Regular rate and regular rhythm, no murmur. Lungs: Clear to auscultation.   Abdomen: Soft, non-tender;no masses, no organomegaly. Extremities : No edema,no cyanosis, no clubbing. Neurologic:Cranial nerves grossly intact.  No focal motor or sensory deficits   Skin:  No rash.         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     His work-up will be completed to include the following:     CBC, CMP, LDH, B2 microglobulin, CRP, serum protein electrophoresis with reflex to immunofixation, serum free kappa and lambda light chains with ratio, quantitative immunoglobulins, 24-hour urine collection for quantitative immunoglobulins and immunofixation.     Serum troponin, proBNP and 2D echo will be done to exclude cardiac amyloidosis. TSH  Factor X assay as well as PT and APTT will be done     Will be scheduled for a bone marrow aspirate and biopsy     Once work-up completed options of therapy with bortezomib/daratumumab will be addressed     He is encouraged to receive the COVID-19 vaccine        3/15/2021  Awaiting his bone marrow aspirate and biopsy and his 2D echo which are both pending. His CBC was in the normal range with a hemoglobin of 15.3, normal WBC count of 6.2, normal MCV of 90 with a normal platelet count of 438.  His TSH was normal at 3.88  Serum LDH was elevated at 266. B2 microglobulin was markedly elevated at 13.6. Quantitative immunoglobulins showed suppression of IgG with normal IgA and IgM. Serum electrophoresis showed an M spike faint measuring 0.1. Serum FLC's showed markedly elevated free lambda light chain at 311 with abnormal ratio of 0.05  Serum troponin was normal but proBNP was elevated at 491. 24-hour urine collection showed nephrotic range proteinuria with total proteins of 6.6 g in a 24-hour period with a faint M spike in the urine measuring 1263 mg in a 24-hour timeframe     He will be recommended induction with Daratumumab/Velcade/dexamethasone  His bone marrow aspirate and biopsy will be reviewed.     All potential side effects were discussed.        3/29/2021  He will be given acyclovir for VZV prophylaxis and Bactrim DS for PCP prophylaxis and as needed Compazine for nausea. Baldomero De Anda will be initiated today on daratumumab/Velcade/cyclophosphamide/dexamethasone regimen.  All potential side effects were discussed.  His baby aspirin has been held.        4/05/2021  He tolerated his treatment with daratumumab well.  He reports constipation.  Few days later he had some chills and body aches and it is likely related to his second dose of the Covid vaccine. No signs of peripheral neuropathy.   To receive Week # 2 of proceed with chemo treatment. To return 7/7/2021 for  Week #12 of Darzalex/Faspro. and Velcade   On Cytoxan   Labs on 7/6/2021 7/7/2021  His repeat myeloma panel shows normalization of free kappa and free lambda light chains with normal FLC ratio indicating an excellent response to present therapy regimen  His serum beta-2 microglobulin is down in the normal range. Quantitative immunoglobulins still show low IgA, low IgG and low IgM. His 24-hour urine collection showed still nephrotic range proteinuria with 3.8 g of proteins in a 24-hour collection  Immunofixation showed faint free monoclonal lambda light chains. His last SPEP from 4/26 had shown an M spike of 0.1 but results from June not available and will be repeated  Back in March his 24-hour UPEP showed 6.6 g of protein in 24 hours. He has achieved an excellent clinical response  To continue on present regimen  He will now go on biweekly Gregoria      7/19/2021  To receive daratumumab/Velcade/Dex  Cytoxan to be DC'd    He will be referred to a tertiary care center for consideration of autologous stem cell transplant.     He will return in 2 weeks for chemo       Santo Byrnes M.D.

## 2021-07-19 ENCOUNTER — OFFICE VISIT (OUTPATIENT)
Dept: ONCOLOGY | Age: 66
End: 2021-07-19
Payer: COMMERCIAL

## 2021-07-19 ENCOUNTER — HOSPITAL ENCOUNTER (OUTPATIENT)
Dept: INFUSION THERAPY | Age: 66
Discharge: HOME | End: 2021-07-19
Payer: COMMERCIAL

## 2021-07-19 VITALS
DIASTOLIC BLOOD PRESSURE: 68 MMHG | OXYGEN SATURATION: 97 % | TEMPERATURE: 97.7 F | HEART RATE: 97 BPM | HEIGHT: 70 IN | BODY MASS INDEX: 23.06 KG/M2 | WEIGHT: 161.1 LBS | SYSTOLIC BLOOD PRESSURE: 104 MMHG

## 2021-07-19 VITALS — RESPIRATION RATE: 16 BRPM | SYSTOLIC BLOOD PRESSURE: 119 MMHG | DIASTOLIC BLOOD PRESSURE: 79 MMHG | HEART RATE: 82 BPM

## 2021-07-19 DIAGNOSIS — E85.81 LIGHT CHAIN (AL) AMYLOIDOSIS (HCC): Primary | ICD-10-CM

## 2021-07-19 LAB
ALBUMIN SERPL-MCNC: 1.8 G/DL (ref 3.5–4.7)
ALPHA-1-GLOBULIN: 0.2 G/DL (ref 0.2–0.4)
ALPHA-2-GLOBULIN: 1 G/FL (ref 0.5–1)
BETA GLOBULIN: 0.9 G/DL (ref 0.8–1.3)
ELECTROPHORESIS: ABNORMAL
GAMMA GLOBULIN: 0.3 G/DL (ref 0.7–1.6)
IMMUNOFIXATION RESULT, SERUM: NORMAL
TOTAL PROTEIN: 4.2 G/DL (ref 6.4–8.3)

## 2021-07-19 PROCEDURE — 6370000000 HC RX 637 (ALT 250 FOR IP): Performed by: INTERNAL MEDICINE

## 2021-07-19 PROCEDURE — 99212 OFFICE O/P EST SF 10 MIN: CPT

## 2021-07-19 PROCEDURE — 6360000002 HC RX W HCPCS: Performed by: INTERNAL MEDICINE

## 2021-07-19 PROCEDURE — 2580000003 HC RX 258: Performed by: INTERNAL MEDICINE

## 2021-07-19 PROCEDURE — 96401 CHEMO ANTI-NEOPL SQ/IM: CPT

## 2021-07-19 PROCEDURE — 6370000000 HC RX 637 (ALT 250 FOR IP): Performed by: NURSE PRACTITIONER

## 2021-07-19 RX ORDER — DIPHENHYDRAMINE HCL 25 MG
25 TABLET ORAL EVERY 6 HOURS PRN
Status: CANCELLED
Start: 2021-07-19

## 2021-07-19 RX ORDER — SODIUM CHLORIDE 9 MG/ML
INJECTION, SOLUTION INTRAVENOUS CONTINUOUS
Status: CANCELLED | OUTPATIENT
Start: 2021-07-19

## 2021-07-19 RX ORDER — METHYLPREDNISOLONE SODIUM SUCCINATE 125 MG/2ML
125 INJECTION, POWDER, LYOPHILIZED, FOR SOLUTION INTRAMUSCULAR; INTRAVENOUS ONCE
Status: CANCELLED | OUTPATIENT
Start: 2021-07-19 | End: 2021-07-19

## 2021-07-19 RX ORDER — DIPHENHYDRAMINE HYDROCHLORIDE 50 MG/ML
50 INJECTION INTRAMUSCULAR; INTRAVENOUS ONCE
Status: CANCELLED | OUTPATIENT
Start: 2021-07-19 | End: 2021-07-19

## 2021-07-19 RX ORDER — DIPHENHYDRAMINE HCL 25 MG
25 TABLET ORAL ONCE
Status: COMPLETED | OUTPATIENT
Start: 2021-07-19 | End: 2021-07-19

## 2021-07-19 RX ORDER — SODIUM CHLORIDE 9 MG/ML
20 INJECTION, SOLUTION INTRAVENOUS ONCE
Status: CANCELLED | OUTPATIENT
Start: 2021-07-19 | End: 2021-07-19

## 2021-07-19 RX ORDER — HEPARIN SODIUM (PORCINE) LOCK FLUSH IV SOLN 100 UNIT/ML 100 UNIT/ML
500 SOLUTION INTRAVENOUS PRN
Status: CANCELLED | OUTPATIENT
Start: 2021-07-19

## 2021-07-19 RX ORDER — SODIUM CHLORIDE 0.9 % (FLUSH) 0.9 %
10 SYRINGE (ML) INJECTION PRN
Status: CANCELLED | OUTPATIENT
Start: 2021-07-19

## 2021-07-19 RX ORDER — SODIUM CHLORIDE 0.9 % (FLUSH) 0.9 %
5 SYRINGE (ML) INJECTION PRN
Status: CANCELLED | OUTPATIENT
Start: 2021-07-19

## 2021-07-19 RX ORDER — DIPHENHYDRAMINE HYDROCHLORIDE 50 MG/ML
25 INJECTION INTRAMUSCULAR; INTRAVENOUS ONCE
Status: CANCELLED | OUTPATIENT
Start: 2021-07-19

## 2021-07-19 RX ORDER — ACETAMINOPHEN 325 MG/1
650 TABLET ORAL ONCE
Status: COMPLETED | OUTPATIENT
Start: 2021-07-19 | End: 2021-07-19

## 2021-07-19 RX ORDER — EPINEPHRINE 1 MG/ML
0.3 INJECTION, SOLUTION, CONCENTRATE INTRAVENOUS PRN
Status: CANCELLED | OUTPATIENT
Start: 2021-07-19

## 2021-07-19 RX ORDER — ONDANSETRON 2 MG/ML
8 INJECTION INTRAMUSCULAR; INTRAVENOUS ONCE
Status: CANCELLED
Start: 2021-07-19 | End: 2021-07-19

## 2021-07-19 RX ORDER — ACETAMINOPHEN 325 MG/1
650 TABLET ORAL ONCE
Status: CANCELLED | OUTPATIENT
Start: 2021-07-19

## 2021-07-19 RX ADMIN — BORTEZOMIB 2.75 MG: 3.5 INJECTION, POWDER, LYOPHILIZED, FOR SOLUTION INTRAVENOUS; SUBCUTANEOUS at 12:26

## 2021-07-19 RX ADMIN — DIPHENHYDRAMINE HYDROCHLORIDE 25 MG: 25 TABLET ORAL at 11:32

## 2021-07-19 RX ADMIN — DARATUMUMAB AND HYALURONIDASE-FIHJ (HUMAN RECOMBINANT) 15 ML: 1800; 30000 INJECTION SUBCUTANEOUS at 12:26

## 2021-07-19 RX ADMIN — ACETAMINOPHEN 650 MG: 325 TABLET ORAL at 11:32

## 2021-07-19 ASSESSMENT — PAIN SCALES - GENERAL: PAINLEVEL_OUTOF10: 0

## 2021-07-30 ENCOUNTER — HOSPITAL ENCOUNTER (OUTPATIENT)
Dept: INFUSION THERAPY | Age: 66
Discharge: HOME | End: 2021-07-30
Payer: COMMERCIAL

## 2021-07-30 DIAGNOSIS — E85.81 LIGHT CHAIN (AL) AMYLOIDOSIS (HCC): ICD-10-CM

## 2021-07-30 LAB
ALBUMIN SERPL-MCNC: 2.4 G/DL (ref 3.5–5.2)
ALP BLD-CCNC: 66 U/L (ref 40–129)
ALT SERPL-CCNC: 26 U/L (ref 0–40)
ANION GAP SERPL CALCULATED.3IONS-SCNC: 9 MMOL/L (ref 7–16)
AST SERPL-CCNC: 28 U/L (ref 0–39)
BASOPHILS ABSOLUTE: 0.03 E9/L (ref 0–0.2)
BASOPHILS RELATIVE PERCENT: 0.4 % (ref 0–2)
BILIRUB SERPL-MCNC: <0.2 MG/DL (ref 0–1.2)
BUN BLDV-MCNC: 17 MG/DL (ref 6–23)
CALCIUM SERPL-MCNC: 8.4 MG/DL (ref 8.6–10.2)
CHLORIDE BLD-SCNC: 105 MMOL/L (ref 98–107)
CO2: 26 MMOL/L (ref 22–29)
CREAT SERPL-MCNC: 1.1 MG/DL (ref 0.7–1.2)
EOSINOPHILS ABSOLUTE: 0.05 E9/L (ref 0.05–0.5)
EOSINOPHILS RELATIVE PERCENT: 0.7 % (ref 0–6)
GFR AFRICAN AMERICAN: >60
GFR NON-AFRICAN AMERICAN: >60 ML/MIN/1.73
GLUCOSE BLD-MCNC: 92 MG/DL (ref 74–99)
HCT VFR BLD CALC: 41.6 % (ref 37–54)
HEMOGLOBIN: 13.9 G/DL (ref 12.5–16.5)
IMMATURE GRANULOCYTES #: 0.04 E9/L
IMMATURE GRANULOCYTES %: 0.5 % (ref 0–5)
LYMPHOCYTES ABSOLUTE: 1.69 E9/L (ref 1.5–4)
LYMPHOCYTES RELATIVE PERCENT: 23 % (ref 20–42)
MCH RBC QN AUTO: 32.4 PG (ref 26–35)
MCHC RBC AUTO-ENTMCNC: 33.4 % (ref 32–34.5)
MCV RBC AUTO: 97 FL (ref 80–99.9)
MONOCYTES ABSOLUTE: 0.72 E9/L (ref 0.1–0.95)
MONOCYTES RELATIVE PERCENT: 9.8 % (ref 2–12)
NEUTROPHILS ABSOLUTE: 4.83 E9/L (ref 1.8–7.3)
NEUTROPHILS RELATIVE PERCENT: 65.6 % (ref 43–80)
PDW BLD-RTO: 13.2 FL (ref 11.5–15)
PLATELET # BLD: 214 E9/L (ref 130–450)
PMV BLD AUTO: 9.2 FL (ref 7–12)
POTASSIUM SERPL-SCNC: 4 MMOL/L (ref 3.5–5)
RBC # BLD: 4.29 E12/L (ref 3.8–5.8)
SODIUM BLD-SCNC: 140 MMOL/L (ref 132–146)
TOTAL PROTEIN: 4.5 G/DL (ref 6.4–8.3)
WBC # BLD: 7.4 E9/L (ref 4.5–11.5)

## 2021-07-30 PROCEDURE — 80053 COMPREHEN METABOLIC PANEL: CPT

## 2021-07-30 PROCEDURE — 36415 COLL VENOUS BLD VENIPUNCTURE: CPT

## 2021-07-30 PROCEDURE — 85025 COMPLETE CBC W/AUTO DIFF WBC: CPT

## 2021-07-30 NOTE — PROGRESS NOTES
[address] [facility]. Chaim Haro        Pt Name: Danielle Molina: 1955  Date of evaluation: 8/2/2021  Primary Care Physician: Chun Copeland MD  Reason for evaluation:   Chief Complaint   Patient presents with    Cancer     Light chain(AL) amyloidosis     Follow-up    Chemotherapy        Subjective:   Here for treatment and follow-up. Feels well today  Was seen at [facility] for consultation in anticipation of stem cell transplant    OBJECTIVE:  VITALS:  height is 5' 10\" (1.778 m) and weight is 158 lb 1.6 oz (71.7 kg). His temperature is 97.7 °F (36.5 °C). His blood pressure is 124/77 and his pulse is 99. His oxygen saturation is 96%. Physical Exam:  Performance Status: 0  Well developed, well nourished male  General: AAO to person, place, time, cooperative, in no acute distress. Head and neck: PERRLA, EOMI. Sclera non icteric. Oropharynx:  Clear. Neck: no JVD,  no adenopathy. Heart: Regular rate and regular rhythm, no murmur. Lungs: Clear to auscultation.   Abdomen: Soft, non-tender;no masses, no organomegaly. Extremities : No edema,no cyanosis, no clubbing. Neurologic:Cranial nerves grossly intact. No focal motor or sensory deficits   Skin:  No rash. Medications  Prior to Admission medications    Medication Sig Start Date End Date Taking? Authorizing Provider   triamterene-hydroCHLOROthiazide (MAXZIDE-25) 37.5-25 MG per tablet Take 1 tablet by mouth daily 7/7/21  Yes Vera Adamson MD   dexamethasone (DECADRON) 4 MG tablet TAKE 10 Tables (40 mg dose) EVERY Monday 7/7/21  Yes Vera Adamson MD   sulfamethoxazole-trimethoprim (BACTRIM DS;SEPTRA DS) 800-160 MG per tablet Take 1 tablet by mouth three times a week Take 1 tablets BID on MONDAYS, [address], and FRIDAYS.   THESE  Are the DIRECTIONS.  [facility] 7/7/21 8/6/21 Yes Vera Adamson MD   predniSONE (DELTASONE) 1 MG tablet Take 20 mg by mouth daily   Yes Historical Provider, MD   acyclovir (ZOVIRAX) 400 MG tablet Take 1 tablet by mouth 2 times daily 6/21/21 9/19/21 Yes VICTOR MANUEL Ford CNP   rosuvastatin (CRESTOR) 40 MG tablet TAKE ONE TABLET BY MOUTH DAILY 4/2/21  Yes Leander Chapa MD   prochlorperazine (COMPAZINE) 10 MG tablet Take 1 tablet by mouth every 6 hours as needed (nausea/vomiting) 3/25/21  Yes Stephanie Pinto MD   fluticasone [facility]) 50 MCG/ACT nasal spray 1 spray by Each Nostril route daily 12/28/20  Yes Leander Chapa MD   miconazole (MICOTIN) 2 % cream Apply topically 2 times daily. 12/28/20  Yes Leander Chapa MD   VITAMIN E PO Take 1 capsule by mouth daily   Yes Historical Provider, MD   Multiple Vitamins-Minerals (THERAPEUTIC MULTIVITAMIN-MINERALS) tablet Take 1 tablet by mouth daily   Yes Historical Provider, MD   Ascorbic Acid (VITAMIN C PO) Take 1 tablet by mouth daily   Yes Historical Provider, MD   VITAMIN D PO Take 1 capsule by mouth daily   Yes Historical Provider, MD   aspirin 81 MG tablet Take 81 mg by mouth daily   Yes Historical Provider, MD   ibuprofen (ADVIL;MOTRIN) 600 MG tablet Take 1 tablet by mouth every 6 hours as needed for Pain 7/5/17  Yes Leander Chapa MD   fexofenadine-pseudoephedrine (ALLEGRA-D 12 HOUR)  MG per tablet Take 1 tablet by mouth 2 times daily as needed.  2/5/13  Yes Oanh Loomis MD   omeprazole (PRILOSEC) 20 MG delayed release capsule Take 1 capsule by mouth 2 times daily (before meals) 6/21/21 7/21/21  VICTOR MANUEL Ford CNP    Scheduled Meds:  Continuous Infusions:  PRN Meds:.        Recent Laboratory Data-     Lab Results   Component Value Date    WBC 7.4 07/30/2021    HGB 13.9 07/30/2021    HCT 41.6 07/30/2021    MCV 97.0 07/30/2021     07/30/2021    LYMPHOPCT 23.0 07/30/2021    RBC 4.29 07/30/2021    MCH 32.4 07/30/2021    MCHC 33.4 07/30/2021    RDW 13.2 07/30/2021    NEUTOPHILPCT 65.6 07/30/2021    MONOPCT 9.8 07/30/2021    BASOPCT 0.4 07/30/2021    NEUTROABS 4.83 07/30/2021    LYMPHSABS 1.69 07/30/2021    MONOSABS 0.72 07/30/2021    EOSABS 0.05 07/30/2021    BASOSABS 0.03 07/30/2021       Lab Results   Component Value Date     07/30/2021    K 4.0 07/30/2021     07/30/2021    CO2 26 07/30/2021    BUN 17 07/30/2021    CREATININE 1.1 07/30/2021    GLUCOSE 92 07/30/2021    CALCIUM 8.4 (L) 07/30/2021    PROT 4.5 (L) 07/30/2021    LABALBU 2.4 (L) 07/30/2021    BILITOT <0.2 07/30/2021    ALKPHOS 66 07/30/2021    AST 28 07/30/2021    ALT 26 07/30/2021    LABGLOM >60 07/30/2021    GFRAA >60 07/30/2021           Radiology-  No results found. 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     His work-up will be completed to include the following:     CBC, CMP, LDH, B2 microglobulin, CRP, serum protein electrophoresis with reflex to immunofixation, serum free kappa and lambda light chains with ratio, quantitative immunoglobulins, 24-hour urine collection for quantitative immunoglobulins and immunofixation.     Serum troponin, proBNP and 2D echo will be done to exclude cardiac amyloidosis. TSH  Factor X assay as well as PT and APTT will be done     Will be scheduled for a bone marrow aspirate and biopsy     Once work-up completed options of therapy with bortezomib/daratumumab will be addressed     He is encouraged to receive the COVID-19 vaccine        3/15/2021  Awaiting his bone marrow aspirate and biopsy and his 2D echo which are both pending. His CBC was in the normal range with a hemoglobin of 15.3, normal WBC count of 6.2, normal MCV of 90 with a normal platelet count of 318.  His TSH was normal at 3.88  Serum LDH was elevated at 266. B2 microglobulin was markedly elevated at 13.6. Quantitative immunoglobulins showed suppression of IgG with normal IgA and IgM. Serum electrophoresis showed an M spike faint measuring 0.1.   Serum FLC's showed markedly elevated free lambda light chain at 311 with abnormal ratio of 0.05  Serum troponin was normal but proBNP was elevated at 491. 24-hour urine collection showed nephrotic range proteinuria with total proteins of 6.6 g in a 24-hour period with a faint M spike in the urine measuring 1263 mg in a 24-hour timeframe     He will be recommended induction with Daratumumab/Velcade/dexamethasone  His bone marrow aspirate and biopsy will be reviewed.     All potential side effects were discussed.        3/29/2021  He will be given acyclovir for VZV prophylaxis and Bactrim DS for PCP prophylaxis and as needed Compazine for nausea. [facility] will be initiated today on daratumumab/Velcade/cyclophosphamide/dexamethasone regimen.  All potential side effects were discussed.  His baby aspirin has been held.        4/05/2021  He tolerated his treatment with daratumumab well.  He reports constipation.  Few days later he had some chills and body aches and it is likely related to his second dose of the Covid vaccine. No signs of peripheral neuropathy. 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 He will be given as needed tramadol for pain and omeprazole 20 mg daily for heartburn. To receive week 3 of chemotherapy regimen consisting of daratumumab/Velcade/dexamethasone/Cytoxan        4/19/2021  He has been experiencing significant side effects from daratumumab 2 to 3 days later in terms of headaches myalgias arthralgias.   He will be given prednisone 20 mg daily starting on 4/21/2021 and he will skip it Monday the day he gets the high-dose steroids prior to his Darzalex Faspro.     He will take as needed Tylenol or Advil and he will continue on his omeprazole daily.        4/26/2021  He has gained few pounds and has fluid retention from steroids and will be initiated on Maxide 37.5 mg daily. He will continue on prednisone except on Mondays. Received Darzalex Faspro today.        5/03/2021  He diuresed very well on Maxide and lost 6 pounds and it has been discontinued. He has had no reactions from Darzalex while maintained on prednisone  To receive week 6 of Darzalex Faspro today        5/10/2021  To receive week 7 of Darzalex Faspro . Continue Prednisone.        5/17/2021  To receive week 8 of Darzalex Faspro. He will have a repeat myeloma panel and urinary collection after week 9.        5/24/2021  To receive week 9 of Darzalex Faspro. He will continue on Cytoxan and Velcade     He will have a repeat myeloma panel and urinary collection after week 9. He will return for follow-up in 2 weeks.        6/07/2021  To receive Week #10 of Darzalex/Faspro. and Velcade   On Cytoxan        6/21/2021  To receive Week #11 of Darzalex/Faspro. and Velcade   On Cytoxan   Lab work reviewed, alfonso to proceed with chemo treatment. To return 7/7/2021 for  Week #12 of Darzalex/Faspro. and Velcade   On Cytoxan   Labs on 7/6/2021 7/7/2021  His repeat myeloma panel shows normalization of free kappa and free lambda light chains with normal FLC ratio indicating an excellent response to present therapy regimen  His serum beta-2 microglobulin is down in the normal range. Quantitative immunoglobulins still show low IgA, low IgG and low IgM. His 24-hour urine collection showed still nephrotic range proteinuria with 3.8 g of proteins in a 24-hour collection  Immunofixation showed faint free monoclonal lambda light chains. His last SPEP from 4/26 had shown an M spike of 0.1 but results from June not available and will be repeated  Back in March his 24-hour UPEP showed 6.6 g of protein in 24 hours.     He has achieved an excellent clinical response  To continue on present regimen  He will now go on biweekly Gregoria      7/19/2021  To receive daratumumab/Velcade/Dex  Cytoxan to be DC'd    He will be referred to a tertiary care center for consideration of autologous stem cell transplant. He will return in 2 weeks for chemo      8/02/2021  To receive Day #1 Cycle #5 daratumumab/Velcade/Dex     To return 8/09/2021 for Day #8 Velcade  Cycle #5   Daratumumab will be now on every 4 weeks basis  He is to be tapered off prednisone  Pretransplant work-up will be initiated with anticipation of stem cell transplant at [address] in November      Antoine E. Para Lanes, M.D., F.A.C.P.   Electronically signed 8/2/2021 at 10:09 AM

## 2021-08-02 ENCOUNTER — HOSPITAL ENCOUNTER (OUTPATIENT)
Dept: INFUSION THERAPY | Age: 66
Discharge: HOME | End: 2021-08-02
Payer: COMMERCIAL

## 2021-08-02 ENCOUNTER — OFFICE VISIT (OUTPATIENT)
Dept: ONCOLOGY | Age: 66
End: 2021-08-02
Payer: COMMERCIAL

## 2021-08-02 VITALS
DIASTOLIC BLOOD PRESSURE: 77 MMHG | SYSTOLIC BLOOD PRESSURE: 124 MMHG | BODY MASS INDEX: 22.63 KG/M2 | HEIGHT: 70 IN | HEART RATE: 99 BPM | TEMPERATURE: 97.7 F | WEIGHT: 158.1 LBS | OXYGEN SATURATION: 96 %

## 2021-08-02 VITALS — HEART RATE: 86 BPM | SYSTOLIC BLOOD PRESSURE: 125 MMHG | DIASTOLIC BLOOD PRESSURE: 82 MMHG

## 2021-08-02 DIAGNOSIS — E85.81 LIGHT CHAIN (AL) AMYLOIDOSIS (HCC): Primary | ICD-10-CM

## 2021-08-02 PROCEDURE — 2580000003 HC RX 258: Performed by: NURSE PRACTITIONER

## 2021-08-02 PROCEDURE — 6370000000 HC RX 637 (ALT 250 FOR IP): Performed by: NURSE PRACTITIONER

## 2021-08-02 PROCEDURE — 96401 CHEMO ANTI-NEOPL SQ/IM: CPT

## 2021-08-02 PROCEDURE — 6360000002 HC RX W HCPCS: Performed by: NURSE PRACTITIONER

## 2021-08-02 RX ORDER — SODIUM CHLORIDE 0.9 % (FLUSH) 0.9 %
10 SYRINGE (ML) INJECTION PRN
Status: CANCELLED | OUTPATIENT
Start: 2021-08-02

## 2021-08-02 RX ORDER — ACETAMINOPHEN 325 MG/1
650 TABLET ORAL ONCE
Status: COMPLETED | OUTPATIENT
Start: 2021-08-02 | End: 2021-08-02

## 2021-08-02 RX ORDER — DIPHENHYDRAMINE HCL 25 MG
25 TABLET ORAL ONCE
Status: COMPLETED | OUTPATIENT
Start: 2021-08-02 | End: 2021-08-02

## 2021-08-02 RX ORDER — PREDNISONE 1 MG/1
TABLET ORAL
Qty: 10 TABLET | Refills: 0 | Status: SHIPPED
Start: 2021-08-02 | End: 2021-08-02 | Stop reason: SDUPTHER

## 2021-08-02 RX ORDER — EPINEPHRINE 1 MG/ML
0.3 INJECTION, SOLUTION, CONCENTRATE INTRAVENOUS PRN
Status: CANCELLED | OUTPATIENT
Start: 2021-08-02

## 2021-08-02 RX ORDER — PALONOSETRON HYDROCHLORIDE 0.05 MG/ML
0.25 INJECTION, SOLUTION INTRAVENOUS ONCE
Status: CANCELLED
Start: 2021-08-02 | End: 2021-08-02

## 2021-08-02 RX ORDER — PREDNISONE 1 MG/1
TABLET ORAL
Qty: 10 TABLET | Refills: 0 | Status: SHIPPED
Start: 2021-08-02 | End: 2022-04-21 | Stop reason: ALTCHOICE

## 2021-08-02 RX ORDER — METHYLPREDNISOLONE SODIUM SUCCINATE 125 MG/2ML
125 INJECTION, POWDER, LYOPHILIZED, FOR SOLUTION INTRAMUSCULAR; INTRAVENOUS ONCE
Status: CANCELLED | OUTPATIENT
Start: 2021-08-02 | End: 2021-08-02

## 2021-08-02 RX ORDER — HEPARIN SODIUM (PORCINE) LOCK FLUSH IV SOLN 100 UNIT/ML 100 UNIT/ML
500 SOLUTION INTRAVENOUS PRN
Status: CANCELLED | OUTPATIENT
Start: 2021-08-02

## 2021-08-02 RX ORDER — SODIUM CHLORIDE 0.9 % (FLUSH) 0.9 %
5 SYRINGE (ML) INJECTION PRN
Status: CANCELLED | OUTPATIENT
Start: 2021-08-02

## 2021-08-02 RX ORDER — ACETAMINOPHEN 325 MG/1
650 TABLET ORAL ONCE
Status: CANCELLED | OUTPATIENT
Start: 2021-08-02

## 2021-08-02 RX ORDER — SODIUM CHLORIDE 9 MG/ML
INJECTION, SOLUTION INTRAVENOUS CONTINUOUS
Status: CANCELLED | OUTPATIENT
Start: 2021-08-02

## 2021-08-02 RX ORDER — DIPHENHYDRAMINE HYDROCHLORIDE 50 MG/ML
50 INJECTION INTRAMUSCULAR; INTRAVENOUS ONCE
Status: CANCELLED | OUTPATIENT
Start: 2021-08-02 | End: 2021-08-02

## 2021-08-02 RX ADMIN — DIPHENHYDRAMINE HYDROCHLORIDE 25 MG: 25 TABLET ORAL at 11:16

## 2021-08-02 RX ADMIN — DARATUMUMAB AND HYALURONIDASE-FIHJ (HUMAN RECOMBINANT) 15 ML: 1800; 30000 INJECTION SUBCUTANEOUS at 11:35

## 2021-08-02 RX ADMIN — ACETAMINOPHEN 650 MG: 325 TABLET ORAL at 11:15

## 2021-08-02 RX ADMIN — BORTEZOMIB 2.75 MG: 3.5 INJECTION, POWDER, LYOPHILIZED, FOR SOLUTION INTRAVENOUS; SUBCUTANEOUS at 11:42

## 2021-08-02 ASSESSMENT — PAIN SCALES - GENERAL: PAINLEVEL_OUTOF10: 0

## 2021-08-02 NOTE — PROGRESS NOTES
CLINICAL PHARMACY NOTE: MEDS TO BEDS    Total # of Prescriptions Filled: 1   The following medications were delivered to the patient:  · Prednisone 5mg    Additional Documentation:

## 2021-08-06 ENCOUNTER — HOSPITAL ENCOUNTER (OUTPATIENT)
Dept: INFUSION THERAPY | Age: 66
Discharge: HOME | End: 2021-08-06
Payer: COMMERCIAL

## 2021-08-06 DIAGNOSIS — E85.81 LIGHT CHAIN (AL) AMYLOIDOSIS (HCC): ICD-10-CM

## 2021-08-06 LAB
ALBUMIN SERPL-MCNC: 2.7 G/DL (ref 3.5–5.2)
ALP BLD-CCNC: 68 U/L (ref 40–129)
ALT SERPL-CCNC: 27 U/L (ref 0–40)
ANION GAP SERPL CALCULATED.3IONS-SCNC: 10 MMOL/L (ref 7–16)
AST SERPL-CCNC: 24 U/L (ref 0–39)
BASOPHILS ABSOLUTE: 0.03 E9/L (ref 0–0.2)
BASOPHILS RELATIVE PERCENT: 0.3 % (ref 0–2)
BILIRUB SERPL-MCNC: <0.2 MG/DL (ref 0–1.2)
BUN BLDV-MCNC: 27 MG/DL (ref 6–23)
CALCIUM SERPL-MCNC: 8.6 MG/DL (ref 8.6–10.2)
CHLORIDE BLD-SCNC: 100 MMOL/L (ref 98–107)
CO2: 26 MMOL/L (ref 22–29)
CREAT SERPL-MCNC: 1.1 MG/DL (ref 0.7–1.2)
EOSINOPHILS ABSOLUTE: 0.06 E9/L (ref 0.05–0.5)
EOSINOPHILS RELATIVE PERCENT: 0.6 % (ref 0–6)
GFR AFRICAN AMERICAN: >60
GFR NON-AFRICAN AMERICAN: >60 ML/MIN/1.73
GLUCOSE BLD-MCNC: 100 MG/DL (ref 74–99)
HCT VFR BLD CALC: 42.8 % (ref 37–54)
HEMOGLOBIN: 14.4 G/DL (ref 12.5–16.5)
IMMATURE GRANULOCYTES #: 0.04 E9/L
IMMATURE GRANULOCYTES %: 0.4 % (ref 0–5)
LYMPHOCYTES ABSOLUTE: 1.51 E9/L (ref 1.5–4)
LYMPHOCYTES RELATIVE PERCENT: 14.9 % (ref 20–42)
MCH RBC QN AUTO: 32.2 PG (ref 26–35)
MCHC RBC AUTO-ENTMCNC: 33.6 % (ref 32–34.5)
MCV RBC AUTO: 95.7 FL (ref 80–99.9)
MONOCYTES ABSOLUTE: 0.69 E9/L (ref 0.1–0.95)
MONOCYTES RELATIVE PERCENT: 6.8 % (ref 2–12)
NEUTROPHILS ABSOLUTE: 7.8 E9/L (ref 1.8–7.3)
NEUTROPHILS RELATIVE PERCENT: 77 % (ref 43–80)
PDW BLD-RTO: 12.9 FL (ref 11.5–15)
PLATELET # BLD: 261 E9/L (ref 130–450)
PMV BLD AUTO: 9.6 FL (ref 7–12)
POTASSIUM SERPL-SCNC: 4.2 MMOL/L (ref 3.5–5)
RBC # BLD: 4.47 E12/L (ref 3.8–5.8)
SODIUM BLD-SCNC: 136 MMOL/L (ref 132–146)
TOTAL PROTEIN: 4.9 G/DL (ref 6.4–8.3)
WBC # BLD: 10.1 E9/L (ref 4.5–11.5)

## 2021-08-06 PROCEDURE — 80053 COMPREHEN METABOLIC PANEL: CPT

## 2021-08-06 PROCEDURE — 36415 COLL VENOUS BLD VENIPUNCTURE: CPT

## 2021-08-06 PROCEDURE — 85025 COMPLETE CBC W/AUTO DIFF WBC: CPT

## 2021-08-09 ENCOUNTER — HOSPITAL ENCOUNTER (OUTPATIENT)
Dept: INFUSION THERAPY | Age: 66
Discharge: HOME | End: 2021-08-09
Payer: COMMERCIAL

## 2021-08-09 ENCOUNTER — OFFICE VISIT (OUTPATIENT)
Dept: ONCOLOGY | Age: 66
End: 2021-08-09
Payer: COMMERCIAL

## 2021-08-09 VITALS
DIASTOLIC BLOOD PRESSURE: 70 MMHG | SYSTOLIC BLOOD PRESSURE: 90 MMHG | HEIGHT: 70 IN | BODY MASS INDEX: 22.66 KG/M2 | WEIGHT: 158.3 LBS | TEMPERATURE: 98.4 F | RESPIRATION RATE: 18 BRPM | OXYGEN SATURATION: 94 % | HEART RATE: 105 BPM

## 2021-08-09 DIAGNOSIS — E85.81 LIGHT CHAIN (AL) AMYLOIDOSIS (HCC): Primary | ICD-10-CM

## 2021-08-09 PROCEDURE — 2580000003 HC RX 258: Performed by: INTERNAL MEDICINE

## 2021-08-09 PROCEDURE — 6360000002 HC RX W HCPCS: Performed by: INTERNAL MEDICINE

## 2021-08-09 PROCEDURE — 99212 OFFICE O/P EST SF 10 MIN: CPT

## 2021-08-09 PROCEDURE — 96401 CHEMO ANTI-NEOPL SQ/IM: CPT

## 2021-08-09 RX ORDER — SODIUM CHLORIDE 0.9 % (FLUSH) 0.9 %
5 SYRINGE (ML) INJECTION PRN
Status: CANCELLED | OUTPATIENT
Start: 2021-08-09

## 2021-08-09 RX ORDER — METHYLPREDNISOLONE SODIUM SUCCINATE 125 MG/2ML
125 INJECTION, POWDER, LYOPHILIZED, FOR SOLUTION INTRAMUSCULAR; INTRAVENOUS ONCE
Status: CANCELLED | OUTPATIENT
Start: 2021-08-09 | End: 2021-08-09

## 2021-08-09 RX ORDER — HEPARIN SODIUM (PORCINE) LOCK FLUSH IV SOLN 100 UNIT/ML 100 UNIT/ML
500 SOLUTION INTRAVENOUS PRN
Status: CANCELLED | OUTPATIENT
Start: 2021-08-09

## 2021-08-09 RX ORDER — DIPHENHYDRAMINE HYDROCHLORIDE 50 MG/ML
50 INJECTION INTRAMUSCULAR; INTRAVENOUS ONCE
Status: CANCELLED | OUTPATIENT
Start: 2021-08-09 | End: 2021-08-09

## 2021-08-09 RX ORDER — SODIUM CHLORIDE 0.9 % (FLUSH) 0.9 %
10 SYRINGE (ML) INJECTION PRN
Status: CANCELLED | OUTPATIENT
Start: 2021-08-09

## 2021-08-09 RX ORDER — SODIUM CHLORIDE 9 MG/ML
INJECTION, SOLUTION INTRAVENOUS CONTINUOUS
Status: CANCELLED | OUTPATIENT
Start: 2021-08-09

## 2021-08-09 RX ORDER — EPINEPHRINE 1 MG/ML
0.3 INJECTION, SOLUTION, CONCENTRATE INTRAVENOUS PRN
Status: CANCELLED | OUTPATIENT
Start: 2021-08-09

## 2021-08-09 RX ADMIN — BORTEZOMIB 2.75 MG: 3.5 INJECTION, POWDER, LYOPHILIZED, FOR SOLUTION INTRAVENOUS; SUBCUTANEOUS at 14:11

## 2021-08-09 NOTE — PROGRESS NOTES
[address] [facility]. [facility]        Pt Name: Shasta Hyde: 1955  Date of evaluation: 8/9/2021  Primary Care Physician: Kylee Reyes MD  Reason for evaluation:   Chief Complaint   Patient presents with    Cancer     Light chain (AL) amyloidosis    Follow-up    Treatment        Subjective:   Here for treatment and follow-up. Feels well today  Was seen at [facility] for consultation in anticipation of stem cell transplant    OBJECTIVE:  VITALS:  height is 5' 10\" (1.778 m) and weight is 158 lb 4.8 oz (71.8 kg). His temperature is 98.4 °F (36.9 °C). His blood pressure is 90/70 and his pulse is 105. His respiration is 18 and oxygen saturation is 94%. Physical Exam:  Performance Status: 0  Well developed, well nourished male  General: AAO to person, place, time, cooperative, in no acute distress. Head and neck: PERRLA, EOMI. Sclera non icteric. Oropharynx:  Clear. Neck: no JVD,  no adenopathy. Heart: Regular rate and regular rhythm, no murmur. Lungs: Clear to auscultation.   Abdomen: Soft, non-tender;no masses, no organomegaly. Extremities : No edema,no cyanosis, no clubbing. Neurologic:Cranial nerves grossly intact. No focal motor or sensory deficits   Skin:  No rash. Medications  Prior to Admission medications    Medication Sig Start Date End Date Taking? Authorizing Provider   predniSONE (DELTASONE) 5 MG tablet TAKE 5 mg DAILY for 5 days, THEN Take 5 mg EVERY OTHER DAY x 5 days.  8/2/21  Yes VICTOR MANUEL Gupta - CNP   triamterene-hydroCHLOROthiazide [facility]) 37.5-25 MG per tablet Take 1 tablet by mouth daily 7/7/21  Yes Librado Melgar MD   dexamethasone (DECADRON) 4 MG tablet TAKE 10 Tables (40 mg dose) EVERY Monday 7/7/21  Yes Librado Melgar MD   predniSONE (DELTASONE) 1 MG tablet Take 20 mg by mouth daily   Yes Historical Provider, MD   acyclovir (ZOVIRAX) 400 MG tablet Take 1 tablet by mouth 2 times daily 6/21/21 9/19/21 Yes May Rodriguez VICTOR MANUEL Frias - CNP   rosuvastatin (CRESTOR) 40 MG tablet TAKE ONE TABLET BY MOUTH DAILY 4/2/21  Yes Marielle Cid MD   prochlorperazine (COMPAZINE) 10 MG tablet Take 1 tablet by mouth every 6 hours as needed (nausea/vomiting) 3/25/21  Yes Linda Montero MD   fluticasone [facility]) 50 MCG/ACT nasal spray 1 spray by Each Nostril route daily 12/28/20  Yes Marielle Cid MD   miconazole (MICOTIN) 2 % cream Apply topically 2 times daily. 12/28/20  Yes Marielle Cid MD   VITAMIN E PO Take 1 capsule by mouth daily   Yes Historical Provider, MD   Multiple Vitamins-Minerals (THERAPEUTIC MULTIVITAMIN-MINERALS) tablet Take 1 tablet by mouth daily   Yes Historical Provider, MD   Ascorbic Acid (VITAMIN C PO) Take 1 tablet by mouth daily   Yes Historical Provider, MD   VITAMIN D PO Take 1 capsule by mouth daily   Yes Historical Provider, MD   aspirin 81 MG tablet Take 81 mg by mouth daily   Yes Historical Provider, MD   ibuprofen (ADVIL;MOTRIN) 600 MG tablet Take 1 tablet by mouth every 6 hours as needed for Pain 7/5/17  Yes Marielle Cid MD   fexofenadine-pseudoephedrine (ALLEGRA-D 12 HOUR)  MG per tablet Take 1 tablet by mouth 2 times daily as needed.  2/5/13  Yes Pierre Michel MD   omeprazole (PRILOSEC) 20 MG delayed release capsule Take 1 capsule by mouth 2 times daily (before meals) 6/21/21 7/21/21  VICTOR MANUEL Baig - CNP    Scheduled Meds:  Continuous Infusions:  PRN Meds:.        Recent Laboratory Data-     Lab Results   Component Value Date    WBC 10.1 08/06/2021    HGB 14.4 08/06/2021    HCT 42.8 08/06/2021    MCV 95.7 08/06/2021     08/06/2021    LYMPHOPCT 14.9 (L) 08/06/2021    RBC 4.47 08/06/2021    MCH 32.2 08/06/2021    MCHC 33.6 08/06/2021    RDW 12.9 08/06/2021    NEUTOPHILPCT 77.0 08/06/2021    MONOPCT 6.8 08/06/2021    BASOPCT 0.3 08/06/2021    NEUTROABS 7.80 (H) 08/06/2021    LYMPHSABS 1.51 08/06/2021    MONOSABS 0.69 08/06/2021    EOSABS 0.06 08/06/2021    BASOSABS 0.03 0.05  Serum troponin was normal but proBNP was elevated at 491. 24-hour urine collection showed nephrotic range proteinuria with total proteins of 6.6 g in a 24-hour period with a faint M spike in the urine measuring 1263 mg in a 24-hour timeframe     He will be recommended induction with Daratumumab/Velcade/dexamethasone  His bone marrow aspirate and biopsy will be reviewed.     All potential side effects were discussed.        3/29/2021  He will be given acyclovir for VZV prophylaxis and Bactrim DS for PCP prophylaxis and as needed Compazine for nausea. Katherine Luciano will be initiated today on daratumumab/Velcade/cyclophosphamide/dexamethasone regimen.  All potential side effects were discussed.  His baby aspirin has been held.        4/05/2021  He tolerated his treatment with daratumumab well.  He reports constipation.  Few days later he had some chills and body aches and it is likely related to his second dose of the Covid vaccine. No signs of peripheral neuropathy. 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 He will be given as needed tramadol for pain and omeprazole 20 mg daily for heartburn. To receive week 3 of chemotherapy regimen consisting of daratumumab/Velcade/dexamethasone/Cytoxan        4/19/2021  He has been experiencing significant side effects from daratumumab 2 to 3 days later in terms of headaches myalgias arthralgias.   He will be given prednisone 20 mg daily starting on 4/21/2021 and he will skip it Monday the day he gets the high-dose steroids prior to his Darzalex Faspro.     He will take as needed Tylenol or Advil and he will continue on his omeprazole daily.        4/26/2021  He has gained few pounds and has fluid retention from steroids and will be initiated on Maxide 37.5 mg daily. He will continue on prednisone except on Mondays. Received Darzalex Faspro today.        5/03/2021  He diuresed very well on Maxide and lost 6 pounds and it has been discontinued. He has had no reactions from Darzalex while maintained on prednisone  To receive week 6 of Darzalex Faspro today        5/10/2021  To receive week 7 of Darzalex Faspro . Continue Prednisone.        5/17/2021  To receive week 8 of Darzalex Faspro. He will have a repeat myeloma panel and urinary collection after week 9.        5/24/2021  To receive week 9 of Darzalex Faspro. He will continue on Cytoxan and Velcade     He will have a repeat myeloma panel and urinary collection after week 9. He will return for follow-up in 2 weeks.        6/07/2021  To receive Week #10 of Darzalex/Faspro. and Velcade   On Cytoxan        6/21/2021  To receive Week #11 of Darzalex/Faspro. and Velcade   On Cytoxan   Lab work reviewed, alfonso to proceed with chemo treatment. To return 7/7/2021 for  Week #12 of Darzalex/Faspro. and Velcade   On Cytoxan   Labs on 7/6/2021 7/7/2021  His repeat myeloma panel shows normalization of free kappa and free lambda light chains with normal FLC ratio indicating an excellent response to present therapy regimen  His serum beta-2 microglobulin is down in the normal range. Quantitative immunoglobulins still show low IgA, low IgG and low IgM. His 24-hour urine collection showed still nephrotic range proteinuria with 3.8 g of proteins in a 24-hour collection  Immunofixation showed faint free monoclonal lambda light chains. His last SPEP from 4/26 had shown an M spike of 0.1 but results from June not available and will be repeated  Back in March his 24-hour UPEP showed 6.6 g of protein in 24 hours.     He has achieved an excellent clinical response  To continue on present regimen  He will now go on biweekly Gregoria      7/19/2021  To

## 2021-08-10 DIAGNOSIS — E85.81 LIGHT CHAIN (AL) AMYLOIDOSIS (HCC): ICD-10-CM

## 2021-08-10 RX ORDER — OMEPRAZOLE 20 MG/1
20 CAPSULE, DELAYED RELEASE ORAL
Qty: 60 CAPSULE | Refills: 0 | Status: SHIPPED | OUTPATIENT
Start: 2021-08-10 | End: 2022-07-28

## 2021-08-12 ENCOUNTER — HOSPITAL ENCOUNTER (OUTPATIENT)
Age: 66
Discharge: HOME | End: 2021-08-12
Payer: COMMERCIAL

## 2021-08-12 PROCEDURE — 84166 PROTEIN E-PHORESIS/URINE/CSF: CPT

## 2021-08-12 PROCEDURE — 86334 IMMUNOFIX E-PHORESIS SERUM: CPT

## 2021-08-13 ENCOUNTER — HOSPITAL ENCOUNTER (OUTPATIENT)
Dept: INFUSION THERAPY | Age: 66
Discharge: HOME | End: 2021-08-13
Payer: COMMERCIAL

## 2021-08-13 DIAGNOSIS — E85.81 LIGHT CHAIN (AL) AMYLOIDOSIS (HCC): ICD-10-CM

## 2021-08-13 LAB
ADDENDUM ELECTROPHORESIS URINE RANDOM: NORMAL
ALBUMIN SERPL-MCNC: 2.9 G/DL (ref 3.5–5.2)
ALP BLD-CCNC: 66 U/L (ref 40–129)
ALT SERPL-CCNC: 26 U/L (ref 0–40)
ANION GAP SERPL CALCULATED.3IONS-SCNC: 6 MMOL/L (ref 7–16)
AST SERPL-CCNC: 23 U/L (ref 0–39)
BASOPHILS ABSOLUTE: 0.02 E9/L (ref 0–0.2)
BASOPHILS RELATIVE PERCENT: 0.2 % (ref 0–2)
BILIRUB SERPL-MCNC: <0.2 MG/DL (ref 0–1.2)
BUN BLDV-MCNC: 23 MG/DL (ref 6–23)
CALCIUM SERPL-MCNC: 8.7 MG/DL (ref 8.6–10.2)
CHLORIDE BLD-SCNC: 104 MMOL/L (ref 98–107)
CO2: 26 MMOL/L (ref 22–29)
CREAT SERPL-MCNC: 1.2 MG/DL (ref 0.7–1.2)
EOSINOPHILS ABSOLUTE: 0.01 E9/L (ref 0.05–0.5)
EOSINOPHILS RELATIVE PERCENT: 0.1 % (ref 0–6)
GFR AFRICAN AMERICAN: >60
GFR NON-AFRICAN AMERICAN: >60 ML/MIN/1.73
GLUCOSE BLD-MCNC: 122 MG/DL (ref 74–99)
HCT VFR BLD CALC: 44.5 % (ref 37–54)
HEMOGLOBIN: 14.8 G/DL (ref 12.5–16.5)
IMMATURE GRANULOCYTES #: 0.04 E9/L
IMMATURE GRANULOCYTES %: 0.4 % (ref 0–5)
IMMUNOFIXATION URINE: NORMAL
LYMPHOCYTES ABSOLUTE: 0.8 E9/L (ref 1.5–4)
LYMPHOCYTES RELATIVE PERCENT: 7.8 % (ref 20–42)
MCH RBC QN AUTO: 32 PG (ref 26–35)
MCHC RBC AUTO-ENTMCNC: 33.3 % (ref 32–34.5)
MCV RBC AUTO: 96.3 FL (ref 80–99.9)
MONOCYTES ABSOLUTE: 0.57 E9/L (ref 0.1–0.95)
MONOCYTES RELATIVE PERCENT: 5.6 % (ref 2–12)
NEUTROPHILS ABSOLUTE: 8.76 E9/L (ref 1.8–7.3)
NEUTROPHILS RELATIVE PERCENT: 85.9 % (ref 43–80)
PDW BLD-RTO: 12.7 FL (ref 11.5–15)
PLATELET # BLD: 242 E9/L (ref 130–450)
PMV BLD AUTO: 9.7 FL (ref 7–12)
POTASSIUM SERPL-SCNC: 4.7 MMOL/L (ref 3.5–5)
RBC # BLD: 4.62 E12/L (ref 3.8–5.8)
SODIUM BLD-SCNC: 136 MMOL/L (ref 132–146)
TOTAL PROTEIN: 5.2 G/DL (ref 6.4–8.3)
WBC # BLD: 10.2 E9/L (ref 4.5–11.5)

## 2021-08-13 PROCEDURE — 80053 COMPREHEN METABOLIC PANEL: CPT

## 2021-08-13 PROCEDURE — 85025 COMPLETE CBC W/AUTO DIFF WBC: CPT

## 2021-08-13 PROCEDURE — 36415 COLL VENOUS BLD VENIPUNCTURE: CPT

## 2021-08-13 NOTE — PROGRESS NOTES
[address] [facility]        Pt Name: Tayler Acevedo: 1955  Date of evaluation: 8/16/2021  Primary Care Physician: Jaun Carlson MD  Reason for evaluation:   Chief Complaint   Patient presents with    Other     Light chain (AL) amyloidosis    Follow-up    Treatment          Subjective:   Here for treatment and follow-up. Reports itchy rash and joint aches with tapering his prednisone. Follows at [facility] in anticipation of stem cell transplant in [address] [place] [address]. OBJECTIVE:  VITALS:  height is 5' 10\" (1.778 m) and weight is 158 lb 1.6 oz (71.7 kg). His temperature is 97.8 °F (36.6 °C). His blood pressure is 110/79 and his pulse is 100. His oxygen saturation is 96%. Physical Exam:  Performance Status: 0  Well developed, well nourished male  General: AAO to person, place, time, cooperative, in no acute distress. Head and neck: PERRLA, EOMI. Sclera non icteric. Oropharynx:  Clear. Neck: no JVD,  no adenopathy. Heart: Regular rate and regular rhythm, no murmur. Lungs: Clear to auscultation.   Abdomen: Soft, non-tender;no masses, no organomegaly. Extremities : No edema,no cyanosis, no clubbing. Neurologic:Cranial nerves grossly intact. No focal motor or sensory deficits   Skin:  No rash. Medications  Prior to Admission medications    Medication Sig Start Date End Date Taking? Authorizing Provider   omeprazole (PRILOSEC) 20 MG delayed release capsule Take 1 capsule by mouth 2 times daily (before meals) 8/10/21 9/9/21 Yes Minford VICTOR MANUEL Baker CNP   predniSONE (DELTASONE) 5 MG tablet TAKE 5 mg DAILY for 5 days, THEN Take 5 mg EVERY OTHER DAY x 5 days.  8/2/21  Yes Minford VICTOR MANUEL Baker CNP   triamterene-hydroCHLOROthiazide [facility]) 37.5-25 MG per tablet Take 1 tablet by mouth daily 7/7/21  Yes Cade Fowler MD   dexamethasone (DECADRON) 4 MG tablet TAKE 10 Tables (40 mg dose) EVERY Monday 7/7/21  Yes Cade Fowler MD predniSONE (DELTASONE) 1 MG tablet Take 20 mg by mouth daily   Yes Historical Provider, MD   acyclovir (ZOVIRAX) 400 MG tablet Take 1 tablet by mouth 2 times daily 6/21/21 9/19/21 Yes Vista Osgood, APRN - CNP   rosuvastatin (CRESTOR) 40 MG tablet TAKE ONE TABLET BY MOUTH DAILY 4/2/21  Yes Jaime Pederson MD   prochlorperazine (COMPAZINE) 10 MG tablet Take 1 tablet by mouth every 6 hours as needed (nausea/vomiting) 3/25/21  Yes Akosua Kasper MD   fluticasone (FLONASE) 50 MCG/ACT nasal spray 1 spray by Each Nostril route daily 12/28/20  Yes Jaime Pederson MD   miconazole (MICOTIN) 2 % cream Apply topically 2 times daily. 12/28/20  Yes Jaime Pederson MD   VITAMIN E PO Take 1 capsule by mouth daily   Yes Historical Provider, MD   Multiple Vitamins-Minerals (THERAPEUTIC MULTIVITAMIN-MINERALS) tablet Take 1 tablet by mouth daily   Yes Historical Provider, MD   Ascorbic Acid (VITAMIN C PO) Take 1 tablet by mouth daily   Yes Historical Provider, MD   VITAMIN D PO Take 1 capsule by mouth daily   Yes Historical Provider, MD   aspirin 81 MG tablet Take 81 mg by mouth daily   Yes Historical Provider, MD   ibuprofen (ADVIL;MOTRIN) 600 MG tablet Take 1 tablet by mouth every 6 hours as needed for Pain 7/5/17  Yes Jaime Pederson MD   fexofenadine-pseudoephedrine (ALLEGRA-D 12 HOUR)  MG per tablet Take 1 tablet by mouth 2 times daily as needed.  2/5/13  Yes Dossie Kocher, MD    Scheduled Meds:  Continuous Infusions:  PRN Meds:.        Recent Laboratory Data-     Lab Results   Component Value Date    WBC 10.2 08/13/2021    HGB 14.8 08/13/2021    HCT 44.5 08/13/2021    MCV 96.3 08/13/2021     08/13/2021    LYMPHOPCT 7.8 (L) 08/13/2021    RBC 4.62 08/13/2021    MCH 32.0 08/13/2021    MCHC 33.3 08/13/2021    RDW 12.7 08/13/2021    NEUTOPHILPCT 85.9 (H) 08/13/2021    MONOPCT 5.6 08/13/2021    BASOPCT 0.2 08/13/2021    NEUTROABS 8.76 (H) 08/13/2021    LYMPHSABS 0.80 (L) 08/13/2021    MONOSABS 0.57 08/13/2021 EOSABS 0.01 (L) 08/13/2021    BASOSABS 0.02 08/13/2021       Lab Results   Component Value Date     08/13/2021    K 4.7 08/13/2021     08/13/2021    CO2 26 08/13/2021    BUN 23 08/13/2021    CREATININE 1.2 08/13/2021    GLUCOSE 122 (H) 08/13/2021    CALCIUM 8.7 08/13/2021    PROT 5.2 (L) 08/13/2021    LABALBU 2.9 (L) 08/13/2021    BILITOT <0.2 08/13/2021    ALKPHOS 66 08/13/2021    AST 23 08/13/2021    ALT 26 08/13/2021    LABGLOM >60 08/13/2021    GFRAA >60 08/13/2021           Radiology-  No results found. 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     His work-up will be completed to include the following:     CBC, CMP, LDH, B2 microglobulin, CRP, serum protein electrophoresis with reflex to immunofixation, serum free kappa and lambda light chains with ratio, quantitative immunoglobulins, 24-hour urine collection for quantitative immunoglobulins and immunofixation.     Serum troponin, proBNP and 2D echo will be done to exclude cardiac amyloidosis. TSH  Factor X assay as well as PT and APTT will be done     Will be scheduled for a bone marrow aspirate and biopsy     Once work-up completed options of therapy with bortezomib/daratumumab will be addressed     He is encouraged to receive the COVID-19 vaccine        3/15/2021  Awaiting his bone marrow aspirate and biopsy and his 2D echo which are both pending. His CBC was in the normal range with a hemoglobin of 15.3, normal WBC count of 6.2, normal MCV of 90 with a normal platelet count of 239.  His TSH was normal at 3.88  Serum LDH was elevated at 266. B2 microglobulin was markedly elevated at 13.6. Quantitative immunoglobulins showed suppression of IgG with normal IgA and IgM. Serum electrophoresis showed an M spike faint measuring 0.1.   Serum FLC's showed markedly elevated free lambda light chain at 311 with abnormal ratio of 0.05  Serum troponin was normal but proBNP was elevated at 491. 24-hour urine collection showed nephrotic range proteinuria with total proteins of 6.6 g in a 24-hour period with a faint M spike in the urine measuring 1263 mg in a 24-hour timeframe     He will be recommended induction with Daratumumab/Velcade/dexamethasone  His bone marrow aspirate and biopsy will be reviewed.     All potential side effects were discussed.        3/29/2021  He will be given acyclovir for VZV prophylaxis and Bactrim DS for PCP prophylaxis and as needed Compazine for nausea. [facility] will be initiated today on daratumumab/Velcade/cyclophosphamide/dexamethasone regimen.  All potential side effects were discussed.  His baby aspirin has been held.        4/05/2021  He tolerated his treatment with daratumumab well.  He reports constipation.  Few days later he had some chills and body aches and it is likely related to his second dose of the Covid vaccine. No signs of peripheral neuropathy. 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 He will be given as needed tramadol for pain and omeprazole 20 mg daily for heartburn. To receive week 3 of chemotherapy regimen consisting of daratumumab/Velcade/dexamethasone/Cytoxan        4/19/2021  He has been experiencing significant side effects from daratumumab 2 to 3 days later in terms of headaches myalgias arthralgias.   He will be given prednisone 20 mg daily starting on 4/21/2021 and he will skip it Monday the day he gets the high-dose steroids prior to his Darzalex Faspro.     He will take as needed Tylenol or Advil and he will continue on his omeprazole daily.        4/26/2021  He has gained few pounds and has fluid retention from steroids and will be initiated on Maxide 37.5 mg daily. He will continue on prednisone except on Mondays. Received Darzalex Faspro today.        5/03/2021  He diuresed very well on Maxide and lost 6 pounds and it has been discontinued. He has had no reactions from Darzalex while maintained on prednisone  To receive week 6 of Darzalex Faspro today        5/10/2021  To receive week 7 of Darzalex Faspro . Continue Prednisone.        5/17/2021  To receive week 8 of Darzalex Faspro. He will have a repeat myeloma panel and urinary collection after week 9.        5/24/2021  To receive week 9 of Darzalex Faspro. He will continue on Cytoxan and Velcade     He will have a repeat myeloma panel and urinary collection after week 9. He will return for follow-up in 2 weeks.        6/07/2021  To receive Week #10 of Darzalex/Faspro. and Velcade   On Cytoxan        6/21/2021  To receive Week #11 of Darzalex/Faspro. and Velcade   On Cytoxan   Lab work reviewed, okay to proceed with chemo treatment. To return 7/7/2021 for  Week #12 of Darzalex/Faspro. and Velcade   On Cytoxan   Labs on 7/6/2021 7/7/2021  His repeat myeloma panel shows normalization of free kappa and free lambda light chains with normal FLC ratio indicating an excellent response to present therapy regimen  His serum beta-2 microglobulin is down in the normal range. Quantitative immunoglobulins still show low IgA, low IgG and low IgM. His 24-hour urine collection showed still nephrotic range proteinuria with 3.8 g of proteins in a 24-hour collection  Immunofixation showed faint free monoclonal lambda light chains. His last SPEP from 4/26 had shown an M spike of 0.1 but results from June not available and will be repeated  Back in March his 24-hour UPEP showed 6.6 g of protein in 24 hours.     He has achieved an excellent clinical response  To continue on present regimen  He

## 2021-08-16 ENCOUNTER — HOSPITAL ENCOUNTER (OUTPATIENT)
Dept: INFUSION THERAPY | Age: 66
Discharge: HOME | End: 2021-08-16
Payer: COMMERCIAL

## 2021-08-16 ENCOUNTER — OFFICE VISIT (OUTPATIENT)
Dept: ONCOLOGY | Age: 66
End: 2021-08-16
Payer: COMMERCIAL

## 2021-08-16 VITALS
WEIGHT: 158.1 LBS | OXYGEN SATURATION: 96 % | BODY MASS INDEX: 22.63 KG/M2 | SYSTOLIC BLOOD PRESSURE: 110 MMHG | HEIGHT: 70 IN | TEMPERATURE: 97.8 F | HEART RATE: 100 BPM | DIASTOLIC BLOOD PRESSURE: 79 MMHG

## 2021-08-16 DIAGNOSIS — E85.81 LIGHT CHAIN (AL) AMYLOIDOSIS (HCC): Primary | ICD-10-CM

## 2021-08-16 PROCEDURE — 96401 CHEMO ANTI-NEOPL SQ/IM: CPT

## 2021-08-16 PROCEDURE — 99212 OFFICE O/P EST SF 10 MIN: CPT

## 2021-08-16 PROCEDURE — 2580000003 HC RX 258: Performed by: INTERNAL MEDICINE

## 2021-08-16 PROCEDURE — 6360000002 HC RX W HCPCS: Performed by: INTERNAL MEDICINE

## 2021-08-16 RX ORDER — DIPHENHYDRAMINE HYDROCHLORIDE 50 MG/ML
50 INJECTION INTRAMUSCULAR; INTRAVENOUS ONCE
Status: CANCELLED | OUTPATIENT
Start: 2021-08-16 | End: 2021-08-16

## 2021-08-16 RX ORDER — DIPHENHYDRAMINE HYDROCHLORIDE 50 MG/ML
50 INJECTION INTRAMUSCULAR; INTRAVENOUS ONCE
Status: CANCELLED | OUTPATIENT
Start: 2021-09-27 | End: 2021-09-21

## 2021-08-16 RX ORDER — ACETAMINOPHEN 325 MG/1
650 TABLET ORAL ONCE
Status: CANCELLED | OUTPATIENT
Start: 2021-09-27

## 2021-08-16 RX ORDER — SODIUM CHLORIDE 9 MG/ML
INJECTION, SOLUTION INTRAVENOUS CONTINUOUS
Status: CANCELLED | OUTPATIENT
Start: 2021-08-23

## 2021-08-16 RX ORDER — HEPARIN SODIUM (PORCINE) LOCK FLUSH IV SOLN 100 UNIT/ML 100 UNIT/ML
500 SOLUTION INTRAVENOUS PRN
Status: CANCELLED | OUTPATIENT
Start: 2021-08-30

## 2021-08-16 RX ORDER — PREDNISONE 10 MG/1
TABLET ORAL
Qty: 40 TABLET | Refills: 0 | Status: SHIPPED
Start: 2021-08-16 | End: 2022-04-21 | Stop reason: ALTCHOICE

## 2021-08-16 RX ORDER — EPINEPHRINE 1 MG/ML
0.3 INJECTION, SOLUTION, CONCENTRATE INTRAVENOUS PRN
Status: CANCELLED | OUTPATIENT
Start: 2021-08-30

## 2021-08-16 RX ORDER — SODIUM CHLORIDE 0.9 % (FLUSH) 0.9 %
5 SYRINGE (ML) INJECTION PRN
Status: CANCELLED | OUTPATIENT
Start: 2021-08-23

## 2021-08-16 RX ORDER — METHYLPREDNISOLONE SODIUM SUCCINATE 125 MG/2ML
125 INJECTION, POWDER, LYOPHILIZED, FOR SOLUTION INTRAMUSCULAR; INTRAVENOUS ONCE
Status: CANCELLED | OUTPATIENT
Start: 2021-08-23 | End: 2021-08-23

## 2021-08-16 RX ORDER — HEPARIN SODIUM (PORCINE) LOCK FLUSH IV SOLN 100 UNIT/ML 100 UNIT/ML
500 SOLUTION INTRAVENOUS PRN
Status: CANCELLED | OUTPATIENT
Start: 2021-09-27

## 2021-08-16 RX ORDER — METHYLPREDNISOLONE SODIUM SUCCINATE 125 MG/2ML
60 INJECTION, POWDER, LYOPHILIZED, FOR SOLUTION INTRAMUSCULAR; INTRAVENOUS ONCE
Status: CANCELLED | OUTPATIENT
Start: 2021-09-27

## 2021-08-16 RX ORDER — DIPHENHYDRAMINE HYDROCHLORIDE 50 MG/ML
50 INJECTION INTRAMUSCULAR; INTRAVENOUS ONCE
Status: CANCELLED | OUTPATIENT
Start: 2021-08-30 | End: 2021-08-30

## 2021-08-16 RX ORDER — SODIUM CHLORIDE 9 MG/ML
INJECTION, SOLUTION INTRAVENOUS CONTINUOUS
Status: CANCELLED | OUTPATIENT
Start: 2021-09-20

## 2021-08-16 RX ORDER — SODIUM CHLORIDE 0.9 % (FLUSH) 0.9 %
10 SYRINGE (ML) INJECTION PRN
Status: CANCELLED | OUTPATIENT
Start: 2021-09-27

## 2021-08-16 RX ORDER — DIPHENHYDRAMINE HYDROCHLORIDE 50 MG/ML
25 INJECTION INTRAMUSCULAR; INTRAVENOUS ONCE
Status: CANCELLED | OUTPATIENT
Start: 2021-09-27

## 2021-08-16 RX ORDER — EPINEPHRINE 1 MG/ML
0.3 INJECTION, SOLUTION, CONCENTRATE INTRAVENOUS PRN
Status: CANCELLED | OUTPATIENT
Start: 2021-09-13

## 2021-08-16 RX ORDER — ONDANSETRON 2 MG/ML
8 INJECTION INTRAMUSCULAR; INTRAVENOUS ONCE
Status: CANCELLED | OUTPATIENT
Start: 2021-09-27 | End: 2021-09-21

## 2021-08-16 RX ORDER — SODIUM CHLORIDE 9 MG/ML
20 INJECTION, SOLUTION INTRAVENOUS ONCE
Status: CANCELLED | OUTPATIENT
Start: 2021-09-27 | End: 2021-09-21

## 2021-08-16 RX ORDER — METHYLPREDNISOLONE SODIUM SUCCINATE 125 MG/2ML
125 INJECTION, POWDER, LYOPHILIZED, FOR SOLUTION INTRAMUSCULAR; INTRAVENOUS ONCE
Status: CANCELLED | OUTPATIENT
Start: 2021-08-16 | End: 2021-08-16

## 2021-08-16 RX ORDER — SODIUM CHLORIDE 0.9 % (FLUSH) 0.9 %
10 SYRINGE (ML) INJECTION PRN
Status: CANCELLED | OUTPATIENT
Start: 2021-09-20

## 2021-08-16 RX ORDER — HEPARIN SODIUM (PORCINE) LOCK FLUSH IV SOLN 100 UNIT/ML 100 UNIT/ML
500 SOLUTION INTRAVENOUS PRN
Status: CANCELLED | OUTPATIENT
Start: 2021-09-13

## 2021-08-16 RX ORDER — SODIUM CHLORIDE 0.9 % (FLUSH) 0.9 %
10 SYRINGE (ML) INJECTION PRN
Status: CANCELLED | OUTPATIENT
Start: 2021-08-16

## 2021-08-16 RX ORDER — METHYLPREDNISOLONE SODIUM SUCCINATE 125 MG/2ML
125 INJECTION, POWDER, LYOPHILIZED, FOR SOLUTION INTRAMUSCULAR; INTRAVENOUS ONCE
Status: CANCELLED | OUTPATIENT
Start: 2021-08-30 | End: 2021-08-30

## 2021-08-16 RX ORDER — DIPHENHYDRAMINE HCL 25 MG
25 TABLET ORAL ONCE
Status: CANCELLED
Start: 2021-08-30 | End: 2021-08-30

## 2021-08-16 RX ORDER — HEPARIN SODIUM (PORCINE) LOCK FLUSH IV SOLN 100 UNIT/ML 100 UNIT/ML
500 SOLUTION INTRAVENOUS PRN
Status: CANCELLED | OUTPATIENT
Start: 2021-08-16

## 2021-08-16 RX ORDER — SODIUM CHLORIDE 0.9 % (FLUSH) 0.9 %
5 SYRINGE (ML) INJECTION PRN
Status: CANCELLED | OUTPATIENT
Start: 2021-08-16

## 2021-08-16 RX ORDER — SODIUM CHLORIDE 9 MG/ML
INJECTION, SOLUTION INTRAVENOUS CONTINUOUS
Status: CANCELLED | OUTPATIENT
Start: 2021-08-30

## 2021-08-16 RX ORDER — DIPHENHYDRAMINE HYDROCHLORIDE 50 MG/ML
50 INJECTION INTRAMUSCULAR; INTRAVENOUS ONCE
Status: CANCELLED | OUTPATIENT
Start: 2021-08-23 | End: 2021-08-23

## 2021-08-16 RX ORDER — DIPHENHYDRAMINE HYDROCHLORIDE 50 MG/ML
50 INJECTION INTRAMUSCULAR; INTRAVENOUS ONCE
Status: CANCELLED | OUTPATIENT
Start: 2021-09-20 | End: 2021-09-14

## 2021-08-16 RX ORDER — SODIUM CHLORIDE 9 MG/ML
INJECTION, SOLUTION INTRAVENOUS CONTINUOUS
Status: CANCELLED | OUTPATIENT
Start: 2021-08-16

## 2021-08-16 RX ORDER — EPINEPHRINE 1 MG/ML
0.3 INJECTION, SOLUTION, CONCENTRATE INTRAVENOUS PRN
Status: CANCELLED | OUTPATIENT
Start: 2021-09-20

## 2021-08-16 RX ORDER — METHYLPREDNISOLONE SODIUM SUCCINATE 125 MG/2ML
125 INJECTION, POWDER, LYOPHILIZED, FOR SOLUTION INTRAMUSCULAR; INTRAVENOUS ONCE
Status: CANCELLED | OUTPATIENT
Start: 2021-09-13 | End: 2021-09-13

## 2021-08-16 RX ORDER — ACETAMINOPHEN 325 MG/1
650 TABLET ORAL ONCE
Status: CANCELLED | OUTPATIENT
Start: 2021-08-30

## 2021-08-16 RX ORDER — SODIUM CHLORIDE 0.9 % (FLUSH) 0.9 %
5 SYRINGE (ML) INJECTION PRN
Status: CANCELLED | OUTPATIENT
Start: 2021-09-20

## 2021-08-16 RX ORDER — DIPHENHYDRAMINE HYDROCHLORIDE 50 MG/ML
50 INJECTION INTRAMUSCULAR; INTRAVENOUS ONCE
Status: CANCELLED | OUTPATIENT
Start: 2021-09-13 | End: 2021-09-13

## 2021-08-16 RX ORDER — METHYLPREDNISOLONE SODIUM SUCCINATE 125 MG/2ML
125 INJECTION, POWDER, LYOPHILIZED, FOR SOLUTION INTRAMUSCULAR; INTRAVENOUS ONCE
Status: CANCELLED | OUTPATIENT
Start: 2021-09-20 | End: 2021-09-14

## 2021-08-16 RX ORDER — SODIUM CHLORIDE 9 MG/ML
20 INJECTION, SOLUTION INTRAVENOUS ONCE
Status: CANCELLED | OUTPATIENT
Start: 2021-08-23 | End: 2021-08-23

## 2021-08-16 RX ORDER — EPINEPHRINE 1 MG/ML
0.3 INJECTION, SOLUTION, CONCENTRATE INTRAVENOUS PRN
Status: CANCELLED | OUTPATIENT
Start: 2021-08-16

## 2021-08-16 RX ORDER — SODIUM CHLORIDE 9 MG/ML
INJECTION, SOLUTION INTRAVENOUS CONTINUOUS
Status: CANCELLED | OUTPATIENT
Start: 2021-09-27

## 2021-08-16 RX ORDER — HEPARIN SODIUM (PORCINE) LOCK FLUSH IV SOLN 100 UNIT/ML 100 UNIT/ML
500 SOLUTION INTRAVENOUS PRN
Status: CANCELLED | OUTPATIENT
Start: 2021-09-20

## 2021-08-16 RX ORDER — SODIUM CHLORIDE 0.9 % (FLUSH) 0.9 %
10 SYRINGE (ML) INJECTION PRN
Status: CANCELLED | OUTPATIENT
Start: 2021-08-30

## 2021-08-16 RX ORDER — METHYLPREDNISOLONE SODIUM SUCCINATE 125 MG/2ML
125 INJECTION, POWDER, LYOPHILIZED, FOR SOLUTION INTRAMUSCULAR; INTRAVENOUS ONCE
Status: CANCELLED | OUTPATIENT
Start: 2021-09-27 | End: 2021-09-21

## 2021-08-16 RX ORDER — SODIUM CHLORIDE 0.9 % (FLUSH) 0.9 %
10 SYRINGE (ML) INJECTION PRN
Status: CANCELLED | OUTPATIENT
Start: 2021-08-23

## 2021-08-16 RX ORDER — SODIUM CHLORIDE 0.9 % (FLUSH) 0.9 %
5 SYRINGE (ML) INJECTION PRN
Status: CANCELLED | OUTPATIENT
Start: 2021-09-27

## 2021-08-16 RX ORDER — SODIUM CHLORIDE 9 MG/ML
INJECTION, SOLUTION INTRAVENOUS CONTINUOUS
Status: CANCELLED | OUTPATIENT
Start: 2021-09-13

## 2021-08-16 RX ORDER — SODIUM CHLORIDE 0.9 % (FLUSH) 0.9 %
5 SYRINGE (ML) INJECTION PRN
Status: CANCELLED | OUTPATIENT
Start: 2021-09-13

## 2021-08-16 RX ORDER — HEPARIN SODIUM (PORCINE) LOCK FLUSH IV SOLN 100 UNIT/ML 100 UNIT/ML
500 SOLUTION INTRAVENOUS PRN
Status: CANCELLED | OUTPATIENT
Start: 2021-08-23

## 2021-08-16 RX ORDER — EPINEPHRINE 1 MG/ML
0.3 INJECTION, SOLUTION, CONCENTRATE INTRAVENOUS PRN
Status: CANCELLED | OUTPATIENT
Start: 2021-09-27

## 2021-08-16 RX ORDER — SODIUM CHLORIDE 0.9 % (FLUSH) 0.9 %
10 SYRINGE (ML) INJECTION PRN
Status: CANCELLED | OUTPATIENT
Start: 2021-09-13

## 2021-08-16 RX ORDER — EPINEPHRINE 1 MG/ML
0.3 INJECTION, SOLUTION, CONCENTRATE INTRAVENOUS PRN
Status: CANCELLED | OUTPATIENT
Start: 2021-08-23

## 2021-08-16 RX ORDER — SODIUM CHLORIDE 0.9 % (FLUSH) 0.9 %
5 SYRINGE (ML) INJECTION PRN
Status: CANCELLED | OUTPATIENT
Start: 2021-08-30

## 2021-08-16 RX ADMIN — BORTEZOMIB 2.75 MG: 3.5 INJECTION, POWDER, LYOPHILIZED, FOR SOLUTION INTRAVENOUS; SUBCUTANEOUS at 11:08

## 2021-08-18 ENCOUNTER — CLINICAL DOCUMENTATION (OUTPATIENT)
Dept: INFUSION THERAPY | Age: 66
End: 2021-08-18

## 2021-08-18 NOTE — PROGRESS NOTES
CLINICAL PHARMACY NOTE: MEDS TO BEDS    Total # of Prescriptions Filled: 1   The following medications were delivered to the patient:  · Prednisone 10 mg    Additional Documentation:

## 2021-08-18 NOTE — PROGRESS NOTES
Patient came into the office today with complaints of a red raised rash at the site of his Velcade injection that he received on Monday. He said he feels warm and he complains of scratchy throat and feels he might be getting a cold. His temp is 98.0 now. He did have a raised warm red area on the posterior lateral aspect of his left arm which had a small rash scattered throughout it. I Spoke with Dr. Nile Alexander and he wanted the patient to take Benadryl. I gave him these instructions and I told him to call me tomorrow if the rash was no better. He did state he had an appointment at the [facility] tomorrow. I told me that I feel well he should call and cancel the appointment.

## 2021-08-20 ENCOUNTER — HOSPITAL ENCOUNTER (OUTPATIENT)
Dept: INFUSION THERAPY | Age: 66
Discharge: HOME | End: 2021-08-20
Payer: COMMERCIAL

## 2021-08-20 DIAGNOSIS — E85.81 LIGHT CHAIN (AL) AMYLOIDOSIS (HCC): ICD-10-CM

## 2021-08-20 LAB
ALBUMIN SERPL-MCNC: 2.4 G/DL (ref 3.5–5.2)
ALP BLD-CCNC: 68 U/L (ref 40–129)
ALT SERPL-CCNC: 26 U/L (ref 0–40)
ANION GAP SERPL CALCULATED.3IONS-SCNC: 9 MMOL/L (ref 7–16)
AST SERPL-CCNC: 22 U/L (ref 0–39)
BASOPHILS ABSOLUTE: 0.03 E9/L (ref 0–0.2)
BASOPHILS RELATIVE PERCENT: 0.3 % (ref 0–2)
BILIRUB SERPL-MCNC: <0.2 MG/DL (ref 0–1.2)
BUN BLDV-MCNC: 22 MG/DL (ref 6–23)
CALCIUM SERPL-MCNC: 8.5 MG/DL (ref 8.6–10.2)
CHLORIDE BLD-SCNC: 102 MMOL/L (ref 98–107)
CO2: 27 MMOL/L (ref 22–29)
CREAT SERPL-MCNC: 1.1 MG/DL (ref 0.7–1.2)
EOSINOPHILS ABSOLUTE: 0.09 E9/L (ref 0.05–0.5)
EOSINOPHILS RELATIVE PERCENT: 0.9 % (ref 0–6)
GFR AFRICAN AMERICAN: >60
GFR NON-AFRICAN AMERICAN: >60 ML/MIN/1.73
GLUCOSE BLD-MCNC: 102 MG/DL (ref 74–99)
HCT VFR BLD CALC: 41.6 % (ref 37–54)
HEMOGLOBIN: 14 G/DL (ref 12.5–16.5)
IMMATURE GRANULOCYTES #: 0.06 E9/L
IMMATURE GRANULOCYTES %: 0.6 % (ref 0–5)
LYMPHOCYTES ABSOLUTE: 0.78 E9/L (ref 1.5–4)
LYMPHOCYTES RELATIVE PERCENT: 7.8 % (ref 20–42)
MCH RBC QN AUTO: 32.8 PG (ref 26–35)
MCHC RBC AUTO-ENTMCNC: 33.7 % (ref 32–34.5)
MCV RBC AUTO: 97.4 FL (ref 80–99.9)
MONOCYTES ABSOLUTE: 0.91 E9/L (ref 0.1–0.95)
MONOCYTES RELATIVE PERCENT: 9.1 % (ref 2–12)
NEUTROPHILS ABSOLUTE: 8.15 E9/L (ref 1.8–7.3)
NEUTROPHILS RELATIVE PERCENT: 81.3 % (ref 43–80)
PDW BLD-RTO: 12.6 FL (ref 11.5–15)
PLATELET # BLD: 216 E9/L (ref 130–450)
PMV BLD AUTO: 10 FL (ref 7–12)
POTASSIUM SERPL-SCNC: 4 MMOL/L (ref 3.5–5)
RBC # BLD: 4.27 E12/L (ref 3.8–5.8)
SODIUM BLD-SCNC: 138 MMOL/L (ref 132–146)
TOTAL PROTEIN: 4.5 G/DL (ref 6.4–8.3)
WBC # BLD: 10 E9/L (ref 4.5–11.5)

## 2021-08-20 PROCEDURE — 36415 COLL VENOUS BLD VENIPUNCTURE: CPT

## 2021-08-20 PROCEDURE — 85025 COMPLETE CBC W/AUTO DIFF WBC: CPT

## 2021-08-20 PROCEDURE — 80053 COMPREHEN METABOLIC PANEL: CPT

## 2021-08-20 NOTE — PROGRESS NOTES
ONCOLOGY  NP VISIT         8/20/2021      NAME:  Remy Garza    YOB: 1955      ALLERGIES:  Codeine, Lipitor, and Vytorin [ezetimibe-simvastatin]         Current Outpatient Medications   Medication Sig Dispense Refill    predniSONE (DELTASONE) 10 MG tablet Take 2 Tablets Daily for 3 days  THEN Take 1 tablet Daily for 3 days THEN take 1/2 Tablet Daily for 3 days. Then discontinue 40 tablet 0    omeprazole (PRILOSEC) 20 MG delayed release capsule Take 1 capsule by mouth 2 times daily (before meals) 60 capsule 0    predniSONE (DELTASONE) 5 MG tablet TAKE 5 mg DAILY for 5 days, THEN Take 5 mg EVERY OTHER DAY x 5 days. 10 tablet 0    triamterene-hydroCHLOROthiazide (MAXZIDE-25) 37.5-25 MG per tablet Take 1 tablet by mouth daily 30 tablet 0    dexamethasone (DECADRON) 4 MG tablet TAKE 10 Tables (40 mg dose) EVERY Monday 40 tablet 2    predniSONE (DELTASONE) 1 MG tablet Take 20 mg by mouth daily      acyclovir (ZOVIRAX) 400 MG tablet Take 1 tablet by mouth 2 times daily 180 tablet 0    rosuvastatin (CRESTOR) 40 MG tablet TAKE ONE TABLET BY MOUTH DAILY 90 tablet 3    prochlorperazine (COMPAZINE) 10 MG tablet Take 1 tablet by mouth every 6 hours as needed (nausea/vomiting) 40 tablet 3    fluticasone (FLONASE) 50 MCG/ACT nasal spray 1 spray by Each Nostril route daily 2 Bottle 1    miconazole (MICOTIN) 2 % cream Apply topically 2 times daily. 28 g 1    VITAMIN E PO Take 1 capsule by mouth daily      Multiple Vitamins-Minerals (THERAPEUTIC MULTIVITAMIN-MINERALS) tablet Take 1 tablet by mouth daily      Ascorbic Acid (VITAMIN C PO) Take 1 tablet by mouth daily      VITAMIN D PO Take 1 capsule by mouth daily      aspirin 81 MG tablet Take 81 mg by mouth daily      ibuprofen (ADVIL;MOTRIN) 600 MG tablet Take 1 tablet by mouth every 6 hours as needed for Pain 60 tablet 1    fexofenadine-pseudoephedrine (ALLEGRA-D 12 HOUR)  MG per tablet Take 1 tablet by mouth 2 times daily as needed. 60 tablet 2     No current facility-administered medications for this visit. SUBJECTIVE:   Here for  Velcade and follow up. C/O nasal congestion. On 8/18/2021 he complainted of a red raised rash at the site of his Velcade injection that he received on Monday. He said he feels warm and he complains of scratchy throat and feels he might be getting a cold. His temp was 98.0. He did have a raised warm red area on the posterior lateral aspect of his left arm which had a small rash scattered throughout it. I Spoke with Dr. Boris Cintron and he wanted the patient to take Benadryl 25 mg po. OBJECTIVE:   /77   Pulse 106   Temp 97.5 °F (36.4 °C)   Ht 5' 10\" (1.778 m)   Wt 158 lb (71.7 kg)   SpO2 97%   BMI 22.67 kg/m²   Physical Examination:   Performance Status: 0  Well developed, well nourished male  General: AAO to person, place, time, cooperative, in no acute distress. Head and neck: PERRLA, EOMI. Sclera non icteric. Oropharynx:  Clear. Neck: no JVD,  no adenopathy. Heart: Regular rate and regular rhythm, no murmur. Lungs: Clear to auscultation.   Abdomen: Soft, non-tender;no masses, no organomegaly. Extremities : No edema. Neurologic:Cranial nerves grossly intact. No focal motor or sensory deficits   Skin:  No rash.         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     His work-up will be completed to include the following:     CBC, CMP, LDH, B2 microglobulin, CRP, serum protein electrophoresis with reflex to immunofixation, serum free kappa and lambda light chains with ratio, quantitative immunoglobulins, 24-hour urine collection for quantitative immunoglobulins and immunofixation.     Serum troponin, proBNP and 2D echo will be done to exclude cardiac amyloidosis. TSH  Factor X assay as well as PT and APTT will be done     Will be scheduled for a bone marrow aspirate and biopsy     Once work-up completed options of therapy with bortezomib/daratumumab will be addressed     He is encouraged to receive the COVID-19 vaccine        3/15/2021  Awaiting his bone marrow aspirate and biopsy and his 2D echo which are both pending. His CBC was in the normal range with a hemoglobin of 15.3, normal WBC count of 6.2, normal MCV of 90 with a normal platelet count of 065.  His TSH was normal at 3.88  Serum LDH was elevated at 266. B2 microglobulin was markedly elevated at 13.6. Quantitative immunoglobulins showed suppression of IgG with normal IgA and IgM. Serum electrophoresis showed an M spike faint measuring 0.1. Serum FLC's showed markedly elevated free lambda light chain at 311 with abnormal ratio of 0.05  Serum troponin was normal but proBNP was elevated at 491.   24-hour urine collection showed nephrotic range proteinuria with total proteins of 6.6 g in a 24-hour period with a faint M spike in the urine measuring 1263 mg in a 24-hour timeframe     He will be recommended induction with Daratumumab/Velcade/dexamethasone  His bone marrow aspirate and biopsy will be reviewed.     All potential side effects were discussed.        3/29/2021  He will be given acyclovir for VZV prophylaxis and Bactrim DS for PCP prophylaxis and as needed Compazine for nausea. Yudy Naylor will be initiated today on 9.        5/24/2021  To receive week 9 of Darzalex Arun Links will continue on Cytoxan and Velcade     He will have a repeat myeloma panel and urinary collection after week 9. He will return for follow-up in 2 weeks.        6/07/2021  To receive Week #10 of Darzalex/Faspro. and Velcade   On Cytoxan         6/21/2021  To receive Week #11 of Darzalex/Faspro. and Velcade   On Cytoxan   Lab work reviewed, okay to proceed with chemo treatment.      To return 7/7/2021 for [facility] Darzalex/Faspro. and Velcade   On Cytoxan   Labs on 7/6/2021 7/7/2021  His repeat myeloma panel shows normalization of free kappa and free lambda light chains with normal FLC ratio indicating an excellent response to present therapy regimen  His serum beta-2 microglobulin is down in the normal range. Quantitative immunoglobulins still show low IgA, low IgG and low IgM.   His 24-hour urine collection showed still nephrotic range proteinuria with 3.8 g of proteins in a 24-hour collection  Immunofixation showed faint free monoclonal lambda light chains.     His last SPEP from 4/26 had shown an M spike of 0.1 but results from June not available and will be repeated  Back in March his 24-hour UPEP showed 6.6 g of protein in 24 hours.     He has achieved an excellent clinical response  To continue on present regimen  He will now go on biweekly Gregoria        7/19/2021  To receive daratumumab/Velcade/Dex  Cytoxan to be DC'd     He will be referred to a tertiary care center for consideration of autologous stem cell transplant.     He will return in 2 weeks for chemo        8/02/2021  To receive Day #1 Cycle #5 daratumumab/Velcade/Dex     To return 8/09/2021 for Day #8 Velcade  Cycle #5   Daratumumab will be now on every 4 weeks basis  He is to be tapered off prednisone  Pretransplant work-up will be initiated with anticipation of stem cell transplant at [address] in November 8/09/2021   To receive Day #8 Cycle #5 Velcade. Continue on weekly dexamethasone. He will have 1 last dose of daratumumab in early September   He will start stem cell collection in October with anticipation of stem cell transplant in November     to return 8/16/2021 for Day #15 Cycle #5 Velcade        8/16/2021  To receive Day #15 Cycle #5 Velcade today.     Continue on weekly dexamethasone. He will have 1 last dose of daratumumab on 8/30/2021  He will start stem cell collection in October with anticipation of stem cell transplant in November     To return 8/23/2021 for Day #22 Cycle #5 Velcade. He will return on 8/30/2021 for Velcade and daratumumab     We will see me back on 9/13/2021  Will be resumed on prednisone tapering schedule for his rash and joint aches      8/23/2021  To receive Day #22 Cycle #5 Velcade today. Lab work reviewed, okay to proceed with chemo treatment.      Continue on weekly dexamethasone. To return 8/30/2021 for last dose of daratumumab Day #1 Cycle #6  Labs on same day. He will start stem cell collection in October with anticipation of stem cell transplant in November.         Chava Kirkpatrick, [address]:  [phone]

## 2021-08-23 ENCOUNTER — OFFICE VISIT (OUTPATIENT)
Dept: ONCOLOGY | Age: 66
End: 2021-08-23
Payer: COMMERCIAL

## 2021-08-23 ENCOUNTER — HOSPITAL ENCOUNTER (OUTPATIENT)
Dept: INFUSION THERAPY | Age: 66
Discharge: HOME | End: 2021-08-23
Payer: COMMERCIAL

## 2021-08-23 VITALS
OXYGEN SATURATION: 97 % | BODY MASS INDEX: 22.62 KG/M2 | DIASTOLIC BLOOD PRESSURE: 77 MMHG | TEMPERATURE: 97.5 F | SYSTOLIC BLOOD PRESSURE: 121 MMHG | HEIGHT: 70 IN | HEART RATE: 106 BPM | WEIGHT: 158 LBS

## 2021-08-23 DIAGNOSIS — E85.81 LIGHT CHAIN (AL) AMYLOIDOSIS (HCC): Primary | ICD-10-CM

## 2021-08-23 PROCEDURE — 2580000003 HC RX 258: Performed by: INTERNAL MEDICINE

## 2021-08-23 PROCEDURE — 99212 OFFICE O/P EST SF 10 MIN: CPT

## 2021-08-23 PROCEDURE — 96401 CHEMO ANTI-NEOPL SQ/IM: CPT

## 2021-08-23 PROCEDURE — 6360000002 HC RX W HCPCS: Performed by: INTERNAL MEDICINE

## 2021-08-23 RX ADMIN — BORTEZOMIB 2.75 MG: 3.5 INJECTION, POWDER, LYOPHILIZED, FOR SOLUTION INTRAVENOUS; SUBCUTANEOUS at 12:42

## 2021-08-27 ENCOUNTER — HOSPITAL ENCOUNTER (OUTPATIENT)
Dept: INFUSION THERAPY | Age: 66
Discharge: HOME | End: 2021-08-27
Payer: COMMERCIAL

## 2021-08-27 DIAGNOSIS — E85.81 LIGHT CHAIN (AL) AMYLOIDOSIS (HCC): ICD-10-CM

## 2021-08-27 LAB
ALBUMIN SERPL-MCNC: 2.6 G/DL (ref 3.5–5.2)
ALP BLD-CCNC: 72 U/L (ref 40–129)
ALT SERPL-CCNC: 28 U/L (ref 0–40)
ANION GAP SERPL CALCULATED.3IONS-SCNC: 9 MMOL/L (ref 7–16)
AST SERPL-CCNC: 25 U/L (ref 0–39)
BASOPHILS ABSOLUTE: 0.02 E9/L (ref 0–0.2)
BASOPHILS RELATIVE PERCENT: 0.2 % (ref 0–2)
BILIRUB SERPL-MCNC: <0.2 MG/DL (ref 0–1.2)
BUN BLDV-MCNC: 21 MG/DL (ref 6–23)
CALCIUM SERPL-MCNC: 8.7 MG/DL (ref 8.6–10.2)
CHLORIDE BLD-SCNC: 105 MMOL/L (ref 98–107)
CO2: 26 MMOL/L (ref 22–29)
CREAT SERPL-MCNC: 1.2 MG/DL (ref 0.7–1.2)
EOSINOPHILS ABSOLUTE: 0.04 E9/L (ref 0.05–0.5)
EOSINOPHILS RELATIVE PERCENT: 0.4 % (ref 0–6)
GFR AFRICAN AMERICAN: >60
GFR NON-AFRICAN AMERICAN: >60 ML/MIN/1.73
GLUCOSE BLD-MCNC: 121 MG/DL (ref 74–99)
HCT VFR BLD CALC: 43.9 % (ref 37–54)
HEMOGLOBIN: 14.8 G/DL (ref 12.5–16.5)
IMMATURE GRANULOCYTES #: 0.04 E9/L
IMMATURE GRANULOCYTES %: 0.4 % (ref 0–5)
LYMPHOCYTES ABSOLUTE: 1.37 E9/L (ref 1.5–4)
LYMPHOCYTES RELATIVE PERCENT: 14.7 % (ref 20–42)
MCH RBC QN AUTO: 32.5 PG (ref 26–35)
MCHC RBC AUTO-ENTMCNC: 33.7 % (ref 32–34.5)
MCV RBC AUTO: 96.3 FL (ref 80–99.9)
MONOCYTES ABSOLUTE: 0.87 E9/L (ref 0.1–0.95)
MONOCYTES RELATIVE PERCENT: 9.3 % (ref 2–12)
NEUTROPHILS ABSOLUTE: 7 E9/L (ref 1.8–7.3)
NEUTROPHILS RELATIVE PERCENT: 75 % (ref 43–80)
PDW BLD-RTO: 12.3 FL (ref 11.5–15)
PLATELET # BLD: 218 E9/L (ref 130–450)
PMV BLD AUTO: 9.9 FL (ref 7–12)
POTASSIUM SERPL-SCNC: 4.2 MMOL/L (ref 3.5–5)
RBC # BLD: 4.56 E12/L (ref 3.8–5.8)
SODIUM BLD-SCNC: 140 MMOL/L (ref 132–146)
TOTAL PROTEIN: 5.2 G/DL (ref 6.4–8.3)
WBC # BLD: 9.3 E9/L (ref 4.5–11.5)

## 2021-08-27 PROCEDURE — 85025 COMPLETE CBC W/AUTO DIFF WBC: CPT

## 2021-08-27 PROCEDURE — 36415 COLL VENOUS BLD VENIPUNCTURE: CPT

## 2021-08-27 PROCEDURE — 80053 COMPREHEN METABOLIC PANEL: CPT

## 2021-08-27 NOTE — PROGRESS NOTES
ONCOLOGY  NP VISIT         8/27/2021      NAME:  Cheryl Gill    YOB: 1955      ALLERGIES:  Codeine, Lipitor, and Vytorin [ezetimibe-simvastatin]         Current Outpatient Medications   Medication Sig Dispense Refill    predniSONE (DELTASONE) 10 MG tablet Take 2 Tablets Daily for 3 days  THEN Take 1 tablet Daily for 3 days THEN take 1/2 Tablet Daily for 3 days. Then discontinue 40 tablet 0    omeprazole (PRILOSEC) 20 MG delayed release capsule Take 1 capsule by mouth 2 times daily (before meals) 60 capsule 0    predniSONE (DELTASONE) 5 MG tablet TAKE 5 mg DAILY for 5 days, THEN Take 5 mg EVERY OTHER DAY x 5 days. 10 tablet 0    triamterene-hydroCHLOROthiazide (MAXZIDE-25) 37.5-25 MG per tablet Take 1 tablet by mouth daily 30 tablet 0    dexamethasone (DECADRON) 4 MG tablet TAKE 10 Tables (40 mg dose) EVERY Monday 40 tablet 2    predniSONE (DELTASONE) 1 MG tablet Take 20 mg by mouth daily      acyclovir (ZOVIRAX) 400 MG tablet Take 1 tablet by mouth 2 times daily 180 tablet 0    rosuvastatin (CRESTOR) 40 MG tablet TAKE ONE TABLET BY MOUTH DAILY 90 tablet 3    prochlorperazine (COMPAZINE) 10 MG tablet Take 1 tablet by mouth every 6 hours as needed (nausea/vomiting) 40 tablet 3    fluticasone (FLONASE) 50 MCG/ACT nasal spray 1 spray by Each Nostril route daily 2 Bottle 1    miconazole (MICOTIN) 2 % cream Apply topically 2 times daily. 28 g 1    VITAMIN E PO Take 1 capsule by mouth daily      Multiple Vitamins-Minerals (THERAPEUTIC MULTIVITAMIN-MINERALS) tablet Take 1 tablet by mouth daily      Ascorbic Acid (VITAMIN C PO) Take 1 tablet by mouth daily      VITAMIN D PO Take 1 capsule by mouth daily      aspirin 81 MG tablet Take 81 mg by mouth daily      ibuprofen (ADVIL;MOTRIN) 600 MG tablet Take 1 tablet by mouth every 6 hours as needed for Pain 60 tablet 1    fexofenadine-pseudoephedrine (ALLEGRA-D 12 HOUR)  MG per tablet Take 1 tablet by mouth 2 times daily as needed. 60 tablet 2     No current facility-administered medications for this visit. SUBJECTIVE:   Here for  Darzalex/Faspro/Velcade/Dex  and follow up. C/O nasal congestion. On 8/18/2021 he complainted of a red raised rash at the site of his Velcade injection that he received on Monday. He said he feels warm and he complains of scratchy throat and feels he might be getting a cold. His temp was 98.0. He did have a raised warm red area on the posterior lateral aspect of his left arm which had a small rash scattered throughout it. I Spoke with Dr. Edwina Frey and he wanted the patient to take Benadryl 25 mg po. OBJECTIVE:   /78   Pulse 96   Temp 97.5 °F (36.4 °C)   Resp 18   Ht 5' 10\" (1.778 m)   Wt 155 lb 14.4 oz (70.7 kg)   SpO2 97%   BMI 22.37 kg/m²   Physical Examination:   Performance Status: 0  Well developed, well nourished male  General: AAO to person, place, time, cooperative, in no acute distress. Head and neck: PERRLA, EOMI. Sclera non icteric. Oropharynx:  Clear. Neck: no JVD,  no adenopathy. Heart: Regular rate and regular rhythm, no murmur. Lungs: Clear to auscultation.   Abdomen: Soft, non-tender;no masses, no organomegaly. Extremities : No edema. Neurologic:Cranial nerves grossly intact. No focal motor or sensory deficits   Skin:  No rash.         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     His work-up will be completed to include the following:     CBC, CMP, LDH, B2 microglobulin, CRP, serum protein electrophoresis with reflex to immunofixation, serum free kappa and lambda light chains with ratio, quantitative immunoglobulins, 24-hour urine collection for quantitative immunoglobulins and immunofixation.     Serum troponin, proBNP and 2D echo will be done to exclude cardiac amyloidosis. TSH  Factor X assay as well as PT and APTT will be done     Will be scheduled for a bone marrow aspirate and biopsy     Once work-up completed options of therapy with bortezomib/daratumumab will be addressed     He is encouraged to receive the COVID-19 vaccine        3/15/2021  Awaiting his bone marrow aspirate and biopsy and his 2D echo which are both pending. His CBC was in the normal range with a hemoglobin of 15.3, normal WBC count of 6.2, normal MCV of 90 with a normal platelet count of 780.  His TSH was normal at 3.88  Serum LDH was elevated at 266. B2 microglobulin was markedly elevated at 13.6. Quantitative immunoglobulins showed suppression of IgG with normal IgA and IgM. Serum electrophoresis showed an M spike faint measuring 0.1. Serum FLC's showed markedly elevated free lambda light chain at 311 with abnormal ratio of 0.05  Serum troponin was normal but proBNP was elevated at 491.   24-hour urine collection showed nephrotic range proteinuria with total proteins of 6.6 g in a 24-hour period with a faint M spike in the urine measuring 1263 mg in a 24-hour timeframe     He will be recommended induction with Daratumumab/Velcade/dexamethasone  His bone marrow aspirate and biopsy will be reviewed.     All potential side effects were discussed.        3/29/2021  He will be given acyclovir for VZV prophylaxis and Bactrim DS for PCP prophylaxis and as needed Compazine for nausea.  He will be initiated today on daratumumab/Velcade/cyclophosphamide/dexamethasone regimen.  All potential side effects were discussed.  His baby aspirin has been held.        4/05/2021  He tolerated his treatment with daratumumab well. Lon Stoll reports constipation.  Few days later he had some chills and body aches and it is likely related to his second dose of the Covid vaccine. No signs of peripheral neuropathy. 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 He will be given as needed tramadol for pain and omeprazole 20 mg daily for heartburn. To receive week 3 of chemotherapy regimen consisting of daratumumab/Velcade/dexamethasone/Cytoxan        4/19/2021  He has been experiencing significant side effects from daratumumab 2 to 3 days later in terms of headaches myalgias arthralgias. He will be given prednisone 20 mg daily starting on 4/21/2021 and he will skip it Monday the day he gets the high-dose steroids prior to his Darzalex Faspro.     He will take as needed Tylenol or Advil and he will continue on his omeprazole daily.        4/26/2021  He has gained few pounds and has fluid retention from steroids and will be initiated on Maxide 37.5 mg daily. He will continue on prednisone except on Mondays. Received Darzalex Faspro today.        5/03/2021  He diuresed very well on Maxide and lost 6 pounds and it has been discontinued. He has had no reactions from Darzalex while maintained on prednisone  To receive week 6 of Darzalex Faspro today        5/10/2021  To receive week 7 of Darzalex Faspro . Continue Prednisone.        5/17/2021  To receive week 8 of Darzalex Faspro.   He will have a repeat myeloma panel and urinary collection after week 9.        5/24/2021  To receive week 9 of Darzalex Di Apodaca will continue on Cytoxan and Velcade     He will have a repeat myeloma panel and urinary collection after week 9. He will return for follow-up in 2 weeks.        6/07/2021  To receive Week #10 of Darzalex/Faspro. and Velcade   On Cytoxan         6/21/2021  To receive Week #11 of Darzalex/Faspro. and Velcade   On Cytoxan   Lab work reviewed, okay to proceed with chemo treatment.      To return 7/7/2021 for [facility] Darzalex/Faspro. and Velcade   On Cytoxan   Labs on 7/6/2021 7/7/2021  His repeat myeloma panel shows normalization of free kappa and free lambda light chains with normal FLC ratio indicating an excellent response to present therapy regimen  His serum beta-2 microglobulin is down in the normal range. Quantitative immunoglobulins still show low IgA, low IgG and low IgM.   His 24-hour urine collection showed still nephrotic range proteinuria with 3.8 g of proteins in a 24-hour collection  Immunofixation showed faint free monoclonal lambda light chains.     His last SPEP from 4/26 had shown an M spike of 0.1 but results from June not available and will be repeated  Back in March his 24-hour UPEP showed 6.6 g of protein in 24 hours.     He has achieved an excellent clinical response  To continue on present regimen  He will now go on biweekly Gregoria        7/19/2021  To receive daratumumab/Velcade/Dex  Cytoxan to be DC'd     He will be referred to a tertiary care center for consideration of autologous stem cell transplant.     He will return in 2 weeks for chemo        8/02/2021  To receive Day #1 Cycle #5 daratumumab/Velcade/Dex     To return 8/09/2021 for Day #8 Velcade  Cycle #5   Daratumumab will be now on every 4 weeks basis  He is to be tapered off prednisone  Pretransplant work-up will be initiated with anticipation of stem cell transplant at [address] in November 8/09/2021 To receive Day #8 Cycle #5 Velcade. Continue on weekly dexamethasone. He will have 1 last dose of daratumumab in early September   He will start stem cell collection in October with anticipation of stem cell transplant in November     to return 8/16/2021 for Day #15 Cycle #5 Velcade        8/16/2021  To receive Day #15 Cycle #5 Velcade today.     Continue on weekly dexamethasone. He will have 1 last dose of daratumumab on 8/30/2021  He will start stem cell collection in October with anticipation of stem cell transplant in November     To return 8/23/2021 for Day #22 Cycle #5 Velcade. He will return on 8/30/2021 for Velcade and daratumumab     We will see me back on 9/13/2021  Will be resumed on prednisone tapering schedule for his rash and joint aches      8/23/2021  To receive Day #22 Cycle #5 Velcade today. Lab work reviewed, okay to proceed with chemo treatment.      Continue on weekly dexamethasone. To return 8/30/2021 for last dose of daratumumab Day #1 Cycle #6  Labs on same day. He will start stem cell collection in October with anticipation of stem cell transplant in November. 8/30/2021  To receive Day #1 Cycle #6 Daratumumab/Velcade/Dex  Lab work reviewed, okay to proceed with chemo treatment. Return on 9/13/2021 for OV with Dr. Severa Halon,  Day #8 Velcade. Labs on 9/10/2021.           Shannan Pena, [address]:  [phone]

## 2021-08-30 ENCOUNTER — HOSPITAL ENCOUNTER (OUTPATIENT)
Dept: INFUSION THERAPY | Age: 66
Discharge: HOME | End: 2021-08-30
Payer: COMMERCIAL

## 2021-08-30 ENCOUNTER — OFFICE VISIT (OUTPATIENT)
Dept: ONCOLOGY | Age: 66
End: 2021-08-30
Payer: COMMERCIAL

## 2021-08-30 VITALS
TEMPERATURE: 97.5 F | HEART RATE: 96 BPM | OXYGEN SATURATION: 97 % | DIASTOLIC BLOOD PRESSURE: 78 MMHG | SYSTOLIC BLOOD PRESSURE: 116 MMHG | HEIGHT: 70 IN | RESPIRATION RATE: 18 BRPM | WEIGHT: 155.9 LBS | BODY MASS INDEX: 22.32 KG/M2

## 2021-08-30 DIAGNOSIS — E85.81 LIGHT CHAIN (AL) AMYLOIDOSIS (HCC): Primary | ICD-10-CM

## 2021-08-30 PROCEDURE — 96409 CHEMO IV PUSH SNGL DRUG: CPT

## 2021-08-30 PROCEDURE — 6360000002 HC RX W HCPCS: Performed by: INTERNAL MEDICINE

## 2021-08-30 PROCEDURE — 6370000000 HC RX 637 (ALT 250 FOR IP)

## 2021-08-30 PROCEDURE — 6370000000 HC RX 637 (ALT 250 FOR IP): Performed by: INTERNAL MEDICINE

## 2021-08-30 PROCEDURE — 2580000003 HC RX 258: Performed by: INTERNAL MEDICINE

## 2021-08-30 PROCEDURE — 96401 CHEMO ANTI-NEOPL SQ/IM: CPT

## 2021-08-30 RX ORDER — ACETAMINOPHEN 325 MG/1
650 TABLET ORAL ONCE
Status: COMPLETED | OUTPATIENT
Start: 2021-08-30 | End: 2021-08-30

## 2021-08-30 RX ORDER — DIPHENHYDRAMINE HCL 25 MG
25 TABLET ORAL ONCE
Status: COMPLETED | OUTPATIENT
Start: 2021-08-30 | End: 2021-08-30

## 2021-08-30 RX ORDER — ACETAMINOPHEN 325 MG/1
TABLET ORAL
Status: COMPLETED
Start: 2021-08-30 | End: 2021-08-30

## 2021-08-30 RX ADMIN — DIPHENHYDRAMINE HYDROCHLORIDE 25 MG: 25 TABLET ORAL at 14:10

## 2021-08-30 RX ADMIN — ACETAMINOPHEN 650 MG: 325 TABLET ORAL at 14:09

## 2021-08-30 RX ADMIN — DARATUMUMAB AND HYALURONIDASE-FIHJ (HUMAN RECOMBINANT) 15 ML: 1800; 30000 INJECTION SUBCUTANEOUS at 14:49

## 2021-08-30 RX ADMIN — BORTEZOMIB 2.75 MG: 3.5 INJECTION, POWDER, LYOPHILIZED, FOR SOLUTION INTRAVENOUS; SUBCUTANEOUS at 14:49

## 2021-08-30 ASSESSMENT — PAIN SCALES - GENERAL: PAINLEVEL_OUTOF10: 0

## 2021-09-10 ENCOUNTER — HOSPITAL ENCOUNTER (OUTPATIENT)
Dept: INFUSION THERAPY | Age: 66
Discharge: HOME | End: 2021-09-10
Payer: COMMERCIAL

## 2021-09-10 DIAGNOSIS — E85.81 LIGHT CHAIN (AL) AMYLOIDOSIS (HCC): ICD-10-CM

## 2021-09-10 LAB
ALBUMIN SERPL-MCNC: 2.8 G/DL (ref 3.5–5.2)
ALP BLD-CCNC: 64 U/L (ref 40–129)
ALT SERPL-CCNC: 26 U/L (ref 0–40)
ANION GAP SERPL CALCULATED.3IONS-SCNC: 7 MMOL/L (ref 7–16)
AST SERPL-CCNC: 26 U/L (ref 0–39)
BASOPHILS ABSOLUTE: 0.02 E9/L (ref 0–0.2)
BASOPHILS RELATIVE PERCENT: 0.2 % (ref 0–2)
BILIRUB SERPL-MCNC: <0.2 MG/DL (ref 0–1.2)
BUN BLDV-MCNC: 17 MG/DL (ref 6–23)
CALCIUM SERPL-MCNC: 8.7 MG/DL (ref 8.6–10.2)
CHLORIDE BLD-SCNC: 107 MMOL/L (ref 98–107)
CO2: 25 MMOL/L (ref 22–29)
CREAT SERPL-MCNC: 0.9 MG/DL (ref 0.7–1.2)
EOSINOPHILS ABSOLUTE: 0.06 E9/L (ref 0.05–0.5)
EOSINOPHILS RELATIVE PERCENT: 0.6 % (ref 0–6)
GFR AFRICAN AMERICAN: >60
GFR NON-AFRICAN AMERICAN: >60 ML/MIN/1.73
GLUCOSE BLD-MCNC: 106 MG/DL (ref 74–99)
HCT VFR BLD CALC: 41.6 % (ref 37–54)
HEMOGLOBIN: 14 G/DL (ref 12.5–16.5)
IMMATURE GRANULOCYTES #: 0.03 E9/L
IMMATURE GRANULOCYTES %: 0.3 % (ref 0–5)
LYMPHOCYTES ABSOLUTE: 1.01 E9/L (ref 1.5–4)
LYMPHOCYTES RELATIVE PERCENT: 10.8 % (ref 20–42)
MCH RBC QN AUTO: 32.3 PG (ref 26–35)
MCHC RBC AUTO-ENTMCNC: 33.7 % (ref 32–34.5)
MCV RBC AUTO: 96.1 FL (ref 80–99.9)
MONOCYTES ABSOLUTE: 0.62 E9/L (ref 0.1–0.95)
MONOCYTES RELATIVE PERCENT: 6.6 % (ref 2–12)
NEUTROPHILS ABSOLUTE: 7.61 E9/L (ref 1.8–7.3)
NEUTROPHILS RELATIVE PERCENT: 81.5 % (ref 43–80)
PDW BLD-RTO: 12.1 FL (ref 11.5–15)
PLATELET # BLD: 262 E9/L (ref 130–450)
PMV BLD AUTO: 9.5 FL (ref 7–12)
POTASSIUM SERPL-SCNC: 4.4 MMOL/L (ref 3.5–5)
RBC # BLD: 4.33 E12/L (ref 3.8–5.8)
SODIUM BLD-SCNC: 139 MMOL/L (ref 132–146)
TOTAL PROTEIN: 4.8 G/DL (ref 6.4–8.3)
WBC # BLD: 9.4 E9/L (ref 4.5–11.5)

## 2021-09-10 PROCEDURE — 80053 COMPREHEN METABOLIC PANEL: CPT

## 2021-09-10 PROCEDURE — 85025 COMPLETE CBC W/AUTO DIFF WBC: CPT

## 2021-09-10 PROCEDURE — 36415 COLL VENOUS BLD VENIPUNCTURE: CPT

## 2021-09-13 ENCOUNTER — HOSPITAL ENCOUNTER (OUTPATIENT)
Dept: INFUSION THERAPY | Age: 66
Discharge: HOME | End: 2021-09-13
Payer: COMMERCIAL

## 2021-09-13 ENCOUNTER — OFFICE VISIT (OUTPATIENT)
Dept: ONCOLOGY | Age: 66
End: 2021-09-13
Payer: COMMERCIAL

## 2021-09-13 VITALS
OXYGEN SATURATION: 97 % | DIASTOLIC BLOOD PRESSURE: 78 MMHG | BODY MASS INDEX: 22 KG/M2 | HEART RATE: 91 BPM | TEMPERATURE: 98.6 F | WEIGHT: 153.7 LBS | HEIGHT: 70 IN | SYSTOLIC BLOOD PRESSURE: 118 MMHG

## 2021-09-13 DIAGNOSIS — E85.81 LIGHT CHAIN (AL) AMYLOIDOSIS (HCC): Primary | ICD-10-CM

## 2021-09-13 PROCEDURE — 99212 OFFICE O/P EST SF 10 MIN: CPT

## 2021-09-13 PROCEDURE — 96401 CHEMO ANTI-NEOPL SQ/IM: CPT

## 2021-09-13 PROCEDURE — 6360000002 HC RX W HCPCS: Performed by: INTERNAL MEDICINE

## 2021-09-13 PROCEDURE — 2580000003 HC RX 258: Performed by: INTERNAL MEDICINE

## 2021-09-13 RX ADMIN — BORTEZOMIB 2.75 MG: 3.5 INJECTION, POWDER, LYOPHILIZED, FOR SOLUTION INTRAVENOUS; SUBCUTANEOUS at 13:26

## 2021-09-13 NOTE — PROGRESS NOTES
[address] [facility]. Chaim Salgado        Pt Name: Ellis Diana: 1955  Date of evaluation: 9/13/2021  Primary Care Physician: Ellie Miramontes MD  Reason for evaluation:   Chief Complaint   Patient presents with    Other     Light [facility])  Amyloidosis    Follow-up          Subjective:   Here for treatment and follow-up. Reports itchy rash and joint aches with tapering his prednisone. Follows at [facility] in anticipation of stem cell transplant in November. OBJECTIVE:  VITALS:  height is 5' 10\" (1.778 m) and weight is 153 lb 11.2 oz (69.7 kg). His temperature is 98.6 °F (37 °C). His blood pressure is 118/78 and his pulse is 91. His oxygen saturation is 97%. Physical Exam:  Performance Status: 0  Well developed, well nourished male  General: AAO to person, place, time, cooperative, in no acute distress. Head and neck: PERRLA, EOMI. Sclera non icteric. Oropharynx:  Clear. Neck: no JVD,  no adenopathy. Heart: Regular rate and regular rhythm, no murmur. Lungs: Clear to auscultation.   Abdomen: Soft, non-tender;no masses, no organomegaly. Extremities : No edema,no cyanosis, no clubbing. Neurologic:Cranial nerves grossly intact. No focal motor or sensory deficits   Skin:  No rash. Medications  Prior to Admission medications    Medication Sig Start Date End Date Taking? Authorizing Provider   predniSONE (DELTASONE) 10 MG tablet Take 2 Tablets Daily for 3 days  THEN Take 1 tablet Daily for 3 days THEN take 1/2 Tablet Daily for 3 days. Then discontinue 8/16/21  Yes VICTOR MANUEL Oliver CNP   predniSONE (DELTASONE) 5 MG tablet TAKE 5 mg DAILY for 5 days, THEN Take 5 mg EVERY OTHER DAY x 5 days.  8/2/21  Yes VICTOR MANUEL Oliver CNP   triamterene-hydroCHLOROthiazide [facility]) 37.5-25 MG per tablet Take 1 tablet by mouth daily 7/7/21  Yes Alexandru Watson MD   dexamethasone (DECADRON) 4 MG tablet TAKE 10 Tables (40 mg dose) EVERY Monday 7/7/21  Yes Rex Delgadillo MD   predniSONE (DELTASONE) 1 MG tablet Take 20 mg by mouth daily   Yes Historical Provider, MD   acyclovir (ZOVIRAX) 400 MG tablet Take 1 tablet by mouth 2 times daily 6/21/21 9/19/21 Yes VICTOR MANUEL Wu CNP   rosuvastatin (CRESTOR) 40 MG tablet TAKE ONE TABLET BY MOUTH DAILY 4/2/21  Yes Javier Banks MD   prochlorperazine (COMPAZINE) 10 MG tablet Take 1 tablet by mouth every 6 hours as needed (nausea/vomiting) 3/25/21  Yes Rex Delgadillo MD   fluticasone Katarina Ran) 50 MCG/ACT nasal spray 1 spray by Each Nostril route daily 12/28/20  Yes Javier Banks MD   miconazole (MICOTIN) 2 % cream Apply topically 2 times daily. 12/28/20  Yes Javier Banks MD   VITAMIN E PO Take 1 capsule by mouth daily   Yes Historical Provider, MD   Multiple Vitamins-Minerals (THERAPEUTIC MULTIVITAMIN-MINERALS) tablet Take 1 tablet by mouth daily   Yes Historical Provider, MD   Ascorbic Acid (VITAMIN C PO) Take 1 tablet by mouth daily   Yes Historical Provider, MD   VITAMIN D PO Take 1 capsule by mouth daily   Yes Historical Provider, MD   aspirin 81 MG tablet Take 81 mg by mouth daily   Yes Historical Provider, MD   ibuprofen (ADVIL;MOTRIN) 600 MG tablet Take 1 tablet by mouth every 6 hours as needed for Pain 7/5/17  Yes Javier Banks MD   fexofenadine-pseudoephedrine (ALLEGRA-D 12 HOUR)  MG per tablet Take 1 tablet by mouth 2 times daily as needed.  2/5/13  Yes Jimmy Delgado MD   omeprazole (PRILOSEC) 20 MG delayed release capsule Take 1 capsule by mouth 2 times daily (before meals) 8/10/21 9/9/21  VICTOR MANUEL Wu CNP    Scheduled Meds:  Continuous Infusions:  PRN Meds:.        Recent Laboratory Data-     Lab Results   Component Value Date    WBC 9.4 09/10/2021    HGB 14.0 09/10/2021    HCT 41.6 09/10/2021    MCV 96.1 09/10/2021     09/10/2021    LYMPHOPCT 10.8 (L) 09/10/2021    RBC 4.33 09/10/2021    MCH 32.3 09/10/2021    MCHC 33.7 09/10/2021    RDW 12.1 13.6.  Quantitative immunoglobulins showed suppression of IgG with normal IgA and IgM. Serum electrophoresis showed an M spike faint measuring 0.1. Serum FLC's showed markedly elevated free lambda light chain at 311 with abnormal ratio of 0.05  Serum troponin was normal but proBNP was elevated at 491. 24-hour urine collection showed nephrotic range proteinuria with total proteins of 6.6 g in a 24-hour period with a faint M spike in the urine measuring 1263 mg in a 24-hour timeframe     He will be recommended induction with Daratumumab/Velcade/dexamethasone  His bone marrow aspirate and biopsy will be reviewed.     All potential side effects were discussed.        3/29/2021  He will be given acyclovir for VZV prophylaxis and Bactrim DS for PCP prophylaxis and as needed Compazine for nausea. Serena Marr will be initiated today on daratumumab/Velcade/cyclophosphamide/dexamethasone regimen.  All potential side effects were discussed.  His baby aspirin has been held.        4/05/2021  He tolerated his treatment with daratumumab well.  He reports constipation.  Few days later he had some chills and body aches and it is likely related to his second dose of the Covid vaccine. No signs of peripheral neuropathy. 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 He will be given as needed tramadol for pain and omeprazole 20 mg daily for heartburn. To receive week 3 of chemotherapy regimen consisting of daratumumab/Velcade/dexamethasone/Cytoxan        4/19/2021  He has been experiencing significant side effects from daratumumab 2 to 3 days later in terms of headaches myalgias arthralgias.   He will be given prednisone 20 mg daily starting on 4/21/2021 and he will skip it Monday the day he gets the high-dose steroids prior to his Darzalex Faspro.     He will take as needed Tylenol or Advil and he will continue on his omeprazole daily.        4/26/2021  He has gained few pounds and has fluid retention from steroids and will be initiated on Maxide 37.5 mg daily. He will continue on prednisone except on Mondays. Received Darzalex Faspro today.        5/03/2021  He diuresed very well on Maxide and lost 6 pounds and it has been discontinued. He has had no reactions from Darzalex while maintained on prednisone  To receive week 6 of Darzalex Faspro today        5/10/2021  To receive week 7 of Darzalex Faspro . Continue Prednisone.        5/17/2021  To receive week 8 of Darzalex Faspro. He will have a repeat myeloma panel and urinary collection after week 9.        5/24/2021  To receive week 9 of Darzalex Faspro. He will continue on Cytoxan and Velcade     He will have a repeat myeloma panel and urinary collection after week 9. He will return for follow-up in 2 weeks.        6/07/2021  To receive Week #10 of Darzalex/Faspro. and Velcade   On Cytoxan        6/21/2021  To receive Week #11 of Darzalex/Faspro. and Velcade   On Cytoxan   Lab work reviewed, okay to proceed with chemo treatment. To return 7/7/2021 for  Week #12 of Darzalex/Faspro. and Velcade   On Cytoxan   Labs on 7/6/2021 7/7/2021  His repeat myeloma panel shows normalization of free kappa and free lambda light chains with normal FLC ratio indicating an excellent response to present therapy regimen  His serum beta-2 microglobulin is down in the normal range. Quantitative immunoglobulins still show low IgA, low IgG and low IgM. His 24-hour urine collection showed still nephrotic range proteinuria with 3.8 g of proteins in a 24-hour collection  Immunofixation showed faint free monoclonal lambda light chains.     His last SPEP from 4/26 had shown an M spike of 0.1 but results from June not available and will be repeated  Back in March his 24-hour UPEP showed 6.6 g of protein in 24 hours. He has achieved an excellent clinical response  To continue on present regimen  He will now go on biweekly Gregoria      7/19/2021  To receive daratumumab/Velcade/Dex  Cytoxan to be DC'd    He will be referred to a tertiary care center for consideration of autologous stem cell transplant. He will return in 2 weeks for chemo      8/02/2021  To receive Day #1 Cycle #5 daratumumab/Velcade/Dex    To return 8/09/2021 for Day #8 Velcade  Cycle #5   Daratumumab will be now on every 4 weeks basis  He is to be tapered off prednisone  Pretransplant work-up will be initiated with anticipation of stem cell transplant at [facility] [address] in November 8/09/2021   To receive Day #8 Cycle #5 Velcade. Continue on weekly dexamethasone. He will have 1 last dose of daratumumab in early September   He will start stem cell collection in October with anticipation of stem cell transplant in November    to return 8/16/2021 for Day #15 Cycle #5 Velcade      8/16/2021  To receive Day #15 Cycle #5 Velcade today. Continue on weekly dexamethasone. He will have 1 last dose of daratumumab on 8/30/2021  He will start stem cell collection in October with anticipation of stem cell transplant in November    To return 8/23/2021 for Day #22 Cycle #5 Velcade. He will return on 8/30/2021 for Velcade and daratumumab    We will see me back on 9/13/2021  Will be resumed on prednisone tapering schedule for his rash and joint aches. 8/23/2021  To receive Day #22 Cycle #5 Velcade today. Lab work reviewed, okay to proceed with chemo treatment.      Continue on weekly dexamethasone.       To return 8/30/2021 for last dose of daratumumab Day #1 Cycle #6  Labs on same day.       He will start stem cell collection in October with anticipation of stem cell transplant in November.        8/30/2021  To receive Day #1 Cycle #6 Daratumumab/Velcade/Dex  Lab work reviewed, okay to proceed with chemo treatment.      Return on 9/13/2021 for OV with Dr. Orren Mortimer,  Day #8 Velcade. Labs on 9/10/2021.        9/13/2021  To receive  Day #8 Cycle #6  Velcade today. His treatment will be discontinued after today    He will start stem cell collection in October with anticipation of stem cell transplant in November. [address]. Orren Mortimer, M.D., F.A.C.P.   Electronically signed 9/13/2021 at 12:55 PM

## 2021-09-29 DIAGNOSIS — E85.81 LIGHT CHAIN (AL) AMYLOIDOSIS (HCC): ICD-10-CM

## 2021-09-29 RX ORDER — ACYCLOVIR 400 MG/1
400 TABLET ORAL 2 TIMES DAILY
Qty: 180 TABLET | Refills: 0 | Status: SHIPPED | OUTPATIENT
Start: 2021-09-29 | End: 2021-12-28

## 2021-10-05 ENCOUNTER — HOSPITAL ENCOUNTER (OUTPATIENT)
Dept: INFUSION THERAPY | Age: 66
Discharge: HOME | End: 2021-10-05
Payer: COMMERCIAL

## 2021-10-05 DIAGNOSIS — E85.81 LIGHT CHAIN (AL) AMYLOIDOSIS (HCC): ICD-10-CM

## 2021-10-05 LAB
ALBUMIN SERPL-MCNC: 2.7 G/DL (ref 3.5–5.2)
ALP BLD-CCNC: 81 U/L (ref 40–129)
ALT SERPL-CCNC: 15 U/L (ref 0–40)
ANION GAP SERPL CALCULATED.3IONS-SCNC: 6 MMOL/L (ref 7–16)
AST SERPL-CCNC: 20 U/L (ref 0–39)
BASOPHILS ABSOLUTE: 0.04 E9/L (ref 0–0.2)
BASOPHILS RELATIVE PERCENT: 0.6 % (ref 0–2)
BILIRUB SERPL-MCNC: <0.2 MG/DL (ref 0–1.2)
BUN BLDV-MCNC: 14 MG/DL (ref 6–23)
CALCIUM SERPL-MCNC: 9.1 MG/DL (ref 8.6–10.2)
CHLORIDE BLD-SCNC: 103 MMOL/L (ref 98–107)
CO2: 27 MMOL/L (ref 22–29)
CREAT SERPL-MCNC: 1.2 MG/DL (ref 0.7–1.2)
EOSINOPHILS ABSOLUTE: 0.14 E9/L (ref 0.05–0.5)
EOSINOPHILS RELATIVE PERCENT: 2.1 % (ref 0–6)
GFR AFRICAN AMERICAN: >60
GFR NON-AFRICAN AMERICAN: >60 ML/MIN/1.73
GLUCOSE BLD-MCNC: 117 MG/DL (ref 74–99)
HCT VFR BLD CALC: 40.3 % (ref 37–54)
HEMOGLOBIN: 13.6 G/DL (ref 12.5–16.5)
IMMATURE GRANULOCYTES #: 0.01 E9/L
IMMATURE GRANULOCYTES %: 0.1 % (ref 0–5)
LACTATE DEHYDROGENASE: 299 U/L (ref 135–225)
LYMPHOCYTES ABSOLUTE: 1.42 E9/L (ref 1.5–4)
LYMPHOCYTES RELATIVE PERCENT: 20.9 % (ref 20–42)
MCH RBC QN AUTO: 31.7 PG (ref 26–35)
MCHC RBC AUTO-ENTMCNC: 33.7 % (ref 32–34.5)
MCV RBC AUTO: 93.9 FL (ref 80–99.9)
MONOCYTES ABSOLUTE: 0.56 E9/L (ref 0.1–0.95)
MONOCYTES RELATIVE PERCENT: 8.2 % (ref 2–12)
NEUTROPHILS ABSOLUTE: 4.64 E9/L (ref 1.8–7.3)
NEUTROPHILS RELATIVE PERCENT: 68.1 % (ref 43–80)
PDW BLD-RTO: 11.6 FL (ref 11.5–15)
PLATELET # BLD: 310 E9/L (ref 130–450)
PMV BLD AUTO: 9.2 FL (ref 7–12)
POTASSIUM SERPL-SCNC: 4.1 MMOL/L (ref 3.5–5)
RBC # BLD: 4.29 E12/L (ref 3.8–5.8)
SODIUM BLD-SCNC: 136 MMOL/L (ref 132–146)
WBC # BLD: 6.8 E9/L (ref 4.5–11.5)

## 2021-10-05 PROCEDURE — 84165 PROTEIN E-PHORESIS SERUM: CPT

## 2021-10-05 PROCEDURE — 86334 IMMUNOFIX E-PHORESIS SERUM: CPT

## 2021-10-05 PROCEDURE — 83615 LACTATE (LD) (LDH) ENZYME: CPT

## 2021-10-05 PROCEDURE — 36415 COLL VENOUS BLD VENIPUNCTURE: CPT

## 2021-10-05 PROCEDURE — 83883 ASSAY NEPHELOMETRY NOT SPEC: CPT

## 2021-10-05 PROCEDURE — 80053 COMPREHEN METABOLIC PANEL: CPT

## 2021-10-05 PROCEDURE — 85025 COMPLETE CBC W/AUTO DIFF WBC: CPT

## 2021-10-05 PROCEDURE — 82784 ASSAY IGA/IGD/IGG/IGM EACH: CPT

## 2021-10-06 ENCOUNTER — OFFICE VISIT (OUTPATIENT)
Dept: ONCOLOGY | Age: 66
End: 2021-10-06
Payer: COMMERCIAL

## 2021-10-06 VITALS
TEMPERATURE: 97.7 F | BODY MASS INDEX: 22.12 KG/M2 | HEIGHT: 70 IN | HEART RATE: 98 BPM | DIASTOLIC BLOOD PRESSURE: 78 MMHG | OXYGEN SATURATION: 94 % | SYSTOLIC BLOOD PRESSURE: 105 MMHG | WEIGHT: 154.5 LBS

## 2021-10-06 DIAGNOSIS — E85.81 LIGHT CHAIN (AL) AMYLOIDOSIS (HCC): Primary | ICD-10-CM

## 2021-10-06 LAB
ALBUMIN SERPL-MCNC: 1.9 G/DL (ref 3.5–4.7)
ALPHA-1-GLOBULIN: 0.3 G/DL (ref 0.2–0.4)
ALPHA-2-GLOBULIN: 1.2 G/FL (ref 0.5–1)
BETA GLOBULIN: 0.9 G/DL (ref 0.8–1.3)
ELECTROPHORESIS: ABNORMAL
GAMMA GLOBULIN: 0.2 G/DL (ref 0.7–1.6)
IGA: 40 MG/DL (ref 70–400)
IGG: 105 MG/DL (ref 700–1600)
IGM: 18 MG/DL (ref 40–230)
IMMUNOFIXATION RESULT, SERUM: NORMAL
TOTAL PROTEIN: 4.5 G/DL (ref 6.4–8.3)

## 2021-10-06 PROCEDURE — 99212 OFFICE O/P EST SF 10 MIN: CPT

## 2021-10-06 RX ORDER — BENZONATATE 100 MG/1
100 CAPSULE ORAL 3 TIMES DAILY PRN
Qty: 30 CAPSULE | Refills: 0 | Status: SHIPPED | OUTPATIENT
Start: 2021-10-06 | End: 2021-10-13

## 2021-10-06 RX ORDER — AMOXICILLIN 500 MG/1
500 CAPSULE ORAL 3 TIMES DAILY
COMMUNITY
End: 2022-04-21 | Stop reason: ALTCHOICE

## 2021-10-06 NOTE — PROGRESS NOTES
[address] [facility]. [facility]        Pt Name: Kurt Hashimoto: 1955  Date of evaluation: 10/6/2021  Primary Care Physician: Oscar Butcher MD  Reason for evaluation:   Chief Complaint   Patient presents with    Follow-up     LIGHT CHAIN AMYLOIDOSIS          Subjective:   Here forfollow-up. Follows at [facility] in anticipation of stem cell transplant in November. OBJECTIVE:  VITALS:  height is 5' 10\" (1.778 m) and weight is 154 lb 8 oz (70.1 kg). His temperature is 97.7 °F (36.5 °C). His blood pressure is 105/78 and his pulse is 98. His oxygen saturation is 94%. Physical Exam:  Performance Status: 0  Well developed, well nourished male  General: AAO to person, place, time, cooperative, in no acute distress. Head and neck: PERRLA, EOMI. Sclera non icteric. Oropharynx:  Clear. Neck: no JVD,  no adenopathy. Heart: Regular rate and regular rhythm, no murmur. Lungs: Clear to auscultation.   Abdomen: Soft, non-tender;no masses, no organomegaly. Extremities : No edema,no cyanosis, no clubbing. Neurologic:Cranial nerves grossly intact. No focal motor or sensory deficits   Skin:  No rash. Medications  Prior to Admission medications    Medication Sig Start Date End Date Taking? Authorizing Provider   amoxicillin (AMOXIL) 500 MG capsule Take 500 mg by mouth 3 times daily   Yes Historical Provider, MD   acyclovir (ZOVIRAX) 400 MG tablet Take 1 tablet by mouth 2 times daily 9/29/21 12/28/21 Yes VICTOR MANUEL Vaz CNP   predniSONE (DELTASONE) 10 MG tablet Take 2 Tablets Daily for 3 days  THEN Take 1 tablet Daily for 3 days THEN take 1/2 Tablet Daily for 3 days. Then discontinue 8/16/21  Yes VICTOR MANUEL Vaz CNP   predniSONE (DELTASONE) 5 MG tablet TAKE 5 mg DAILY for 5 days, THEN Take 5 mg EVERY OTHER DAY x 5 days.  8/2/21  Yes VICTOR MANUEL Vaz CNP   triamterene-hydroCHLOROthiazide (MAXZIDE-25) 37.5-25 MG per tablet Take 1 tablet by mouth daily 7/7/21  Yes Alonso Robles MD   dexamethasone (DECADRON) 4 MG tablet TAKE 10 Tables (40 mg dose) EVERY Monday 7/7/21  Yes Alonso Robles MD   predniSONE (DELTASONE) 1 MG tablet Take 20 mg by mouth daily   Yes Historical Provider, MD   rosuvastatin (CRESTOR) 40 MG tablet TAKE ONE TABLET BY MOUTH DAILY 4/2/21  Yes Pavan Velasquez MD   prochlorperazine (COMPAZINE) 10 MG tablet Take 1 tablet by mouth every 6 hours as needed (nausea/vomiting) 3/25/21  Yes Alonso Robles MD   fluticasone (FLONASE) 50 MCG/ACT nasal spray 1 spray by Each Nostril route daily 12/28/20  Yes Pavan Velasquez MD   miconazole (MICOTIN) 2 % cream Apply topically 2 times daily. 12/28/20  Yes Pavan Velasquez MD   VITAMIN E PO Take 1 capsule by mouth daily   Yes Historical Provider, MD   Multiple Vitamins-Minerals (THERAPEUTIC MULTIVITAMIN-MINERALS) tablet Take 1 tablet by mouth daily   Yes Historical Provider, MD   Ascorbic Acid (VITAMIN C PO) Take 1 tablet by mouth daily   Yes Historical Provider, MD   VITAMIN D PO Take 1 capsule by mouth daily   Yes Historical Provider, MD   aspirin 81 MG tablet Take 81 mg by mouth daily   Yes Historical Provider, MD   ibuprofen (ADVIL;MOTRIN) 600 MG tablet Take 1 tablet by mouth every 6 hours as needed for Pain 7/5/17  Yes Pavan Velasquez MD   fexofenadine-pseudoephedrine (ALLEGRA-D 12 HOUR)  MG per tablet Take 1 tablet by mouth 2 times daily as needed.  2/5/13  Yes Eda Eng MD   omeprazole (PRILOSEC) 20 MG delayed release capsule Take 1 capsule by mouth 2 times daily (before meals) 8/10/21 9/9/21  VICTOR MANUEL Wang - CNP    Scheduled Meds:  Continuous Infusions:  PRN Meds:.        Recent Laboratory Data-     Lab Results   Component Value Date    WBC 6.8 10/05/2021    HGB 13.6 10/05/2021    HCT 40.3 10/05/2021    MCV 93.9 10/05/2021     10/05/2021    LYMPHOPCT 20.9 10/05/2021    RBC 4.29 10/05/2021    MCH 31.7 10/05/2021    MCHC 33.7 10/05/2021    RDW 11.6 10/05/2021    NEUTOPHILPCT 68.1 10/05/2021    MONOPCT 8.2 10/05/2021    BASOPCT 0.6 10/05/2021    NEUTROABS 4.64 10/05/2021    LYMPHSABS 1.42 (L) 10/05/2021    MONOSABS 0.56 10/05/2021    EOSABS 0.14 10/05/2021    BASOSABS 0.04 10/05/2021       Lab Results   Component Value Date     10/05/2021    K 4.1 10/05/2021     10/05/2021    CO2 27 10/05/2021    BUN 14 10/05/2021    CREATININE 1.2 10/05/2021    GLUCOSE 117 (H) 10/05/2021    CALCIUM 9.1 10/05/2021    PROT 4.8 (L) 09/10/2021    LABALBU 2.7 (L) 10/05/2021    BILITOT <0.2 10/05/2021    ALKPHOS 81 10/05/2021    AST 20 10/05/2021    ALT 15 10/05/2021    LABGLOM >60 10/05/2021    GFRAA >60 10/05/2021           Radiology-  No results found. 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     His work-up will be completed to include the following:     CBC, CMP, LDH, B2 microglobulin, CRP, serum protein electrophoresis with reflex to immunofixation, serum free kappa and lambda light chains with ratio, quantitative immunoglobulins, 24-hour urine collection for quantitative immunoglobulins and immunofixation.     Serum troponin, proBNP and 2D echo will be done to exclude cardiac amyloidosis. TSH  Factor X assay as well as PT and APTT will be done     Will be scheduled for a bone marrow aspirate and biopsy     Once work-up completed options of therapy with bortezomib/daratumumab will be addressed     He is encouraged to receive the COVID-19 vaccine        3/15/2021  Awaiting his bone marrow aspirate and biopsy and his 2D echo which are both pending. His CBC was in the normal range with a hemoglobin of 15.3, normal WBC count of 6.2, normal MCV of 90 with a normal platelet count of 262.  His TSH was normal at 3.88  Serum LDH was elevated at 266.   B2 microglobulin was markedly elevated at 13.6.  Quantitative immunoglobulins showed suppression of IgG with normal IgA and IgM. Serum electrophoresis showed an M spike faint measuring 0.1. Serum FLC's showed markedly elevated free lambda light chain at 311 with abnormal ratio of 0.05  Serum troponin was normal but proBNP was elevated at 491. 24-hour urine collection showed nephrotic range proteinuria with total proteins of 6.6 g in a 24-hour period with a faint M spike in the urine measuring 1263 mg in a 24-hour timeframe     He will be recommended induction with Daratumumab/Velcade/dexamethasone  His bone marrow aspirate and biopsy will be reviewed.     All potential side effects were discussed.        3/29/2021  He will be given acyclovir for VZV prophylaxis and Bactrim DS for PCP prophylaxis and as needed Compazine for nausea. Japser Benites will be initiated today on daratumumab/Velcade/cyclophosphamide/dexamethasone regimen.  All potential side effects were discussed.  His baby aspirin has been held.        4/05/2021  He tolerated his treatment with daratumumab well.  He reports constipation.  Few days later he had some chills and body aches and it is likely related to his second dose of the Covid vaccine. No signs of peripheral neuropathy. 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 He will be given as needed tramadol for pain and omeprazole 20 mg daily for heartburn. To receive week 3 of chemotherapy regimen consisting of daratumumab/Velcade/dexamethasone/Cytoxan        4/19/2021  He has been experiencing significant side effects from daratumumab 2 to 3 days later in terms of headaches myalgias arthralgias.   He will be given prednisone 20 mg daily starting not available and will be repeated  Back in March his 24-hour UPEP showed 6.6 g of protein in 24 hours. He has achieved an excellent clinical response  To continue on present regimen  He will now go on biweekly Gregoria      7/19/2021  To receive daratumumab/Velcade/Dex  Cytoxan to be DC'd    He will be referred to a tertiary care center for consideration of autologous stem cell transplant. He will return in 2 weeks for chemo      8/02/2021  To receive Day #1 Cycle #5 daratumumab/Velcade/Dex    To return 8/09/2021 for Day #8 Velcade  Cycle #5   Daratumumab will be now on every 4 weeks basis  He is to be tapered off prednisone  Pretransplant work-up will be initiated with anticipation of stem cell transplant at [facility] [address] in November 8/09/2021   To receive Day #8 Cycle #5 Velcade. Continue on weekly dexamethasone. He will have 1 last dose of daratumumab in early September   He will start stem cell collection in October with anticipation of stem cell transplant in November    to return 8/16/2021 for Day #15 Cycle #5 Velcade      8/16/2021  To receive Day #15 Cycle #5 Velcade today. Continue on weekly dexamethasone. He will have 1 last dose of daratumumab on 8/30/2021  He will start stem cell collection in October with anticipation of stem cell transplant in November    To return 8/23/2021 for Day #22 Cycle #5 Velcade. He will return on 8/30/2021 for Velcade and daratumumab    We will see me back on 9/13/2021  Will be resumed on prednisone tapering schedule for his rash and joint aches. 8/23/2021  To receive Day #22 Cycle #5 Velcade today. Lab work reviewed, okay to proceed with chemo treatment.      Continue on weekly dexamethasone.       To return 8/30/2021 for last dose of daratumumab Day #1 Cycle #6  Labs on same day.       He will start stem cell collection in October with anticipation of stem cell transplant in November.        8/30/2021  To receive Day #1 Cycle #6 Daratumumab/Velcade/Dex  Lab work reviewed, okay to proceed with chemo treatment.      Return on 9/13/2021 for OV with Dr. Sofia Hawkins,  Day #8 Velcade. Labs on 9/10/2021.        9/13/2021  To receive  Day #8 Cycle #6  Velcade today. His treatment will be discontinued after today    He will start stem cell collection in October with anticipation of stem cell transplant in November. 10/6/2020  Status post dental extraction few days ago  Reports occasional dry cough  He will start his injections on Friday in anticipation of stem cell collection early next week at [facility]      His stem cell transplant is scheduled for early November  Yoan Hawkins M.D., F.A.C.P.   Electronically signed 10/6/2021 at 12:00 PM

## 2021-10-08 LAB
KAPPA FREE LIGHT CHAINS QNT: 8.21 MG/L (ref 3.3–19.4)
KAPPA/LAMBDA FREE LIGHT CHAIN RATIO: 0.76 (ref 0.26–1.65)
LAMBDA FREE LIGHT CHAINS QNT: 10.85 MG/L (ref 5.71–26.3)

## 2021-10-08 NOTE — PROGRESS NOTES
CLINICAL PHARMACY NOTE: MEDS TO BEDS    Total # of Prescriptions Filled: 1   The following medications were delivered to the patient:  · TESSALON PEARLES 100 MG    Additional Documentation:

## 2021-11-02 ENCOUNTER — CARE COORDINATION (OUTPATIENT)
Dept: OTHER | Facility: CLINIC | Age: 66
End: 2021-11-02

## 2021-11-02 NOTE — CARE COORDINATION
Ambulatory Care Coordination Note  11/2/2021  CM Risk Score: 4  Charlson 10 Year Mortality Risk Score: 10%     ACC: Marcus Heller RN    Summary Note: ACM attempted to reach patient for introduction to Associate Care Management related to rising risk. HIPAA compliant message left requesting a return phone call. Will attempt to outreach patient again. No future appointments.

## 2021-11-08 ENCOUNTER — CARE COORDINATION (OUTPATIENT)
Dept: OTHER | Facility: CLINIC | Age: 66
End: 2021-11-08

## 2021-11-15 ENCOUNTER — CARE COORDINATION (OUTPATIENT)
Dept: OTHER | Facility: CLINIC | Age: 66
End: 2021-11-15

## 2022-01-20 ENCOUNTER — TELEPHONE (OUTPATIENT)
Dept: PRIMARY CARE CLINIC | Age: 67
End: 2022-01-20

## 2022-01-20 ENCOUNTER — IMMUNIZATION (OUTPATIENT)
Dept: PRIMARY CARE CLINIC | Age: 67
End: 2022-01-20
Payer: COMMERCIAL

## 2022-01-20 PROCEDURE — 91301 COVID-19, MODERNA PRIMARY SERIES VACCINE 100MCG/0.5ML DOSE: CPT | Performed by: NURSE PRACTITIONER

## 2022-01-20 PROCEDURE — 0011A COVID-19, MODERNA PRIMARY SERIES VACCINE 100MCG/0.5ML DOSE: CPT | Performed by: NURSE PRACTITIONER

## 2022-01-20 NOTE — TELEPHONE ENCOUNTER
Per Dr. Roxanne Blanchard, even though the patient has received 2 Pfizer vaccines, he is to restart the vaccination series all over again due to transplant status. First full dose of Moderna, 28 days later another full dose, and then 28 days after that a full dose booster.

## 2022-02-17 ENCOUNTER — IMMUNIZATION (OUTPATIENT)
Dept: PRIMARY CARE CLINIC | Age: 67
End: 2022-02-17
Payer: MEDICARE

## 2022-02-17 PROCEDURE — 0012A COVID-19, MODERNA PRIMARY SERIES VACCINE 100MCG/0.5ML DOSE: CPT | Performed by: NURSE PRACTITIONER

## 2022-02-17 PROCEDURE — 91301 COVID-19, MODERNA PRIMARY SERIES VACCINE 100MCG/0.5ML DOSE: CPT | Performed by: NURSE PRACTITIONER

## 2022-03-16 ENCOUNTER — TELEPHONE (OUTPATIENT)
Dept: INFUSION THERAPY | Age: 67
End: 2022-03-16

## 2022-03-16 ENCOUNTER — HOSPITAL ENCOUNTER (OUTPATIENT)
Dept: INFUSION THERAPY | Age: 67
Discharge: HOME | End: 2022-03-16
Payer: MEDICARE

## 2022-03-16 ENCOUNTER — OFFICE VISIT (OUTPATIENT)
Dept: ONCOLOGY | Age: 67
End: 2022-03-16
Payer: MEDICARE

## 2022-03-16 VITALS
WEIGHT: 149 LBS | OXYGEN SATURATION: 100 % | TEMPERATURE: 97.7 F | HEIGHT: 70 IN | HEART RATE: 102 BPM | SYSTOLIC BLOOD PRESSURE: 111 MMHG | DIASTOLIC BLOOD PRESSURE: 71 MMHG | BODY MASS INDEX: 21.33 KG/M2

## 2022-03-16 DIAGNOSIS — E85.81 LIGHT CHAIN (AL) AMYLOIDOSIS (HCC): ICD-10-CM

## 2022-03-16 DIAGNOSIS — E85.81 LIGHT CHAIN (AL) AMYLOIDOSIS (HCC): Primary | ICD-10-CM

## 2022-03-16 LAB
ALBUMIN SERPL-MCNC: 3.3 G/DL (ref 3.5–5.2)
ALP BLD-CCNC: 88 U/L (ref 40–129)
ALT SERPL-CCNC: 25 U/L (ref 0–40)
ANION GAP SERPL CALCULATED.3IONS-SCNC: 10 MMOL/L (ref 7–16)
AST SERPL-CCNC: 28 U/L (ref 0–39)
BASOPHILS ABSOLUTE: 0.04 E9/L (ref 0–0.2)
BASOPHILS RELATIVE PERCENT: 0.8 % (ref 0–2)
BILIRUB SERPL-MCNC: <0.2 MG/DL (ref 0–1.2)
BUN BLDV-MCNC: 18 MG/DL (ref 6–23)
CALCIUM SERPL-MCNC: 9.4 MG/DL (ref 8.6–10.2)
CHLORIDE BLD-SCNC: 103 MMOL/L (ref 98–107)
CO2: 25 MMOL/L (ref 22–29)
CREAT SERPL-MCNC: 1 MG/DL (ref 0.7–1.2)
EOSINOPHILS ABSOLUTE: 0.12 E9/L (ref 0.05–0.5)
EOSINOPHILS RELATIVE PERCENT: 2.3 % (ref 0–6)
GFR AFRICAN AMERICAN: >60
GFR NON-AFRICAN AMERICAN: >60 ML/MIN/1.73
GLUCOSE BLD-MCNC: 57 MG/DL (ref 74–99)
HCT VFR BLD CALC: 45.4 % (ref 37–54)
HEMOGLOBIN: 14.9 G/DL (ref 12.5–16.5)
IMMATURE GRANULOCYTES #: 0.01 E9/L
IMMATURE GRANULOCYTES %: 0.2 % (ref 0–5)
LYMPHOCYTES ABSOLUTE: 1.75 E9/L (ref 1.5–4)
LYMPHOCYTES RELATIVE PERCENT: 33.9 % (ref 20–42)
MCH RBC QN AUTO: 31 PG (ref 26–35)
MCHC RBC AUTO-ENTMCNC: 32.8 % (ref 32–34.5)
MCV RBC AUTO: 94.4 FL (ref 80–99.9)
MONOCYTES ABSOLUTE: 0.63 E9/L (ref 0.1–0.95)
MONOCYTES RELATIVE PERCENT: 12.2 % (ref 2–12)
NEUTROPHILS ABSOLUTE: 2.61 E9/L (ref 1.8–7.3)
NEUTROPHILS RELATIVE PERCENT: 50.6 % (ref 43–80)
PDW BLD-RTO: 11.6 FL (ref 11.5–15)
PLATELET # BLD: 207 E9/L (ref 130–450)
PMV BLD AUTO: 8.7 FL (ref 7–12)
POTASSIUM SERPL-SCNC: 4.8 MMOL/L (ref 3.5–5)
RBC # BLD: 4.81 E12/L (ref 3.8–5.8)
SODIUM BLD-SCNC: 138 MMOL/L (ref 132–146)
TOTAL PROTEIN: 6.3 G/DL (ref 6.4–8.3)
WBC # BLD: 5.2 E9/L (ref 4.5–11.5)

## 2022-03-16 PROCEDURE — 80053 COMPREHEN METABOLIC PANEL: CPT

## 2022-03-16 PROCEDURE — 99212 OFFICE O/P EST SF 10 MIN: CPT

## 2022-03-16 PROCEDURE — 85025 COMPLETE CBC W/AUTO DIFF WBC: CPT

## 2022-03-16 PROCEDURE — 36415 COLL VENOUS BLD VENIPUNCTURE: CPT

## 2022-03-16 RX ORDER — LENALIDOMIDE 5 MG/1
CAPSULE ORAL
Qty: 21 CAPSULE | Refills: 5 | Status: ACTIVE
Start: 2022-03-16 | End: 2022-04-04 | Stop reason: SDUPTHER

## 2022-03-16 NOTE — TELEPHONE ENCOUNTER
Called and left VM to have notes sent to [facility] onc at [phone] for Dr. Heidi Vargas- mehreen Campos to Dr. Lai Yepez office from [facility] and his pone number is [phone]

## 2022-03-16 NOTE — PROGRESS NOTES
[facility]. Chaim Nettles        Pt Name: Bobo Thomas: 1955  Date of evaluation: 3/16/2022  Primary Care Physician: Jesus Duvall MD  Reason for evaluation:   Chief Complaint   Patient presents with    Cancer     Light chain (AL) amyloidosi    Follow-up    Treatment          Subjective:   Here forfollow-up. Follows at [facility];  S/P  stem cell transplant November 22, 2021. OBJECTIVE:  VITALS:  height is 5' 10\" (1.778 m) and weight is 149 lb (67.6 kg). His temperature is 97.7 °F (36.5 °C). His blood pressure is 111/71 and his pulse is 102. His oxygen saturation is 100%. Physical Exam:  Performance Status: 0  Well developed, well nourished male  General: AAO to person, place, time, cooperative, in no acute distress. Head and neck: PERRLA, EOMI. Sclera non icteric. Oropharynx:  Clear. Neck: no JVD,  no adenopathy. Heart: Regular rate and regular rhythm, no murmur. Lungs: Clear to auscultation.   Abdomen: Soft, non-tender;no masses, no organomegaly. Extremities : No edema,no cyanosis, no clubbing. Neurologic:Cranial nerves grossly intact. No focal motor or sensory deficits   Skin:  No rash. Medications  Prior to Admission medications    Medication Sig Start Date End Date Taking?  Authorizing Provider   rosuvastatin (CRESTOR) 40 MG tablet TAKE ONE TABLET BY MOUTH DAILY 4/2/21  Yes Jesus Duvall MD   fluticasone [facility]) 50 MCG/ACT nasal spray 1 spray by Each Nostril route daily 12/28/20  Yes Jesus Duvall MD   VITAMIN E PO Take 1 capsule by mouth daily   Yes Historical Provider, MD   Multiple Vitamins-Minerals (THERAPEUTIC MULTIVITAMIN-MINERALS) tablet Take 1 tablet by mouth daily   Yes Historical Provider, MD   Ascorbic Acid (VITAMIN C PO) Take 1 tablet by mouth daily   Yes Historical Provider, MD   VITAMIN D PO Take 1 capsule by mouth daily   Yes Historical Provider, MD   aspirin 81 MG tablet Take 81 mg by mouth daily   Yes Historical Provider, MD   fexofenadine-pseudoephedrine (ALLEGRA-D 12 HOUR)  MG per tablet Take 1 tablet by mouth 2 times daily as needed. 2/5/13  Yes Anna Tellez MD   amoxicillin (AMOXIL) 500 MG capsule Take 500 mg by mouth 3 times daily  Patient not taking: Reported on 3/16/2022    Historical Provider, MD   predniSONE (DELTASONE) 10 MG tablet Take 2 Tablets Daily for 3 days  THEN Take 1 tablet Daily for 3 days THEN take 1/2 Tablet Daily for 3 days. Then discontinue  Patient not taking: Reported on 3/16/2022 8/16/21   VICTOR MANUEL Amos CNP   omeprazole (PRILOSEC) 20 MG delayed release capsule Take 1 capsule by mouth 2 times daily (before meals) 8/10/21 9/9/21  VICTOR MANUEL Amos CNP   predniSONE (DELTASONE) 5 MG tablet TAKE 5 mg DAILY for 5 days, THEN Take 5 mg EVERY OTHER DAY x 5 days. Patient not taking: Reported on 3/16/2022 8/2/21   VICTOR MANUEL Amos CNP   triamterene-hydroCHLOROthiazide [facility]) 37.5-25 MG per tablet Take 1 tablet by mouth daily  Patient not taking: Reported on 3/16/2022 7/7/21   Reji Zabala MD   dexamethasone (DECADRON) 4 MG tablet TAKE 10 Tables (40 mg dose) EVERY Monday  Patient not taking: Reported on 3/16/2022 7/7/21   Reji Zabala MD   predniSONE (DELTASONE) 1 MG tablet Take 20 mg by mouth daily  Patient not taking: Reported on 3/16/2022    Historical Provider, MD   prochlorperazine (COMPAZINE) 10 MG tablet Take 1 tablet by mouth every 6 hours as needed (nausea/vomiting)  Patient not taking: Reported on 3/16/2022 3/25/21   Reji Zabala MD   miconazole (MICOTIN) 2 % cream Apply topically 2 times daily.   Patient not taking: Reported on 3/16/2022 12/28/20   Thom Torres MD   ibuprofen (ADVIL;MOTRIN) 600 MG tablet Take 1 tablet by mouth every 6 hours as needed for Pain  Patient not taking: Reported on 3/16/2022 7/5/17   Thom Torres MD    Scheduled Meds:  Continuous Infusions:  PRN Meds:.        Recent Laboratory Data-     Lab Results   Component Value Date    WBC 6.8 10/05/2021    HGB 13.6 10/05/2021    HCT 40.3 10/05/2021    MCV 93.9 10/05/2021     10/05/2021    LYMPHOPCT 20.9 10/05/2021    RBC 4.29 10/05/2021    MCH 31.7 10/05/2021    MCHC 33.7 10/05/2021    RDW 11.6 10/05/2021    NEUTOPHILPCT 68.1 10/05/2021    MONOPCT 8.2 10/05/2021    BASOPCT 0.6 10/05/2021    NEUTROABS 4.64 10/05/2021    LYMPHSABS 1.42 (L) 10/05/2021    MONOSABS 0.56 10/05/2021    EOSABS 0.14 10/05/2021    BASOSABS 0.04 10/05/2021       Lab Results   Component Value Date     10/05/2021    K 4.1 10/05/2021     10/05/2021    CO2 27 10/05/2021    BUN 14 10/05/2021    CREATININE 1.2 10/05/2021    GLUCOSE 117 (H) 10/05/2021    CALCIUM 9.1 10/05/2021    PROT 4.5 (L) 10/05/2021    LABALBU 1.9 (L) 10/05/2021    LABALBU 2.7 (L) 10/05/2021    BILITOT <0.2 10/05/2021    ALKPHOS 81 10/05/2021    AST 20 10/05/2021    ALT 15 10/05/2021    LABGLOM >60 10/05/2021    GFRAA >60 10/05/2021           Radiology-  No results found. 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     His work-up will be completed to include the following:     CBC, CMP, LDH, B2 microglobulin, CRP, serum protein electrophoresis with reflex to immunofixation, serum free kappa and lambda light chains with ratio, quantitative immunoglobulins, 24-hour urine collection for quantitative immunoglobulins and immunofixation.     Serum troponin, proBNP and 2D echo will be done to exclude cardiac amyloidosis.   TSH  Factor X assay as well as PT and APTT will be done     Will be scheduled for a bone marrow aspirate and biopsy     Once work-up completed options of therapy with bortezomib/daratumumab will be addressed     He is encouraged to receive the COVID-19 vaccine        3/15/2021  Awaiting his bone marrow aspirate and biopsy and his 2D echo which are both pending. His CBC was in the normal range with a hemoglobin of 15.3, normal WBC count of 6.2, normal MCV of 90 with a normal platelet count of 658.  His TSH was normal at 3.88  Serum LDH was elevated at 266. B2 microglobulin was markedly elevated at 13.6. Quantitative immunoglobulins showed suppression of IgG with normal IgA and IgM. Serum electrophoresis showed an M spike faint measuring 0.1. Serum FLC's showed markedly elevated free lambda light chain at 311 with abnormal ratio of 0.05  Serum troponin was normal but proBNP was elevated at 491. 24-hour urine collection showed nephrotic range proteinuria with total proteins of 6.6 g in a 24-hour period with a faint M spike in the urine measuring 1263 mg in a 24-hour timeframe     He will be recommended induction with Daratumumab/Velcade/dexamethasone  His bone marrow aspirate and biopsy will be reviewed.     All potential side effects were discussed.        3/29/2021  He will be given acyclovir for VZV prophylaxis and Bactrim DS for PCP prophylaxis and as needed Compazine for nausea. Cheryn Kanner will be initiated today on daratumumab/Velcade/cyclophosphamide/dexamethasone regimen.  All potential side effects were discussed.  His baby aspirin has been held.        4/05/2021  He tolerated his treatment with daratumumab well.  He reports constipation.  Few days later he had some chills and body aches and it is likely related to his second dose of the Covid vaccine. No signs of peripheral neuropathy. 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 He will be given as needed tramadol for pain and omeprazole 20 mg daily for heartburn.   To receive week 3 of chemotherapy regimen consisting of daratumumab/Velcade/dexamethasone/Cytoxan        4/19/2021  He has been experiencing significant side effects from daratumumab 2 to 3 days later in terms of headaches myalgias arthralgias. He will be given prednisone 20 mg daily starting on 4/21/2021 and he will skip it Monday the day he gets the high-dose steroids prior to his Darzalex Faspro.     He will take as needed Tylenol or Advil and he will continue on his omeprazole daily.        4/26/2021  He has gained few pounds and has fluid retention from steroids and will be initiated on Maxide 37.5 mg daily. He will continue on prednisone except on Mondays. Received Darzalex Faspro today.        5/03/2021  He diuresed very well on Maxide and lost 6 pounds and it has been discontinued. He has had no reactions from Darzalex while maintained on prednisone  To receive week 6 of Darzalex Faspro today        5/10/2021  To receive week 7 of Darzalex Faspro . Continue Prednisone.        5/17/2021  To receive week 8 of Darzalex Faspro. He will have a repeat myeloma panel and urinary collection after week 9.        5/24/2021  To receive week 9 of Darzalex Faspro. He will continue on Cytoxan and Velcade     He will have a repeat myeloma panel and urinary collection after week 9. He will return for follow-up in 2 weeks.        6/07/2021  To receive Week #10 of Darzalex/Faspro. and Velcade   On Cytoxan        6/21/2021  To receive Week #11 of Darzalex/Faspro. and Velcade   On Cytoxan   Lab work reviewed, alfonso to proceed with chemo treatment. To return 7/7/2021 for  Week #12 of Darzalex/Faspro. and Velcade   On Cytoxan   Labs on 7/6/2021 7/7/2021  His repeat myeloma panel shows normalization of free kappa and free lambda light chains with normal FLC ratio indicating an excellent response to present therapy regimen  His serum beta-2 microglobulin is down in the normal range.   Quantitative immunoglobulins still show low IgA, low IgG and low IgM. His 24-hour urine collection showed still nephrotic range proteinuria with 3.8 g of proteins in a 24-hour collection  Immunofixation showed faint free monoclonal lambda light chains. His last SPEP from 4/26 had shown an M spike of 0.1 but results from June not available and will be repeated  Back in March his 24-hour UPEP showed 6.6 g of protein in 24 hours. He has achieved an excellent clinical response  To continue on present regimen  He will now go on biweekly Gregoria      7/19/2021  To receive daratumumab/Velcade/Dex  Cytoxan to be DC'd    He will be referred to a tertiary care center for consideration of autologous stem cell transplant. He will return in 2 weeks for chemo      8/02/2021  To receive Day #1 Cycle #5 daratumumab/Velcade/Dex    To return 8/09/2021 for Day #8 Velcade  Cycle #5   Daratumumab will be now on every 4 weeks basis  He is to be tapered off prednisone  Pretransplant work-up will be initiated with anticipation of stem cell transplant at [address] [place] [address] in November 8/09/2021   To receive Day #8 Cycle #5 Velcade. Continue on weekly dexamethasone. He will have 1 last dose of daratumumab in early September   He will start stem cell collection in October with anticipation of stem cell transplant in November    to return 8/16/2021 for Day #15 Cycle #5 Velcade      8/16/2021  To receive Day #15 Cycle #5 Velcade today. Continue on weekly dexamethasone. He will have 1 last dose of daratumumab on 8/30/2021  He will start stem cell collection in October with anticipation of stem cell transplant in November    To return 8/23/2021 for Day #22 Cycle #5 Velcade. He will return on 8/30/2021 for Velcade and daratumumab    We will see me back on 9/13/2021  Will be resumed on prednisone tapering schedule for his rash and joint aches. 8/23/2021  To receive Day #22 Cycle #5 Velcade today. Lab work reviewed, okay to proceed with chemo treatment.    Continue on weekly dexamethasone.       To return 8/30/2021 for last dose of daratumumab Day #1 Cycle #6  Labs on same day. He will start stem cell collection in October with anticipation of stem cell transplant in November.        8/30/2021  To receive Day #1 Cycle #6 Daratumumab/Velcade/Dex  Lab work reviewed, okay to proceed with chemo treatment.      Return on 9/13/2021 for OV with Dr. Mukund Lobo,  Day #8 Velcade. Labs on 9/10/2021.        9/13/2021  To receive  Day #8 Cycle #6  Velcade today. His treatment will be discontinued after today    He will start stem cell collection in October with anticipation of stem cell transplant in November. 10/6/2020  Status post dental extraction few days ago  Reports occasional dry cough  He will start his injections on Friday in anticipation of stem cell collection early next week at [facility]      His stem cell transplant is scheduled for early November. 3/16/2022  S/P stem cell transplant  10/22/2021   Recovered very well post transplant. Most recent bone marrow showed no plasmacytosis  His labs show a faint M spike with slight elevation in FLC's  He did do very well post transplant and he is now around day 140 post transplant    He is scheduled for his vaccinations to start 6-month post transplant around May    He will be initiated on maintenance Revlimid 5 mg daily 3 weeks on and 1 week off and all potential side effects were discussed. He is cleared to travel on vacation to [facility]. His management will be coordinated with Dr. Laurence Negron. Mukund Lobo M.D., F.A.C.P.   Electronically signed 3/16/2022 at 10:58 AM

## 2022-03-16 NOTE — PROGRESS NOTES
[facility] Update    Date: 03/16/22    Patient's prescription benefits are being verified for coverage. Status update to follow as soon as possible. Please call us with any questions at [phone] opt.  6.     Thank you

## 2022-03-17 ENCOUNTER — IMMUNIZATION (OUTPATIENT)
Dept: PRIMARY CARE CLINIC | Age: 67
End: 2022-03-17
Payer: MEDICARE

## 2022-03-17 ENCOUNTER — TELEPHONE (OUTPATIENT)
Dept: INFUSION THERAPY | Age: 67
End: 2022-03-17

## 2022-03-17 PROCEDURE — 0013A COVID-19, MODERNA PRIMARY SERIES VACCINE 100MCG/0.5ML DOSE: CPT | Performed by: NURSE PRACTITIONER

## 2022-03-17 PROCEDURE — 91301 COVID-19, MODERNA PRIMARY SERIES VACCINE 100MCG/0.5ML DOSE: CPT | Performed by: NURSE PRACTITIONER

## 2022-03-17 NOTE — PROGRESS NOTES
[facility] Update    Date: 03/17/22    Pharmacy staff advised patient to contact your practice to begin application process to potentially obtain free medication directly from the , if eligible.  contact info: [email or web address].     Thank you

## 2022-03-17 NOTE — PROGRESS NOTES
[address] [facility] Update    Date: 03/17/22    Patient has a high copay on their prescription. Pharmacy staff will contact patient to begin coordination of financial assistance. Status update to follow as soon as possible. Please call us with any questions at [phone] opt.  6.     Thank you

## 2022-03-18 ENCOUNTER — TELEPHONE (OUTPATIENT)
Dept: INFUSION THERAPY | Age: 67
End: 2022-03-18

## 2022-03-18 DIAGNOSIS — Z23 ENCOUNTER FOR IMMUNIZATION: ICD-10-CM

## 2022-03-21 ENCOUNTER — TELEPHONE (OUTPATIENT)
Dept: INFUSION THERAPY | Age: 67
End: 2022-03-21

## 2022-03-21 NOTE — TELEPHONE ENCOUNTER
Called patient and reinforced teaching given by Shobha Lloyd with regards to oral medication and his Revlimid. Patient will  a teaching handout from [facility] with written information on Revlimid. All of patient's questions and concerns were addressed to his satisfaction. Patient denied any further questions or concerns at this time. Patient is agreeable with the plan. He  will return call to [phone] should any further questions, concerns, and or needs arise.

## 2022-03-23 ENCOUNTER — TELEPHONE (OUTPATIENT)
Dept: INFUSION THERAPY | Age: 67
End: 2022-03-23

## 2022-03-23 RX ORDER — ACYCLOVIR 400 MG/1
400 TABLET ORAL 2 TIMES DAILY
COMMUNITY

## 2022-03-23 NOTE — TELEPHONE ENCOUNTER
Per Dr. Yuriy Wilson, \"It is ok for patient to start his Revlimid after he returns from [facility] at the end of April. He is not to be taking any more steroids at this time. He is to call office and make an appointment for blood work and an office visit a week or two after starting to take Revlimid. \" Updated patient and his wife, both voiced understanding and agree with plan.

## 2022-03-28 ENCOUNTER — TELEPHONE (OUTPATIENT)
Dept: INFUSION THERAPY | Age: 67
End: 2022-03-28

## 2022-03-28 NOTE — TELEPHONE ENCOUNTER
Spoke to Khurram from [address] [place] [address] who states that they have received all information and will be processing within the next 24-48 business hours.

## 2022-04-04 RX ORDER — LENALIDOMIDE 5 MG/1
CAPSULE ORAL
Qty: 21 CAPSULE | Refills: 5 | Status: ACTIVE | OUTPATIENT
Start: 2022-04-04 | End: 2022-04-27 | Stop reason: SDUPTHER

## 2022-04-11 ENCOUNTER — TELEPHONE (OUTPATIENT)
Dept: INFUSION THERAPY | Age: 67
End: 2022-04-11

## 2022-04-21 ENCOUNTER — OFFICE VISIT (OUTPATIENT)
Dept: FAMILY MEDICINE CLINIC | Age: 67
End: 2022-04-21
Payer: MEDICARE

## 2022-04-21 VITALS
OXYGEN SATURATION: 98 % | RESPIRATION RATE: 18 BRPM | WEIGHT: 150 LBS | HEIGHT: 70 IN | SYSTOLIC BLOOD PRESSURE: 97 MMHG | BODY MASS INDEX: 21.47 KG/M2 | TEMPERATURE: 98.2 F | DIASTOLIC BLOOD PRESSURE: 66 MMHG | HEART RATE: 101 BPM

## 2022-04-21 DIAGNOSIS — E78.5 HYPERLIPIDEMIA, UNSPECIFIED HYPERLIPIDEMIA TYPE: Chronic | ICD-10-CM

## 2022-04-21 DIAGNOSIS — R55 SITUATIONAL SYNCOPE: ICD-10-CM

## 2022-04-21 DIAGNOSIS — R55 NEUROCARDIOGENIC PRE-SYNCOPE: ICD-10-CM

## 2022-04-21 DIAGNOSIS — S46.919S STRAIN OF SHOULDER, UNSPECIFIED LATERALITY, SEQUELA: ICD-10-CM

## 2022-04-21 DIAGNOSIS — R68.2 DRY MOUTH: Primary | ICD-10-CM

## 2022-04-21 DIAGNOSIS — M79.674 PAIN OF TOE OF RIGHT FOOT: ICD-10-CM

## 2022-04-21 DIAGNOSIS — R68.2 DRY MOUTH: ICD-10-CM

## 2022-04-21 DIAGNOSIS — E85.81 LIGHT CHAIN (AL) AMYLOIDOSIS (HCC): ICD-10-CM

## 2022-04-21 PROBLEM — S46.919A SHOULDER STRAIN: Status: ACTIVE | Noted: 2022-04-21

## 2022-04-21 LAB
CHOLESTEROL, TOTAL: 328 MG/DL (ref 0–199)
HDLC SERPL-MCNC: 71 MG/DL
LDL CHOLESTEROL CALCULATED: 226 MG/DL (ref 0–99)
TRIGL SERPL-MCNC: 153 MG/DL (ref 0–149)
VITAMIN D 25-HYDROXY: 32 NG/ML (ref 30–100)
VLDLC SERPL CALC-MCNC: 31 MG/DL

## 2022-04-21 PROCEDURE — 99214 OFFICE O/P EST MOD 30 MIN: CPT | Performed by: FAMILY MEDICINE

## 2022-04-21 PROCEDURE — 99212 OFFICE O/P EST SF 10 MIN: CPT | Performed by: FAMILY MEDICINE

## 2022-04-21 RX ORDER — ROSUVASTATIN CALCIUM 40 MG/1
TABLET, COATED ORAL
Qty: 90 TABLET | Refills: 3 | Status: SHIPPED | OUTPATIENT
Start: 2022-04-21

## 2022-04-21 SDOH — ECONOMIC STABILITY: FOOD INSECURITY: WITHIN THE PAST 12 MONTHS, YOU WORRIED THAT YOUR FOOD WOULD RUN OUT BEFORE YOU GOT MONEY TO BUY MORE.: NEVER TRUE

## 2022-04-21 SDOH — ECONOMIC STABILITY: FOOD INSECURITY: WITHIN THE PAST 12 MONTHS, THE FOOD YOU BOUGHT JUST DIDN'T LAST AND YOU DIDN'T HAVE MONEY TO GET MORE.: NEVER TRUE

## 2022-04-21 ASSESSMENT — PATIENT HEALTH QUESTIONNAIRE - PHQ9
SUM OF ALL RESPONSES TO PHQ QUESTIONS 1-9: 0
SUM OF ALL RESPONSES TO PHQ9 QUESTIONS 1 & 2: 0
SUM OF ALL RESPONSES TO PHQ QUESTIONS 1-9: 0
1. LITTLE INTEREST OR PLEASURE IN DOING THINGS: 0
SUM OF ALL RESPONSES TO PHQ QUESTIONS 1-9: 0
2. FEELING DOWN, DEPRESSED OR HOPELESS: 0
SUM OF ALL RESPONSES TO PHQ QUESTIONS 1-9: 0

## 2022-04-21 ASSESSMENT — SOCIAL DETERMINANTS OF HEALTH (SDOH): HOW HARD IS IT FOR YOU TO PAY FOR THE VERY BASICS LIKE FOOD, HOUSING, MEDICAL CARE, AND HEATING?: NOT HARD AT ALL

## 2022-04-21 NOTE — PATIENT INSTRUCTIONS
Patient Education                Preventing Falls: Care Instructions  Your Care Instructions     Getting around your home safely can be a challenge if you have injuries or health problems that make it easy for you to fall. Loose rugs and furniture in walkways are among the dangers for many older people who have problems walking or who have poor eyesight. People who have conditions such as arthritis,osteoporosis, or dementia also have to be careful not to fall. You can make your home safer with a few simple measures. Follow-up care is a key part of your treatment and safety. Be sure to make and go to all appointments, and call your doctor if you are having problems. It's also a good idea to know your test results and keep alist of the medicines you take. How can you care for yourself at home? Taking care of yourself   Exercise regularly to improve your strength, muscle tone, and balance. Walk if you can. Swimming may be a good choice if you cannot walk easily.  Have your vision and hearing checked each year or any time you notice a change. If you have trouble seeing and hearing, you might not be able to avoid objects and could lose your balance.  Know the side effects of the medicines you take. Ask your doctor or pharmacist whether the medicines you take can affect your balance. Sleeping pills or sedatives can affect your balance.  Limit the amount of alcohol you drink. Alcohol can impair your balance and other senses.  Ask your doctor whether calluses or corns on your feet need to be removed. If you wear loose-fitting shoes because of calluses or corns, you can lose your balance and fall.  Talk to your doctor if you have numbness in your feet.  You may get dizzy if you do not drink enough water. To prevent dehydration, drink plenty of fluids. Choose water and other clear liquids.  If you have kidney, heart, or liver disease and have to limit fluids, talk with your doctor before you increase the amount of fluids you drink. Preventing falls at home   Remove raised doorway thresholds, throw rugs, and clutter. Repair loose carpet or raised areas in the floor. [facility] furniture and electrical cords to keep them out of walking paths.  Use nonskid floor wax, and wipe up spills right away, especially on ceramic tile floors.  If you use a walker or cane, put rubber tips on it. If you use crutches, clean the bottoms of them regularly with an abrasive pad, such as steel wool.  Keep your house well lit, especially [place] Konig, and outside walkways. Use night-lights in areas such as hallways and bathrooms. Add extra light switches or use remote switches (such as switches that go on or off when you clap your hands) to make it easier to turn lights on if you have to get up during the night.  Install sturdy handrails on stairways.  Move items in your cabinets so that the things you use a lot are on the lower shelves (about waist level).  Keep a cordless phone and a flashlight with new batteries by your bed. If possible, put a phone in each of the main rooms of your house, or carry a cell phone in case you fall and cannot reach a phone. Or, you can wear a device around your neck or wrist. You push a button that sends a signal for help.  Wear low-heeled shoes that fit well and give your feet good support. Use footwear with nonskid soles. Check the heels and soles of your shoes for wear. Repair or replace worn heels or soles.  Do not wear socks without shoes on wood floors.  Walk on the grass when the sidewalks are slippery. If you live in an area that gets snow and ice in the winter, sprinkle salt on slippery steps and sidewalks. Or ask a family member or friend to do this for you. Preventing falls in the bath   Install grab bars and nonskid mats inside and outside your shower or tub and near the toilet and sinks.  Use shower chairs and bath benches.    Use a hand-held shower head that will allow you to sit while showering.  Get into a tub or shower by putting the weaker leg in first. Get out of a tub or shower with your strong side first.   Repair loose toilet seats and consider installing a raised toilet seat to make getting on and off the toilet easier.  Keep your bathroom door unlocked while you are in the shower. Where can you learn more? Go to [email or web address] org and sign in to your [employer] account. Enter [phone] in the [employer] box to learn more about \"Preventing Falls: Care Instructions. \"     If you do not have an account, please click on the \"Sign Up Now\" link. Current as of: September 8, 2021               Content Version: 13.2  © 0101-0524 [employer], [employer]. Care instructions adapted under license by [facility] ([facility]). If you have questions about a medical condition or this instruction, always ask your healthcare professional. [place] [address] any warranty or liability for your use of this information.

## 2022-04-21 NOTE — PROGRESS NOTES
[facility] is a 74 y.o. male who presents today for   Chief Complaint   Patient presents with    New Patient       HPI:    74yo male here for routine followup. [facility]. Had stem cell transplant November 22, 2021. now on Revlimid. 1) Right Foot 4/5th digit pain - worse at night, better when walking on it. No injury recalled. No pain in other foot. 2) Dry mouth - 2/2 chemo/BMT. Uses wash. Also dry eyes. May consider dry mouth treatment, does recall dry eyes prior to starting chemo. 3) Bilat Shoulder Pain - hx of shoulder surgery in LEFT, PT and xray in right without complete improvement. Interested in f/u with Dr Abbi Peng. 4) Travel to [facility] in a few weeks. Hx of syncope on airplanes.      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    Situational syncope 12/2/2010       Current Outpatient Medications   Medication Instructions    acyclovir (ZOVIRAX) 400 mg, Oral, 2 TIMES DAILY    Ascorbic Acid (VITAMIN C PO) 1 tablet, Oral, DAILY    aspirin 81 mg, Oral, DAILY    diclofenac sodium (VOLTAREN) 2 g, Topical, DAILY    fexofenadine-pseudoephedrine (ALLEGRA-D 12 HOUR)  MG per tablet 1 tablet, Oral, 2 TIMES DAILY PRN    fluticasone (FLONASE) 50 MCG/ACT nasal spray 1 spray, Each Nostril, DAILY    ibuprofen (ADVIL;MOTRIN) 600 mg, Oral, EVERY 6 HOURS PRN    lenalidomide (REVLIMID) 5 MG chemo capsule Take 1 capsule by mouth daily 3 weeks on and 1 week off    Multiple Vitamins-Minerals (THERAPEUTIC MULTIVITAMIN-MINERALS) tablet 1 tablet, Oral, DAILY    omeprazole (PRILOSEC) 20 mg, Oral, 2 TIMES DAILY BEFORE MEALS    rosuvastatin (CRESTOR) 40 MG tablet TAKE ONE TABLET BY MOUTH DAILY    VITAMIN D PO 1,000 Units, Oral, 2 times daily    VITAMIN E PO 1 capsule, Oral, DAILY       Allergies   Allergen Reactions    Codeine Itching    Lipitor      Muscle cramps    Vytorin [Ezetimibe-Simvastatin]      Muscle cramps         Family History   Problem Relation Age of Onset    Cancer Mother         breast    Heart Disease Mother     Heart Disease Father     Stroke Father        Past Surgical History:   Procedure Laterality Date    CT BIOPSY RENAL  2/15/2021    CT BIOPSY RENAL 2/15/2021 Jean-Claude Ma MD [facility]    KNEE SURGERY Right 2016    SHOULDER ARTHROSCOPY Left 2001    left    VENTRAL HERNIA REPAIR         Social History     Tobacco Use    Smoking status: Former Smoker     Packs/day: 0.10     Years: 5.00     Pack years: 0.50     Types: Cigars     Start date: 1980     Quit date: 1990     Years since quittin.6    Smokeless tobacco: Never Used   Substance Use Topics    Alcohol use: Yes     Comment: occasionally    Drug use: No       ROS:     No fevers / chills / shivering / sweats. No cough/shortness of breath. No Calf pain or swelling. No constipation. Urinating and stooling without problem. Physical Exam    Gen: NAD, AAOX3  HEENT: NC/AT, EOMI / PERRL / ANICTERIC, No Facial Droop. Mouth dry 2/. Graylon Zi OP Clear. CV: NDPMI, RRR no MRG  RESP: no IWOB, ctab. no w/r. ABD: +BS, SOFT, NT, ND. No rebound / guarding. EXT: RUIZ Equally grossly. No LE Edema. 2+ Radial and DP Pulses. SKIN: Warm / Dry. Mood/ Affect: Pleasant & cooperative. Foot Exam: No obvious lesions. Toenails No toe onychomycosis, 2+ DP/PT Pulses, Symmetric. Foot warm to touch. DJD angulation @ 3rd/4th toes bilat @ distal joints. Mild TTP. No other lesions. Ipswitch Touch Test 3point intact Left and Right. 1) Dry Mouth - Check sjogrens labs. Poss s/e of chemo/BMT. Consider Sialagogue if not improving. 2) Right 3rd/4th digit pain - Consider injection. 3) Bilat Shoulder Strain - ?RTC.  followup with ortho    4) Syncope hx - w/ flight upcoming - rec compression stockings, cont Aspirin and get up and walk every 30-45 minutes. Immunization per Dr Zaidi Favorite. Additional plan and future considerations:  F/u 3 mos. Health risk factors discussed and addressed. Please see Patient Instructions for further counseling and information given. Advised to be adherent to the treatment plans discussed today, and please call with any questions or concerns, letting the office know of any reasons that the plans may not be followed. The risks of untreated conditions include worsening illness, injury, disability, and possibly, death. Please call if symptoms change in any way, worsen, or fail to completely resolve, as this could necessitate a change to treatment plans. Patient and/or caregiver expressed understanding. Indications and proper use of medication(s) reviewed. Potential side-effects, and risks of medication(s) also explained. Patient and/or caregiver was instructed to call if any new symptoms develop prior to next visit.      Lakesha Griggs MD  04/21/22

## 2022-04-21 NOTE — ASSESSMENT & PLAN NOTE
Syncope hx - w/ flight upcoming - rec compression stockings, cont Aspirin and get up and walk every 30-45 minutes.

## 2022-04-22 LAB
ENA TO SSA (RO) ANTIBODY: NEGATIVE
ENA TO SSB (LA) ANTIBODY: NEGATIVE

## 2022-04-27 ENCOUNTER — TELEPHONE (OUTPATIENT)
Dept: INFUSION THERAPY | Age: 67
End: 2022-04-27

## 2022-04-27 RX ORDER — LENALIDOMIDE 5 MG/1
CAPSULE ORAL
Qty: 21 CAPSULE | Refills: 5 | Status: ACTIVE | COMMUNITY
Start: 2022-04-27 | End: 2022-04-28

## 2022-04-27 NOTE — PROGRESS NOTES
[facility] Update    Date: 04/27/22    Patient receives free drug dispensed by [facility]. Routed script there.

## 2022-04-27 NOTE — PROGRESS NOTES
[facility]    Date: 04/27/22    Patient's prescription benefits are being verified for coverage. Status update to follow as soon as possible. Please call us with any questions at [phone] opt.  2.

## 2022-04-28 DIAGNOSIS — E85.81 LIGHT CHAIN (AL) AMYLOIDOSIS (HCC): Primary | ICD-10-CM

## 2022-04-28 RX ORDER — LENALIDOMIDE 5 MG/1
5 CAPSULE ORAL DAILY
Qty: 21 CAPSULE | Refills: 0 | Status: ACTIVE | OUTPATIENT
Start: 2022-04-28 | End: 2022-08-02 | Stop reason: SDUPTHER

## 2022-05-18 ENCOUNTER — TELEPHONE (OUTPATIENT)
Dept: INFUSION THERAPY | Age: 67
End: 2022-05-18

## 2022-05-18 NOTE — TELEPHONE ENCOUNTER
Brendajoseph Uriarteodminick, patient's wife called. She tested positive for Covid, patient is currently testing negative. Patient is not feeling well and has a very bad cough. Per Dr. Boss Canadian patient is to d/c revlimid until he is feeling better and he is to retest for Covid. Patient's wife verbalized understanding.   Celena Vora RN 5/18/22 [id number]

## 2022-05-25 ENCOUNTER — HOSPITAL ENCOUNTER (OUTPATIENT)
Dept: INFUSION THERAPY | Age: 67
Discharge: HOME | End: 2022-05-25
Payer: MEDICARE

## 2022-05-25 ENCOUNTER — OFFICE VISIT (OUTPATIENT)
Dept: ONCOLOGY | Age: 67
End: 2022-05-25
Payer: MEDICARE

## 2022-05-25 VITALS
SYSTOLIC BLOOD PRESSURE: 115 MMHG | WEIGHT: 146.5 LBS | DIASTOLIC BLOOD PRESSURE: 69 MMHG | BODY MASS INDEX: 20.97 KG/M2 | TEMPERATURE: 97.5 F | HEART RATE: 92 BPM | HEIGHT: 70 IN | OXYGEN SATURATION: 97 %

## 2022-05-25 DIAGNOSIS — E85.81 LIGHT CHAIN (AL) AMYLOIDOSIS (HCC): ICD-10-CM

## 2022-05-25 DIAGNOSIS — E85.81 LIGHT CHAIN (AL) AMYLOIDOSIS (HCC): Primary | ICD-10-CM

## 2022-05-25 LAB
ALBUMIN SERPL-MCNC: 3 G/DL (ref 3.5–5.2)
ALP BLD-CCNC: 106 U/L (ref 40–129)
ALT SERPL-CCNC: 18 U/L (ref 0–40)
ANION GAP SERPL CALCULATED.3IONS-SCNC: 9 MMOL/L (ref 7–16)
AST SERPL-CCNC: 19 U/L (ref 0–39)
BASOPHILS ABSOLUTE: 0.08 E9/L (ref 0–0.2)
BASOPHILS RELATIVE PERCENT: 1.2 % (ref 0–2)
BILIRUB SERPL-MCNC: 0.2 MG/DL (ref 0–1.2)
BUN BLDV-MCNC: 16 MG/DL (ref 6–23)
CALCIUM SERPL-MCNC: 8.8 MG/DL (ref 8.6–10.2)
CHLORIDE BLD-SCNC: 104 MMOL/L (ref 98–107)
CO2: 22 MMOL/L (ref 22–29)
CREAT SERPL-MCNC: 0.9 MG/DL (ref 0.7–1.2)
EOSINOPHILS ABSOLUTE: 0.69 E9/L (ref 0.05–0.5)
EOSINOPHILS RELATIVE PERCENT: 9.9 % (ref 0–6)
GFR AFRICAN AMERICAN: >60
GFR NON-AFRICAN AMERICAN: >60 ML/MIN/1.73
GLUCOSE BLD-MCNC: 104 MG/DL (ref 74–99)
HCT VFR BLD CALC: 40.4 % (ref 37–54)
HEMOGLOBIN: 13.2 G/DL (ref 12.5–16.5)
IMMATURE GRANULOCYTES #: 0.02 E9/L
IMMATURE GRANULOCYTES %: 0.3 % (ref 0–5)
LYMPHOCYTES ABSOLUTE: 1.67 E9/L (ref 1.5–4)
LYMPHOCYTES RELATIVE PERCENT: 24 % (ref 20–42)
MCH RBC QN AUTO: 29.7 PG (ref 26–35)
MCHC RBC AUTO-ENTMCNC: 32.7 % (ref 32–34.5)
MCV RBC AUTO: 90.8 FL (ref 80–99.9)
MONOCYTES ABSOLUTE: 1.16 E9/L (ref 0.1–0.95)
MONOCYTES RELATIVE PERCENT: 16.7 % (ref 2–12)
NEUTROPHILS ABSOLUTE: 3.33 E9/L (ref 1.8–7.3)
NEUTROPHILS RELATIVE PERCENT: 47.9 % (ref 43–80)
PDW BLD-RTO: 12.4 FL (ref 11.5–15)
PLATELET # BLD: 275 E9/L (ref 130–450)
PMV BLD AUTO: 8.3 FL (ref 7–12)
POTASSIUM SERPL-SCNC: 4.2 MMOL/L (ref 3.5–5)
RBC # BLD: 4.45 E12/L (ref 3.8–5.8)
SODIUM BLD-SCNC: 135 MMOL/L (ref 132–146)
TOTAL PROTEIN: 6.1 G/DL (ref 6.4–8.3)
WBC # BLD: 7 E9/L (ref 4.5–11.5)

## 2022-05-25 PROCEDURE — 85025 COMPLETE CBC W/AUTO DIFF WBC: CPT

## 2022-05-25 PROCEDURE — 80053 COMPREHEN METABOLIC PANEL: CPT

## 2022-05-25 PROCEDURE — 99212 OFFICE O/P EST SF 10 MIN: CPT

## 2022-05-25 PROCEDURE — 36415 COLL VENOUS BLD VENIPUNCTURE: CPT

## 2022-05-25 NOTE — PROGRESS NOTES
[address] [facility]        Pt Name: Oleg Hind: 1955  Date of evaluation: 5/25/2022  Primary Care Physician: Ubaldo Cranker, MD  Reason for evaluation:   Chief Complaint   Patient presents with    Other     Light Chain (AL) amyloidosis    Follow-up          Subjective:   Here forfollow-up. Follows at [facility];  S/P  stem cell transplant November 22,2021. OBJECTIVE:  VITALS:  height is 5' 10\" (1.778 m) and weight is 146 lb 8 oz (66.5 kg). His temperature is 97.5 °F (36.4 °C). His blood pressure is 115/69 and his pulse is 92. His oxygen saturation is 97%. Physical Exam:  Performance Status: 0  Well developed, well nourished male  General: AAO to person, place, time, cooperative, in no acute distress. Head and neck: PERRLA, EOMI. Sclera non icteric. Oropharynx:  Clear. Neck: no JVD,  no adenopathy. Heart: Regular rate and regular rhythm, no murmur. Lungs: Clear to auscultation.   Abdomen: Soft, non-tender;no masses, no organomegaly. Extremities : No edema,no cyanosis, no clubbing. Neurologic:Cranial nerves grossly intact. No focal motor or sensory deficits   Skin:  No rash. Medications  Prior to Admission medications    Medication Sig Start Date End Date Taking?  Authorizing Provider   rosuvastatin (CRESTOR) 40 MG tablet TAKE ONE TABLET BY MOUTH DAILY 4/21/22  Yes Ubaldo Cranker, MD   diclofenac sodium (VOLTAREN) 1 % GEL Apply 2 g topically daily 4/21/22  Yes Ubaldo Cranker, MD   acyclovir (ZOVIRAX) 400 MG tablet Take 400 mg by mouth 2 times daily   Yes Historical Provider, MD   fluticasone (FLONASE) 50 MCG/ACT nasal spray 1 spray by Each Nostril route daily 12/28/20  Yes Jonathan Lopez MD   VITAMIN E PO Take 1 capsule by mouth daily   Yes Historical Provider, MD   Multiple Vitamins-Minerals (THERAPEUTIC MULTIVITAMIN-MINERALS) tablet Take 1 tablet by mouth daily   Yes Historical Provider, MD   Ascorbic Acid (VITAMIN C PO) Take for PCP prophylaxis and as needed Compazine for nausea. [facility] will be initiated today on daratumumab/Velcade/cyclophosphamide/dexamethasone regimen.  All potential side effects were discussed.  His baby aspirin has been held.        4/05/2021  He tolerated his treatment with daratumumab well. [facility] reports constipation.  Few days later he had some chills and body aches and it is likely related to his second dose of the Covid vaccine. No signs of peripheral neuropathy. 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 He will be given as needed tramadol for pain and omeprazole 20 mg daily for heartburn. To receive week 3 of chemotherapy regimen consisting of daratumumab/Velcade/dexamethasone/Cytoxan        4/19/2021  He has been experiencing significant side effects from daratumumab 2 to 3 days later in terms of headaches myalgias arthralgias. He will be given prednisone 20 mg daily starting on 4/21/2021 and he will skip it Monday the day he gets the high-dose steroids prior to his Darzalex Faspro.     He will take as needed Tylenol or Advil and he will continue on his omeprazole daily.        4/26/2021  He has gained few pounds and has fluid retention from steroids and will be initiated on Maxide 37.5 mg daily. He will continue on prednisone except on Mondays. Received Darzalex Faspro today.        5/03/2021  He diuresed very well on Maxide and lost 6 pounds and it has been discontinued. He has had no reactions from Darzalex while maintained on prednisone  To receive week 6 of Darzalex Faspro today        5/10/2021  To receive week 7 of Darzalex Faspro .   Continue Prednisone.        5/17/2021  To receive week 8 of Darzalex Faspro. He will have a repeat myeloma panel and urinary collection after week 9.        5/24/2021  To receive week 9 of Darzalex Faspro. He will continue on Cytoxan and Velcade     He will have a repeat myeloma panel and urinary collection after week 9. He will return for follow-up in 2 weeks.        6/07/2021  To receive Week #10 of Darzalex/Faspro. and Velcade   On Cytoxan        6/21/2021  To receive Week #11 of Darzalex/Faspro. and Velcade   On Cytoxan   Lab work reviewed, okay to proceed with chemo treatment. To return 7/7/2021 for  Week #12 of Darzalex/Faspro. and Velcade   On Cytoxan   Labs on 7/6/2021 7/7/2021  His repeat myeloma panel shows normalization of free kappa and free lambda light chains with normal FLC ratio indicating an excellent response to present therapy regimen  His serum beta-2 microglobulin is down in the normal range. Quantitative immunoglobulins still show low IgA, low IgG and low IgM. His 24-hour urine collection showed still nephrotic range proteinuria with 3.8 g of proteins in a 24-hour collection  Immunofixation showed faint free monoclonal lambda light chains. His last SPEP from 4/26 had shown an M spike of 0.1 but results from June not available and will be repeated  Back in March his 24-hour UPEP showed 6.6 g of protein in 24 hours. He has achieved an excellent clinical response  To continue on present regimen  He will now go on biweekly Gregoria      7/19/2021  To receive daratumumab/Velcade/Dex  Cytoxan to be DC'd    He will be referred to a tertiary care center for consideration of autologous stem cell transplant.     He will return in 2 weeks for chemo      8/02/2021  To receive Day #1 Cycle #5 daratumumab/Velcade/Dex    To return 8/09/2021 for Day #8 Velcade  Cycle #5   Daratumumab will be now on every 4 weeks basis  He is to be tapered off prednisone  Pretransplant work-up will be initiated with anticipation of stem cell transplant at [facility] in November 8/09/2021   To receive Day #8 Cycle #5 Velcade. Continue on weekly dexamethasone. He will have 1 last dose of daratumumab in early September   He will start stem cell collection in October with anticipation of stem cell transplant in November    to return 8/16/2021 for Day #15 Cycle #5 Velcade      8/16/2021  To receive Day #15 Cycle #5 Velcade today. Continue on weekly dexamethasone. He will have 1 last dose of daratumumab on 8/30/2021  He will start stem cell collection in October with anticipation of stem cell transplant in November    To return 8/23/2021 for Day #22 Cycle #5 Velcade. He will return on 8/30/2021 for Velcade and daratumumab    We will see me back on 9/13/2021  Will be resumed on prednisone tapering schedule for his rash and joint aches. 8/23/2021  To receive Day #22 Cycle #5 Velcade today. Lab work reviewed, okay to proceed with chemo treatment.      Continue on weekly dexamethasone.       To return 8/30/2021 for last dose of daratumumab Day #1 Cycle #6  Labs on same day. He will start stem cell collection in October with anticipation of stem cell transplant in November.        8/30/2021  To receive Day #1 Cycle #6 Daratumumab/Velcade/Dex  Lab work reviewed, okay to proceed with chemo treatment.      Return on 9/13/2021 for OV with Dr. Sean Bajwa,  Day #8 Velcade. Labs on 9/10/2021.        9/13/2021  To receive  Day #8 Cycle #6  Velcade today. His treatment will be discontinued after today    He will start stem cell collection in October with anticipation of stem cell transplant in November. 10/6/2020  Status post dental extraction few days ago  Reports occasional dry cough  He will start his injections on Friday in anticipation of stem cell collection early next week at [address]      His stem cell transplant is scheduled for early November.       3/16/2022  S/P stem cell transplant  10/22/2021   Recovered very well post transplant. Most recent bone marrow showed no plasmacytosis  His labs show a faint M spike with slight elevation in FLC's  He did do very well post transplant and he is now around day 140 post transplant    He is scheduled for his vaccinations to start 6-month post transplant around May    He will be initiated on maintenance Revlimid 5 mg daily 3 weeks on and 1 week off and all potential side effects were discussed. He is cleared to travel on vacation to [facility]. His management will be coordinated with Dr. Danie Tamayo. 5/25/2022  Patient went to [facility] in April and came back and then his wife contracted COVID and both had cough myalgias and fatigue but apparently he tested negative and he started his Revlimid and then he stopped it blaming the cough as a side effect of Revlimid. It was explained to him that he likely had COVID even though he tested negative and he is willing to resume Revlimid next week and we will closely monitor him for potential side effects he will continue on his baby aspirin. He will start his immunization in 2 to 3 weeks 6 weeks post transplant    Cash Potts. Herson Herring M.D., F.A.C.P.   Electronically signed 5/25/2022 at 8:48 AM

## 2022-05-31 DIAGNOSIS — E85.81 LIGHT CHAIN (AL) AMYLOIDOSIS (HCC): ICD-10-CM

## 2022-05-31 RX ORDER — LENALIDOMIDE 5 MG/1
5 CAPSULE ORAL DAILY
Qty: 21 CAPSULE | Refills: 0 | Status: ACTIVE | OUTPATIENT
Start: 2022-05-31 | End: 2022-06-01 | Stop reason: SDUPTHER

## 2022-06-01 ENCOUNTER — HOSPITAL ENCOUNTER (OUTPATIENT)
Dept: INFUSION THERAPY | Age: 67
End: 2022-06-01

## 2022-06-01 DIAGNOSIS — E85.81 LIGHT CHAIN (AL) AMYLOIDOSIS (HCC): ICD-10-CM

## 2022-06-01 RX ORDER — LENALIDOMIDE 5 MG/1
5 CAPSULE ORAL DAILY
Qty: 21 CAPSULE | Refills: 0 | Status: ACTIVE | OUTPATIENT
Start: 2022-06-01 | End: 2022-07-29

## 2022-06-01 NOTE — PROGRESS NOTES
Patient receives free drug through [facility]. Script routed there to fill. Previous script failed to route.

## 2022-06-07 ENCOUNTER — HOSPITAL ENCOUNTER (OUTPATIENT)
Dept: INFUSION THERAPY | Age: 67
Discharge: HOME | End: 2022-06-07
Payer: MEDICARE

## 2022-06-07 DIAGNOSIS — E85.81 LIGHT CHAIN (AL) AMYLOIDOSIS (HCC): ICD-10-CM

## 2022-06-07 LAB
ALBUMIN SERPL-MCNC: 2.7 G/DL (ref 3.5–5.2)
ALP BLD-CCNC: 124 U/L (ref 40–129)
ALT SERPL-CCNC: 18 U/L (ref 0–40)
ANION GAP SERPL CALCULATED.3IONS-SCNC: 11 MMOL/L (ref 7–16)
AST SERPL-CCNC: 19 U/L (ref 0–39)
BASOPHILS ABSOLUTE: 0.07 E9/L (ref 0–0.2)
BASOPHILS RELATIVE PERCENT: 0.9 % (ref 0–2)
BILIRUB SERPL-MCNC: <0.2 MG/DL (ref 0–1.2)
BUN BLDV-MCNC: 14 MG/DL (ref 6–23)
CALCIUM SERPL-MCNC: 8.8 MG/DL (ref 8.6–10.2)
CHLORIDE BLD-SCNC: 102 MMOL/L (ref 98–107)
CO2: 24 MMOL/L (ref 22–29)
CREAT SERPL-MCNC: 0.9 MG/DL (ref 0.7–1.2)
EOSINOPHILS ABSOLUTE: 1.35 E9/L (ref 0.05–0.5)
EOSINOPHILS RELATIVE PERCENT: 18.2 % (ref 0–6)
GFR AFRICAN AMERICAN: >60
GFR NON-AFRICAN AMERICAN: >60 ML/MIN/1.73
GLUCOSE BLD-MCNC: 103 MG/DL (ref 74–99)
HCT VFR BLD CALC: 38.4 % (ref 37–54)
HEMOGLOBIN: 12.6 G/DL (ref 12.5–16.5)
IMMATURE GRANULOCYTES #: 0.03 E9/L
IMMATURE GRANULOCYTES %: 0.4 % (ref 0–5)
LYMPHOCYTES ABSOLUTE: 1.08 E9/L (ref 1.5–4)
LYMPHOCYTES RELATIVE PERCENT: 14.5 % (ref 20–42)
MCH RBC QN AUTO: 29.6 PG (ref 26–35)
MCHC RBC AUTO-ENTMCNC: 32.8 % (ref 32–34.5)
MCV RBC AUTO: 90.4 FL (ref 80–99.9)
MONOCYTES ABSOLUTE: 0.86 E9/L (ref 0.1–0.95)
MONOCYTES RELATIVE PERCENT: 11.6 % (ref 2–12)
NEUTROPHILS ABSOLUTE: 4.04 E9/L (ref 1.8–7.3)
NEUTROPHILS RELATIVE PERCENT: 54.4 % (ref 43–80)
PDW BLD-RTO: 12.4 FL (ref 11.5–15)
PLATELET # BLD: 213 E9/L (ref 130–450)
PMV BLD AUTO: 8.6 FL (ref 7–12)
POTASSIUM SERPL-SCNC: 3.8 MMOL/L (ref 3.5–5)
RBC # BLD: 4.25 E12/L (ref 3.8–5.8)
SODIUM BLD-SCNC: 137 MMOL/L (ref 132–146)
TOTAL PROTEIN: 6.1 G/DL (ref 6.4–8.3)
WBC # BLD: 7.4 E9/L (ref 4.5–11.5)

## 2022-06-07 PROCEDURE — 85025 COMPLETE CBC W/AUTO DIFF WBC: CPT

## 2022-06-07 PROCEDURE — 80053 COMPREHEN METABOLIC PANEL: CPT

## 2022-06-07 PROCEDURE — 36415 COLL VENOUS BLD VENIPUNCTURE: CPT

## 2022-06-07 NOTE — PROGRESS NOTES
[address] [facility]. [facility]        Pt Name: Karyle Shires: 1955  Date of evaluation: 6/8/2022  Primary Care Physician: Wade Arias MD  Reason for evaluation:   Chief Complaint   Patient presents with    Other     Light Chain( AL) amyloidosis     Follow-up          Subjective:   Here for follow-up. Follows at [facility];  S/P  Stem Cell Transplant November 22,2021. OBJECTIVE:  VITALS:  height is 5' 10\" (1.778 m) and weight is 143 lb 1.6 oz (64.9 kg). His temperature is 98.7 °F (37.1 °C). His blood pressure is 99/71 and his pulse is 92. His oxygen saturation is 96%. Physical Exam:  Performance Status: 0  Well developed, well nourished male  General: AAO to person, place, time, cooperative, in no acute distress. Head and neck: PERRLA, EOMI. Sclera non icteric. Oropharynx:  Clear. Neck: no JVD,  no adenopathy. Heart: Regular rate and regular rhythm, no murmur. Lungs: Clear to auscultation.   Abdomen: Soft, non-tender;no masses, no organomegaly. Extremities : No edema,no cyanosis, no clubbing. Neurologic:Cranial nerves grossly intact. No focal motor or sensory deficits   Skin:  No rash. Medications  Prior to Admission medications    Medication Sig Start Date End Date Taking?  Authorizing Provider   lenalidomide (REVLIMID) 5 MG chemo capsule Take 1 capsule by mouth daily for 21 days Take 1 capsule for 21 days  THEN OFF 7 days    ADULT MALE   AUTH #  [id number] 6/1/22 6/22/22 Yes VICTOR MANUEL Lombardo - CNP   rosuvastatin (CRESTOR) 40 MG tablet TAKE ONE TABLET BY MOUTH DAILY 4/21/22  Yes Wade Arias MD   diclofenac sodium (VOLTAREN) 1 % GEL Apply 2 g topically daily 4/21/22  Yes Wade Arias MD   acyclovir (ZOVIRAX) 400 MG tablet Take 400 mg by mouth 2 times daily   Yes Historical Provider, MD   fluticasone (FLONASE) 50 MCG/ACT nasal spray 1 spray by Each Nostril route daily 12/28/20  Yes Mirian Navarro MD   VITAMIN E PO Take 1 capsule by mouth daily   Yes Historical Provider, MD   Multiple Vitamins-Minerals (THERAPEUTIC MULTIVITAMIN-MINERALS) tablet Take 1 tablet by mouth daily   Yes Historical Provider, MD   Ascorbic Acid (VITAMIN C PO) Take 1 tablet by mouth daily   Yes Historical Provider, MD   VITAMIN D PO Take 1,000 Units by mouth in the morning and at bedtime    Yes Historical Provider, MD   aspirin 81 MG tablet Take 81 mg by mouth daily   Yes Historical Provider, MD   ibuprofen (ADVIL;MOTRIN) 600 MG tablet Take 1 tablet by mouth every 6 hours as needed for Pain 7/5/17  Yes Hawa Coelho MD   fexofenadine-pseudoephedrine (ALLEGRA-D 12 HOUR)  MG per tablet Take 1 tablet by mouth 2 times daily as needed. 2/5/13  Yes Meryl Hughes MD   lenalidomide (REVLIMID) 5 MG chemo capsule Take 1 capsule by mouth daily for 21 days Take one 5 mg capsule daily by mouth for 21 days and off 7 days.  4/28/22 5/19/22  Queta Knott MD   omeprazole (PRILOSEC) 20 MG delayed release capsule Take 1 capsule by mouth 2 times daily (before meals) 8/10/21 9/9/21  VICTOR MANUEL Felix - CNP    Scheduled Meds:  Continuous Infusions:  PRN Meds:.        Recent Laboratory Data-     Lab Results   Component Value Date    WBC 7.4 06/07/2022    HGB 12.6 06/07/2022    HCT 38.4 06/07/2022    MCV 90.4 06/07/2022     06/07/2022    LYMPHOPCT 14.5 (L) 06/07/2022    RBC 4.25 06/07/2022    MCH 29.6 06/07/2022    MCHC 32.8 06/07/2022    RDW 12.4 06/07/2022    NEUTOPHILPCT 54.4 06/07/2022    MONOPCT 11.6 06/07/2022    BASOPCT 0.9 06/07/2022    NEUTROABS 4.04 06/07/2022    LYMPHSABS 1.08 (L) 06/07/2022    MONOSABS 0.86 06/07/2022    EOSABS 1.35 (H) 06/07/2022    BASOSABS 0.07 06/07/2022       Lab Results   Component Value Date     06/07/2022    K 3.8 06/07/2022     06/07/2022    CO2 24 06/07/2022    BUN 14 06/07/2022    CREATININE 0.9 06/07/2022    GLUCOSE 103 (H) 06/07/2022    CALCIUM 8.8 06/07/2022    PROT 6.1 (L) 06/07/2022    LABALBU 2.7 (L) 06/07/2022    BILITOT <0.2 06/07/2022    ALKPHOS 124 06/07/2022    AST 19 06/07/2022    ALT 18 06/07/2022    LABGLOM >60 06/07/2022    GFRAA >60 06/07/2022           Radiology-  No results found. 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     His work-up will be completed to include the following:     CBC, CMP, LDH, B2 microglobulin, CRP, serum protein electrophoresis with reflex to immunofixation, serum free kappa and lambda light chains with ratio, quantitative immunoglobulins, 24-hour urine collection for quantitative immunoglobulins and immunofixation.     Serum troponin, proBNP and 2D echo will be done to exclude cardiac amyloidosis. TSH  Factor X assay as well as PT and APTT will be done     Will be scheduled for a bone marrow aspirate and biopsy     Once work-up completed options of therapy with bortezomib/daratumumab will be addressed     He is encouraged to receive the COVID-19 vaccine        3/15/2021  Awaiting his bone marrow aspirate and biopsy and his 2D echo which are both pending. His CBC was in the normal range with a hemoglobin of 15.3, normal WBC count of 6.2, normal MCV of 90 with a normal platelet count of 404.  His TSH was normal at 3.88  Serum LDH was elevated at 266. B2 microglobulin was markedly elevated at 13.6. Quantitative immunoglobulins showed suppression of IgG with normal IgA and IgM. Serum electrophoresis showed an M spike faint measuring 0.1. Serum FLC's showed markedly elevated free lambda light chain at 311 with abnormal ratio of 0.05  Serum troponin was normal but proBNP was elevated at 491.   24-hour urine collection showed nephrotic range proteinuria with total proteins of 6.6 g in a 24-hour period with a faint M spike in the urine measuring 1263 mg in a 24-hour timeframe     He will be recommended induction with Daratumumab/Velcade/dexamethasone  His bone marrow aspirate and biopsy will be reviewed.     All potential side effects were discussed.        3/29/2021  He will be given acyclovir for VZV prophylaxis and Bactrim DS for PCP prophylaxis and as needed Compazine for nausea. [facility] will be initiated today on daratumumab/Velcade/cyclophosphamide/dexamethasone regimen.  All potential side effects were discussed.  His baby aspirin has been held.        4/05/2021  He tolerated his treatment with daratumumab well. [facility] reports constipation.  Few days later he had some chills and body aches and it is likely related to his second dose of the Covid vaccine. No signs of peripheral neuropathy. 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 He will be given as needed tramadol for pain and omeprazole 20 mg daily for heartburn. To receive week 3 of chemotherapy regimen consisting of daratumumab/Velcade/dexamethasone/Cytoxan        4/19/2021  He has been experiencing significant side effects from daratumumab 2 to 3 days later in terms of headaches myalgias arthralgias. He will be given prednisone 20 mg daily starting on 4/21/2021 and he will skip it Monday the day he gets the high-dose steroids prior to his Darzalex Faspro.     He will take as needed Tylenol or Advil and he will continue on his omeprazole daily.        4/26/2021  He has gained few pounds and has fluid retention from steroids and will be initiated on Maxide 37.5 mg daily. He will continue on prednisone except on Mondays. Received Darzalex Faspro today.        5/03/2021  He diuresed very well on Maxide and lost 6 pounds and it has been discontinued.   He has had no reactions from Darzalex while maintained on prednisone  To receive week 6 of Darzalex Faspro today        5/10/2021  To receive week 7 of Darzalex Faspro . Continue Prednisone.        5/17/2021  To receive week 8 of Darzalex Faspro. He will have a repeat myeloma panel and urinary collection after week 9.        5/24/2021  To receive week 9 of Darzalex Faspro. He will continue on Cytoxan and Velcade     He will have a repeat myeloma panel and urinary collection after week 9. He will return for follow-up in 2 weeks.        6/07/2021  To receive Week #10 of Darzalex/Faspro. and Velcade   On Cytoxan        6/21/2021  To receive Week #11 of Darzalex/Faspro. and Velcade   On Cytoxan   Lab work reviewed, alfonso to proceed with chemo treatment. To return 7/7/2021 for  Week #12 of Darzalex/Faspro. and Velcade   On Cytoxan   Labs on 7/6/2021 7/7/2021  His repeat myeloma panel shows normalization of free kappa and free lambda light chains with normal FLC ratio indicating an excellent response to present therapy regimen  His serum beta-2 microglobulin is down in the normal range. Quantitative immunoglobulins still show low IgA, low IgG and low IgM. His 24-hour urine collection showed still nephrotic range proteinuria with 3.8 g of proteins in a 24-hour collection  Immunofixation showed faint free monoclonal lambda light chains. His last SPEP from 4/26 had shown an M spike of 0.1 but results from June not available and will be repeated  Back in March his 24-hour UPEP showed 6.6 g of protein in 24 hours. He has achieved an excellent clinical response  To continue on present regimen  He will now go on biweekly Gregoria      7/19/2021  To receive daratumumab/Velcade/Dex  Cytoxan to be DC'd    He will be referred to a tertiary care center for consideration of autologous stem cell transplant.     He will return in 2 weeks for chemo      8/02/2021  To receive Day #1 Cycle #5 daratumumab/Velcade/Dex    To return 8/09/2021 for Day #8 Velcade  Cycle #5   Daratumumab will be now on every 4 weeks basis  He is to be tapered off prednisone  Pretransplant work-up will be initiated with anticipation of stem cell transplant at [address] in November 8/09/2021   To receive Day #8 Cycle #5 Velcade. Continue on weekly dexamethasone. He will have 1 last dose of daratumumab in early September   He will start stem cell collection in October with anticipation of stem cell transplant in November    to return 8/16/2021 for Day #15 Cycle #5 Velcade      8/16/2021  To receive Day #15 Cycle #5 Velcade today. Continue on weekly dexamethasone. He will have 1 last dose of daratumumab on 8/30/2021  He will start stem cell collection in October with anticipation of stem cell transplant in November    To return 8/23/2021 for Day #22 Cycle #5 Velcade. He will return on 8/30/2021 for Velcade and daratumumab    We will see me back on 9/13/2021  Will be resumed on prednisone tapering schedule for his rash and joint aches. 8/23/2021  To receive Day #22 Cycle #5 Velcade today. Lab work reviewed, okay to proceed with chemo treatment.      Continue on weekly dexamethasone.       To return 8/30/2021 for last dose of daratumumab Day #1 Cycle #6  Labs on same day. He will start stem cell collection in October with anticipation of stem cell transplant in November.        8/30/2021  To receive Day #1 Cycle #6 Daratumumab/Velcade/Dex  Lab work reviewed, okay to proceed with chemo treatment.      Return on 9/13/2021 for OV with Dr. Jarrod Dove,  Day #8 Velcade. Labs on 9/10/2021.        9/13/2021  To receive  Day #8 Cycle #6  Velcade today. His treatment will be discontinued after today    He will start stem cell collection in October with anticipation of stem cell transplant in November.   10/6/2020  Status post dental extraction few days ago  Reports occasional dry cough  He will start his injections on Friday in anticipation of stem cell collection early next week at [facility]      His stem cell transplant is scheduled for early November. 3/16/2022  S/P stem cell transplant  10/22/2021   Recovered very well post transplant. Most recent bone marrow showed no plasmacytosis  His labs show a faint M spike with slight elevation in FLC's  He did do very well post transplant and he is now around day 140 post transplant    He is scheduled for his vaccinations to start 6-month post transplant around May    He will be initiated on maintenance Revlimid 5 mg daily 3 weeks on and 1 week off and all potential side effects were discussed. He is cleared to travel on vacation to [facility]. His management will be coordinated with Dr. Vicente Hallman. 5/25/2022  Patient went to [facility] in April and came back and then his wife contracted COVID and both had cough myalgias and fatigue but apparently he tested negative and he started his Revlimid and then he stopped it blaming the cough as a side effect of Revlimid. It was explained to him that he likely had COVID even though he tested negative and he is willing to resume Revlimid next week and we will closely monitor him for potential side effects he will continue on his baby aspirin. He will start his immunization in 2 to 3 weeks which would be 6-month post transplant. 6/8/2022  Patient has been intolerant from Revlimid and and since he resumed it he has been complaining of recurrent sinusitis congestion cough and excessive mucus and phlegm. He also reports myalgias and muscle cramps. No diarrhea or skin rash. On exam he has scattered rhonchi and wheezing. He will be given Medrol Dosepak along with Mucinex and will be given [facility] for his cough. He will be given as needed muscle relaxers with tizanidine for his muscle cramps. He will be go off Revlimid for 2 weeks and will be reevaluated clinically in 2 weeks.   His vaccines will be postponed till

## 2022-06-08 ENCOUNTER — HOSPITAL ENCOUNTER (OUTPATIENT)
Dept: INFUSION THERAPY | Age: 67
Discharge: HOME | End: 2022-06-08

## 2022-06-08 ENCOUNTER — OFFICE VISIT (OUTPATIENT)
Dept: ONCOLOGY | Age: 67
End: 2022-06-08
Payer: MEDICARE

## 2022-06-08 VITALS
TEMPERATURE: 98.7 F | OXYGEN SATURATION: 96 % | DIASTOLIC BLOOD PRESSURE: 71 MMHG | HEIGHT: 70 IN | HEART RATE: 92 BPM | SYSTOLIC BLOOD PRESSURE: 99 MMHG | BODY MASS INDEX: 20.49 KG/M2 | WEIGHT: 143.1 LBS

## 2022-06-08 DIAGNOSIS — E85.81 LIGHT CHAIN (AL) AMYLOIDOSIS (HCC): Primary | ICD-10-CM

## 2022-06-08 PROCEDURE — 99212 OFFICE O/P EST SF 10 MIN: CPT

## 2022-06-08 RX ORDER — BENZONATATE 100 MG/1
100 CAPSULE ORAL 3 TIMES DAILY PRN
Qty: 30 CAPSULE | Refills: 0 | Status: SHIPPED | OUTPATIENT
Start: 2022-06-08 | End: 2022-07-28

## 2022-06-08 RX ORDER — TIZANIDINE 4 MG/1
4 TABLET ORAL 3 TIMES DAILY PRN
Qty: 60 TABLET | Refills: 0 | Status: SHIPPED | OUTPATIENT
Start: 2022-06-08 | End: 2022-11-03

## 2022-06-08 RX ORDER — METHYLPREDNISOLONE 4 MG/1
TABLET ORAL
Qty: 1 KIT | Refills: 0 | Status: SHIPPED | OUTPATIENT
Start: 2022-06-08 | End: 2022-06-14

## 2022-06-08 RX ORDER — GUAIFENESIN 600 MG/1
600 TABLET, EXTENDED RELEASE ORAL 2 TIMES DAILY
Qty: 20 TABLET | Refills: 0 | Status: SHIPPED | OUTPATIENT
Start: 2022-06-08 | End: 2022-06-18

## 2022-06-08 NOTE — PROGRESS NOTES
CLINICAL PHARMACY NOTE: MEDS TO BEDS    Total # of Prescriptions Filled: 3   The following medications were delivered to the patient:  · Methylprednisolone 4mg dose pack  · Tizanidine 4mg  · Benzonatate 100mg    Additional Documentation:    Mucinex is cheaper to buy OTC than for use to fill in house. Patient is aware.

## 2022-06-15 DIAGNOSIS — R21 RASH: Primary | ICD-10-CM

## 2022-06-15 DIAGNOSIS — E85.81 LIGHT CHAIN (AL) AMYLOIDOSIS (HCC): ICD-10-CM

## 2022-06-15 RX ORDER — METHYLPREDNISOLONE 4 MG/1
TABLET ORAL
Qty: 1 KIT | Refills: 0 | Status: SHIPPED | OUTPATIENT
Start: 2022-06-15 | End: 2022-06-21

## 2022-06-20 ENCOUNTER — OFFICE VISIT (OUTPATIENT)
Dept: ONCOLOGY | Age: 67
End: 2022-06-20
Payer: MEDICARE

## 2022-06-20 ENCOUNTER — HOSPITAL ENCOUNTER (OUTPATIENT)
Dept: INFUSION THERAPY | Age: 67
Discharge: HOME | End: 2022-06-20
Payer: MEDICARE

## 2022-06-20 VITALS
SYSTOLIC BLOOD PRESSURE: 94 MMHG | HEIGHT: 70 IN | WEIGHT: 140 LBS | OXYGEN SATURATION: 98 % | TEMPERATURE: 98.9 F | BODY MASS INDEX: 20.04 KG/M2 | HEART RATE: 87 BPM | DIASTOLIC BLOOD PRESSURE: 72 MMHG

## 2022-06-20 DIAGNOSIS — E85.81 LIGHT CHAIN (AL) AMYLOIDOSIS (HCC): ICD-10-CM

## 2022-06-20 DIAGNOSIS — E85.81 LIGHT CHAIN (AL) AMYLOIDOSIS (HCC): Primary | ICD-10-CM

## 2022-06-20 LAB
ALBUMIN SERPL-MCNC: 2.9 G/DL (ref 3.5–5.2)
ALP BLD-CCNC: 116 U/L (ref 40–129)
ALT SERPL-CCNC: 24 U/L (ref 0–40)
ANION GAP SERPL CALCULATED.3IONS-SCNC: 7 MMOL/L (ref 7–16)
AST SERPL-CCNC: 22 U/L (ref 0–39)
BASOPHILS ABSOLUTE: 0.08 E9/L (ref 0–0.2)
BASOPHILS RELATIVE PERCENT: 1.2 % (ref 0–2)
BILIRUB SERPL-MCNC: 0.3 MG/DL (ref 0–1.2)
BUN BLDV-MCNC: 16 MG/DL (ref 6–23)
CALCIUM SERPL-MCNC: 8.8 MG/DL (ref 8.6–10.2)
CHLORIDE BLD-SCNC: 102 MMOL/L (ref 98–107)
CO2: 26 MMOL/L (ref 22–29)
CREAT SERPL-MCNC: 0.9 MG/DL (ref 0.7–1.2)
EOSINOPHILS ABSOLUTE: 1.39 E9/L (ref 0.05–0.5)
EOSINOPHILS RELATIVE PERCENT: 20.1 % (ref 0–6)
GFR AFRICAN AMERICAN: >60
GFR NON-AFRICAN AMERICAN: >60 ML/MIN/1.73
GLUCOSE BLD-MCNC: 108 MG/DL (ref 74–99)
HCT VFR BLD CALC: 42.1 % (ref 37–54)
HEMOGLOBIN: 13.5 G/DL (ref 12.5–16.5)
IMMATURE GRANULOCYTES #: 0.01 E9/L
IMMATURE GRANULOCYTES %: 0.1 % (ref 0–5)
LYMPHOCYTES ABSOLUTE: 1.3 E9/L (ref 1.5–4)
LYMPHOCYTES RELATIVE PERCENT: 18.8 % (ref 20–42)
MCH RBC QN AUTO: 29.1 PG (ref 26–35)
MCHC RBC AUTO-ENTMCNC: 32.1 % (ref 32–34.5)
MCV RBC AUTO: 90.7 FL (ref 80–99.9)
MONOCYTES ABSOLUTE: 1.18 E9/L (ref 0.1–0.95)
MONOCYTES RELATIVE PERCENT: 17.1 % (ref 2–12)
NEUTROPHILS ABSOLUTE: 2.95 E9/L (ref 1.8–7.3)
NEUTROPHILS RELATIVE PERCENT: 42.7 % (ref 43–80)
PDW BLD-RTO: 13 FL (ref 11.5–15)
PLATELET # BLD: 261 E9/L (ref 130–450)
PMV BLD AUTO: 8.6 FL (ref 7–12)
POTASSIUM SERPL-SCNC: 4.3 MMOL/L (ref 3.5–5)
RBC # BLD: 4.64 E12/L (ref 3.8–5.8)
SODIUM BLD-SCNC: 135 MMOL/L (ref 132–146)
TOTAL PROTEIN: 6.2 G/DL (ref 6.4–8.3)
WBC # BLD: 6.9 E9/L (ref 4.5–11.5)

## 2022-06-20 PROCEDURE — 99212 OFFICE O/P EST SF 10 MIN: CPT

## 2022-06-20 PROCEDURE — 36415 COLL VENOUS BLD VENIPUNCTURE: CPT

## 2022-06-20 PROCEDURE — 80053 COMPREHEN METABOLIC PANEL: CPT

## 2022-06-20 PROCEDURE — 85025 COMPLETE CBC W/AUTO DIFF WBC: CPT

## 2022-06-20 NOTE — PROGRESS NOTES
[address] [facility]. [facility]        Pt Name: Gamal Church: 1955  Date of evaluation: 6/20/2022  Primary Care Physician: Masha Melvin MD  Reason for evaluation:   Chief Complaint   Patient presents with    Other     Light Chain (AL) Amyloidosis    Follow-up          Subjective:   Here for follow-up. Has had recurrent rash despite the fact he did not resume his Revlimid yet  Follows at [facility];  S/P  Stem Cell Transplant November 22,2021. OBJECTIVE:  VITALS:  height is 5' 10\" (1.778 m) and weight is 140 lb (63.5 kg). His temperature is 98.9 °F (37.2 °C). His blood pressure is 94/72 and his pulse is 87. His oxygen saturation is 98%. Physical Exam:  Performance Status: 0  Well developed, well nourished male  General: AAO to person, place, time, cooperative, in no acute distress. Head and neck: PERRLA, EOMI. Sclera non icteric. Oropharynx:  Clear. Neck: no JVD,  no adenopathy. Heart: Regular rate and regular rhythm, no murmur. Lungs: Clear to auscultation.   Abdomen: Soft, non-tender;no masses, no organomegaly. Extremities : No edema,no cyanosis, no clubbing. Neurologic:Cranial nerves grossly intact. No focal motor or sensory deficits   Skin:  No rash. Medications  Prior to Admission medications    Medication Sig Start Date End Date Taking?  Authorizing Provider   methylPREDNISolone (MEDROL, KHADRA,) 4 MG tablet Take by mouth, AS DIRECTED 6/15/22 6/21/22  Jocelyne Vigil, APRN - CNP   benzonatate (TESSALON PERLES) 100 MG capsule Take 1 capsule by mouth 3 times daily as needed for Cough 6/8/22   Jocelyne Vigil, APRN - CNP   tiZANidine (ZANAFLEX) 4 MG tablet Take 1 tablet by mouth 3 times daily as needed (SPASMS) 6/8/22   Jocelyne Vigil APRN - CNP   lenalidomide (REVLIMID) 5 MG chemo capsule Take 1 capsule by mouth daily for 21 days Take 1 capsule for 21 days  THEN OFF 7 days    ADULT MALE   Jocelyne Pimentel8 #  [id number] 6/1/22 6/22/22  [facility] VICTOR MANUEL Berg CNP   lenalidomide (REVLIMID) 5 MG chemo capsule Take 1 capsule by mouth daily for 21 days Take one 5 mg capsule daily by mouth for 21 days and off 7 days. 4/28/22 5/19/22  Fausto Moreno MD   rosuvastatin (CRESTOR) 40 MG tablet TAKE ONE TABLET BY MOUTH DAILY 4/21/22   Lakesha Griggs MD   diclofenac sodium (VOLTAREN) 1 % GEL Apply 2 g topically daily 4/21/22   Lakesha Griggs MD   acyclovir (ZOVIRAX) 400 MG tablet Take 400 mg by mouth 2 times daily    Historical Provider, MD   omeprazole (PRILOSEC) 20 MG delayed release capsule Take 1 capsule by mouth 2 times daily (before meals) 8/10/21 9/9/21  VICTOR MANUEL Hill CNP   fluticasone Juan Francisco Roopa) 50 MCG/ACT nasal spray 1 spray by Each Nostril route daily 12/28/20   Reinaldo Marcus MD   VITAMIN E PO Take 1 capsule by mouth daily    Historical Provider, MD   Multiple Vitamins-Minerals (THERAPEUTIC MULTIVITAMIN-MINERALS) tablet Take 1 tablet by mouth daily    Historical Provider, MD   Ascorbic Acid (VITAMIN C PO) Take 1 tablet by mouth daily    Historical Provider, MD   VITAMIN D PO Take 1,000 Units by mouth in the morning and at bedtime     Historical Provider, MD   aspirin 81 MG tablet Take 81 mg by mouth daily    Historical Provider, MD   ibuprofen (ADVIL;MOTRIN) 600 MG tablet Take 1 tablet by mouth every 6 hours as needed for Pain 7/5/17   Reinaldo Marcus MD   fexofenadine-pseudoephedrine (ALLEGRA-D 12 HOUR)  MG per tablet Take 1 tablet by mouth 2 times daily as needed.  2/5/13   Naveen Loo MD    Scheduled Meds:  Continuous Infusions:  PRN Meds:.        Recent Laboratory Data-     Lab Results   Component Value Date    WBC 7.4 06/07/2022    HGB 12.6 06/07/2022    HCT 38.4 06/07/2022    MCV 90.4 06/07/2022     06/07/2022    LYMPHOPCT 14.5 (L) 06/07/2022    RBC 4.25 06/07/2022    MCH 29.6 06/07/2022    MCHC 32.8 06/07/2022    RDW 12.4 06/07/2022    NEUTOPHILPCT 54.4 06/07/2022    MONOPCT 11.6 06/07/2022    BASOPCT 0.9 06/07/2022    NEUTROABS 4.04 06/07/2022    LYMPHSABS 1.08 (L) 06/07/2022    MONOSABS 0.86 06/07/2022    EOSABS 1.35 (H) 06/07/2022    BASOSABS 0.07 06/07/2022       Lab Results   Component Value Date     06/07/2022    K 3.8 06/07/2022     06/07/2022    CO2 24 06/07/2022    BUN 14 06/07/2022    CREATININE 0.9 06/07/2022    GLUCOSE 103 (H) 06/07/2022    CALCIUM 8.8 06/07/2022    PROT 6.1 (L) 06/07/2022    LABALBU 2.7 (L) 06/07/2022    BILITOT <0.2 06/07/2022    ALKPHOS 124 06/07/2022    AST 19 06/07/2022    ALT 18 06/07/2022    LABGLOM >60 06/07/2022    GFRAA >60 06/07/2022           Radiology-  No results found. 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     His work-up will be completed to include the following:     CBC, CMP, LDH, B2 microglobulin, CRP, serum protein electrophoresis with reflex to immunofixation, serum free kappa and lambda light chains with ratio, quantitative immunoglobulins, 24-hour urine collection for quantitative immunoglobulins and immunofixation.     Serum troponin, proBNP and 2D echo will be done to exclude cardiac amyloidosis. TSH  Factor X assay as well as PT and APTT will be done     Will be scheduled for a bone marrow aspirate and biopsy     Once work-up completed options of therapy with bortezomib/daratumumab will be addressed     He is encouraged to receive the COVID-19 vaccine        3/15/2021  Awaiting his bone marrow aspirate and biopsy and his 2D echo which are both pending. His CBC was in the normal range with a hemoglobin of 15.3, normal WBC count of 6.2, normal MCV of 90 with a normal platelet count of 026.  His TSH was normal at 3.88  Serum LDH was elevated at 266. B2 microglobulin was markedly elevated at 13.6. Quantitative immunoglobulins showed suppression of IgG with normal IgA and IgM.   Serum electrophoresis showed an M spike faint measuring 0.1. Serum FLC's showed markedly elevated free lambda light chain at 311 with abnormal ratio of 0.05  Serum troponin was normal but proBNP was elevated at 491. 24-hour urine collection showed nephrotic range proteinuria with total proteins of 6.6 g in a 24-hour period with a faint M spike in the urine measuring 1263 mg in a 24-hour timeframe     He will be recommended induction with Daratumumab/Velcade/dexamethasone  His bone marrow aspirate and biopsy will be reviewed.     All potential side effects were discussed.        3/29/2021  He will be given acyclovir for VZV prophylaxis and Bactrim DS for PCP prophylaxis and as needed Compazine for nausea. Baldomero De Anda will be initiated today on daratumumab/Velcade/cyclophosphamide/dexamethasone regimen.  All potential side effects were discussed.  His baby aspirin has been held.        4/05/2021  He tolerated his treatment with daratumumab well.  He reports constipation.  Few days later he had some chills and body aches and it is likely related to his second dose of the Covid vaccine. No signs of peripheral neuropathy. 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 He will be given as needed tramadol for pain and omeprazole 20 mg daily for heartburn. To receive week 3 of chemotherapy regimen consisting of daratumumab/Velcade/dexamethasone/Cytoxan        4/19/2021  He has been experiencing significant side effects from daratumumab 2 to 3 days later in terms of headaches myalgias arthralgias.   He will be given prednisone 20 mg daily starting on 4/21/2021 and he will skip it Monday the day he gets the high-dose steroids prior to his Darzalex Faspro.     He will take as needed Tylenol or Advil and he will continue on his omeprazole daily.        4/26/2021  He has gained few pounds and has fluid retention from steroids and will be initiated on Maxide 37.5 mg daily. He will continue on prednisone except on Mondays. Received Darzalex Faspro today.        5/03/2021  He diuresed very well on Maxide and lost 6 pounds and it has been discontinued. He has had no reactions from Darzalex while maintained on prednisone  To receive week 6 of Darzalex Faspro today        5/10/2021  To receive week 7 of Darzalex Faspro . Continue Prednisone.        5/17/2021  To receive week 8 of Darzalex Faspro. He will have a repeat myeloma panel and urinary collection after week 9.        5/24/2021  To receive week 9 of Darzalex Faspro. He will continue on Cytoxan and Velcade     He will have a repeat myeloma panel and urinary collection after week 9. He will return for follow-up in 2 weeks.        6/07/2021  To receive Week #10 of Darzalex/Faspro. and Velcade   On Cytoxan        6/21/2021  To receive Week #11 of Darzalex/Faspro. and Velcade   On Cytoxan   Lab work reviewed, alfonso to proceed with chemo treatment. To return 7/7/2021 for  Week #12 of Darzalex/Faspro. and Velcade   On Cytoxan   Labs on 7/6/2021 7/7/2021  His repeat myeloma panel shows normalization of free kappa and free lambda light chains with normal FLC ratio indicating an excellent response to present therapy regimen  His serum beta-2 microglobulin is down in the normal range. Quantitative immunoglobulins still show low IgA, low IgG and low IgM. His 24-hour urine collection showed still nephrotic range proteinuria with 3.8 g of proteins in a 24-hour collection  Immunofixation showed faint free monoclonal lambda light chains.     His last SPEP from 4/26 had shown an M spike of 0.1 but results from June not available and will be repeated  Back in March his 24-hour UPEP showed 6.6 g of protein in 24 hours. He has achieved an excellent clinical response  To continue on present regimen  He will now go on biweekly Gregoria      7/19/2021  To receive daratumumab/Velcade/Dex  Cytoxan to be DC'd    He will be referred to a tertiary care center for consideration of autologous stem cell transplant. He will return in 2 weeks for chemo      8/02/2021  To receive Day #1 Cycle #5 daratumumab/Velcade/Dex    To return 8/09/2021 for Day #8 Velcade  Cycle #5   Daratumumab will be now on every 4 weeks basis  He is to be tapered off prednisone  Pretransplant work-up will be initiated with anticipation of stem cell transplant at [facility] [address] in November 8/09/2021   To receive Day #8 Cycle #5 Velcade. Continue on weekly dexamethasone. He will have 1 last dose of daratumumab in early September   He will start stem cell collection in October with anticipation of stem cell transplant in November    to return 8/16/2021 for Day #15 Cycle #5 Velcade      8/16/2021  To receive Day #15 Cycle #5 Velcade today. Continue on weekly dexamethasone. He will have 1 last dose of daratumumab on 8/30/2021  He will start stem cell collection in October with anticipation of stem cell transplant in November    To return 8/23/2021 for Day #22 Cycle #5 Velcade. He will return on 8/30/2021 for Velcade and daratumumab    We will see me back on 9/13/2021  Will be resumed on prednisone tapering schedule for his rash and joint aches. 8/23/2021  To receive Day #22 Cycle #5 Velcade today. Lab work reviewed, okay to proceed with chemo treatment.      Continue on weekly dexamethasone.       To return 8/30/2021 for last dose of daratumumab Day #1 Cycle #6  Labs on same day. He will start stem cell collection in October with anticipation of stem cell transplant in November.        8/30/2021  To receive Day #1 Cycle #6 Daratumumab/Velcade/Dex  Lab work reviewed, okay to proceed with chemo treatment.    Return on 9/13/2021 for OV with Dr. Jonathan Grewal,  Day #8 Velcade. Labs on 9/10/2021.        9/13/2021  To receive  Day #8 Cycle #6  Velcade today. His treatment will be discontinued after today    He will start stem cell collection in October with anticipation of stem cell transplant in November. 10/6/2020  Status post dental extraction few days ago  Reports occasional dry cough  He will start his injections on Friday in anticipation of stem cell collection early next week at [facility]      His stem cell transplant is scheduled for early November. 3/16/2022  S/P stem cell transplant  10/22/2021   Recovered very well post transplant. Most recent bone marrow showed no plasmacytosis  His labs show a faint M spike with slight elevation in FLC's  He did do very well post transplant and he is now around day 140 post transplant    He is scheduled for his vaccinations to start 6-month post transplant around May    He will be initiated on maintenance Revlimid 5 mg daily 3 weeks on and 1 week off and all potential side effects were discussed. He is cleared to travel on vacation to [facility]. His management will be coordinated with Dr. Cinthya Zepeda. 5/25/2022  Patient went to [facility] in April and came back and then his wife contracted COVID and both had cough myalgias and fatigue but apparently he tested negative and he started his Revlimid and then he stopped it blaming the cough as a side effect of Revlimid. It was explained to him that he likely had COVID even though he tested negative and he is willing to resume Revlimid next week and we will closely monitor him for potential side effects he will continue on his baby aspirin. He will start his immunization in 2 to 3 weeks which would be 6-month post transplant.       6/8/2022  Patient has been intolerant from Revlimid and and since he resumed it he has been complaining of recurrent sinusitis congestion cough and excessive mucus and phlegm. He also reports myalgias and muscle cramps. No diarrhea or skin rash. On exam he has scattered rhonchi and wheezing. He will be given Medrol Dosepak along with Mucinex and will be given Harrison Min for his cough. He will be given as needed muscle relaxers with tizanidine for his muscle cramps. He will be go off Revlimid for 2 weeks and will be reevaluated clinically in 2 weeks. His vaccines will be postponed till his return in 2 weeks. 6/20/2022  Has had recurrent rash despite the fact he did not resume his Revlimid yet  Medrol Dosepak helped clear the rash it was very itchy and generalized erythematous maculopapular mostly over extremities and trunk. He stopped all his vitamins. At this time he is only on his acyclovir which she has been on for several months as well as Crestor Zanaflex and omeprazole. He is to resume on Thursday Revlimid and if he develops recurrent cough and rash it will be permanently discontinued. Post transplant vaccinations remain on hold      Tanikao E. Severa Halon, M.D., F.A.C.P.   Electronically signed 6/20/2022 at 6:45 AM

## 2022-06-21 ENCOUNTER — APPOINTMENT (OUTPATIENT)
Dept: INFUSION THERAPY | Age: 67
End: 2022-06-21
Payer: MEDICARE

## 2022-06-27 ENCOUNTER — HOSPITAL ENCOUNTER (OUTPATIENT)
Dept: INFUSION THERAPY | Age: 67
Discharge: HOME | End: 2022-06-27
Payer: MEDICARE

## 2022-06-27 ENCOUNTER — OFFICE VISIT (OUTPATIENT)
Dept: ONCOLOGY | Age: 67
End: 2022-06-27
Payer: MEDICARE

## 2022-06-27 VITALS
WEIGHT: 138.8 LBS | SYSTOLIC BLOOD PRESSURE: 103 MMHG | TEMPERATURE: 97.9 F | HEART RATE: 87 BPM | DIASTOLIC BLOOD PRESSURE: 68 MMHG | HEIGHT: 70 IN | OXYGEN SATURATION: 100 % | BODY MASS INDEX: 19.87 KG/M2

## 2022-06-27 DIAGNOSIS — E85.81 LIGHT CHAIN (AL) AMYLOIDOSIS (HCC): ICD-10-CM

## 2022-06-27 DIAGNOSIS — E85.81 LIGHT CHAIN (AL) AMYLOIDOSIS (HCC): Primary | ICD-10-CM

## 2022-06-27 LAB
ALBUMIN SERPL-MCNC: 2.7 G/DL (ref 3.5–5.2)
ALP BLD-CCNC: 117 U/L (ref 40–129)
ALT SERPL-CCNC: 19 U/L (ref 0–40)
ANION GAP SERPL CALCULATED.3IONS-SCNC: 9 MMOL/L (ref 7–16)
AST SERPL-CCNC: 17 U/L (ref 0–39)
BASOPHILS ABSOLUTE: 0.1 E9/L (ref 0–0.2)
BASOPHILS RELATIVE PERCENT: 1.4 % (ref 0–2)
BILIRUB SERPL-MCNC: 0.3 MG/DL (ref 0–1.2)
BUN BLDV-MCNC: 10 MG/DL (ref 6–23)
CALCIUM SERPL-MCNC: 9.1 MG/DL (ref 8.6–10.2)
CHLORIDE BLD-SCNC: 101 MMOL/L (ref 98–107)
CO2: 26 MMOL/L (ref 22–29)
CREAT SERPL-MCNC: 0.9 MG/DL (ref 0.7–1.2)
EOSINOPHILS ABSOLUTE: 1.3 E9/L (ref 0.05–0.5)
EOSINOPHILS RELATIVE PERCENT: 18.4 % (ref 0–6)
GFR AFRICAN AMERICAN: >60
GFR NON-AFRICAN AMERICAN: >60 ML/MIN/1.73
GLUCOSE BLD-MCNC: 98 MG/DL (ref 74–99)
HCT VFR BLD CALC: 39.8 % (ref 37–54)
HEMOGLOBIN: 12.7 G/DL (ref 12.5–16.5)
IMMATURE GRANULOCYTES #: 0.02 E9/L
IMMATURE GRANULOCYTES %: 0.3 % (ref 0–5)
LYMPHOCYTES ABSOLUTE: 1.25 E9/L (ref 1.5–4)
LYMPHOCYTES RELATIVE PERCENT: 17.7 % (ref 20–42)
MCH RBC QN AUTO: 28.6 PG (ref 26–35)
MCHC RBC AUTO-ENTMCNC: 31.9 % (ref 32–34.5)
MCV RBC AUTO: 89.6 FL (ref 80–99.9)
MONOCYTES ABSOLUTE: 0.77 E9/L (ref 0.1–0.95)
MONOCYTES RELATIVE PERCENT: 10.9 % (ref 2–12)
NEUTROPHILS ABSOLUTE: 3.63 E9/L (ref 1.8–7.3)
NEUTROPHILS RELATIVE PERCENT: 51.3 % (ref 43–80)
PDW BLD-RTO: 12.8 FL (ref 11.5–15)
PLATELET # BLD: 252 E9/L (ref 130–450)
PMV BLD AUTO: 8.9 FL (ref 7–12)
POTASSIUM SERPL-SCNC: 4.3 MMOL/L (ref 3.5–5)
RBC # BLD: 4.44 E12/L (ref 3.8–5.8)
SODIUM BLD-SCNC: 136 MMOL/L (ref 132–146)
TOTAL PROTEIN: 6.2 G/DL (ref 6.4–8.3)
WBC # BLD: 7.1 E9/L (ref 4.5–11.5)

## 2022-06-27 PROCEDURE — 99212 OFFICE O/P EST SF 10 MIN: CPT

## 2022-06-27 PROCEDURE — 80053 COMPREHEN METABOLIC PANEL: CPT

## 2022-06-27 PROCEDURE — 85025 COMPLETE CBC W/AUTO DIFF WBC: CPT

## 2022-06-27 PROCEDURE — 36415 COLL VENOUS BLD VENIPUNCTURE: CPT

## 2022-06-27 NOTE — PROGRESS NOTES
[address] [facility]. [facility]        Pt Name: Jorge Luis Hernández: 1955  Date of evaluation: 6/27/2022  Primary Care Physician: Freddy Collazo MD  Reason for evaluation:   Chief Complaint   Patient presents with    Other     Light Chain (AL) amyloidosis    Follow-up          Subjective:   Here for follow-up. Follows at [facility];  S/P  Stem Cell Transplant November 22,2021. OBJECTIVE:  VITALS:  height is 5' 10\" (1.778 m) and weight is 138 lb 12.8 oz (63 kg). His temperature is 97.9 °F (36.6 °C). His blood pressure is 103/68 and his pulse is 87. His oxygen saturation is 100%. Physical Exam:  Performance Status: 0  Well developed, well nourished male  General: AAO to person, place, time, cooperative, in no acute distress. Head and neck: PERRLA, EOMI. Sclera non icteric. Oropharynx:  Clear. Neck: no JVD,  no adenopathy. Heart: Regular rate and regular rhythm, no murmur. Lungs: Clear to auscultation.   Abdomen: Soft, non-tender;no masses, no organomegaly. Extremities : No edema,no cyanosis, no clubbing. Neurologic:Cranial nerves grossly intact. No focal motor or sensory deficits   Skin:  No rash. Medications  Prior to Admission medications    Medication Sig Start Date End Date Taking? Authorizing Provider   benzonatate (TESSALON PERLES) 100 MG capsule Take 1 capsule by mouth 3 times daily as needed for Cough 6/8/22  Yes VICTOR MANUEL Lobato CNP   tiZANidine (ZANAFLEX) 4 MG tablet Take 1 tablet by mouth 3 times daily as needed (SPASMS) 6/8/22  Yes VICTOR MANUEL Lobato CNP   lenalidomide (REVLIMID) 5 MG chemo capsule Take 1 capsule by mouth daily for 21 days Take one 5 mg capsule daily by mouth for 21 days and off 7 days.  4/28/22 6/27/22 Yes Blake Rico MD   rosuvastatin (CRESTOR) 40 MG tablet TAKE ONE TABLET BY MOUTH DAILY 4/21/22  Yes Freddy Collazo MD   diclofenac sodium (VOLTAREN) 1 % GEL Apply 2 g topically daily 4/21/22 Yes Masha Melvin MD   acyclovir (ZOVIRAX) 400 MG tablet Take 400 mg by mouth 2 times daily   Yes Historical Provider, MD   omeprazole (PRILOSEC) 20 MG delayed release capsule Take 1 capsule by mouth 2 times daily (before meals) 8/10/21 6/27/22 Yes VICTOR MANUEL Duran CNP   fluticasone (FLONASE) 50 MCG/ACT nasal spray 1 spray by Each Nostril route daily 12/28/20  Yes Jessenia Delgado MD   VITAMIN E PO Take 1 capsule by mouth daily   Yes Historical Provider, MD   Multiple Vitamins-Minerals (THERAPEUTIC MULTIVITAMIN-MINERALS) tablet Take 1 tablet by mouth daily   Yes Historical Provider, MD   Ascorbic Acid (VITAMIN C PO) Take 1 tablet by mouth daily   Yes Historical Provider, MD   VITAMIN D PO Take 1,000 Units by mouth in the morning and at bedtime    Yes Historical Provider, MD   aspirin 81 MG tablet Take 81 mg by mouth daily   Yes Historical Provider, MD   ibuprofen (ADVIL;MOTRIN) 600 MG tablet Take 1 tablet by mouth every 6 hours as needed for Pain 7/5/17  Yes Jessenia Delgado MD   fexofenadine-pseudoephedrine (ALLEGRA-D 12 HOUR)  MG per tablet Take 1 tablet by mouth 2 times daily as needed.  2/5/13  Yes Nadiya Lobato MD   lenalidomide (REVLIMID) 5 MG chemo capsule Take 1 capsule by mouth daily for 21 days Take 1 capsule for 21 days  THEN OFF 7 days    ADULT MALE   [facility] [place] #  [id number]  Patient not taking: Reported on 6/20/2022 6/1/22 6/22/22  VICTOR MANUEL Duran CNP    Scheduled Meds:  Continuous Infusions:  PRN Meds:.        Recent Laboratory Data-     Lab Results   Component Value Date    WBC 7.1 06/27/2022    HGB 12.7 06/27/2022    HCT 39.8 06/27/2022    MCV 89.6 06/27/2022     06/27/2022    LYMPHOPCT 17.7 (L) 06/27/2022    RBC 4.44 06/27/2022    MCH 28.6 06/27/2022    MCHC 31.9 (L) 06/27/2022    RDW 12.8 06/27/2022    NEUTOPHILPCT 51.3 06/27/2022    MONOPCT 10.9 06/27/2022    BASOPCT 1.4 06/27/2022    NEUTROABS 3.63 06/27/2022    LYMPHSABS 1.25 (L) 06/27/2022    MONOSABS 0.77 06/27/2022 EOSABS 1.30 (H) 06/27/2022    BASOSABS 0.10 06/27/2022       Lab Results   Component Value Date     06/27/2022    K 4.3 06/27/2022     06/27/2022    CO2 26 06/27/2022    BUN 10 06/27/2022    CREATININE 0.9 06/27/2022    GLUCOSE 98 06/27/2022    CALCIUM 9.1 06/27/2022    PROT 6.2 (L) 06/27/2022    LABALBU 2.7 (L) 06/27/2022    BILITOT 0.3 06/27/2022    ALKPHOS 117 06/27/2022    AST 17 06/27/2022    ALT 19 06/27/2022    LABGLOM >60 06/27/2022    GFRAA >60 06/27/2022           Radiology-  No results found. 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     His work-up will be completed to include the following:     CBC, CMP, LDH, B2 microglobulin, CRP, serum protein electrophoresis with reflex to immunofixation, serum free kappa and lambda light chains with ratio, quantitative immunoglobulins, 24-hour urine collection for quantitative immunoglobulins and immunofixation.     Serum troponin, proBNP and 2D echo will be done to exclude cardiac amyloidosis. TSH  Factor X assay as well as PT and APTT will be done     Will be scheduled for a bone marrow aspirate and biopsy     Once work-up completed options of therapy with bortezomib/daratumumab will be addressed     He is encouraged to receive the COVID-19 vaccine        3/15/2021  Awaiting his bone marrow aspirate and biopsy and his 2D echo which are both pending. His CBC was in the normal range with a hemoglobin of 15.3, normal WBC count of 6.2, normal MCV of 90 with a normal platelet count of 794.  His TSH was normal at 3.88  Serum LDH was elevated at 266. B2 microglobulin was markedly elevated at 13.6. Quantitative immunoglobulins showed suppression of IgG with normal IgA and IgM. Serum electrophoresis showed an M spike faint measuring 0.1.   Serum FLC's showed markedly elevated free lambda light chain at 311 with abnormal ratio of 0.05  Serum troponin was normal but proBNP was elevated at 491. 24-hour urine collection showed nephrotic range proteinuria with total proteins of 6.6 g in a 24-hour period with a faint M spike in the urine measuring 1263 mg in a 24-hour timeframe     He will be recommended induction with Daratumumab/Velcade/dexamethasone  His bone marrow aspirate and biopsy will be reviewed.     All potential side effects were discussed.        3/29/2021  He will be given acyclovir for VZV prophylaxis and Bactrim DS for PCP prophylaxis and as needed Compazine for nausea. Javan Rothman will be initiated today on daratumumab/Velcade/cyclophosphamide/dexamethasone regimen.  All potential side effects were discussed.  His baby aspirin has been held.        4/05/2021  He tolerated his treatment with daratumumab well.  He reports constipation.  Few days later he had some chills and body aches and it is likely related to his second dose of the Covid vaccine. No signs of peripheral neuropathy. 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 He will be given as needed tramadol for pain and omeprazole 20 mg daily for heartburn. To receive week 3 of chemotherapy regimen consisting of daratumumab/Velcade/dexamethasone/Cytoxan        4/19/2021  He has been experiencing significant side effects from daratumumab 2 to 3 days later in terms of headaches myalgias arthralgias.   He will be given prednisone 20 mg daily starting on 4/21/2021 and he will skip it Monday the day he gets the high-dose steroids prior to his Darzalex Faspro.     He will take as needed Tylenol or Advil and he will continue on his omeprazole daily.        4/26/2021  He has gained few pounds and has fluid retention from steroids and will be initiated on Maxide 37.5 mg daily. He will continue on prednisone except on Mondays. Received Darzalex Faspro today.        5/03/2021  He diuresed very well on Maxide and lost 6 pounds and it has been discontinued. He has had no reactions from Darzalex while maintained on prednisone  To receive week 6 of Darzalex Faspro today        5/10/2021  To receive week 7 of Darzalex Faspro . Continue Prednisone.        5/17/2021  To receive week 8 of Darzalex Faspro. He will have a repeat myeloma panel and urinary collection after week 9.        5/24/2021  To receive week 9 of Darzalex Faspro. He will continue on Cytoxan and Velcade     He will have a repeat myeloma panel and urinary collection after week 9. He will return for follow-up in 2 weeks.        6/07/2021  To receive Week #10 of Darzalex/Faspro. and Velcade   On Cytoxan        6/21/2021  To receive Week #11 of Darzalex/Faspro. and Velcade   On Cytoxan   Lab work reviewed, okay to proceed with chemo treatment. To return 7/7/2021 for  Week #12 of Darzalex/Faspro. and Velcade   On Cytoxan   Labs on 7/6/2021 7/7/2021  His repeat myeloma panel shows normalization of free kappa and free lambda light chains with normal FLC ratio indicating an excellent response to present therapy regimen  His serum beta-2 microglobulin is down in the normal range. Quantitative immunoglobulins still show low IgA, low IgG and low IgM. His 24-hour urine collection showed still nephrotic range proteinuria with 3.8 g of proteins in a 24-hour collection  Immunofixation showed faint free monoclonal lambda light chains. His last SPEP from 4/26 had shown an M spike of 0.1 but results from June not available and will be repeated  Back in March his 24-hour UPEP showed 6.6 g of protein in 24 hours.     He has achieved an excellent clinical response  To continue on present regimen  He will now go on biweekly Gregoria      7/19/2021  To receive daratumumab/Velcade/Dex  Cytoxan to be DC'd    He will be referred to a tertiary care center for consideration of autologous stem cell transplant. He will return in 2 weeks for chemo      8/02/2021  To receive Day #1 Cycle #5 daratumumab/Velcade/Dex    To return 8/09/2021 for Day #8 Velcade  Cycle #5   Daratumumab will be now on every 4 weeks basis  He is to be tapered off prednisone  Pretransplant work-up will be initiated with anticipation of stem cell transplant at [facility] [address] in November 8/09/2021   To receive Day #8 Cycle #5 Velcade. Continue on weekly dexamethasone. He will have 1 last dose of daratumumab in early September   He will start stem cell collection in October with anticipation of stem cell transplant in November    to return 8/16/2021 for Day #15 Cycle #5 Velcade      8/16/2021  To receive Day #15 Cycle #5 Velcade today. Continue on weekly dexamethasone. He will have 1 last dose of daratumumab on 8/30/2021  He will start stem cell collection in October with anticipation of stem cell transplant in November    To return 8/23/2021 for Day #22 Cycle #5 Velcade. He will return on 8/30/2021 for Velcade and daratumumab    We will see me back on 9/13/2021  Will be resumed on prednisone tapering schedule for his rash and joint aches. 8/23/2021  To receive Day #22 Cycle #5 Velcade today. Lab work reviewed, okay to proceed with chemo treatment.      Continue on weekly dexamethasone.       To return 8/30/2021 for last dose of daratumumab Day #1 Cycle #6  Labs on same day. He will start stem cell collection in October with anticipation of stem cell transplant in November.        8/30/2021  To receive Day #1 Cycle #6 Daratumumab/Velcade/Dex  Lab work reviewed, okay to proceed with chemo treatment.      Return on 9/13/2021 for OV with Dr. Divine Pascal,  Day #8 Velcade. Labs on 9/10/2021. 9/13/2021  To receive  Day #8 Cycle #6  Velcade today. His treatment will be discontinued after today    He will start stem cell collection in October with anticipation of stem cell transplant in November. 10/6/2020  Status post dental extraction few days ago  Reports occasional dry cough  He will start his injections on Friday in anticipation of stem cell collection early next week at [facility]      His stem cell transplant is scheduled for early November. 3/16/2022  S/P stem cell transplant  10/22/2021   Recovered very well post transplant. Most recent bone marrow showed no plasmacytosis  His labs show a faint M spike with slight elevation in FLC's  He did do very well post transplant and he is now around day 140 post transplant    He is scheduled for his vaccinations to start 6-month post transplant around May    He will be initiated on maintenance Revlimid 5 mg daily 3 weeks on and 1 week off and all potential side effects were discussed. He is cleared to travel on vacation to [facility]. His management will be coordinated with Dr. Navin Mckeon. 5/25/2022  Patient went to [facility] in April and came back and then his wife contracted COVID and both had cough myalgias and fatigue but apparently he tested negative and he started his Revlimid and then he stopped it blaming the cough as a side effect of Revlimid. It was explained to him that he likely had COVID even though he tested negative and he is willing to resume Revlimid next week and we will closely monitor him for potential side effects he will continue on his baby aspirin. He will start his immunization in 2 to 3 weeks which would be 6-month post transplant. 6/8/2022  Patient has been intolerant from Revlimid and and since he resumed it he has been complaining of recurrent sinusitis congestion cough and excessive mucus and phlegm. He also reports myalgias and muscle cramps. No diarrhea or skin rash.     On exam he has scattered rhonchi and wheezing. He will be given Medrol Dosepak along with Mucinex and will be given Faby Broody for his cough. He will be given as needed muscle relaxers with tizanidine for his muscle cramps. He will be go off Revlimid for 2 weeks and will be reevaluated clinically in 2 weeks. His vaccines will be postponed till his return in 2 weeks. 6/20/2022  Has had recurrent rash despite the fact he did not resume his Revlimid yet  Medrol Dosepak helped clear the rash it was very itchy and generalized erythematous maculopapular mostly over extremities and trunk. He stopped all his vitamins. At this time he is only on his acyclovir which he has been on for several months as well as Crestor Zanaflex and omeprazole. He is to resume on Thursday Revlimid and if he develops recurrent cough and rash it will be permanently discontinued. Post transplant vaccinations remain on hold. 6/27/2022  Resumed Revlimid 5 days ago and so far tolerating it very well without any cough or rash. He continues only on vitamin D. He remains on acyclovir for VZV prophylaxis. If everything goes well without side effects he will be maintained on Revlimid along with his baby aspirin. He will return for follow-up on 7/18/2022 and he will start then his posttransplant immunization program    Aj Gonzales. Sean Bajwa M.D., F.A.C.P.   Electronically signed 6/27/2022 at 9:56 AM

## 2022-07-15 NOTE — PROGRESS NOTES
[address] [facility]. [facility]        Pt Name: Glendy Shaker: 1955  Date of evaluation: 7/15/2022  Primary Care Physician: Bard Wendy MD  Reason for evaluation:   Chief Complaint   Patient presents with    Other     Light Chain (AL)  Amyloidosis    Follow-up          Subjective:   Here for follow-up. Follows at [facility];  S/P  Stem Cell Transplant November 22,2021. OBJECTIVE:  VITALS:  height is 5' 10\" (1.778 m) and weight is 140 lb 11.2 oz (63.8 kg). His temperature is 97.2 °F (36.2 °C). His blood pressure is 110/75 and his pulse is 73. His oxygen saturation is 99%. Physical Exam:  Performance Status: 0  Well developed, well nourished male  General: AAO to person, place, time, cooperative, in no acute distress. Head and neck: PERRLA, EOMI. Sclera non icteric. Oropharynx:  Clear. Neck: no JVD,  no adenopathy. Heart: Regular rate and regular rhythm, no murmur. Lungs: Clear to auscultation. Abdomen: Soft, non-tender;no masses, no organomegaly. Extremities : No edema,no cyanosis, no clubbing. Neurologic:Cranial nerves grossly intact. No focal motor or sensory deficits   Skin:  No rash. Medications  Prior to Admission medications    Medication Sig Start Date End Date Taking?  Authorizing Provider   benzonatate (TESSALON PERLES) 100 MG capsule Take 1 capsule by mouth 3 times daily as needed for Cough 6/8/22   Lebrianne Linareszarine, APRN - CNP   tiZANidine (ZANAFLEX) 4 MG tablet Take 1 tablet by mouth 3 times daily as needed (SPASMS) 6/8/22   Leory Balzarine, APRN - CNP   lenalidomide (REVLIMID) 5 MG chemo capsule Take 1 capsule by mouth daily for 21 days Take 1 capsule for 21 days  THEN OFF 7 days    ADULT MALE   AUTH #  [id number]  Patient not taking: Reported on 6/20/2022 6/1/22 6/22/22  Leory Balzarine, APRN - CNP   lenalidomide (REVLIMID) 5 MG chemo capsule Take 1 capsule by mouth daily for 21 days Take one 5 mg capsule daily by mouth for 21 days and off 7 days. 4/28/22 6/27/22  Cyn Davies MD   rosuvastatin (CRESTOR) 40 MG tablet TAKE ONE TABLET BY MOUTH DAILY 4/21/22   Jarred Ortiz MD   diclofenac sodium (VOLTAREN) 1 % GEL Apply 2 g topically daily 4/21/22   Jarred Ortiz MD   acyclovir (ZOVIRAX) 400 MG tablet Take 400 mg by mouth 2 times daily    Historical Provider, MD   omeprazole (PRILOSEC) 20 MG delayed release capsule Take 1 capsule by mouth 2 times daily (before meals) 8/10/21 6/27/22  Carole Reno APRN - CNP   fluticasone [facility]) 50 MCG/ACT nasal spray 1 spray by Each Nostril route daily 12/28/20   Casey Rodriguez MD   VITAMIN E PO Take 1 capsule by mouth daily    Historical Provider, MD   Multiple Vitamins-Minerals (THERAPEUTIC MULTIVITAMIN-MINERALS) tablet Take 1 tablet by mouth daily    Historical Provider, MD   Ascorbic Acid (VITAMIN C PO) Take 1 tablet by mouth daily    Historical Provider, MD   VITAMIN D PO Take 1,000 Units by mouth in the morning and at bedtime     Historical Provider, MD   aspirin 81 MG tablet Take 81 mg by mouth daily    Historical Provider, MD   ibuprofen (ADVIL;MOTRIN) 600 MG tablet Take 1 tablet by mouth every 6 hours as needed for Pain 7/5/17   Casey Rodriguez MD   fexofenadine-pseudoephedrine (ALLEGRA-D 12 HOUR)  MG per tablet Take 1 tablet by mouth 2 times daily as needed.  2/5/13   Vicki Joe MD    Scheduled Meds:  Continuous Infusions:  PRN Meds:.        Recent Laboratory Data-     Lab Results   Component Value Date    WBC 7.1 06/27/2022    HGB 12.7 06/27/2022    HCT 39.8 06/27/2022    MCV 89.6 06/27/2022     06/27/2022    LYMPHOPCT 17.7 (L) 06/27/2022    RBC 4.44 06/27/2022    MCH 28.6 06/27/2022    MCHC 31.9 (L) 06/27/2022    RDW 12.8 06/27/2022    NEUTOPHILPCT 51.3 06/27/2022    MONOPCT 10.9 06/27/2022    BASOPCT 1.4 06/27/2022    NEUTROABS 3.63 06/27/2022    LYMPHSABS 1.25 (L) 06/27/2022    MONOSABS 0.77 06/27/2022    EOSABS 1.30 (H) 06/27/2022    BASOSABS 0.10 06/27/2022       Lab Results   Component Value Date     06/27/2022    K 4.3 06/27/2022     06/27/2022    CO2 26 06/27/2022    BUN 10 06/27/2022    CREATININE 0.9 06/27/2022    GLUCOSE 98 06/27/2022    CALCIUM 9.1 06/27/2022    PROT 6.2 (L) 06/27/2022    LABALBU 2.7 (L) 06/27/2022    BILITOT 0.3 06/27/2022    ALKPHOS 117 06/27/2022    AST 17 06/27/2022    ALT 19 06/27/2022    LABGLOM >60 06/27/2022    GFRAA >60 06/27/2022           Radiology-  No results found. 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 His work-up will be completed to include the following:     CBC, CMP, LDH, B2 microglobulin, CRP, serum protein electrophoresis with reflex to immunofixation, serum free kappa and lambda light chains with ratio, quantitative immunoglobulins, 24-hour urine collection for quantitative immunoglobulins and immunofixation. Serum troponin, proBNP and 2D echo will be done to exclude cardiac amyloidosis. TSH  Factor X assay as well as PT and APTT will be done     Will be scheduled for a bone marrow aspirate and biopsy     Once work-up completed options of therapy with bortezomib/daratumumab will be addressed     He is encouraged to receive the COVID-19 vaccine        3/15/2021  Awaiting his bone marrow aspirate and biopsy and his 2D echo which are both pending. His CBC was in the normal range with a hemoglobin of 15.3, normal WBC count of 6.2, normal MCV of 90 with a normal platelet count of 218. His TSH was normal at 3.88  Serum LDH was elevated at 266. B2 microglobulin was markedly elevated at 13.6. Quantitative immunoglobulins showed suppression of IgG with normal IgA and IgM. Serum electrophoresis showed an M spike faint measuring 0.1.   Serum FLC's showed markedly elevated free lambda light chain at 311 with abnormal ratio of 0.05  Serum troponin was normal but proBNP was elevated at 491. 24-hour urine collection showed nephrotic range proteinuria with total proteins of 6.6 g in a 24-hour period with a faint M spike in the urine measuring 1263 mg in a 24-hour timeframe     He will be recommended induction with Daratumumab/Velcade/dexamethasone  His bone marrow aspirate and biopsy will be reviewed. All potential side effects were discussed. 3/29/2021  He will be given acyclovir for VZV prophylaxis and Bactrim DS for PCP prophylaxis and as needed Compazine for nausea. He will be initiated today on daratumumab/Velcade/cyclophosphamide/dexamethasone regimen. All potential side effects were discussed. His baby aspirin has been held. 4/05/2021  He tolerated his treatment with daratumumab well. He reports constipation. Few days later he had some chills and body aches and it is likely related to his second dose of the Covid vaccine. No signs of peripheral neuropathy. 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 He will be given as needed tramadol for pain and omeprazole 20 mg daily for heartburn. To receive week 3 of chemotherapy regimen consisting of daratumumab/Velcade/dexamethasone/Cytoxan        4/19/2021  He has been experiencing significant side effects from daratumumab 2 to 3 days later in terms of headaches myalgias arthralgias. He will be given prednisone 20 mg daily starting on 4/21/2021 and he will skip it Monday the day he gets the high-dose steroids prior to his Darzalex Faspro. He will take as needed Tylenol or Advil and he will continue on his omeprazole daily.         4/26/2021  He has gained few pounds and has fluid retention from steroids and will be initiated on Maxide 37.5 mg daily. He will continue on prednisone except on Mondays. Received Darzalex Faspro today. 5/03/2021  He diuresed very well on Maxide and lost 6 pounds and it has been discontinued. He has had no reactions from Darzalex while maintained on prednisone  To receive week 6 of Darzalex Faspro today        5/10/2021  To receive week 7 of Darzalex Faspro . Continue Prednisone. 5/17/2021  To receive week 8 of Darzalex Faspro. He will have a repeat myeloma panel and urinary collection after week 9.        5/24/2021  To receive week 9 of Darzalex Faspro. He will continue on Cytoxan and Velcade     He will have a repeat myeloma panel and urinary collection after week 9. He will return for follow-up in 2 weeks. 6/07/2021  To receive Week #10 of Darzalex/Faspro. and Velcade   On Cytoxan        6/21/2021  To receive Week #11 of Darzalex/Faspro. and Velcade   On Cytoxan   Lab work reviewed, alfonso to proceed with chemo treatment. To return 7/7/2021 for  Week #12 of Darzalex/Faspro. and Velcade   On Cytoxan   Labs on 7/6/2021 7/7/2021  His repeat myeloma panel shows normalization of free kappa and free lambda light chains with normal FLC ratio indicating an excellent response to present therapy regimen  His serum beta-2 microglobulin is down in the normal range. Quantitative immunoglobulins still show low IgA, low IgG and low IgM. His 24-hour urine collection showed still nephrotic range proteinuria with 3.8 g of proteins in a 24-hour collection  Immunofixation showed faint free monoclonal lambda light chains. His last SPEP from 4/26 had shown an M spike of 0.1 but results from June not available and will be repeated  Back in March his 24-hour UPEP showed 6.6 g of protein in 24 hours.     He has achieved an excellent clinical response  To continue on present regimen  He will now go on biweekly Gregoria      7/19/2021  To receive daratumumab/Velcade/Dex  Cytoxan to be DC'd    He will be referred to a tertiary care center for consideration of autologous stem cell transplant. He will return in 2 weeks for chemo      8/02/2021  To receive Day #1 Cycle #5 daratumumab/Velcade/Dex    To return 8/09/2021 for Day #8 Velcade  Cycle #5   Daratumumab will be now on every 4 weeks basis  He is to be tapered off prednisone  Pretransplant work-up will be initiated with anticipation of stem cell transplant at [address] [place] [address] St in November 8/09/2021   To receive Day #8 Cycle #5 Velcade. Continue on weekly dexamethasone. He will have 1 last dose of daratumumab in early September   He will start stem cell collection in October with anticipation of stem cell transplant in November    to return 8/16/2021 for Day #15 Cycle #5 Velcade      8/16/2021  To receive Day #15 Cycle #5 Velcade today. Continue on weekly dexamethasone. He will have 1 last dose of daratumumab on 8/30/2021  He will start stem cell collection in October with anticipation of stem cell transplant in November    To return 8/23/2021 for Day #22 Cycle #5 Velcade. He will return on 8/30/2021 for Velcade and daratumumab    We will see me back on 9/13/2021  Will be resumed on prednisone tapering schedule for his rash and joint aches. 8/23/2021  To receive Day #22 Cycle #5 Velcade today. Lab work reviewed, okay to proceed with chemo treatment. Continue on weekly dexamethasone. To return 8/30/2021 for last dose of daratumumab Day #1 Cycle #6  Labs on same day. He will start stem cell collection in October with anticipation of stem cell transplant in November. 8/30/2021  To receive Day #1 Cycle #6 Daratumumab/Velcade/Dex  Lab work reviewed, okay to proceed with chemo treatment. Return on 9/13/2021 for OV with Dr. Mireya Rocha,  Day #8 Velcade.   Labs on 9/10/2021.        9/13/2021  To receive  Day #8 Cycle #6  Velcade today.    His treatment will be discontinued after today    He will start stem cell collection in October with anticipation of stem cell transplant in November. 10/6/2020  Status post dental extraction few days ago  Reports occasional dry cough  He will start his injections on Friday in anticipation of stem cell collection early next [facility]      His stem cell transplant is scheduled for early November. 3/16/2022  S/P stem cell transplant  10/22/2021   Recovered very well post transplant. Most recent bone marrow showed no plasmacytosis  His labs show a faint M spike with slight elevation in FLC's  He did do very well post transplant and he is now around day 140 post transplant    He is scheduled for his vaccinations to start 6-month post transplant around May    He will be initiated on maintenance Revlimid 5 mg daily 3 weeks on and 1 week off and all potential side effects were discussed. He is cleared to travel on vacation to [facility]. His management will be coordinated with Dr. Ros Pederson. 5/25/2022  Patient went to [facility] in April and came back and then his wife contracted COVID and both had cough myalgias and fatigue but apparently he tested negative and he started his Revlimid and then he stopped it blaming the cough as a side effect of Revlimid. It was explained to him that he likely had COVID even though he tested negative and he is willing to resume Revlimid next week and we will closely monitor him for potential side effects he will continue on his baby aspirin. He will start his immunization in 2 to 3 weeks which would be 6-month post transplant. 6/8/2022  Patient has been intolerant from Revlimid and and since he resumed it he has been complaining of recurrent sinusitis congestion cough and excessive mucus and phlegm. He also reports myalgias and muscle cramps. No diarrhea or skin rash. On exam he has scattered rhonchi and wheezing.   He will be given

## 2022-07-18 ENCOUNTER — HOSPITAL ENCOUNTER (OUTPATIENT)
Dept: INFUSION THERAPY | Age: 67
Discharge: HOME | End: 2022-07-18
Payer: MEDICARE

## 2022-07-18 ENCOUNTER — OFFICE VISIT (OUTPATIENT)
Dept: ONCOLOGY | Age: 67
End: 2022-07-18
Payer: MEDICARE

## 2022-07-18 VITALS
TEMPERATURE: 97.2 F | OXYGEN SATURATION: 99 % | BODY MASS INDEX: 20.14 KG/M2 | WEIGHT: 140.7 LBS | DIASTOLIC BLOOD PRESSURE: 75 MMHG | SYSTOLIC BLOOD PRESSURE: 110 MMHG | HEART RATE: 73 BPM | HEIGHT: 70 IN

## 2022-07-18 DIAGNOSIS — E85.81 LIGHT CHAIN (AL) AMYLOIDOSIS (HCC): Primary | ICD-10-CM

## 2022-07-18 DIAGNOSIS — Z23 ENCOUNTER FOR IMMUNIZATION: ICD-10-CM

## 2022-07-18 LAB
ALBUMIN SERPL-MCNC: 2.9 G/DL (ref 3.5–5.2)
ALP BLD-CCNC: 102 U/L (ref 40–129)
ALT SERPL-CCNC: 31 U/L (ref 0–40)
ANION GAP SERPL CALCULATED.3IONS-SCNC: 8 MMOL/L (ref 7–16)
AST SERPL-CCNC: 32 U/L (ref 0–39)
BASOPHILS ABSOLUTE: 0.13 E9/L (ref 0–0.2)
BASOPHILS RELATIVE PERCENT: 2.6 % (ref 0–2)
BILIRUB SERPL-MCNC: <0.2 MG/DL (ref 0–1.2)
BUN BLDV-MCNC: 18 MG/DL (ref 6–23)
CALCIUM SERPL-MCNC: 9 MG/DL (ref 8.6–10.2)
CHLORIDE BLD-SCNC: 102 MMOL/L (ref 98–107)
CO2: 27 MMOL/L (ref 22–29)
CREAT SERPL-MCNC: 1 MG/DL (ref 0.7–1.2)
EOSINOPHILS ABSOLUTE: 0.43 E9/L (ref 0.05–0.5)
EOSINOPHILS RELATIVE PERCENT: 8.5 % (ref 0–6)
GFR AFRICAN AMERICAN: >60
GFR NON-AFRICAN AMERICAN: >60 ML/MIN/1.73
GLUCOSE BLD-MCNC: 102 MG/DL (ref 74–99)
HCT VFR BLD CALC: 38.5 % (ref 37–54)
HEMOGLOBIN: 12.2 G/DL (ref 12.5–16.5)
IMMATURE GRANULOCYTES #: 0.02 E9/L
IMMATURE GRANULOCYTES %: 0.4 % (ref 0–5)
LYMPHOCYTES ABSOLUTE: 1.48 E9/L (ref 1.5–4)
LYMPHOCYTES RELATIVE PERCENT: 29.4 % (ref 20–42)
MCH RBC QN AUTO: 28.8 PG (ref 26–35)
MCHC RBC AUTO-ENTMCNC: 31.7 % (ref 32–34.5)
MCV RBC AUTO: 90.8 FL (ref 80–99.9)
MONOCYTES ABSOLUTE: 0.54 E9/L (ref 0.1–0.95)
MONOCYTES RELATIVE PERCENT: 10.7 % (ref 2–12)
NEUTROPHILS ABSOLUTE: 2.44 E9/L (ref 1.8–7.3)
NEUTROPHILS RELATIVE PERCENT: 48.4 % (ref 43–80)
PDW BLD-RTO: 13.3 FL (ref 11.5–15)
PLATELET # BLD: 306 E9/L (ref 130–450)
PMV BLD AUTO: 8.2 FL (ref 7–12)
POTASSIUM SERPL-SCNC: 4.5 MMOL/L (ref 3.5–5)
RBC # BLD: 4.24 E12/L (ref 3.8–5.8)
SODIUM BLD-SCNC: 137 MMOL/L (ref 132–146)
TOTAL PROTEIN: 6.3 G/DL (ref 6.4–8.3)
WBC # BLD: 5 E9/L (ref 4.5–11.5)

## 2022-07-18 PROCEDURE — 90472 IMMUNIZATION ADMIN EACH ADD: CPT | Performed by: INTERNAL MEDICINE

## 2022-07-18 PROCEDURE — 90696 DTAP-IPV VACCINE 4-6 YRS IM: CPT | Performed by: INTERNAL MEDICINE

## 2022-07-18 PROCEDURE — 90670 PCV13 VACCINE IM: CPT | Performed by: INTERNAL MEDICINE

## 2022-07-18 PROCEDURE — G0010 ADMIN HEPATITIS B VACCINE: HCPCS | Performed by: INTERNAL MEDICINE

## 2022-07-18 PROCEDURE — 90739 HEPB VACC 2/4 DOSE ADULT IM: CPT | Performed by: INTERNAL MEDICINE

## 2022-07-18 PROCEDURE — 80053 COMPREHEN METABOLIC PANEL: CPT

## 2022-07-18 PROCEDURE — 90750 HZV VACC RECOMBINANT IM: CPT | Performed by: INTERNAL MEDICINE

## 2022-07-18 PROCEDURE — 90647 HIB PRP-OMP VACC 3 DOSE IM: CPT | Performed by: INTERNAL MEDICINE

## 2022-07-18 PROCEDURE — 36415 COLL VENOUS BLD VENIPUNCTURE: CPT

## 2022-07-18 PROCEDURE — 6360000002 HC RX W HCPCS: Performed by: INTERNAL MEDICINE

## 2022-07-18 PROCEDURE — 99212 OFFICE O/P EST SF 10 MIN: CPT

## 2022-07-18 PROCEDURE — 90471 IMMUNIZATION ADMIN: CPT | Performed by: INTERNAL MEDICINE

## 2022-07-18 PROCEDURE — 85025 COMPLETE CBC W/AUTO DIFF WBC: CPT

## 2022-07-18 PROCEDURE — G0009 ADMIN PNEUMOCOCCAL VACCINE: HCPCS | Performed by: INTERNAL MEDICINE

## 2022-07-18 RX ADMIN — DIPHTHERIA AND TETANUS TOXOIDS AND ACELLULAR PERTUSSIS ADSORBED AND INACTIVATED POLIOVIRUS VACCINE 0.5 ML: 15; 5; 20; 20; 3; 5; 40; 8; 32 INJECTION, SUSPENSION INTRAMUSCULAR at 12:10

## 2022-07-18 RX ADMIN — HEPATITIS B VACCINE (RECOMBINANT) ADJUVANTED 20 MCG: 20 INJECTION, SOLUTION INTRAMUSCULAR at 12:00

## 2022-07-18 RX ADMIN — PNEUMOCOCCAL 13-VALENT CONJUGATE VACCINE 0.5 ML: 2.2; 2.2; 2.2; 2.2; 2.2; 4.4; 2.2; 2.2; 2.2; 2.2; 2.2; 2.2; 2.2 INJECTION, SUSPENSION INTRAMUSCULAR at 11:56

## 2022-07-18 RX ADMIN — ZOSTER VACCINE RECOMBINANT, ADJUVANTED 50 MCG: KIT at 12:04

## 2022-07-18 RX ADMIN — HAEMOPHILUS B CONJUGATE VACCINE (MENINGOCOCCAL PROTEIN CONJUGATE) 7.5 MCG: 7.5 INJECTION, SUSPENSION INTRAMUSCULAR at 12:14

## 2022-07-18 NOTE — DISCHARGE INSTR - COC
Continuity of Care Form    Patient Name: Yane Altamirano   :  1955  MRN:  [id number]    Admit date:  2022  Discharge date:  ***    Code Status Order: Prior   Advance Directives:     Admitting Physician:  No admitting provider for patient encounter. PCP: Jose Witt MD    Discharging Nurse: [facility] Unit/Room#: No information available for this encounter.   Discharging Unit Phone Number: ***    Emergency Contact:   Extended Emergency Contact Information  Primary Emergency Contact: Armaan Reyes  Address: [address] [place] [address]  Home Phone: [phone]  Work Phone: [phone]  Mobile Phone: [phone]  Relation: Spouse  Secondary Emergency Contact: Paulo Rutledge  Home Phone: [phone]  Mobile Phone: [phone]  Relation: Step Child    Past Surgical History:  Past Surgical History:   Procedure Laterality Date    CT BIOPSY RENAL  2/15/2021    CT BIOPSY RENAL 2/15/2021 Casey Alexander MD [facility]    KNEE SURGERY Right 2016    SHOULDER ARTHROSCOPY Left 2001    left    VENTRAL HERNIA REPAIR  1981       Immunization History:   Immunization History   Administered Date(s) Administered    COVID-19, MODERNA BLUE border, Primary or Immunocompromised, (age 12y+), IM, 100 mcg/0.5mL 2022, 2022, 2022    COVID-19, PFIZER PURPLE top, DILUTE for use, (age 15 y+), 30mcg/0.3mL 2021, 2021    DTaP/IPV Merlinda Eisenmenger, Kinrix) 2022    Hepatitis B Adult (Heplisav-b) 2022    Hib PRP-OMP (PedvaxHIB) 2022    Influenza, MDCK Quadv, IM, PF (Flucelvax 2 yrs and older) 10/16/2020    Pneumococcal Conjugate 13-valent (Tzyiazg99) 2022    Tdap (Boostrix, Adacel) 03/10/2015    Zoster Recombinant (Shingrix) 2022       Active Problems:  Patient Active Problem List   Diagnosis Code    Hyperlipidemia E78.5    Situational syncope R55    Sciatica M54.30    Primary osteoarthritis of right knee M17.11    Right shoulder pain M25.511    Primary osteoarthritis involving multiple joints M89.49    Monoclonal gammopathy D47.2    Light chain (AL) amyloidosis ([facility]) E85.81    Bence Frost proteinuria R80.3    Encounter for immunization  Z23    Shoulder strain S46.919A    Pain of toe of right foot M79.674       Isolation/Infection:   Isolation            No Isolation          Patient Infection Status       Infection Onset Added Last Indicated Last Indicated By Review Planned Expiration Resolved Resolved By    None active    Resolved    COVID-19 (Rule Out) 03/11/21 03/11/21 03/11/21 Covid-19 Ambulatory (Ordered)   03/12/21 Rule-Out Test Resulted    COVID-19 (Rule Out) 02/04/21 02/04/21 02/04/21 COVID-19 Ambulatory (Ordered)   02/06/21 Rule-Out Test Resulted            Nurse Assessment:  Last Vital Signs: There were no vitals taken for this visit. Last documented pain score (0-10 scale):    Last Weight:   Wt Readings from Last 1 Encounters:   07/18/22 140 lb 11.2 oz (63.8 kg)     Mental Status:  {IP PT MENTAL STATUS:58298}    IV Access:  [address] [employer] IV ACCESS:[id number]}    Nursing Mobility/ADLs:  Walking   {P DME WKIY:354929638}  Transfer  {P DME NOOW:029523512}  Bathing  {P DME SWLE:548102181}  Dressing  {P DME HNES:213646423}  Toileting  {P DME MRJH:019419479}  Feeding  {[facility] DME VETL:243306631}  Med Admin  {[facility] DME WEXZ:207159654}  Med Delivery   {[facility] MED Delivery:332298456}    Wound Care Documentation and Therapy:        Elimination:  Continence: Bowel: {YES / TI:67728}  Bladder: {YES / MO:59194}  Urinary Catheter: {Urinary Catheter:831464215}   Colostomy/Ileostomy/Ileal Conduit: {YES / QF:19718}       Date of Last BM: ***  No intake or output data in the 24 hours ending 07/18/22 1219  No intake/output data recorded.     Safety Concerns:     [address] [employer] Safety Concerns:760205062}    Impairments/Disabilities:      [address] [employer] Impairments/Disabilities:240864962}    Nutrition Therapy:  Current Nutrition Therapy:   508 Ailyn Nam DIMITRIOS Diet

## 2022-07-28 ENCOUNTER — OFFICE VISIT (OUTPATIENT)
Dept: FAMILY MEDICINE CLINIC | Age: 67
End: 2022-07-28
Payer: MEDICARE

## 2022-07-28 VITALS
SYSTOLIC BLOOD PRESSURE: 103 MMHG | TEMPERATURE: 99 F | HEIGHT: 70 IN | WEIGHT: 139 LBS | BODY MASS INDEX: 19.9 KG/M2 | OXYGEN SATURATION: 96 % | HEART RATE: 89 BPM | DIASTOLIC BLOOD PRESSURE: 68 MMHG | RESPIRATION RATE: 18 BRPM

## 2022-07-28 DIAGNOSIS — L84 CALLUS OF FOOT: ICD-10-CM

## 2022-07-28 DIAGNOSIS — K11.7 XEROSTOMIA: ICD-10-CM

## 2022-07-28 DIAGNOSIS — H04.123 DRY EYES: ICD-10-CM

## 2022-07-28 DIAGNOSIS — E85.81 LIGHT CHAIN (AL) AMYLOIDOSIS (HCC): Primary | ICD-10-CM

## 2022-07-28 DIAGNOSIS — R63.6 UNDERWEIGHT: ICD-10-CM

## 2022-07-28 PROCEDURE — 99214 OFFICE O/P EST MOD 30 MIN: CPT | Performed by: FAMILY MEDICINE

## 2022-07-28 PROCEDURE — 99212 OFFICE O/P EST SF 10 MIN: CPT | Performed by: FAMILY MEDICINE

## 2022-07-28 PROCEDURE — 1123F ACP DISCUSS/DSCN MKR DOCD: CPT | Performed by: FAMILY MEDICINE

## 2022-07-28 SDOH — ECONOMIC STABILITY: FOOD INSECURITY: WITHIN THE PAST 12 MONTHS, THE FOOD YOU BOUGHT JUST DIDN'T LAST AND YOU DIDN'T HAVE MONEY TO GET MORE.: NEVER TRUE

## 2022-07-28 SDOH — ECONOMIC STABILITY: TRANSPORTATION INSECURITY
IN THE PAST 12 MONTHS, HAS THE LACK OF TRANSPORTATION KEPT YOU FROM MEDICAL APPOINTMENTS OR FROM GETTING MEDICATIONS?: NO

## 2022-07-28 SDOH — ECONOMIC STABILITY: FOOD INSECURITY: WITHIN THE PAST 12 MONTHS, YOU WORRIED THAT YOUR FOOD WOULD RUN OUT BEFORE YOU GOT MONEY TO BUY MORE.: NEVER TRUE

## 2022-07-28 SDOH — ECONOMIC STABILITY: TRANSPORTATION INSECURITY
IN THE PAST 12 MONTHS, HAS LACK OF TRANSPORTATION KEPT YOU FROM MEETINGS, WORK, OR FROM GETTING THINGS NEEDED FOR DAILY LIVING?: NO

## 2022-07-28 ASSESSMENT — PATIENT HEALTH QUESTIONNAIRE - PHQ9
1. LITTLE INTEREST OR PLEASURE IN DOING THINGS: 0
SUM OF ALL RESPONSES TO PHQ QUESTIONS 1-9: 0
2. FEELING DOWN, DEPRESSED OR HOPELESS: 0
SUM OF ALL RESPONSES TO PHQ QUESTIONS 1-9: 0
SUM OF ALL RESPONSES TO PHQ QUESTIONS 1-9: 0
SUM OF ALL RESPONSES TO PHQ9 QUESTIONS 1 & 2: 0
SUM OF ALL RESPONSES TO PHQ QUESTIONS 1-9: 0

## 2022-07-28 ASSESSMENT — LIFESTYLE VARIABLES: HOW OFTEN DO YOU HAVE A DRINK CONTAINING ALCOHOL: NEVER

## 2022-07-28 ASSESSMENT — SOCIAL DETERMINANTS OF HEALTH (SDOH): HOW HARD IS IT FOR YOU TO PAY FOR THE VERY BASICS LIKE FOOD, HOUSING, MEDICAL CARE, AND HEATING?: NOT HARD AT ALL

## 2022-07-28 NOTE — PATIENT INSTRUCTIONS
Maryana Amaya    Call about dry mouth medication if interested in trying. Leg Stretches prior to bed time, 30sec x3, 3 sets.

## 2022-07-28 NOTE — PROGRESS NOTES
Omayra Houser is a 74 y.o. male who presents today for   Chief Complaint   Patient presents with    Check-Up       HPI:    72yo male here for routine followup. Has been seen by [facility] for management of Amyloidosis. Had stem cell transplant November 22, 2021. now on Revlimid per Hemon reports fatigue with it. Has some neuropathy in the hands. Concerned he may have had COVID after wife had it w/ a dermatitis/rash. HE also reports fatigue improving since that time. Has noticed dry mouth, dry eyes as side effects from medication. Has noticed difficulty regaining lost weight. He reports good PO intake, good caloric intake. Does supplement w/ Ensure. Noticed hard skin spot w/ perhaps some draiange over lateral side of left foot. Had been walking more with different shoes. Has noticed leg cramps that he has to get up from bed and move legs to relieve. Nothing has helped much. He does take vitamin b. 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    Situational syncope 12/2/2010       Current Outpatient Medications   Medication Instructions    acyclovir (ZOVIRAX) 400 mg, Oral, 2 TIMES DAILY    Ascorbic Acid (VITAMIN C PO) 1 tablet, Oral, DAILY    aspirin 81 mg, Oral, DAILY    diclofenac sodium (VOLTAREN) 2 g, Topical, DAILY    fexofenadine-pseudoephedrine (ALLEGRA-D 12 HOUR)  MG per tablet 1 tablet, Oral, 2 TIMES DAILY PRN    fluticasone (FLONASE) 50 MCG/ACT nasal spray 1 spray, Each Nostril, DAILY    lenalidomide (REVLIMID) 5 mg, Oral, DAILY, Take one 5 mg capsule daily by mouth for 21 days and off 7 days.     lenalidomide (REVLIMID) 5 mg, Oral, DAILY, Take 1 capsule for 21 days  THEN OFF 7 days    ADULT MALE   AUTH #  [id number]    Multiple Vitamins-Minerals (THERAPEUTIC MULTIVITAMIN-MINERALS) tablet 1 tablet, Oral, DAILY    rosuvastatin (CRESTOR) 40 MG tablet TAKE ONE TABLET BY MOUTH DAILY    tiZANidine (ZANAFLEX) 4 mg, Oral, 3 TIMES DAILY PRN    VITAMIN D PO 1,000 Units, Oral, 2 times daily    VITAMIN E PO 1 capsule, Oral, DAILY       Allergies   Allergen Reactions    Codeine Itching    Lipitor      Muscle cramps    Vytorin [Ezetimibe-Simvastatin]      Muscle cramps         Family History   Problem Relation Age of Onset    Cancer Mother         breast    Heart Disease Mother     Heart Disease Father     Stroke Father        Past Surgical History:   Procedure Laterality Date    CT BIOPSY RENAL  2/15/2021    CT BIOPSY RENAL 2/15/2021 Amanda Glasgow MD [facility]    KNEE SURGERY Right 2016    SHOULDER ARTHROSCOPY Left 2001    left    VENTRAL HERNIA REPAIR         Social History     Tobacco Use    Smoking status: Former     Packs/day: 0.10     Years: 5.00     Pack years: 0.50     Types: Cigars, Cigarettes     Start date: 1980     Quit date: 1990     Years since quittin.9    Smokeless tobacco: Never   Substance Use Topics    Alcohol use: Yes     Comment: occasionally    Drug use: No       Physical Exam    Gen: NAD, AAOX3  HEENT: NC/AT, Patrisha Justin / PERRL / ANICTERIC, No Facial Droop. Mouth moist. OP Clear. CV: NDPMI, RRR no MRG  RESP: no IWOB, ctab. no w/r. ABD: +BS, SOFT, NT, ND. No rebound / guarding. EXT: RUIZ Equally grossly. No LE Edema. SKIN: Warm / Dry. Mood/ Affect: Pleasant & cooperative. Foot Exam: Bilat feet have base of 5th metatarsal prominence laterally. On the foot there is thickened skin in that area and a small punctate area in the mdidle. Skin lines are generally preserved. 1) Dry Eyes - Soothe XP, B&L.     2) Callus on foot - rec moleskin. May be a corn and need further debridement, consider urea cream if not improving. 3) Leg cramps, - cont b complex vitamin, leg stretches, 30sec x 3 x 3 sets. 4) Weight - Consider supplment w/ milkshakes. Likely SE amyloidosis/rx.      5) Dry eyes - print out given on cevimeline. Please review and consider if sx are not well controlld. Immunization completed per Dr Ivonne Allen. Advised to be adherent to the treatment plans discussed today, and please call with any questions or concerns, letting the office know of any reasons that the plans may not be followed. The risks of untreated conditions include worsening illness, injury, disability, and possibly, death. Please call if symptoms change in any way, worsen, or fail to completely resolve, as this could necessitate a change to treatment plans. Patient and/or caregiver expressed understanding. Indications and proper use of medication(s) reviewed. Potential side-effects, and risks of medication(s) also explained. Patient and/or caregiver was instructed to call if any new symptoms develop prior to next visit.      Magui Estrada MD  07/28/22

## 2022-08-02 DIAGNOSIS — E85.81 LIGHT CHAIN (AL) AMYLOIDOSIS (HCC): ICD-10-CM

## 2022-08-02 RX ORDER — LENALIDOMIDE 5 MG/1
5 CAPSULE ORAL DAILY
Qty: 21 CAPSULE | Refills: 0 | Status: ACTIVE | OUTPATIENT
Start: 2022-08-02 | End: 2022-09-02 | Stop reason: SDUPTHER

## 2022-08-02 NOTE — PROGRESS NOTES
[facility] Update    Date: 08/02/22    Patient receives free drug through [facility]. Prescription has been routed there. Please call us with any questions at [phone] opt.  2.

## 2022-08-12 NOTE — PROGRESS NOTES
[address] [facility]. [facility]        Pt Name: Zulma Mike: 1955  Date of evaluation: 8/15/2022  Primary Care Physician: Junior Vargas MD  Reason for evaluation:   Chief Complaint   Patient presents with    Other     Light Chain (AL) amyloidosis    Follow-up          Subjective:   Here for follow-up. Follows at [facility];  S/P  Stem Cell Transplant November 22,2021. OBJECTIVE:  VITALS:  height is 5' 10\" (1.778 m) and weight is 140 lb 4.8 oz (63.6 kg). His temperature is 97.6 °F (36.4 °C). His blood pressure is 117/69 and his pulse is 72. His oxygen saturation is 100%. Physical Exam:  Performance Status: 0  Well developed, well nourished male  General: AAO to person, place, time, cooperative, in no acute distress. Head and neck: PERRLA, EOMI. Sclera non icteric. Oropharynx:  Clear. Neck: no JVD,  no adenopathy. Heart: Regular rate and regular rhythm, no murmur. Lungs: Clear to auscultation. Abdomen: Soft, non-tender;no masses, no organomegaly. Extremities : No edema,no cyanosis, no clubbing. Neurologic:Cranial nerves grossly intact. No focal motor or sensory deficits   Skin:  No rash. Medications  Prior to Admission medications    Medication Sig Start Date End Date Taking? Authorizing Provider   lenalidomide (REVLIMID) 5 MG chemo capsule Take 1 capsule by mouth daily for 21 days Take one 5 mg capsule daily by mouth for 21 days and off 7 days.  Juanis Headings [id number] ADULT MALE 8/2/22 8/23/22 Yes Aaron Bar, APRN - CNP   tiZANidine (ZANAFLEX) 4 MG tablet Take 1 tablet by mouth 3 times daily as needed (SPASMS) 6/8/22  Yes VICTOR MANUEL Snell - CNP   rosuvastatin (CRESTOR) 40 MG tablet TAKE ONE TABLET BY MOUTH DAILY 4/21/22  Yes Junior Vargas MD   diclofenac sodium (VOLTAREN) 1 % GEL Apply 2 g topically daily 4/21/22  Yes Junior Vargas MD   acyclovir (ZOVIRAX) 400 MG tablet Take 400 mg by mouth 2 times daily   Yes Historical Provider, MD   fluticasone Jaime Burch) 50 MCG/ACT nasal spray 1 spray by Each Nostril route daily 12/28/20  Yes Soheila Miguel MD   VITAMIN E PO Take 1 capsule by mouth daily   Yes Historical Provider, MD   Multiple Vitamins-Minerals (THERAPEUTIC MULTIVITAMIN-MINERALS) tablet Take 1 tablet by mouth daily   Yes Historical Provider, MD   Ascorbic Acid (VITAMIN C PO) Take 1 tablet by mouth daily   Yes Historical Provider, MD   VITAMIN D PO Take 1,000 Units by mouth in the morning and at bedtime    Yes Historical Provider, MD   aspirin 81 MG tablet Take 81 mg by mouth daily   Yes Historical Provider, MD   fexofenadine-pseudoephedrine (ALLEGRA-D 12 HOUR)  MG per tablet Take 1 tablet by mouth 2 times daily as needed. 2/5/13  Yes Ruben Pacheco MD    Scheduled Meds:  Continuous Infusions:  PRN Meds:.        Recent Laboratory Data-     Lab Results   Component Value Date    WBC 4.4 (L) 08/15/2022    HGB 13.4 08/15/2022    HCT 42.2 08/15/2022    MCV 89.2 08/15/2022     08/15/2022    LYMPHOPCT 37.3 08/15/2022    RBC 4.73 08/15/2022    MCH 28.3 08/15/2022    MCHC 31.8 (L) 08/15/2022    RDW 14.6 08/15/2022    NEUTOPHILPCT 39.8 (L) 08/15/2022    MONOPCT 10.7 08/15/2022    BASOPCT 2.0 08/15/2022    NEUTROABS 1.75 (L) 08/15/2022    LYMPHSABS 1.64 08/15/2022    MONOSABS 0.47 08/15/2022    EOSABS 0.44 08/15/2022    BASOSABS 0.09 08/15/2022       Lab Results   Component Value Date     08/15/2022    K 4.4 08/15/2022     08/15/2022    CO2 27 08/15/2022    BUN 18 08/15/2022    CREATININE 0.9 08/15/2022    GLUCOSE 135 (H) 08/15/2022    CALCIUM 8.9 08/15/2022    PROT 6.1 (L) 08/15/2022    LABALBU 3.2 (L) 08/15/2022    BILITOT 0.3 08/15/2022    ALKPHOS 75 08/15/2022    AST 21 08/15/2022    ALT 23 08/15/2022    LABGLOM >60 08/15/2022    GFRAA >60 08/15/2022           Radiology-  No results found.             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 His work-up will be completed to include the following:     CBC, CMP, LDH, B2 microglobulin, CRP, serum protein electrophoresis with reflex to immunofixation, serum free kappa and lambda light chains with ratio, quantitative immunoglobulins, 24-hour urine collection for quantitative immunoglobulins and immunofixation. Serum troponin, proBNP and 2D echo will be done to exclude cardiac amyloidosis. TSH  Factor X assay as well as PT and APTT will be done     Will be scheduled for a bone marrow aspirate and biopsy     Once work-up completed options of therapy with bortezomib/daratumumab will be addressed     He is encouraged to receive the COVID-19 vaccine        3/15/2021  Awaiting his bone marrow aspirate and biopsy and his 2D echo which are both pending. His CBC was in the normal range with a hemoglobin of 15.3, normal WBC count of 6.2, normal MCV of 90 with a normal platelet count of 355. His TSH was normal at 3.88  Serum LDH was elevated at 266. B2 microglobulin was markedly elevated at 13.6. Quantitative immunoglobulins showed suppression of IgG with normal IgA and IgM. Serum electrophoresis showed an M spike faint measuring 0.1. Serum FLC's showed markedly elevated free lambda light chain at 311 with abnormal ratio of 0.05  Serum troponin was normal but proBNP was elevated at 491. 24-hour urine collection showed nephrotic range proteinuria with total proteins of 6.6 g in a 24-hour period with a faint M spike in the urine measuring 1263 mg in a 24-hour timeframe     He will be recommended induction with Daratumumab/Velcade/dexamethasone  His bone marrow aspirate and biopsy will be reviewed. All potential side effects were discussed. 3/29/2021  He will be given acyclovir for VZV prophylaxis and Bactrim DS for PCP prophylaxis and as needed Compazine for nausea.   He will be initiated today on daratumumab/Velcade/cyclophosphamide/dexamethasone regimen. All potential side effects were discussed. His baby aspirin has been held. 4/05/2021  He tolerated his treatment with daratumumab well. He reports constipation. Few days later he had some chills and body aches and it is likely related to his second dose of the Covid vaccine. No signs of peripheral neuropathy. 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 He will be given as needed tramadol for pain and omeprazole 20 mg daily for heartburn. To receive week 3 of chemotherapy regimen consisting of daratumumab/Velcade/dexamethasone/Cytoxan        4/19/2021  He has been experiencing significant side effects from daratumumab 2 to 3 days later in terms of headaches myalgias arthralgias. He will be given prednisone 20 mg daily starting on 4/21/2021 and he will skip it Monday the day he gets the high-dose steroids prior to his Darzalex Faspro. He will take as needed Tylenol or Advil and he will continue on his omeprazole daily. 4/26/2021  He has gained few pounds and has fluid retention from steroids and will be initiated on Maxide 37.5 mg daily. He will continue on prednisone except on Mondays. Received Darzalex Faspro today. 5/03/2021  He diuresed very well on Maxide and lost 6 pounds and it has been discontinued. He has had no reactions from Darzalex while maintained on prednisone  To receive week 6 of Darzalex Faspro today        5/10/2021  To receive week 7 of Darzalex Faspro . Continue Prednisone. 5/17/2021  To receive week 8 of Darzalex Faspro. He will have a repeat myeloma panel and urinary collection after week 9. 5/24/2021  To receive week 9 of Darzalex Faspro. He will continue on Cytoxan and Velcade     He will have a repeat myeloma panel and urinary collection after week 9. He will return for follow-up in 2 weeks. 6/07/2021  To receive Week #10 of Darzalex/Faspro. and Velcade   On Cytoxan        6/21/2021  To receive Week #11 of Darzalex/Faspro. and Velcade   On Cytoxan   Lab work reviewed, okay to proceed with chemo treatment. To return 7/7/2021 for  Week #12 of Darzalex/Faspro. and Velcade   On Cytoxan   Labs on 7/6/2021 7/7/2021  His repeat myeloma panel shows normalization of free kappa and free lambda light chains with normal FLC ratio indicating an excellent response to present therapy regimen  His serum beta-2 microglobulin is down in the normal range. Quantitative immunoglobulins still show low IgA, low IgG and low IgM. His 24-hour urine collection showed still nephrotic range proteinuria with 3.8 g of proteins in a 24-hour collection  Immunofixation showed faint free monoclonal lambda light chains. His last SPEP from 4/26 had shown an M spike of 0.1 but results from June not available and will be repeated  Back in March his 24-hour UPEP showed 6.6 g of protein in 24 hours. He has achieved an excellent clinical response  To continue on present regimen  He will now go on biweekly Gregoria      7/19/2021  To receive daratumumab/Velcade/Dex  Cytoxan to be DC'd    He will be referred to a tertiary care center for consideration of autologous stem cell transplant.     He will return in 2 weeks for chemo      8/02/2021  To receive Day #1 Cycle #5 daratumumab/Velcade/Dex    To return 8/09/2021 for Day #8 Velcade  Cycle #5   Daratumumab will be now on every 4 weeks basis  He is to be tapered off prednisone  Pretransplant work-up will be initiated with anticipation of stem cell transplant at [address] in November 8/09/2021   To receive Day #8 Cycle #5 Velcade. Continue on weekly dexamethasone. He will have 1 last dose of daratumumab in early September   He will start stem cell collection in October with anticipation of stem cell transplant in November    to return 8/16/2021 for Day #15 Cycle #5 Velcade      8/16/2021  To receive Day #15 Cycle #5 Velcade today. Continue on weekly dexamethasone. He will have 1 last dose of daratumumab on 8/30/2021  He will start stem cell collection in October with anticipation of stem cell transplant in November    To return 8/23/2021 for Day #22 Cycle #5 Velcade. He will return on 8/30/2021 for Velcade and daratumumab    We will see me back on 9/13/2021  Will be resumed on prednisone tapering schedule for his rash and joint aches. 8/23/2021  To receive Day #22 Cycle #5 Velcade today. Lab work reviewed, okay to proceed with chemo treatment. Continue on weekly dexamethasone. To return 8/30/2021 for last dose of daratumumab Day #1 Cycle #6  Labs on same day. He will start stem cell collection in October with anticipation of stem cell transplant in November. 8/30/2021  To receive Day #1 Cycle #6 Daratumumab/Velcade/Dex  Lab work reviewed, okay to proceed with chemo treatment. Return on 9/13/2021 for OV with Dr. Sulma Angelo,  Day #8 Velcade. Labs on 9/10/2021.        9/13/2021  To receive  Day #8 Cycle #6  Velcade today. His treatment will be discontinued after today    He will start stem cell collection in October with anticipation of stem cell transplant in November. 10/6/2020  Status post dental extraction few days ago  Reports occasional dry cough  He will start his injections on Friday in anticipation of stem cell collection early next week at [facility]      His stem cell transplant is scheduled for early November. 3/16/2022  S/P stem cell transplant  10/22/2021   Recovered very well post transplant.   Most recent bone marrow showed no plasmacytosis  His labs show a faint M spike with slight elevation in FLC's  He did do very well post transplant and he is now around day 140 post transplant    He is scheduled for his vaccinations to start 6-month post transplant around May    He will be initiated on maintenance Revlimid 5 mg daily 3 weeks on and 1 week off and all potential side effects were discussed. He is cleared to travel on vacation to [facility]. His management will be coordinated with Dr. Юлия Velasquez. 5/25/2022  Patient went to [facility] in April and came back and then his wife contracted COVID and both had cough myalgias and fatigue but apparently he tested negative and he started his Revlimid and then he stopped it blaming the cough as a side effect of Revlimid. It was explained to him that he likely had COVID even though he tested negative and he is willing to resume Revlimid next week and we will closely monitor him for potential side effects he will continue on his baby aspirin. He will start his immunization in 2 to 3 weeks which would be 6-month post transplant. 6/8/2022  Patient has been intolerant from Revlimid and and since he resumed it he has been complaining of recurrent sinusitis congestion cough and excessive mucus and phlegm. He also reports myalgias and muscle cramps. No diarrhea or skin rash. On exam he has scattered rhonchi and wheezing. He will be given Medrol Dosepak along with Mucinex and will be given Zeke Showman for his cough. He will be given as needed muscle relaxers with tizanidine for his muscle cramps. He will be go off Revlimid for 2 weeks and will be reevaluated clinically in 2 weeks. His vaccines will be postponed till his return in 2 weeks. 6/20/2022  Has had recurrent rash despite the fact he did not resume his Revlimid yet  Medrol Dosepak helped clear the rash it was very itchy and generalized erythematous maculopapular mostly over extremities and trunk. He stopped all his vitamins.   At this time he is only on his acyclovir which he has been on for several months as well as Crestor Zanaflex and omeprazole. He is to resume on Thursday Revlimid and if he develops recurrent cough and rash it will be permanently discontinued. Post transplant vaccinations remain on hold. 6/27/2022  Resumed Revlimid 5 days ago and so far tolerating it very well without any cough or rash. He continues only on vitamin D. He remains on acyclovir for VZV prophylaxis. If everything goes well without side effects he will be maintained on Revlimid along with his baby aspirin. He will return for follow-up on 7/18/2022 and he will start then his posttransplant immunization program.      7/18/2022  Very well. His cough and rash have entirely resolved. He is concerned about his mild weight loss. He has been tolerating Revlimid without any diarrhea. He is to start his immunization post stem cell transplant. He will be maintained on Revlimid 5 mg 3 weeks on and 1 week off along with baby aspirin. 8/15/2022    Very well and tolerating low-dose Revlimid. No diarrhea. No skin rash. No cough or joint aches. It was felt that his prior symptoms were related to COVID. He has further recovered from [facility]. He did receive his first batch of posttransplant immunization and he will return for his second session of immunization the week of September 11. He has some easy bruising attributed to his baby aspirin. He will have a follow-up appointment with Dr. Aliza Gutierrez at [facility] in November. To continue present dose of Revlimid  Kamryn Bermudez. Sofia Hawkins M.D., F.A.C.P.   Electronically signed 8/15/2022 at 11:23 AM

## 2022-08-15 ENCOUNTER — HOSPITAL ENCOUNTER (OUTPATIENT)
Dept: INFUSION THERAPY | Age: 67
Discharge: HOME | End: 2022-08-15
Payer: MEDICARE

## 2022-08-15 ENCOUNTER — OFFICE VISIT (OUTPATIENT)
Dept: ONCOLOGY | Age: 67
End: 2022-08-15
Payer: MEDICARE

## 2022-08-15 VITALS
SYSTOLIC BLOOD PRESSURE: 117 MMHG | HEIGHT: 70 IN | TEMPERATURE: 97.6 F | HEART RATE: 72 BPM | WEIGHT: 140.3 LBS | OXYGEN SATURATION: 100 % | BODY MASS INDEX: 20.09 KG/M2 | DIASTOLIC BLOOD PRESSURE: 69 MMHG

## 2022-08-15 DIAGNOSIS — E85.81 LIGHT CHAIN (AL) AMYLOIDOSIS (HCC): Primary | ICD-10-CM

## 2022-08-15 DIAGNOSIS — E85.81 LIGHT CHAIN (AL) AMYLOIDOSIS (HCC): ICD-10-CM

## 2022-08-15 LAB
ALBUMIN SERPL-MCNC: 3.2 G/DL (ref 3.5–5.2)
ALP BLD-CCNC: 75 U/L (ref 40–129)
ALT SERPL-CCNC: 23 U/L (ref 0–40)
ANION GAP SERPL CALCULATED.3IONS-SCNC: 6 MMOL/L (ref 7–16)
AST SERPL-CCNC: 21 U/L (ref 0–39)
BASOPHILS ABSOLUTE: 0.09 E9/L (ref 0–0.2)
BASOPHILS RELATIVE PERCENT: 2 % (ref 0–2)
BILIRUB SERPL-MCNC: 0.3 MG/DL (ref 0–1.2)
BUN BLDV-MCNC: 18 MG/DL (ref 6–23)
CALCIUM SERPL-MCNC: 8.9 MG/DL (ref 8.6–10.2)
CHLORIDE BLD-SCNC: 104 MMOL/L (ref 98–107)
CO2: 27 MMOL/L (ref 22–29)
CREAT SERPL-MCNC: 0.9 MG/DL (ref 0.7–1.2)
EOSINOPHILS ABSOLUTE: 0.44 E9/L (ref 0.05–0.5)
EOSINOPHILS RELATIVE PERCENT: 10 % (ref 0–6)
GFR AFRICAN AMERICAN: >60
GFR NON-AFRICAN AMERICAN: >60 ML/MIN/1.73
GLUCOSE BLD-MCNC: 135 MG/DL (ref 74–99)
HCT VFR BLD CALC: 42.2 % (ref 37–54)
HEMOGLOBIN: 13.4 G/DL (ref 12.5–16.5)
IMMATURE GRANULOCYTES #: 0.01 E9/L
IMMATURE GRANULOCYTES %: 0.2 % (ref 0–5)
LYMPHOCYTES ABSOLUTE: 1.64 E9/L (ref 1.5–4)
LYMPHOCYTES RELATIVE PERCENT: 37.3 % (ref 20–42)
MCH RBC QN AUTO: 28.3 PG (ref 26–35)
MCHC RBC AUTO-ENTMCNC: 31.8 % (ref 32–34.5)
MCV RBC AUTO: 89.2 FL (ref 80–99.9)
MONOCYTES ABSOLUTE: 0.47 E9/L (ref 0.1–0.95)
MONOCYTES RELATIVE PERCENT: 10.7 % (ref 2–12)
NEUTROPHILS ABSOLUTE: 1.75 E9/L (ref 1.8–7.3)
NEUTROPHILS RELATIVE PERCENT: 39.8 % (ref 43–80)
PDW BLD-RTO: 14.6 FL (ref 11.5–15)
PLATELET # BLD: 197 E9/L (ref 130–450)
PMV BLD AUTO: 8.9 FL (ref 7–12)
POTASSIUM SERPL-SCNC: 4.4 MMOL/L (ref 3.5–5)
RBC # BLD: 4.73 E12/L (ref 3.8–5.8)
SODIUM BLD-SCNC: 137 MMOL/L (ref 132–146)
TOTAL PROTEIN: 6.1 G/DL (ref 6.4–8.3)
WBC # BLD: 4.4 E9/L (ref 4.5–11.5)

## 2022-08-15 PROCEDURE — 85025 COMPLETE CBC W/AUTO DIFF WBC: CPT

## 2022-08-15 PROCEDURE — 99212 OFFICE O/P EST SF 10 MIN: CPT

## 2022-08-15 PROCEDURE — 80053 COMPREHEN METABOLIC PANEL: CPT

## 2022-08-15 PROCEDURE — 36415 COLL VENOUS BLD VENIPUNCTURE: CPT

## 2022-08-15 NOTE — PROGRESS NOTES
Patient left clinic today without receiving his second Hepatitis-B (Heplisav) vaccine. This vaccine is given every 4 weeks as opposed to every 8 weeks. I spoke with , RN, Lena Odell, to see if he could come back tomorrow for the injection. The next injections will be due mid-September.

## 2022-08-16 ENCOUNTER — HOSPITAL ENCOUNTER (OUTPATIENT)
Dept: INFUSION THERAPY | Age: 67
Discharge: HOME | End: 2022-08-16
Payer: MEDICARE

## 2022-08-16 DIAGNOSIS — E85.81 LIGHT CHAIN (AL) AMYLOIDOSIS (HCC): ICD-10-CM

## 2022-08-16 DIAGNOSIS — Z23 ENCOUNTER FOR IMMUNIZATION: Primary | ICD-10-CM

## 2022-08-16 PROCEDURE — 96372 THER/PROPH/DIAG INJ SC/IM: CPT

## 2022-08-16 PROCEDURE — G0010 ADMIN HEPATITIS B VACCINE: HCPCS | Performed by: INTERNAL MEDICINE

## 2022-08-16 PROCEDURE — 6360000002 HC RX W HCPCS: Performed by: INTERNAL MEDICINE

## 2022-08-16 PROCEDURE — 90739 HEPB VACC 2/4 DOSE ADULT IM: CPT | Performed by: INTERNAL MEDICINE

## 2022-08-16 RX ADMIN — HEPATITIS B VACCINE (RECOMBINANT) ADJUVANTED 20 MCG: 20 INJECTION, SOLUTION INTRAMUSCULAR at 10:55

## 2022-08-16 NOTE — PROGRESS NOTES
Patient and significant other instructed on action and potentials side effects of heb B   Given both verbal and written instructions. Encouraged to call with questions or concerns. Questions answered to their apparent satisfaction.

## 2022-09-02 DIAGNOSIS — E85.81 LIGHT CHAIN (AL) AMYLOIDOSIS (HCC): ICD-10-CM

## 2022-09-02 RX ORDER — LENALIDOMIDE 5 MG/1
5 CAPSULE ORAL DAILY
Qty: 21 CAPSULE | Refills: 0 | Status: ACTIVE | OUTPATIENT
Start: 2022-09-02 | End: 2022-09-29 | Stop reason: SDUPTHER

## 2022-09-02 NOTE — PROGRESS NOTES
Patient receives free drug through [facility]. Prescription has been routed there. Please call us with any questions at [phone] opt.  2.

## 2022-09-12 ENCOUNTER — APPOINTMENT (OUTPATIENT)
Dept: GENERAL RADIOLOGY | Age: 67
End: 2022-09-12
Payer: MEDICARE

## 2022-09-12 ENCOUNTER — HOSPITAL ENCOUNTER (OUTPATIENT)
Dept: INFUSION THERAPY | Age: 67
Discharge: HOME | End: 2022-09-12
Payer: MEDICARE

## 2022-09-12 ENCOUNTER — HOSPITAL ENCOUNTER (EMERGENCY)
Age: 67
Discharge: HOME | End: 2022-09-12
Attending: STUDENT IN AN ORGANIZED HEALTH CARE EDUCATION/TRAINING PROGRAM
Payer: MEDICARE

## 2022-09-12 VITALS
BODY MASS INDEX: 21.03 KG/M2 | SYSTOLIC BLOOD PRESSURE: 130 MMHG | HEIGHT: 69 IN | WEIGHT: 142 LBS | OXYGEN SATURATION: 96 % | DIASTOLIC BLOOD PRESSURE: 83 MMHG | RESPIRATION RATE: 18 BRPM | TEMPERATURE: 97.6 F | HEART RATE: 103 BPM

## 2022-09-12 DIAGNOSIS — E85.81 LIGHT CHAIN (AL) AMYLOIDOSIS (HCC): ICD-10-CM

## 2022-09-12 DIAGNOSIS — R42 LIGHTHEADEDNESS: Primary | ICD-10-CM

## 2022-09-12 DIAGNOSIS — M79.603 PAIN OF UPPER EXTREMITY, UNSPECIFIED LATERALITY: ICD-10-CM

## 2022-09-12 DIAGNOSIS — Z23 ENCOUNTER FOR IMMUNIZATION: Primary | ICD-10-CM

## 2022-09-12 LAB
ALBUMIN SERPL-MCNC: 3.4 G/DL (ref 3.5–5.2)
ALP BLD-CCNC: 87 U/L (ref 40–129)
ALT SERPL-CCNC: 30 U/L (ref 0–40)
ANION GAP SERPL CALCULATED.3IONS-SCNC: 12 MMOL/L (ref 7–16)
AST SERPL-CCNC: 31 U/L (ref 0–39)
BASOPHILS ABSOLUTE: 0.1 E9/L (ref 0–0.2)
BASOPHILS RELATIVE PERCENT: 1 % (ref 0–2)
BILIRUB SERPL-MCNC: 0.3 MG/DL (ref 0–1.2)
BUN BLDV-MCNC: 18 MG/DL (ref 6–23)
CALCIUM SERPL-MCNC: 9.4 MG/DL (ref 8.6–10.2)
CHLORIDE BLD-SCNC: 103 MMOL/L (ref 98–107)
CO2: 24 MMOL/L (ref 22–29)
CREAT SERPL-MCNC: 1.1 MG/DL (ref 0.7–1.2)
EKG ATRIAL RATE: 71 BPM
EKG P AXIS: -8 DEGREES
EKG P-R INTERVAL: 150 MS
EKG Q-T INTERVAL: 436 MS
EKG QRS DURATION: 80 MS
EKG QTC CALCULATION (BAZETT): 473 MS
EKG R AXIS: 30 DEGREES
EKG T AXIS: 15 DEGREES
EKG VENTRICULAR RATE: 71 BPM
EOSINOPHILS ABSOLUTE: 0.15 E9/L (ref 0.05–0.5)
EOSINOPHILS RELATIVE PERCENT: 1.5 % (ref 0–6)
GFR AFRICAN AMERICAN: >60
GFR NON-AFRICAN AMERICAN: >60 ML/MIN/1.73
GLUCOSE BLD-MCNC: 140 MG/DL (ref 74–99)
HCT VFR BLD CALC: 43.2 % (ref 37–54)
HEMOGLOBIN: 13.8 G/DL (ref 12.5–16.5)
IMMATURE GRANULOCYTES #: 0.02 E9/L
IMMATURE GRANULOCYTES %: 0.2 % (ref 0–5)
LYMPHOCYTES ABSOLUTE: 1.7 E9/L (ref 1.5–4)
LYMPHOCYTES RELATIVE PERCENT: 16.8 % (ref 20–42)
MCH RBC QN AUTO: 28.5 PG (ref 26–35)
MCHC RBC AUTO-ENTMCNC: 31.9 % (ref 32–34.5)
MCV RBC AUTO: 89.3 FL (ref 80–99.9)
MONOCYTES ABSOLUTE: 0.88 E9/L (ref 0.1–0.95)
MONOCYTES RELATIVE PERCENT: 8.7 % (ref 2–12)
NEUTROPHILS ABSOLUTE: 7.27 E9/L (ref 1.8–7.3)
NEUTROPHILS RELATIVE PERCENT: 71.8 % (ref 43–80)
PDW BLD-RTO: 15.3 FL (ref 11.5–15)
PLATELET # BLD: 251 E9/L (ref 130–450)
PMV BLD AUTO: 8.7 FL (ref 7–12)
POTASSIUM REFLEX MAGNESIUM: 3.9 MMOL/L (ref 3.5–5)
RBC # BLD: 4.84 E12/L (ref 3.8–5.8)
SODIUM BLD-SCNC: 139 MMOL/L (ref 132–146)
TOTAL PROTEIN: 6.7 G/DL (ref 6.4–8.3)
TROPONIN, HIGH SENSITIVITY: 11 NG/L (ref 0–11)
TROPONIN, HIGH SENSITIVITY: 9 NG/L (ref 0–11)
WBC # BLD: 10.1 E9/L (ref 4.5–11.5)

## 2022-09-12 PROCEDURE — 2580000003 HC RX 258: Performed by: STUDENT IN AN ORGANIZED HEALTH CARE EDUCATION/TRAINING PROGRAM

## 2022-09-12 PROCEDURE — 6360000002 HC RX W HCPCS: Performed by: STUDENT IN AN ORGANIZED HEALTH CARE EDUCATION/TRAINING PROGRAM

## 2022-09-12 PROCEDURE — 93005 ELECTROCARDIOGRAM TRACING: CPT | Performed by: STUDENT IN AN ORGANIZED HEALTH CARE EDUCATION/TRAINING PROGRAM

## 2022-09-12 PROCEDURE — 90696 DTAP-IPV VACCINE 4-6 YRS IM: CPT | Performed by: INTERNAL MEDICINE

## 2022-09-12 PROCEDURE — 90670 PCV13 VACCINE IM: CPT | Performed by: INTERNAL MEDICINE

## 2022-09-12 PROCEDURE — 6360000002 HC RX W HCPCS: Performed by: INTERNAL MEDICINE

## 2022-09-12 PROCEDURE — 71045 X-RAY EXAM CHEST 1 VIEW: CPT

## 2022-09-12 PROCEDURE — 96372 THER/PROPH/DIAG INJ SC/IM: CPT

## 2022-09-12 PROCEDURE — G0009 ADMIN PNEUMOCOCCAL VACCINE: HCPCS | Performed by: INTERNAL MEDICINE

## 2022-09-12 PROCEDURE — 85025 COMPLETE CBC W/AUTO DIFF WBC: CPT

## 2022-09-12 PROCEDURE — 90471 IMMUNIZATION ADMIN: CPT | Performed by: INTERNAL MEDICINE

## 2022-09-12 PROCEDURE — 90472 IMMUNIZATION ADMIN EACH ADD: CPT | Performed by: INTERNAL MEDICINE

## 2022-09-12 PROCEDURE — 80053 COMPREHEN METABOLIC PANEL: CPT

## 2022-09-12 PROCEDURE — 84484 ASSAY OF TROPONIN QUANT: CPT

## 2022-09-12 PROCEDURE — 90750 HZV VACC RECOMBINANT IM: CPT | Performed by: INTERNAL MEDICINE

## 2022-09-12 PROCEDURE — 90647 HIB PRP-OMP VACC 3 DOSE IM: CPT | Performed by: INTERNAL MEDICINE

## 2022-09-12 RX ORDER — ACETAMINOPHEN AND CODEINE PHOSPHATE 300; 30 MG/1; MG/1
1 TABLET ORAL EVERY 6 HOURS PRN
Qty: 12 TABLET | Refills: 0 | Status: SHIPPED | OUTPATIENT
Start: 2022-09-12 | End: 2022-09-15

## 2022-09-12 RX ORDER — FENTANYL CITRATE 0.05 MG/ML
50 INJECTION, SOLUTION INTRAMUSCULAR; INTRAVENOUS ONCE
Status: COMPLETED | OUTPATIENT
Start: 2022-09-12 | End: 2022-09-12

## 2022-09-12 RX ORDER — 0.9 % SODIUM CHLORIDE 0.9 %
1000 INTRAVENOUS SOLUTION INTRAVENOUS ONCE
Status: COMPLETED | OUTPATIENT
Start: 2022-09-12 | End: 2022-09-12

## 2022-09-12 RX ADMIN — HAEMOPHILUS B CONJUGATE VACCINE (MENINGOCOCCAL PROTEIN CONJUGATE) 7.5 MCG: 7.5 INJECTION, SUSPENSION INTRAMUSCULAR at 11:06

## 2022-09-12 RX ADMIN — ZOSTER VACCINE RECOMBINANT, ADJUVANTED 50 MCG: KIT at 11:09

## 2022-09-12 RX ADMIN — DIPHTHERIA AND TETANUS TOXOIDS AND ACELLULAR PERTUSSIS ADSORBED AND INACTIVATED POLIOVIRUS VACCINE 0.5 ML: 15; 5; 20; 20; 3; 5; 40; 8; 32 INJECTION, SUSPENSION INTRAMUSCULAR at 11:11

## 2022-09-12 RX ADMIN — PNEUMOCOCCAL 13-VALENT CONJUGATE VACCINE 0.5 ML: 2.2; 2.2; 2.2; 2.2; 2.2; 4.4; 2.2; 2.2; 2.2; 2.2; 2.2; 2.2; 2.2 INJECTION, SUSPENSION INTRAMUSCULAR at 11:04

## 2022-09-12 RX ADMIN — SODIUM CHLORIDE 1000 ML: 9 INJECTION, SOLUTION INTRAVENOUS at 15:48

## 2022-09-12 RX ADMIN — FENTANYL CITRATE 50 MCG: 50 INJECTION INTRAMUSCULAR; INTRAVENOUS at 15:49

## 2022-09-12 ASSESSMENT — ENCOUNTER SYMPTOMS
SHORTNESS OF BREATH: 0
PHOTOPHOBIA: 0
NAUSEA: 0
CHEST TIGHTNESS: 0
VOMITING: 0
COUGH: 0
DIARRHEA: 0
ABDOMINAL PAIN: 0
ABDOMINAL DISTENTION: 0
BACK PAIN: 0

## 2022-09-12 ASSESSMENT — PAIN - FUNCTIONAL ASSESSMENT
PAIN_FUNCTIONAL_ASSESSMENT: NONE - DENIES PAIN
PAIN_FUNCTIONAL_ASSESSMENT: NONE - DENIES PAIN

## 2022-09-12 ASSESSMENT — PAIN DESCRIPTION - ORIENTATION
ORIENTATION: LEFT
ORIENTATION: LEFT

## 2022-09-12 ASSESSMENT — PAIN SCALES - GENERAL
PAINLEVEL_OUTOF10: 7
PAINLEVEL_OUTOF10: 9
PAINLEVEL_OUTOF10: 9

## 2022-09-12 ASSESSMENT — PAIN DESCRIPTION - DESCRIPTORS: DESCRIPTORS: ACHING

## 2022-09-12 ASSESSMENT — PAIN DESCRIPTION - LOCATION
LOCATION: ARM
LOCATION: ARM

## 2022-09-12 NOTE — ED PROVIDER NOTES
Kayla Clements is a 74year old male with past medical history of amyloidosis who present emergency department with concern for lightheadedness. Patient went to the cancer center this morning and had 4 vaccines around 10:30 AM.  Patient was at a [employer] and began to feel lightheaded and dizzy and started to have severe pain in the left shoulder. Pain was worse with movement of the arm. Pain is with palpation patient is having pain radiate down the arm. Patient denies chest pain, shortness of breath, nausea, vomiting, fever, chills symptoms are moderate in severity and constant patient is on treatment for symptoms. Patient was vaccinated for tdap, Hib, pneumococcal, zoster. The history is provided by the patient, medical records and a relative. Review of Systems   Constitutional:  Negative for chills, diaphoresis, fatigue and fever. Eyes:  Negative for photophobia and visual disturbance. Respiratory:  Negative for cough, chest tightness and shortness of breath. Cardiovascular:  Negative for chest pain, palpitations and leg swelling. Gastrointestinal:  Negative for abdominal distention, abdominal pain, diarrhea, nausea and vomiting. Genitourinary:  Negative for dysuria. Musculoskeletal:  Negative for back pain, neck pain and neck stiffness. Skin:  Negative for pallor and rash. Neurological:  Negative for headaches. Psychiatric/Behavioral:  Negative for confusion. Physical Exam  Vitals and nursing note reviewed. Constitutional:       General: He is in acute distress. Appearance: Normal appearance. He is not ill-appearing. HENT:      Head: Normocephalic and atraumatic. Eyes:      General: No scleral icterus. Conjunctiva/sclera: Conjunctivae normal.      Pupils: Pupils are equal, round, and reactive to light. Cardiovascular:      Rate and Rhythm: Normal rate and regular rhythm.    Pulmonary:      Effort: Pulmonary effort is normal.      Breath sounds: Normal breath sounds. Abdominal:      General: Bowel sounds are normal. There is no distension. Palpations: Abdomen is soft. Tenderness: There is no abdominal tenderness. There is no guarding or rebound. Musculoskeletal:      Cervical back: Normal range of motion and neck supple. No rigidity or tenderness. No muscular tenderness. Right lower leg: No edema. Left lower leg: No edema. Skin:     General: Skin is warm and dry. Capillary Refill: Capillary refill takes less than 2 seconds. Coloration: Skin is not pale. Findings: No erythema or rash. Neurological:      General: No focal deficit present. Mental Status: He is alert and oriented to person, place, and time. Cranial Nerves: No cranial nerve deficit. Sensory: No sensory deficit. Motor: No weakness. Coordination: Coordination normal.      Gait: Gait normal.   Psychiatric:         Mood and Affect: Mood normal.        Procedures     MDM  Number of Diagnoses or Management Options  Lightheadedness  Pain of upper extremity, unspecified laterality  Diagnosis management comments: Merly Lucas is a 74year old male who presented to ED with concern for pain to the left and right upper arm worse on the left. Patient was neurovascularly intact, troponin was low risk, EKG low risk   Patient is not having chest pain, pain in arm is reproducible  Patient will be discharged home with supportive care and advised to return to ED if symptoms worsen or do not improve, he is agreeable to plan and well appearing symptoms significantly improved at time of re-evaluation and resolved at time of discharge  Patient does not have rash or wheezing, does not have findings concerning for allergic reaction           ED Course as of 09/12/22 2341   [facility] Sep 12, 2022   1619 EKG: This EKG is signed and interpreted by me.     Rate: 71  Rhythm: Sinus  Interpretation: non-specific EKG, no ST elevation or depression  Comparison: was normal [SS]   2007 Patient's symptoms resolved  [SS]      ED Course User Index  [SS] Saintclair Rm, MD       ED Course as of 09/12/22 2350   Mon Sep 12, 2022   1619 EKG: This EKG is signed and interpreted by me. Rate: 71  Rhythm: Sinus  Interpretation: non-specific EKG, no ST elevation or depression  Comparison: was normal   [SS]   2007 Patient's symptoms resolved  [SS]      ED Course User Index  [SS] Saintclair Rm, MD       --------------------------------------------- PAST HISTORY ---------------------------------------------  Past Medical History:  has a past medical history of Arthritis, Derangement of medial meniscus of right knee, Hyperlipidemia, Light chain (AL) amyloidosis ([facility] [address] and Situational syncope. Past Surgical History:  has a past surgical history that includes Shoulder arthroscopy (Left, 03/23/2001); ventral hernia repair (1981); knee surgery (Right, 02/19/2016); and CT BIOPSY RENAL (2/15/2021). Social History:  reports that he quit smoking about 32 years ago. His smoking use included cigars and cigarettes. He started smoking about 42 years ago. He has a 0.50 pack-year smoking history. He has never used smokeless tobacco. He reports current alcohol use. He reports that he does not use drugs. Family History: family history includes Cancer in his mother; Heart Disease in his father and mother; Stroke in his father. The patients home medications have been reviewed.     Allergies: Codeine, Lipitor, and Vytorin [ezetimibe-simvastatin]    -------------------------------------------------- RESULTS -------------------------------------------------  Labs:  Results for orders placed or performed during the hospital encounter of 09/12/22   CBC with Auto Differential   Result Value Ref Range    WBC 10.1 4.5 - 11.5 E9/L    RBC 4.84 3.80 - 5.80 E12/L    Hemoglobin 13.8 12.5 - 16.5 g/dL    Hematocrit 43.2 37.0 - 54.0 %    MCV 89.3 80.0 - 99.9 fL    MCH 28.5 26.0 - 35.0 pg    MCHC 31.9 (L) 32.0 - 34.5 %    RDW 15.3 (H) 11.5 - 15.0 fL    Platelets 663 703 - 323 E9/L    MPV 8.7 7.0 - 12.0 fL    Neutrophils % 71.8 43.0 - 80.0 %    Immature Granulocytes % 0.2 0.0 - 5.0 %    Lymphocytes % 16.8 (L) 20.0 - 42.0 %    Monocytes % 8.7 2.0 - 12.0 %    Eosinophils % 1.5 0.0 - 6.0 %    Basophils % 1.0 0.0 - 2.0 %    Neutrophils Absolute 7.27 1.80 - 7.30 E9/L    Immature Granulocytes # 0.02 E9/L    Lymphocytes Absolute 1.70 1.50 - 4.00 E9/L    Monocytes Absolute 0.88 0.10 - 0.95 E9/L    Eosinophils Absolute 0.15 0.05 - 0.50 E9/L    Basophils Absolute 0.10 0.00 - 0.20 E9/L   Comprehensive Metabolic Panel w/ Reflex to MG   Result Value Ref Range    Sodium 139 132 - 146 mmol/L    Potassium reflex Magnesium 3.9 3.5 - 5.0 mmol/L    Chloride 103 98 - 107 mmol/L    CO2 24 22 - 29 mmol/L    Anion Gap 12 7 - 16 mmol/L    Glucose 140 (H) 74 - 99 mg/dL    BUN 18 6 - 23 mg/dL    Creatinine 1.1 0.7 - 1.2 mg/dL    GFR Non-African American >60 >=60 mL/min/1.73    GFR African American >60     Calcium 9.4 8.6 - 10.2 mg/dL    Total Protein 6.7 6.4 - 8.3 g/dL    Albumin 3.4 (L) 3.5 - 5.2 g/dL    Total Bilirubin 0.3 0.0 - 1.2 mg/dL    Alkaline Phosphatase 87 40 - 129 U/L    ALT 30 0 - 40 U/L    AST 31 0 - 39 U/L   Troponin   Result Value Ref Range    Troponin, High Sensitivity 11 0 - 11 ng/L   Troponin   Result Value Ref Range    Troponin, High Sensitivity 9 0 - 11 ng/L   EKG 12 Lead   Result Value Ref Range    Ventricular Rate 71 BPM    Atrial Rate 71 BPM    P-R Interval 150 ms    QRS Duration 80 ms    Q-T Interval 436 ms    QTc Calculation (Bazett) 473 ms    P Axis -8 degrees    R Axis 30 degrees    T Axis 15 degrees       Radiology:  XR CHEST PORTABLE   Final Result   No acute process. ------------------------- NURSING NOTES AND VITALS REVIEWED ---------------------------  Date / Time Roomed:  9/12/2022  2:45 PM  ED Bed Assignment:  10/10    The nursing notes within the ED encounter and vital signs as below have been reviewed. /83   Pulse (!) 103   Temp 97.6 °F (36.4 °C) (Oral)   Resp 18   Ht 5' 9\" (1.753 m)   Wt 142 lb (64.4 kg)   SpO2 96%   BMI 20.97 kg/m²   Oxygen Saturation Interpretation: Normal      ------------------------------------------ PROGRESS NOTES ------------------------------------------  11:50 PM EDT  I have spoken with the patient and discussed todays results, in addition to providing specific details for the plan of care and counseling regarding the diagnosis and prognosis. Their questions are answered at this time and they are agreeable with the plan. I discussed at length with them reasons for immediate return here for re evaluation. They will followup with their primary care physician by calling their office tomorrow. --------------------------------- ADDITIONAL PROVIDER NOTES ---------------------------------  At this time the patient is without objective evidence of an acute process requiring hospitalization or inpatient management. They have remained hemodynamically stable throughout their entire ED visit and are stable for discharge with outpatient follow-up. The plan has been discussed in detail and they are aware of the specific conditions for emergent return, as well as the importance of follow-up. Discharge Medication List as of 9/12/2022  8:15 PM        START taking these medications    Details   acetaminophen-codeine (TYLENOL/CODEINE #3) 300-30 MG per tablet Take 1 tablet by mouth every 6 hours as needed for Pain for up to 3 days. Intended supply: 3 days. Take lowest dose possible to manage pain, Disp-12 tablet, R-0Print             Diagnosis:  1. Lightheadedness    2. Pain of upper extremity, unspecified laterality        Disposition:  Patient's disposition: Discharge to home  Patient's condition is stable.        Sudarshan Veras MD  09/12/22 0339

## 2022-09-16 ENCOUNTER — TELEPHONE (OUTPATIENT)
Dept: FAMILY MEDICINE CLINIC | Age: 67
End: 2022-09-16

## 2022-09-16 NOTE — TELEPHONE ENCOUNTER
9/12 - 4 vaccine shots shots, tdap, Hib, pneumococcal, zoster. Felt dizzy & went to ER, benign cardiac workup. Did have some low grade chills at that time that resolved. Suspect immune reaction to shots. Tylenol has helped pain 50%. Plan to take tylenol 1000mg po TID short term. Will have him check in next week. Do not think steroids are ideal at this time.

## 2022-09-16 NOTE — TELEPHONE ENCOUNTER
Patient wife called re   He received vaccinations at oncology this week    They \"hit something\" giving one of the vaccines, and now the arm has limited range of motion and very sore.   I made him an appointment with you for 9/22 but they are asking if there is anything sooner or any advise for him   Please advise

## 2022-09-19 ENCOUNTER — TELEPHONE (OUTPATIENT)
Dept: FAMILY MEDICINE CLINIC | Age: 67
End: 2022-09-19

## 2022-09-23 ENCOUNTER — HOSPITAL ENCOUNTER (OUTPATIENT)
Dept: INFUSION THERAPY | Age: 67
Discharge: HOME | End: 2022-09-23
Payer: MEDICARE

## 2022-09-23 DIAGNOSIS — E85.81 LIGHT CHAIN (AL) AMYLOIDOSIS (HCC): ICD-10-CM

## 2022-09-23 LAB
ALBUMIN SERPL-MCNC: 3.2 G/DL (ref 3.5–5.2)
ALP BLD-CCNC: 101 U/L (ref 40–129)
ALT SERPL-CCNC: 32 U/L (ref 0–40)
ANION GAP SERPL CALCULATED.3IONS-SCNC: 9 MMOL/L (ref 7–16)
AST SERPL-CCNC: 29 U/L (ref 0–39)
BASOPHILS ABSOLUTE: 0.12 E9/L (ref 0–0.2)
BASOPHILS RELATIVE PERCENT: 1.7 % (ref 0–2)
BILIRUB SERPL-MCNC: 0.3 MG/DL (ref 0–1.2)
BUN BLDV-MCNC: 16 MG/DL (ref 6–23)
CALCIUM SERPL-MCNC: 9.7 MG/DL (ref 8.6–10.2)
CHLORIDE BLD-SCNC: 103 MMOL/L (ref 98–107)
CO2: 27 MMOL/L (ref 22–29)
CREAT SERPL-MCNC: 1 MG/DL (ref 0.7–1.2)
EOSINOPHILS ABSOLUTE: 0.29 E9/L (ref 0.05–0.5)
EOSINOPHILS RELATIVE PERCENT: 4 % (ref 0–6)
GFR AFRICAN AMERICAN: >60
GFR NON-AFRICAN AMERICAN: >60 ML/MIN/1.73
GLUCOSE BLD-MCNC: 73 MG/DL (ref 74–99)
HCT VFR BLD CALC: 43.7 % (ref 37–54)
HEMOGLOBIN: 14.1 G/DL (ref 12.5–16.5)
IMMATURE GRANULOCYTES #: 0.03 E9/L
IMMATURE GRANULOCYTES %: 0.4 % (ref 0–5)
LYMPHOCYTES ABSOLUTE: 1.49 E9/L (ref 1.5–4)
LYMPHOCYTES RELATIVE PERCENT: 20.7 % (ref 20–42)
MCH RBC QN AUTO: 29.2 PG (ref 26–35)
MCHC RBC AUTO-ENTMCNC: 32.3 % (ref 32–34.5)
MCV RBC AUTO: 90.5 FL (ref 80–99.9)
MONOCYTES ABSOLUTE: 0.51 E9/L (ref 0.1–0.95)
MONOCYTES RELATIVE PERCENT: 7.1 % (ref 2–12)
NEUTROPHILS ABSOLUTE: 4.76 E9/L (ref 1.8–7.3)
NEUTROPHILS RELATIVE PERCENT: 66.1 % (ref 43–80)
PDW BLD-RTO: 15.3 FL (ref 11.5–15)
PLATELET # BLD: 292 E9/L (ref 130–450)
PMV BLD AUTO: 8.5 FL (ref 7–12)
POTASSIUM SERPL-SCNC: 4.5 MMOL/L (ref 3.5–5)
RBC # BLD: 4.83 E12/L (ref 3.8–5.8)
SODIUM BLD-SCNC: 139 MMOL/L (ref 132–146)
TOTAL PROTEIN: 7 G/DL (ref 6.4–8.3)
WBC # BLD: 7.2 E9/L (ref 4.5–11.5)

## 2022-09-23 PROCEDURE — 85025 COMPLETE CBC W/AUTO DIFF WBC: CPT

## 2022-09-23 PROCEDURE — 36415 COLL VENOUS BLD VENIPUNCTURE: CPT

## 2022-09-23 PROCEDURE — 80053 COMPREHEN METABOLIC PANEL: CPT

## 2022-09-23 NOTE — PROGRESS NOTES
[facility]  [address] [place], [place]        Pt Name: Vickey Chris: 1955  Date of evaluation: 9/26/2022  Primary Care Physician: Ze Rodriguez MD  Reason for evaluation:   Chief Complaint   Patient presents with    Other     Light Chain (AL)  amyloidosis    Follow-up          Subjective:   Here for follow-up. Follows at [facility];  S/P Stem Cell Transplant November 22,2021. OBJECTIVE:  VITALS:  height is 5' 9\" (1.753 m) and weight is 142 lb 11.2 oz (64.7 kg). His temperature is 97.1 °F (36.2 °C). His blood pressure is 98/75 and his pulse is 96. His oxygen saturation is 100%. Physical Exam:  Performance Status: 0  Well developed, well nourished male  General: AAO to person, place, time, cooperative, in no acute distress. Head and neck: PERRLA, EOMI. Sclera non icteric. Oropharynx:  Clear. Neck: no JVD,  no adenopathy. Heart: Regular rate and regular rhythm, no murmur. Lungs: Clear to auscultation. Abdomen: Soft, non-tender;no masses, no organomegaly. Extremities: No edema,no cyanosis, no clubbing. Neurologic:Cranial nerves grossly intact. No focal motor or sensory deficits. Skin:  No rash. Medications  Prior to Admission medications    Medication Sig Start Date End Date Taking?  Authorizing Provider   tiZANidine (ZANAFLEX) 4 MG tablet Take 1 tablet by mouth 3 times daily as needed (SPASMS) 6/8/22  Yes VICTOR MANUEL Shin - CNP   rosuvastatin (CRESTOR) 40 MG tablet TAKE ONE TABLET BY MOUTH DAILY 4/21/22  Yes Ze Rodriguez MD   diclofenac sodium (VOLTAREN) 1 % GEL Apply 2 g topically daily 4/21/22  Yes Ze Rodriguez MD   acyclovir (ZOVIRAX) 400 MG tablet Take 400 mg by mouth 2 times daily   Yes Historical Provider, MD   fluticasone (FLONASE) 50 MCG/ACT nasal spray 1 spray by Each Nostril route daily 12/28/20  Yes Arvin Aguilar MD   VITAMIN E PO Take 1 capsule by mouth daily   Yes Historical Provider, MD   Multiple Vitamins-Minerals (THERAPEUTIC MULTIVITAMIN-MINERALS) tablet Take 1 tablet by mouth daily   Yes Historical Provider, MD   Ascorbic Acid (VITAMIN C PO) Take 1 tablet by mouth daily   Yes Historical Provider, MD   VITAMIN D PO Take 1,000 Units by mouth in the morning and at bedtime    Yes Historical Provider, MD   aspirin 81 MG tablet Take 81 mg by mouth daily   Yes Historical Provider, MD   fexofenadine-pseudoephedrine (ALLEGRA-D 12 HOUR)  MG per tablet Take 1 tablet by mouth 2 times daily as needed. 2/5/13  Yes Oziel Cat MD   lenalidomide (REVLIMID) 5 MG chemo capsule Take 1 capsule by mouth daily for 21 days Take one 5 mg capsule daily by mouth for 21 days and off 7 days. Caro Bennette [id number] ADULT MALE 9/2/22 9/23/22  VICTOR MANUEL Katz - CNP    Scheduled Meds:  Continuous Infusions:  PRN Meds:.        Recent Laboratory Data-     Lab Results   Component Value Date    WBC 7.2 09/23/2022    HGB 14.1 09/23/2022    HCT 43.7 09/23/2022    MCV 90.5 09/23/2022     09/23/2022    LYMPHOPCT 20.7 09/23/2022    RBC 4.83 09/23/2022    MCH 29.2 09/23/2022    MCHC 32.3 09/23/2022    RDW 15.3 (H) 09/23/2022    NEUTOPHILPCT 66.1 09/23/2022    MONOPCT 7.1 09/23/2022    BASOPCT 1.7 09/23/2022    NEUTROABS 4.76 09/23/2022    LYMPHSABS 1.49 (L) 09/23/2022    MONOSABS 0.51 09/23/2022    EOSABS 0.29 09/23/2022    BASOSABS 0.12 09/23/2022       Lab Results   Component Value Date     09/23/2022    K 4.5 09/23/2022     09/23/2022    CO2 27 09/23/2022    BUN 16 09/23/2022    CREATININE 1.0 09/23/2022    GLUCOSE 73 (L) 09/23/2022    CALCIUM 9.7 09/23/2022    PROT 7.0 09/23/2022    LABALBU 3.2 (L) 09/23/2022    BILITOT 0.3 09/23/2022    ALKPHOS 101 09/23/2022    AST 29 09/23/2022    ALT 32 09/23/2022    LABGLOM >60 09/23/2022    GFRAA >60 09/23/2022           Radiology-  No results found.             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 His work-up will be completed to include the following:     CBC, CMP, LDH, B2 microglobulin, CRP, serum protein electrophoresis with reflex to immunofixation, serum free kappa and lambda light chains with ratio, quantitative immunoglobulins, 24-hour urine collection for quantitative immunoglobulins and immunofixation. Serum troponin, proBNP and 2D echo will be done to exclude cardiac amyloidosis. TSH  Factor X assay as well as PT and APTT will be done     Will be scheduled for a bone marrow aspirate and biopsy     Once work-up completed options of therapy with bortezomib/daratumumab will be addressed     He is encouraged to receive the COVID-19 vaccine        3/15/2021  Awaiting his bone marrow aspirate and biopsy and his 2D echo which are both pending. His CBC was in the normal range with a hemoglobin of 15.3, normal WBC count of 6.2, normal MCV of 90 with a normal platelet count of 952. His TSH was normal at 3.88  Serum LDH was elevated at 266. B2 microglobulin was markedly elevated at 13.6. Quantitative immunoglobulins showed suppression of IgG with normal IgA and IgM. Serum electrophoresis showed an M spike faint measuring 0.1. Serum FLC's showed markedly elevated free lambda light chain at 311 with abnormal ratio of 0.05  Serum troponin was normal but proBNP was elevated at 491. 24-hour urine collection showed nephrotic range proteinuria with total proteins of 6.6 g in a 24-hour period with a faint M spike in the urine measuring 1263 mg in a 24-hour timeframe     He will be recommended induction with Daratumumab/Velcade/dexamethasone  His bone marrow aspirate and biopsy will be reviewed. All potential side effects were discussed. 3/29/2021  He will be given acyclovir for VZV prophylaxis and Bactrim DS for PCP prophylaxis and as needed Compazine for nausea.   He will be initiated today on daratumumab/Velcade/cyclophosphamide/dexamethasone regimen. All potential side effects were discussed. His baby aspirin has been held. 4/05/2021  He tolerated his treatment with daratumumab well. He reports constipation. Few days later he had some chills and body aches and it is likely related to his second dose of the Covid vaccine. No signs of peripheral neuropathy. 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 He will be given as needed tramadol for pain and omeprazole 20 mg daily for heartburn. To receive week 3 of chemotherapy regimen consisting of daratumumab/Velcade/dexamethasone/Cytoxan        4/19/2021  He has been experiencing significant side effects from daratumumab 2 to 3 days later in terms of headaches myalgias arthralgias. He will be given prednisone 20 mg daily starting on 4/21/2021 and he will skip it Monday the day he gets the high-dose steroids prior to his Darzalex Faspro. He will take as needed Tylenol or Advil and he will continue on his omeprazole daily. 4/26/2021  He has gained few pounds and has fluid retention from steroids and will be initiated on Maxide 37.5 mg daily. He will continue on prednisone except on Mondays. Received Darzalex Faspro today. 5/03/2021  He diuresed very well on Maxide and lost 6 pounds and it has been discontinued. He has had no reactions from Darzalex while maintained on prednisone  To receive week 6 of Darzalex Faspro today        5/10/2021  To receive week 7 of Darzalex Faspro . Continue Prednisone. 5/17/2021  To receive week 8 of Darzalex Faspro. He will have a repeat myeloma panel and urinary collection after week 9. 5/24/2021  To receive week 9 of Darzalex Faspro. He will continue on Cytoxan and Velcade     He will have a repeat myeloma panel and urinary collection after week 9. He will return for follow-up in 2 weeks. 6/07/2021  To receive Week #10 of Darzalex/Faspro. and Velcade   On Cytoxan        6/21/2021  To receive Week #11 of Darzalex/Faspro. and Velcade   On Cytoxan   Lab work reviewed, okay to proceed with chemo treatment. To return 7/7/2021 for  Week #12 of Darzalex/Faspro. and Velcade   On Cytoxan   Labs on 7/6/2021 7/7/2021  His repeat myeloma panel shows normalization of free kappa and free lambda light chains with normal FLC ratio indicating an excellent response to present therapy regimen  His serum beta-2 microglobulin is down in the normal range. Quantitative immunoglobulins still show low IgA, low IgG and low IgM. His 24-hour urine collection showed still nephrotic range proteinuria with 3.8 g of proteins in a 24-hour collection  Immunofixation showed faint free monoclonal lambda light chains. His last SPEP from 4/26 had shown an M spike of 0.1 but results from June not available and will be repeated  Back in March his 24-hour UPEP showed 6.6 g of protein in 24 hours. He has achieved an excellent clinical response  To continue on present regimen  He will now go on biweekly Gregoria      7/19/2021  To receive daratumumab/Velcade/Dex  Cytoxan to be DC'd    He will be referred to a tertiary care center for consideration of autologous stem cell transplant.     He will return in 2 weeks for chemo      8/02/2021  To receive Day #1 Cycle #5 daratumumab/Velcade/Dex    To return 8/09/2021 for Day #8 Velcade  Cycle #5   Daratumumab will be now on every 4 weeks basis  He is to be tapered off prednisone  Pretransplant work-up will be initiated with anticipation of stem cell transplant at [address] in November 8/09/2021   To receive Day #8 Cycle #5 Velcade. Continue on weekly dexamethasone. He will have 1 last dose of daratumumab in early September   He will start stem cell collection in October with anticipation of stem cell transplant in November    to return 8/16/2021 for Day #15 Cycle #5 Velcade      8/16/2021  To receive Day #15 Cycle #5 Velcade today. Continue on weekly dexamethasone. He will have 1 last dose of daratumumab on 8/30/2021  He will start stem cell collection in October with anticipation of stem cell transplant in November    To return 8/23/2021 for Day #22 Cycle #5 Velcade. He will return on 8/30/2021 for Velcade and daratumumab    We will see me back on 9/13/2021  Will be resumed on prednisone tapering schedule for his rash and joint aches. 8/23/2021  To receive Day #22 Cycle #5 Velcade today. Lab work reviewed, okay to proceed with chemo treatment. Continue on weekly dexamethasone. To return 8/30/2021 for last dose of daratumumab Day #1 Cycle #6  Labs on same day. He will start stem cell collection in October with anticipation of stem cell transplant in November. 8/30/2021  To receive Day #1 Cycle #6 Daratumumab/Velcade/Dex  Lab work reviewed, okay to proceed with chemo treatment. Return on 9/13/2021 for OV with Dr. Gregoria Larsen,  Day #8 Velcade. Labs on 9/10/2021.        9/13/2021  To receive  Day #8 Cycle #6  Velcade today. His treatment will be discontinued after today    He will start stem cell collection in October with anticipation of stem cell transplant in November. 10/6/2020  Status post dental extraction few days ago  Reports occasional dry cough  He will start his injections on Friday in anticipation of stem cell collection early next week at [facility]      His stem cell transplant is scheduled for early November. 3/16/2022  S/P stem cell transplant  10/22/2021   Recovered very well post transplant.   Most recent bone marrow showed no plasmacytosis  His labs show a faint M spike with slight elevation in FLC's  He did do very well post transplant and he is now around day 140 post transplant    He is scheduled for his vaccinations to start 6-month post transplant around May    He will be initiated on maintenance Revlimid 5 mg daily 3 weeks on and 1 week off and all potential side effects were discussed. He is cleared to travel on vacation to [facility]. His management will be coordinated with Dr. Heidy Hart. 5/25/2022  Patient went to [facility] in April and came back and then his wife contracted COVID and both had cough myalgias and fatigue but apparently he tested negative and he started his Revlimid and then he stopped it blaming the cough as a side effect of Revlimid. It was explained to him that he likely had COVID even though he tested negative and he is willing to resume Revlimid next week and we will closely monitor him for potential side effects he will continue on his baby aspirin. He will start his immunization in 2 to 3 weeks which would be 6-month post transplant. 6/8/2022  Patient has been intolerant from Revlimid and and since he resumed it he has been complaining of recurrent sinusitis congestion cough and excessive mucus and phlegm. He also reports myalgias and muscle cramps. No diarrhea or skin rash. On exam he has scattered rhonchi and wheezing. He will be given Medrol Dosepak along with Mucinex and will be given Obi Emanuel for his cough. He will be given as needed muscle relaxers with tizanidine for his muscle cramps. He will be go off Revlimid for 2 weeks and will be reevaluated clinically in 2 weeks. His vaccines will be postponed till his return in 2 weeks. 6/20/2022  Has had recurrent rash despite the fact he did not resume his Revlimid yet  Medrol Dosepak helped clear the rash it was very itchy and generalized erythematous maculopapular mostly over extremities and trunk. He stopped all his vitamins.   At this time he is only on his acyclovir which he has been on for several months as well as Crestor Zanaflex and omeprazole. He is to resume on Thursday Revlimid and if he develops recurrent cough and rash it will be permanently discontinued. Post transplant vaccinations remain on hold. 6/27/2022  Resumed Revlimid 5 days ago and so far tolerating it very well without any cough or rash. He continues only on vitamin D. He remains on acyclovir for VZV prophylaxis. If everything goes well without side effects he will be maintained on Revlimid along with his baby aspirin. He will return for follow-up on 7/18/2022 and he will start then his posttransplant immunization program.      7/18/2022  Very well. His cough and rash have entirely resolved. He is concerned about his mild weight loss. He has been tolerating Revlimid without any diarrhea. He is to start his immunization post stem cell transplant. He will be maintained on Revlimid 5 mg 3 weeks on and 1 week off along with baby aspirin. 8/15/2022  Very well and tolerating low-dose Revlimid. No diarrhea. No skin rash. No cough or joint aches. It was felt that his prior symptoms were related to COVID. He has further recovered from [facility]. He did receive his first batch of posttransplant immunization and he will return for his second session of immunization the week of September 11. He has some easy bruising attributed to his baby aspirin. He will have a follow-up appointment with Dr. Abel Duggan at [facility] in November. To continue present dose of Revlimid. 9/26/2022  He completed his immunizations but he experienced pain and tenderness and local reaction in the left forearm at the site of injections. He is feeling better now. He has been tolerating low-dose Revlimid well. No diarrhea. No weight loss. He denies any back pain. He will have a repeat myeloma panel today.   He is scheduled for follow-up appointment in November with Dr. Abel Duggan at [facility] and he will have MRD test done. Mary Grace Rapp. Wade Rico M.D., F.A.C.P.   Electronically signed 9/26/2022 at 9:51 AM

## 2022-09-26 ENCOUNTER — OFFICE VISIT (OUTPATIENT)
Dept: ONCOLOGY | Age: 67
End: 2022-09-26
Payer: MEDICARE

## 2022-09-26 ENCOUNTER — HOSPITAL ENCOUNTER (OUTPATIENT)
Dept: INFUSION THERAPY | Age: 67
Discharge: HOME | End: 2022-09-26
Payer: MEDICARE

## 2022-09-26 VITALS
TEMPERATURE: 97.1 F | SYSTOLIC BLOOD PRESSURE: 98 MMHG | OXYGEN SATURATION: 100 % | WEIGHT: 142.7 LBS | HEIGHT: 69 IN | HEART RATE: 96 BPM | BODY MASS INDEX: 21.14 KG/M2 | DIASTOLIC BLOOD PRESSURE: 75 MMHG

## 2022-09-26 DIAGNOSIS — E85.81 LIGHT CHAIN (AL) AMYLOIDOSIS (HCC): Primary | ICD-10-CM

## 2022-09-26 DIAGNOSIS — E85.81 LIGHT CHAIN (AL) AMYLOIDOSIS (HCC): ICD-10-CM

## 2022-09-26 LAB
ALBUMIN SERPL-MCNC: 3.1 G/DL (ref 3.5–5.2)
ALP BLD-CCNC: 99 U/L (ref 40–129)
ALT SERPL-CCNC: 22 U/L (ref 0–40)
ANION GAP SERPL CALCULATED.3IONS-SCNC: 10 MMOL/L (ref 7–16)
AST SERPL-CCNC: 20 U/L (ref 0–39)
BASOPHILS ABSOLUTE: 0.12 E9/L (ref 0–0.2)
BASOPHILS RELATIVE PERCENT: 1.3 % (ref 0–2)
BILIRUB SERPL-MCNC: 0.2 MG/DL (ref 0–1.2)
BUN BLDV-MCNC: 15 MG/DL (ref 6–23)
CALCIUM SERPL-MCNC: 9.5 MG/DL (ref 8.6–10.2)
CHLORIDE BLD-SCNC: 104 MMOL/L (ref 98–107)
CO2: 24 MMOL/L (ref 22–29)
CREAT SERPL-MCNC: 0.9 MG/DL (ref 0.7–1.2)
EOSINOPHILS ABSOLUTE: 0.36 E9/L (ref 0.05–0.5)
EOSINOPHILS RELATIVE PERCENT: 3.8 % (ref 0–6)
GFR AFRICAN AMERICAN: >60
GFR NON-AFRICAN AMERICAN: >60 ML/MIN/1.73
GLUCOSE BLD-MCNC: 79 MG/DL (ref 74–99)
HCT VFR BLD CALC: 42.6 % (ref 37–54)
HEMOGLOBIN: 13.7 G/DL (ref 12.5–16.5)
IMMATURE GRANULOCYTES #: 0.04 E9/L
IMMATURE GRANULOCYTES %: 0.4 % (ref 0–5)
LYMPHOCYTES ABSOLUTE: 1.63 E9/L (ref 1.5–4)
LYMPHOCYTES RELATIVE PERCENT: 17.1 % (ref 20–42)
MCH RBC QN AUTO: 28.8 PG (ref 26–35)
MCHC RBC AUTO-ENTMCNC: 32.2 % (ref 32–34.5)
MCV RBC AUTO: 89.7 FL (ref 80–99.9)
MONOCYTES ABSOLUTE: 1.15 E9/L (ref 0.1–0.95)
MONOCYTES RELATIVE PERCENT: 12.1 % (ref 2–12)
NEUTROPHILS ABSOLUTE: 6.21 E9/L (ref 1.8–7.3)
NEUTROPHILS RELATIVE PERCENT: 65.3 % (ref 43–80)
PDW BLD-RTO: 15 FL (ref 11.5–15)
PLATELET # BLD: 281 E9/L (ref 130–450)
PMV BLD AUTO: 8.7 FL (ref 7–12)
POTASSIUM SERPL-SCNC: 4.1 MMOL/L (ref 3.5–5)
RBC # BLD: 4.75 E12/L (ref 3.8–5.8)
SODIUM BLD-SCNC: 138 MMOL/L (ref 132–146)
TOTAL PROTEIN: 6.8 G/DL (ref 6.4–8.3)
WBC # BLD: 9.5 E9/L (ref 4.5–11.5)

## 2022-09-26 PROCEDURE — 84165 PROTEIN E-PHORESIS SERUM: CPT

## 2022-09-26 PROCEDURE — 36415 COLL VENOUS BLD VENIPUNCTURE: CPT

## 2022-09-26 PROCEDURE — 80053 COMPREHEN METABOLIC PANEL: CPT

## 2022-09-26 PROCEDURE — 82232 ASSAY OF BETA-2 PROTEIN: CPT

## 2022-09-26 PROCEDURE — 83883 ASSAY NEPHELOMETRY NOT SPEC: CPT

## 2022-09-26 PROCEDURE — 82784 ASSAY IGA/IGD/IGG/IGM EACH: CPT

## 2022-09-26 PROCEDURE — 99212 OFFICE O/P EST SF 10 MIN: CPT

## 2022-09-26 PROCEDURE — 85025 COMPLETE CBC W/AUTO DIFF WBC: CPT

## 2022-09-26 PROCEDURE — 86334 IMMUNOFIX E-PHORESIS SERUM: CPT

## 2022-09-28 ENCOUNTER — HOSPITAL ENCOUNTER (OUTPATIENT)
Age: 67
Setting detail: SPECIMEN
Discharge: HOME | End: 2022-09-28
Payer: MEDICARE

## 2022-09-28 LAB
ALBUMIN SERPL-MCNC: 2.6 G/DL (ref 3.5–4.7)
ALPHA-1-GLOBULIN: 0.3 G/DL (ref 0.2–0.4)
ALPHA-2-GLOBULIN: 1.3 G/DL (ref 0.5–1)
BETA GLOBULIN: 1.1 G/DL (ref 0.8–1.3)
ELECTROPHORESIS: ABNORMAL
GAMMA GLOBULIN: 1.1 G/DL (ref 0.7–1.6)
IGA: 220 MG/DL (ref 70–400)
IGG: 935 MG/DL (ref 700–1600)
IGM: 77 MG/DL (ref 40–230)
IMMUNOFIXATION RESULT, SERUM: NORMAL
KAPPA FREE LIGHT CHAINS QNT: 32.49 MG/L (ref 3.3–19.4)
KAPPA/LAMBDA FREE LIGHT CHAIN RATIO: 1.62 (ref 0.26–1.65)
LAMBDA FREE LIGHT CHAINS QNT: 20.1 MG/L (ref 5.71–26.3)

## 2022-09-28 PROCEDURE — 84166 PROTEIN E-PHORESIS/URINE/CSF: CPT

## 2022-09-29 DIAGNOSIS — E85.81 LIGHT CHAIN (AL) AMYLOIDOSIS (HCC): ICD-10-CM

## 2022-09-29 LAB — BETA-2 MICROGLOBULIN: 2.1 MG/L (ref 0.6–2.4)

## 2022-09-29 RX ORDER — LENALIDOMIDE 5 MG/1
5 CAPSULE ORAL DAILY
Qty: 21 CAPSULE | Refills: 0 | Status: ACTIVE | OUTPATIENT
Start: 2022-09-29 | End: 2022-10-27 | Stop reason: SDUPTHER

## 2022-09-29 RX ORDER — LENALIDOMIDE 5 MG/1
5 CAPSULE ORAL DAILY
Qty: 21 CAPSULE | Refills: 0 | Status: SHIPPED | OUTPATIENT
Start: 2022-09-29 | End: 2022-09-29 | Stop reason: SDUPTHER

## 2022-09-29 NOTE — PROGRESS NOTES
[facility]    Date: 09/29/22    Medication is currently being filled at [facility] and has been routed there. Please call us with any questions at [phone] opt.  2.

## 2022-10-03 LAB — ADDENDUM ELECTROPHORESIS URINE RANDOM: NORMAL

## 2022-10-21 DIAGNOSIS — E85.81 LIGHT CHAIN (AL) AMYLOIDOSIS (HCC): ICD-10-CM

## 2022-10-25 SDOH — HEALTH STABILITY: PHYSICAL HEALTH: ON AVERAGE, HOW MANY MINUTES DO YOU ENGAGE IN EXERCISE AT THIS LEVEL?: 50 MIN

## 2022-10-25 SDOH — HEALTH STABILITY: PHYSICAL HEALTH: ON AVERAGE, HOW MANY DAYS PER WEEK DO YOU ENGAGE IN MODERATE TO STRENUOUS EXERCISE (LIKE A BRISK WALK)?: 5 DAYS

## 2022-10-25 ASSESSMENT — PATIENT HEALTH QUESTIONNAIRE - PHQ9
SUM OF ALL RESPONSES TO PHQ QUESTIONS 1-9: 0
2. FEELING DOWN, DEPRESSED OR HOPELESS: 0
SUM OF ALL RESPONSES TO PHQ9 QUESTIONS 1 & 2: 0
SUM OF ALL RESPONSES TO PHQ QUESTIONS 1-9: 0
1. LITTLE INTEREST OR PLEASURE IN DOING THINGS: 0

## 2022-10-25 ASSESSMENT — LIFESTYLE VARIABLES
HOW MANY STANDARD DRINKS CONTAINING ALCOHOL DO YOU HAVE ON A TYPICAL DAY: 0
HOW MANY STANDARD DRINKS CONTAINING ALCOHOL DO YOU HAVE ON A TYPICAL DAY: PATIENT DOES NOT DRINK
HOW OFTEN DO YOU HAVE A DRINK CONTAINING ALCOHOL: NEVER
HOW OFTEN DO YOU HAVE A DRINK CONTAINING ALCOHOL: 1
HOW OFTEN DO YOU HAVE SIX OR MORE DRINKS ON ONE OCCASION: 1

## 2022-10-27 DIAGNOSIS — E85.81 LIGHT CHAIN (AL) AMYLOIDOSIS (HCC): ICD-10-CM

## 2022-10-27 RX ORDER — LENALIDOMIDE 5 MG/1
5 CAPSULE ORAL DAILY
Qty: 21 CAPSULE | Refills: 0 | Status: ACTIVE | OUTPATIENT
Start: 2022-10-27 | End: 2022-11-28 | Stop reason: SDUPTHER

## 2022-11-01 ENCOUNTER — OFFICE VISIT (OUTPATIENT)
Dept: FAMILY MEDICINE CLINIC | Age: 67
End: 2022-11-01
Payer: MEDICARE

## 2022-11-01 VITALS
SYSTOLIC BLOOD PRESSURE: 109 MMHG | HEIGHT: 69 IN | OXYGEN SATURATION: 95 % | RESPIRATION RATE: 18 BRPM | TEMPERATURE: 98.1 F | WEIGHT: 143 LBS | BODY MASS INDEX: 21.18 KG/M2 | HEART RATE: 84 BPM | DIASTOLIC BLOOD PRESSURE: 77 MMHG

## 2022-11-01 DIAGNOSIS — M79.10 MUSCLE ACHE: ICD-10-CM

## 2022-11-01 DIAGNOSIS — Z00.00 ROUTINE GENERAL MEDICAL EXAMINATION AT A HEALTH CARE FACILITY: ICD-10-CM

## 2022-11-01 DIAGNOSIS — Z00.00 WELCOME TO MEDICARE PREVENTIVE VISIT: Primary | ICD-10-CM

## 2022-11-01 LAB
C-REACTIVE PROTEIN: 0.3 MG/DL (ref 0–0.4)
SEDIMENTATION RATE, ERYTHROCYTE: 54 MM/HR (ref 0–15)
TOTAL CK: 94 U/L (ref 20–200)

## 2022-11-01 PROCEDURE — 1123F ACP DISCUSS/DSCN MKR DOCD: CPT | Performed by: FAMILY MEDICINE

## 2022-11-01 PROCEDURE — 3017F COLORECTAL CA SCREEN DOC REV: CPT | Performed by: FAMILY MEDICINE

## 2022-11-01 PROCEDURE — G0402 INITIAL PREVENTIVE EXAM: HCPCS | Performed by: FAMILY MEDICINE

## 2022-11-01 ASSESSMENT — PATIENT HEALTH QUESTIONNAIRE - PHQ9
SUM OF ALL RESPONSES TO PHQ QUESTIONS 1-9: 0
2. FEELING DOWN, DEPRESSED OR HOPELESS: 0
SUM OF ALL RESPONSES TO PHQ9 QUESTIONS 1 & 2: 0
1. LITTLE INTEREST OR PLEASURE IN DOING THINGS: 0

## 2022-11-01 ASSESSMENT — LIFESTYLE VARIABLES
HOW OFTEN DO YOU HAVE A DRINK CONTAINING ALCOHOL: NEVER
HOW MANY STANDARD DRINKS CONTAINING ALCOHOL DO YOU HAVE ON A TYPICAL DAY: PATIENT DOES NOT DRINK

## 2022-11-01 NOTE — PROGRESS NOTES
Medicare Annual Wellness Visit    Joselo Isaac is here for Medicare AWV and Other (Pain in arms from vaccines )    Assessment & Plan   Welcome to Medicare preventive visit  Routine general medical examination at a health care facility    Recommendations for Preventive Services Due: see orders and patient instructions/AVS.  Recommended screening schedule for the next 5-10 years is provided to the patient in written form: see Patient Instructions/AVS.    Shoulder ache bilaterally-does have some findings concerning for restricted range of motion of bilateral soft shoulders even with passive range of motion he has pain through out the rotator cuff and has restricted range of motion on rotator cuff testing does have signs that are positive also for impingement type syndrome. There could be a component of myopathy related to statin so we will check CK and some other labs to look for inflammatory issues he does not have any hip inflammation set makes PMR or polymyositis much less likely. I suspect he may be having some early signs of frozen shoulder just from not moving much secondary to the pain and I sent him to physical therapy we did consider a steroid burst but will hold off for now unless it is cleared by his oncologist.     Hyperlipidemia-he has an elevated very elevated LDL but also an elevated HDL which is likely protective for him he has never had a vascular event I did ask him to hold off on the Crestor for a few weeks and see if that makes any difference we did discuss resuming it at a different interval or different dose and most likely will half the dose. [id number] with oncology     Return for Medicare Annual Wellness Visit in 1 year. Subjective   The following acute and/or chronic problems were also addressed today:    Mr. Ela Wells is here doing well overall from an amyloidosis standpoint.   However after his last round of vaccination in his bilateral shoulders he had very severe pain necessitating an emergency room visit to discuss this with me over the phone we did consider steroids but ultimately held off and now he still having bilateral shoulder pain restricted range of motion does seem to be affecting his quality of life still. He does have a history of chronic shoulder issues that were very minimal.     He has had some generalized muscle aches as well and was concerned that his rosuvastatin may be related to this he takes 40 mg p.o. daily he has been on it chronically he has never had a vascular event and has no known heart disease peripheral vascular disease or history of CVA stroke. Patient's complete Health Risk Assessment and screening values have been reviewed and are found in Flowsheets. The following problems were reviewed today and where indicated follow up appointments were made and/or referrals ordered. Positive Risk Factor Screenings with Interventions:             General Health and ACP:  General  In general, how would you say your health is?: Good  In the past 7 days, have you experienced any of the following: New or Increased Pain, New or Increased Fatigue, Loneliness, Social Isolation, Stress or Anger?: No  Do you get the social and emotional support that you need?: Yes  Do you have a Living Will?: Yes    Advance Directives       Power of  Living Will ACP-Advance Directive ACP-Power of     Not on File Not on File Not on File Not on File          General Health Risk Interventions:       Hearing/Vision:  Do you or your family notice any trouble with your hearing that hasn't been managed with hearing aids?: No  Do you have difficulty driving, watching TV, or doing any of your daily activities because of your eyesight?: No  Have you had an eye exam within the past year?: (!) No  No results found. Hearing/Vision Interventions:  None needed.             Objective   Vitals:    11/01/22 0925   BP: 109/77   Site: Right Lower Arm   Position: Sitting   Cuff Size: Medium Adult   Pulse: 84   Resp: 18   Temp: 98.1 °F (36.7 °C)   TempSrc: Temporal   SpO2: 95%   Weight: 143 lb (64.9 kg)   Height: 5' 9\" (1.753 m)      Body mass index is 21.12 kg/m². Focused exam  Bilateral shoulder exam  Extremities:Extrinsic Shoulder Exam: No AC tenderness, no scapular bony TTP. Proximal deltoid with diffuse lateral tenderness. Intrinsic Shoulder Exam: Full Passive Range of Motion with pain, generally restricted active range of motion secondary to pain  Neers test for impingement: Positive     Rotator Cuff tests:  Lag test for full thickness: neg  Supraspinatous: Empty Can test: Positive painful Arc: Positive  Subscap - Internal Rotation/Lift off test: Positive  Supraspinatous / Infraspinatous: External rotation lag test: Positive      2+ radial pulse  Brisk cap refill  Distal sensation / Strength intact 5/5. Allergies   Allergen Reactions    Codeine Itching    Lipitor      Muscle cramps    Vytorin [Ezetimibe-Simvastatin]      Muscle cramps       Prior to Visit Medications    Medication Sig Taking? Authorizing Provider   lenalidomide (REVLIMID) 5 MG chemo capsule Take 1 capsule by mouth daily for 21 days Take one 5 mg capsule daily by mouth for 21 days and off 7 days.  AUTH [id number]   ADULT MALE Yes VICTOR MANUEL Dorado - CNP   rosuvastatin (CRESTOR) 40 MG tablet TAKE ONE TABLET BY MOUTH DAILY Yes Allie Álvarez MD   diclofenac sodium (VOLTAREN) 1 % GEL Apply 2 g topically daily Yes Allie Álvarez MD   acyclovir (ZOVIRAX) 400 MG tablet Take 400 mg by mouth 2 times daily Yes Historical Provider, MD   VITAMIN E PO Take 1 capsule by mouth daily Yes Historical Provider, MD   Multiple Vitamins-Minerals (THERAPEUTIC MULTIVITAMIN-MINERALS) tablet Take 1 tablet by mouth daily Yes Historical Provider, MD   Ascorbic Acid (VITAMIN C PO) Take 1 tablet by mouth daily Yes Historical Provider, MD   VITAMIN D PO Take 1,000 Units by mouth in the morning and at bedtime  Yes Historical Provider, MD   aspirin 81 MG tablet Take 81 mg by mouth daily Yes Historical Provider, MD   fexofenadine-pseudoephedrine (ALLEGRA-D 12 HOUR)  MG per tablet Take 1 tablet by mouth 2 times daily as needed.  Yes Dayan Mcneill MD   tiZANidine (ZANAFLEX) 4 MG tablet Take 1 tablet by mouth 3 times daily as needed (SPASMS)  Patient not taking: Reported on 11/1/2022  Leida Guo APRN - CNP   fluticasone [facility]) 50 MCG/ACT nasal spray 1 spray by Each Nostril route daily  Patient not taking: Reported on 11/1/2022  Victor M Orosco MD       [facility] (Including outside providers/suppliers regularly involved in providing care):   Patient Care Team:  Tia Vazquez MD as PCP - General (Family Medicine)  Tia Vazquez MD as PCP - [facility] Empaneled Provider     Reviewed and updated this visit:  Tobacco  Allergies  Meds  Med Hx  Surg Hx  Soc Hx  Fam Hx

## 2022-11-01 NOTE — PATIENT INSTRUCTIONS
Personalized Preventive Plan for Shahbaz Cohen - 11/1/2022  Medicare offers a range of preventive health benefits. Some of the tests and screenings are paid in full while other may be subject to a deductible, co-insurance, and/or copay. Some of these benefits include a comprehensive review of your medical history including lifestyle, illnesses that may run in your family, and various assessments and screenings as appropriate. After reviewing your medical record and screening and assessments performed today your provider may have ordered immunizations, labs, imaging, and/or referrals for you. A list of these orders (if applicable) as well as your Preventive Care list are included within your After Visit Summary for your review. Other Preventive Recommendations:    A preventive eye exam performed by an eye specialist is recommended every 1-2 years to screen for glaucoma; cataracts, macular degeneration, and other eye disorders. A preventive dental visit is recommended every 6 months. Try to get at least 150 minutes of exercise per week or 10,000 steps per day on a pedometer . Order or download the FREE \"Exercise & Physical Activity: Your Everyday Guide\" from [employer]. Call 1-473.905.7041 or search [employer] online. You need 6164-2830 mg of calcium and 8441-6102 IU of vitamin D per day. It is possible to meet your calcium requirement with diet alone, but a vitamin D supplement is usually necessary to meet this goal.  When exposed to the sun, use a sunscreen that protects against both UVA and UVB radiation with an SPF of 30 or greater. Reapply every 2 to 3 hours or after sweating, drying off with a towel, or swimming. Always wear a seat belt when traveling in a car. Always wear a helmet when riding a bicycle or motorcycle.

## 2022-11-03 PROBLEM — M79.10 MUSCLE ACHE: Status: ACTIVE | Noted: 2022-11-03

## 2022-11-04 ENCOUNTER — EVALUATION (OUTPATIENT)
Dept: PHYSICAL THERAPY | Age: 67
End: 2022-11-04
Payer: MEDICARE

## 2022-11-04 DIAGNOSIS — M79.10 MUSCLE ACHE: Primary | ICD-10-CM

## 2022-11-04 PROCEDURE — 97163 PT EVAL HIGH COMPLEX 45 MIN: CPT | Performed by: PHYSICAL THERAPIST

## 2022-11-04 NOTE — PROGRESS NOTES
[facility] OUTPATIENT REHABILITATION  PHYSICAL THERAPY INITIAL EVALUATION         Date:  2022   Patient: Carolyn Porter  : 1955  MRN: [id number]  Referring Provider: Petros Singer MD  [address] [place] [address],  [address] [place] [address]     Medical Diagnosis:     M79.10 (ICD-10-CM) - Muscle ache    Physician Order: Eval and Treat      SUBJECTIVE:     Onset date: R , L 2022     Onset:      History / Mechanism of Injury: after . Patient is left handed. Interventions for current problem:  had PT for R shld    Chief complaint: pain, inability / limited ability to use arm    Behavior: condition is staying the same    Pain: intermittent  Current: 0/10     Best: 0/10     Worst:9/10 (occurs with elevating arms). Pain returns to baseline in moments    Symptom Type / Quality: sharp  Location[de-identified]  deltoid         Aggravated by:  any UE movement that causes deltoid to activiate     Relieved by: rest    Imaging results: No results found. 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  AUTH [id number]   ADULT MALE 21 capsule 0    rosuvastatin (CRESTOR) 40 MG tablet TAKE ONE TABLET BY MOUTH DAILY 90 tablet 3    diclofenac sodium (VOLTAREN) 1 % GEL Apply 2 g topically daily 150 g 1    acyclovir (ZOVIRAX) 400 MG tablet Take 400 mg by mouth 2 times daily      VITAMIN E PO Take 1 capsule by mouth daily      Multiple Vitamins-Minerals (THERAPEUTIC MULTIVITAMIN-MINERALS) tablet Take 1 tablet by mouth daily      Ascorbic Acid (VITAMIN C PO) Take 1 tablet by mouth daily      VITAMIN D PO Take 1,000 Units by mouth in the morning and at bedtime       aspirin 81 MG tablet Take 81 mg by mouth daily      fexofenadine-pseudoephedrine (ALLEGRA-D 12 HOUR)  MG per tablet Take 1 tablet by mouth 2 times daily as needed. 60 tablet 2     No current facility-administered medications for this visit. Occupation: retired. from Maintenance job     Exercise regimen: weight training      Hobbies: none     Patient Goals: pain relief, full use of arm     Precautions/Contraindications: none    OBJECTIVE:     Estimated body mass index is 21.12 kg/m² as calculated from the following:    Height as of 11/1/22: 5' 9\" (1.753 m). Weight as of 11/1/22: 143 lb (64.9 kg). Observations: well nourished male, normal affect    Inspection: normal orthopedic exam      Joint/Motion:    Neck:  Neck AROM is WNL and non-provocative     Right Shoulder:  AROM: 125° Forward elevation,  75° ER,  IR to L5  PROM: 155° Forward elevation,  90° ER,  15° IR    Left Shoulder:  AROM: 125° Forward elevation,  75° ER,  IR to S1  PROM: 160° Forward elevation,  100° ER , 15° IR    Strength: pain on testing flexion and ABD on R and L  Right Shoulder: Flexion 4/5,  Abduction 4/5, ER 5/5, IR 5/5      Left Shoulder: Flexion 4/5,  Abduction 4/5, ER 5/5, IR 5/5     Palpation: Tender to palpation bilateral deltoids, biceps and triceps.     Special Tests/Functional Screens:    [x] April-Marquis []+ / [x] -  [x] Tom Green's []+ / [x] -   []  Treyson's []+ / [] -    []GH drawer []+ / [] -    [] Bicep Load []+ / [] -   [] Crank []+ / [] -  [] Clotilde Clunes []+ / [] -   [] Elbow Varus []+ / [] -  [x] Neer's []+ / [x] -      [] Speed's []+ / [] -   []Sulcus Sign []+ / [] - [] Apprehension []+ / [] -   [] Bicep Load II []+ / [] -   [] Camille Beech []+ / [] -   [] Elbow Valgus []+ / [] -     [] Other: []+ / [] -         Jimbo Naqvi presents with weakness and pain bilateral shoulders. This doesn't appear to be joint related. Seems more along the lines of a systemic issue such as PMR but his blood work is negative. Outcome Measure:   QuickDASH (Disorders of the Arm, Shoulder, and Hand) 32% disability    Problems:   Pain 9/10 intermittent  ROM decreased  Strength decreased   Limitations with use of right upper extremity, use of left upper extremity, reaching, lifting, carrying      [x] There are no barriers affecting plan of care or recovery    [] Barriers to this patient's plan of care or recovery include:    Domestic Concerns:  [x] No  [] Yes:    Short Term goals (2-3 weeks)  Pain 5/10  AROM 135 FE  Strength 4+/5     Long Term goals (4-6 weeks)  Pain 2/10  AROM 155 FE  Strength 5-/5   Able to perform / complete the following functions / tasks: reach / lift / carry medium weighted items in performance of home or work demands\  QuickDASH 18% disability  Independent with Home Exercise Programs    Rehab Potential: [x] Good  [] Fair  [] Poor    PLAN     Time: 9546-0465    40 minutes    Treatment Plan:   instruction in home exercise program   therapeutic exercise   therapeutic activity   neuromuscular re-education   manual therapy     The following CPT codes are likely to be used in the care of this patient:   86003 PT Evaluation: High Complexity   63546 Therapeutic Exercise   65402 Neuromuscular Re-Education   12689 Therapeutic Activities   93134 Manual Therapy     Suggested Professional Referral: [x] No  [] Yes:     Patient Education:  [x] Plans / Goals, Risks / Benefits discussed  [x] Home exercise program  Method of Education: [x] Verbal  [x] Demo  [x] Written  Comprehension of Education:  [x] Verbalizes understanding. [x] Demonstrates understanding. [] Needs Review.   [] Demonstrates / verbalizes understanding of HEP / [place] previously given. Frequency:  1-2 days per week for 4-6 weeks    Patient understands diagnosis/prognosis and consents to treatment, plan and goals: [x] Yes    [] No     Thank you for the opportunity to work with your patient. If you have questions or comments, please contact me at [phone]; fax: [phone]. Electronically signed by: Lo Trevino PT    Medicare Patients Only     Please sign Physician's Certification and return to: [address] [place] [address] PHYSICAL THERAPY  [address] [facility] [address]  Dept: [phone]  Dept Fax: [phone]  Loc: [phone] Certification / Comments     Frequency/Duration 1-2 days per week for 4-6 weeks. Certification period from 11/4/2022  to 1/26/2023. I have reviewed the Plan of Care established for skilled therapy services and certify that the services are required and that they will be provided while the patient is under my care.     Physician's Comments/Revisions:               Physician's Printed Name:                                           [de-identified] Signature:                                                               Date:

## 2022-11-07 ENCOUNTER — TREATMENT (OUTPATIENT)
Dept: PHYSICAL THERAPY | Age: 67
End: 2022-11-07
Payer: MEDICARE

## 2022-11-07 ENCOUNTER — TELEPHONE (OUTPATIENT)
Dept: INFUSION THERAPY | Age: 67
End: 2022-11-07

## 2022-11-07 DIAGNOSIS — M79.10 MUSCLE ACHE: Primary | ICD-10-CM

## 2022-11-07 PROCEDURE — 97110 THERAPEUTIC EXERCISES: CPT | Performed by: PHYSICAL THERAPIST

## 2022-11-07 NOTE — PROGRESS NOTES
Physical Therapy Daily Treatment Note    Date: 2022  Patient Name: Pedro Jackson  : 1955   MRN: [id number]  [facility]: insidious onset R , L 2022   DOSx: -   Referring Provider: Arash Keating MD  [address] [place] [address],  [address] [place] [address]     Medical Diagnosis:     M79.10 (ICD-10-CM) - Muscle ache    Outcome Measure:  Quick DASH 32% disability        X = TO BE PERFORMED NEXT VISIT  > = PROGRESS TO THIS    S: feels a little better  O: Discussed anatomy, physiology, body mechanics, principles of loading, and progressive loading/activity. Reviewed home exercise program extensively; written copy provided. Access Code: [email or web address]  URL: [email or web address]  Date: 2022  Prepared by: Candace Lindsay    Exercises  Supine Shoulder Press with Dowel - 2 x daily - 7 x weekly - 2 sets - 15 reps  Supine Shoulder Flexion with Dowel - 2 x daily - 7 x weekly - 2 sets - 15 reps  Supine Shoulder External Rotation with Dowel - 2 x daily - 7 x weekly - 2 sets - 15 reps  Standing Bilateral Shoulder Internal Rotation AAROM with Dowel - 2 x daily - 7 x weekly - 2 sets - 15 reps        Time 1635-0942     Visit 1 Repeat outcome measure at mid point and end. Pain Pain at rest 1/10     ROM      Modalities       Use sparingly if at all. Prefer an active program.  MO   Manual         MT         Stretch      Table slides   TE   Wall Flexion   TE   Wall ER stretch   TE   Towel IR stretch   TE   IR reaching behind back   TE   Exercise      Shoulder scaption isometrics      Shoulder ER isometrics       Shrugs AROM   TE   Pendulum Ex   TE   Pulleys - flex 4 min in painfree ROM  TE   Pulleys-IR   TE   Supine wand chest press 2 x 15  TE   Supine wand flex 2 x 15  TE   Supine wand ER/IR 2 x 15  TE   Standing wand behind back IR 2 x 15  TE   Supine flexion   TE   S-lying ER   TE   Standing wand flex   TE   Standing flexion   TE   ROWS: H   TA   ROWS: M   TA   ROWS: L   TA   ER   TE   IR   TE               A:  Tolerated well. P: Continue with rehab plan  Sourav Meyer PT    Treatment Charges: Mins Units   Initial Evaluation     Re-Evaluation     Ther Exercise         TE 40 3   Manual Therapy     MT     Ther Activities        TA     Gait Training          GT     Neuro Re-education NR     Modalities     Non-Billable Service Time     Other     Total Time/Units 40 3

## 2022-11-07 NOTE — TELEPHONE ENCOUNTER
Spoke with patient regarding [employer] Re-enrollment. Patient signed application, awaiting dr signature to send to [facility].

## 2022-11-11 ENCOUNTER — TREATMENT (OUTPATIENT)
Dept: PHYSICAL THERAPY | Age: 67
End: 2022-11-11
Payer: MEDICARE

## 2022-11-11 DIAGNOSIS — M79.10 MUSCLE ACHE: Primary | ICD-10-CM

## 2022-11-11 PROCEDURE — 97110 THERAPEUTIC EXERCISES: CPT | Performed by: PHYSICAL THERAPIST

## 2022-11-11 NOTE — PROGRESS NOTES
Physical Therapy Daily Treatment Note    Date: 2022  Patient Name: Joselo Isaac  : 1955   MRN: [id number]  [facility]: insidious onset R , L 2022   DOSx: -   Referring Provider: Rudy Daley MD  [address] [place] [address],   [facility]     Medical Diagnosis:     M79.10 (ICD-10-CM) - Muscle ache    Outcome Measure:  Quick DASH 32% disability        X = TO BE PERFORMED NEXT VISIT  > = PROGRESS TO THIS    S: feels a little better  O: Discussed anatomy, physiology, body mechanics, principles of loading, and progressive loading/activity. Reviewed home exercise program extensively; written copy provided. Access Code: [email or web address]  URL: [email or web address]  Date: 2022  Prepared by: Stacy Marrero    Exercises  Supine Shoulder Press with Dowel - 2 x daily - 7 x weekly - 2 sets - 15 reps  Supine Shoulder Flexion with Dowel - 2 x daily - 7 x weekly - 2 sets - 15 reps  Supine Shoulder External Rotation with Dowel - 2 x daily - 7 x weekly - 2 sets - 15 reps  Standing Bilateral Shoulder Internal Rotation AAROM with Dowel - 2 x daily - 7 x weekly - 2 sets - 15 reps        Time 4093-3786     Visit 1 Repeat outcome measure at mid point and end. Pain Pain at rest 1/10     ROM Right Shoulder:  AROM: 135° Forward elevation,  80° ER in scapular plane,  IR to L4  Left Shoulder:  AROM: 135° Forward elevation,  90° ER in scapular plane,  IR to L4 EVAL  Right Shoulder:  AROM: 125° Forward elevation,  75° ER,  IR to L5  Left Shoulder:  AROM: 125° Forward elevation,  75° ER,  IR to S1    Modalities       Use sparingly if at all.   Prefer an active program.  MO   Manual         MT         Stretch      Table slides   TE   Wall Flexion   TE   Wall ER stretch   TE   Towel IR stretch   TE   IR reaching behind back   TE   Exercise      Shoulder scaption isometrics      Shoulder ER isometrics       Shrugs AROM   TE   Pendulum Ex   TE   Pulleys - flex 4 min in painfree ROM  TE   Pulleys-IR   TE   Supine wand chest press 2 x 15  TE   Supine wand flex 2 x 15  TE   Supine wand ER/IR 2 x 15  TE   Standing wand behind back IR 2 x 15  TE   Supine flexion   TE   S-lying ER   TE   Standing wand press 4 x 10  TE   Standing ER 4 x 10  TE   ROWS: H   TA   ROWS: M   TA   ROWS: L   TA   ER   TE   IR   TE               A:  Tolerated well.     P: Continue with rehab plan  Margie Tellez PT    Treatment Charges: Mins Units   Initial Evaluation     Re-Evaluation     Ther Exercise         TE 40 3   Manual Therapy     MT     Ther Activities        TA     Gait Training          GT     Neuro Re-education NR     Modalities     Non-Billable Service Time     Other     Total Time/Units 40 3

## 2022-11-14 ENCOUNTER — TREATMENT (OUTPATIENT)
Dept: PHYSICAL THERAPY | Age: 67
End: 2022-11-14
Payer: MEDICARE

## 2022-11-14 DIAGNOSIS — M79.10 MUSCLE ACHE: Primary | ICD-10-CM

## 2022-11-14 PROCEDURE — 97110 THERAPEUTIC EXERCISES: CPT | Performed by: PHYSICAL THERAPIST

## 2022-11-14 NOTE — PROGRESS NOTES
Physical Therapy Daily Treatment Note    Date: 2022  Patient Name: Tameka Watts  : 1955   MRN: [id number]  DOInjury: insidious onset R 2020, L 2022   DOSx: -   Referring Provider: Williams Negron MD  [address] [place] [address],  [address] [place] [address]     Medical Diagnosis:     M79.10 (ICD-10-CM) - Muscle ache    Outcome Measure:  Quick DASH 32% disability        X = TO BE PERFORMED NEXT VISIT  > = PROGRESS TO THIS    S: feels a little better  O: Discussed anatomy, physiology, body mechanics, principles of loading, and progressive loading/activity. Reviewed home exercise program extensively; written copy provided. Access Code: [email or web address]  URL: [email or web address]  Date: 2022  Prepared by: Pedro Jensen    Exercises  Supine Shoulder Press with Dowel - 2 x daily - 7 x weekly - 2 sets - 15 reps  Supine Shoulder Flexion with Dowel - 2 x daily - 7 x weekly - 2 sets - 15 reps  Supine Shoulder External Rotation with Dowel - 2 x daily - 7 x weekly - 2 sets - 15 reps  Standing Bilateral Shoulder Internal Rotation AAROM with Dowel - 2 x daily - 7 x weekly - 2 sets - 15 reps        Time 1568-2629     Visit 4 Repeat outcome measure at mid point and end. Pain Pain at rest 1/10     ROM Right Shoulder:  AROM: 135° Forward elevation,  80° ER in scapular plane,  IR to L4  Left Shoulder:  AROM: 135° Forward elevation,  90° ER in scapular plane,  IR to L4 EVAL  Right Shoulder:  AROM: 125° Forward elevation,  75° ER,  IR to L5  Left Shoulder:  AROM: 125° Forward elevation,  75° ER,  IR to S1    Modalities       Use sparingly if at all.   Prefer an active program.  MO   Manual         MT         Stretch      Table slides   TE   Wall Flexion   TE   Wall ER stretch   TE   Towel IR stretch   TE   IR reaching behind back   TE   Exercise      Shoulder scaption isometrics      Shoulder ER isometrics       Shrugs AROM   TE   Pendulum Ex   TE   Pulleys - flex 4 min in painfree ROM  TE   Pulleys-IR   TE   Supine wand chest press 2 x 15  TE   Supine wand flex 2 x 15  TE   Supine wand ER/IR 2 x 15  TE   Standing wand behind back IR 2 x 15  TE   Supine flexion   TE   S-lying ER   TE   Standing wand press 4 x 10  TE   Standing ER 4 x 10  TE   ROWS: H   TA   ROWS: M   TA   ROWS: L   TA   ER   TE   IR   TE               A:  Tolerated well.     P: Continue with rehab plan  Sourav Spear, PT    Treatment Charges: Mins Units   Initial Evaluation     Re-Evaluation     Ther Exercise         TE 40 3   Manual Therapy     MT     Ther Activities        TA     Gait Training          GT     Neuro Re-education NR     Modalities     Non-Billable Service Time     Other     Total Time/Units 40 3

## 2022-11-17 ENCOUNTER — TREATMENT (OUTPATIENT)
Dept: PHYSICAL THERAPY | Age: 67
End: 2022-11-17
Payer: MEDICARE

## 2022-11-17 DIAGNOSIS — M79.10 MUSCLE ACHE: Primary | ICD-10-CM

## 2022-11-17 PROCEDURE — 97110 THERAPEUTIC EXERCISES: CPT | Performed by: PHYSICAL THERAPIST

## 2022-11-17 NOTE — PROGRESS NOTES
Physical Therapy Daily Treatment Note    Date: 2022  Patient Name: Myranda Díaz  : 1955   MRN: [id number]  DOInjury: insidious onset R 2020, L 2022   DOSx: -   Referring Provider: Kalia Gabriel MD  [address] [place] [address],  [address] [place] [address]     Medical Diagnosis:     M79.10 (ICD-10-CM) - Muscle ache    Outcome Measure:  Quick DASH 32% disability        X = TO BE PERFORMED NEXT VISIT  > = PROGRESS TO THIS    S: feels a little better  O: Discussed anatomy, physiology, body mechanics, principles of loading, and progressive loading/activity. Reviewed home exercise program extensively; written copy provided. Access Code: [email or web address]  URL: [email or web address]  Date: 2022  Prepared by: Mary Tellez    Exercises  Supine Shoulder Press with Dowel - 2 x daily - 7 x weekly - 2 sets - 15 reps  Supine Shoulder Flexion with Dowel - 2 x daily - 7 x weekly - 2 sets - 15 reps  Supine Shoulder External Rotation with Dowel - 2 x daily - 7 x weekly - 2 sets - 15 reps  Standing Bilateral Shoulder Internal Rotation AAROM with Dowel - 2 x daily - 7 x weekly - 2 sets - 15 reps        Time 3088-4686     Visit 4 Repeat outcome measure at mid point and end. Pain Pain at rest 1/10     ROM Right Shoulder:  AROM: 135° Forward elevation,  80° ER in scapular plane,  IR to L4  Left Shoulder:  AROM: 135° Forward elevation,  90° ER in scapular plane,  IR to L4 EVAL  Right Shoulder:  AROM: 125° Forward elevation,  75° ER,  IR to L5  Left Shoulder:  AROM: 125° Forward elevation,  75° ER,  IR to S1    Modalities       Use sparingly if at all.   Prefer an active program.  MO   Manual         MT         Stretch      Table slides   TE   Wall Flexion   TE   Wall ER stretch   TE   Towel IR stretch   TE   IR reaching behind back   TE   Exercise      Shoulder scaption isometrics      Shoulder ER isometrics       Shrugs AROM   TE   Pendulum Ex   TE   Pulleys - flex 4 min in painfree ROM  TE   Pulleys-IR   TE   Supine wand chest press 2 x 15  TE   Supine wand flex 2 x 15  TE   Supine wand ER/IR 2 x 15  TE   Standing wand behind back IR 2 x 15  TE   Supine flexion   TE   S-lying ER   TE   Standing wand press 4 x 10  TE   Standing ER 4 x 10  TE   ROWS: H   TA   ROWS: M   TA   ROWS: L   TA   ER   TE   IR   TE               A:  Tolerated well.     P: Continue with rehab plan  Lamonte Ulloa PT    Treatment Charges: Mins Units   Initial Evaluation     Re-Evaluation     Ther Exercise         TE 40 3   Manual Therapy     MT     Ther Activities        TA     Gait Training          GT     Neuro Re-education NR     Modalities     Non-Billable Service Time     Other     Total Time/Units 40 3

## 2022-11-18 ENCOUNTER — HOSPITAL ENCOUNTER (OUTPATIENT)
Dept: INFUSION THERAPY | Age: 67
Discharge: HOME | End: 2022-11-18
Payer: MEDICARE

## 2022-11-18 DIAGNOSIS — E85.81 LIGHT CHAIN (AL) AMYLOIDOSIS (HCC): ICD-10-CM

## 2022-11-18 LAB
ALBUMIN SERPL-MCNC: 3.4 G/DL (ref 3.5–5.2)
ALP BLD-CCNC: 98 U/L (ref 40–129)
ALT SERPL-CCNC: 21 U/L (ref 0–40)
ANION GAP SERPL CALCULATED.3IONS-SCNC: 5 MMOL/L (ref 7–16)
AST SERPL-CCNC: 20 U/L (ref 0–39)
BASOPHILS ABSOLUTE: 0.12 E9/L (ref 0–0.2)
BASOPHILS RELATIVE PERCENT: 2.7 % (ref 0–2)
BILIRUB SERPL-MCNC: 0.4 MG/DL (ref 0–1.2)
BUN BLDV-MCNC: 15 MG/DL (ref 6–23)
CALCIUM SERPL-MCNC: 9.2 MG/DL (ref 8.6–10.2)
CHLORIDE BLD-SCNC: 103 MMOL/L (ref 98–107)
CO2: 29 MMOL/L (ref 22–29)
CREAT SERPL-MCNC: 1 MG/DL (ref 0.7–1.2)
EOSINOPHILS ABSOLUTE: 0.23 E9/L (ref 0.05–0.5)
EOSINOPHILS RELATIVE PERCENT: 5.1 % (ref 0–6)
GFR SERPL CREATININE-BSD FRML MDRD: >60 ML/MIN/1.73
GLUCOSE BLD-MCNC: 93 MG/DL (ref 74–99)
HCT VFR BLD CALC: 45.1 % (ref 37–54)
HEMOGLOBIN: 14 G/DL (ref 12.5–16.5)
IMMATURE GRANULOCYTES #: 0.01 E9/L
IMMATURE GRANULOCYTES %: 0.2 % (ref 0–5)
LYMPHOCYTES ABSOLUTE: 1.62 E9/L (ref 1.5–4)
LYMPHOCYTES RELATIVE PERCENT: 36 % (ref 20–42)
MCH RBC QN AUTO: 28.1 PG (ref 26–35)
MCHC RBC AUTO-ENTMCNC: 31 % (ref 32–34.5)
MCV RBC AUTO: 90.6 FL (ref 80–99.9)
MONOCYTES ABSOLUTE: 0.46 E9/L (ref 0.1–0.95)
MONOCYTES RELATIVE PERCENT: 10.2 % (ref 2–12)
NEUTROPHILS ABSOLUTE: 2.06 E9/L (ref 1.8–7.3)
NEUTROPHILS RELATIVE PERCENT: 45.8 % (ref 43–80)
PDW BLD-RTO: 14.4 FL (ref 11.5–15)
PLATELET # BLD: 215 E9/L (ref 130–450)
PMV BLD AUTO: 8.8 FL (ref 7–12)
POTASSIUM SERPL-SCNC: 4.4 MMOL/L (ref 3.5–5)
RBC # BLD: 4.98 E12/L (ref 3.8–5.8)
SODIUM BLD-SCNC: 137 MMOL/L (ref 132–146)
TOTAL PROTEIN: 6.4 G/DL (ref 6.4–8.3)
WBC # BLD: 4.5 E9/L (ref 4.5–11.5)

## 2022-11-18 PROCEDURE — 85025 COMPLETE CBC W/AUTO DIFF WBC: CPT

## 2022-11-18 PROCEDURE — 36415 COLL VENOUS BLD VENIPUNCTURE: CPT

## 2022-11-18 PROCEDURE — 80053 COMPREHEN METABOLIC PANEL: CPT

## 2022-11-21 ENCOUNTER — OFFICE VISIT (OUTPATIENT)
Dept: ONCOLOGY | Age: 67
End: 2022-11-21

## 2022-11-21 ENCOUNTER — HOSPITAL ENCOUNTER (OUTPATIENT)
Dept: INFUSION THERAPY | Age: 67
Discharge: HOME | End: 2022-11-21
Payer: MEDICARE

## 2022-11-21 ENCOUNTER — TELEPHONE (OUTPATIENT)
Dept: FAMILY MEDICINE CLINIC | Age: 67
End: 2022-11-21

## 2022-11-21 VITALS
BODY MASS INDEX: 21.55 KG/M2 | DIASTOLIC BLOOD PRESSURE: 75 MMHG | HEIGHT: 69 IN | OXYGEN SATURATION: 100 % | TEMPERATURE: 97.7 F | HEART RATE: 89 BPM | WEIGHT: 145.5 LBS | SYSTOLIC BLOOD PRESSURE: 106 MMHG

## 2022-11-21 DIAGNOSIS — E85.81 LIGHT CHAIN (AL) AMYLOIDOSIS (HCC): ICD-10-CM

## 2022-11-21 DIAGNOSIS — E85.81 LIGHT CHAIN (AL) AMYLOIDOSIS (HCC): Primary | ICD-10-CM

## 2022-11-21 DIAGNOSIS — Z23 ENCOUNTER FOR IMMUNIZATION: Primary | ICD-10-CM

## 2022-11-21 PROCEDURE — 6360000002 HC RX W HCPCS: Performed by: NURSE PRACTITIONER

## 2022-11-21 PROCEDURE — 6360000002 HC RX W HCPCS: Performed by: INTERNAL MEDICINE

## 2022-11-21 PROCEDURE — 90694 VACC AIIV4 NO PRSRV 0.5ML IM: CPT | Performed by: NURSE PRACTITIONER

## 2022-11-21 PROCEDURE — 90471 IMMUNIZATION ADMIN: CPT | Performed by: INTERNAL MEDICINE

## 2022-11-21 PROCEDURE — 90632 HEPA VACCINE ADULT IM: CPT | Performed by: INTERNAL MEDICINE

## 2022-11-21 PROCEDURE — G0009 ADMIN PNEUMOCOCCAL VACCINE: HCPCS | Performed by: INTERNAL MEDICINE

## 2022-11-21 PROCEDURE — G0008 ADMIN INFLUENZA VIRUS VAC: HCPCS | Performed by: NURSE PRACTITIONER

## 2022-11-21 PROCEDURE — 90647 HIB PRP-OMP VACC 3 DOSE IM: CPT | Performed by: INTERNAL MEDICINE

## 2022-11-21 PROCEDURE — 90670 PCV13 VACCINE IM: CPT | Performed by: INTERNAL MEDICINE

## 2022-11-21 PROCEDURE — 96372 THER/PROPH/DIAG INJ SC/IM: CPT

## 2022-11-21 PROCEDURE — 90696 DTAP-IPV VACCINE 4-6 YRS IM: CPT | Performed by: INTERNAL MEDICINE

## 2022-11-21 PROCEDURE — 90472 IMMUNIZATION ADMIN EACH ADD: CPT | Performed by: INTERNAL MEDICINE

## 2022-11-21 RX ADMIN — PNEUMOCOCCAL 13-VALENT CONJUGATE VACCINE 0.5 ML: 2.2; 2.2; 2.2; 2.2; 2.2; 4.4; 2.2; 2.2; 2.2; 2.2; 2.2; 2.2; 2.2 INJECTION, SUSPENSION INTRAMUSCULAR at 15:11

## 2022-11-21 RX ADMIN — INFLUENZA A VIRUS A/VICTORIA/2570/2019 IVR-215 (H1N1) ANTIGEN (FORMALDEHYDE INACTIVATED), INFLUENZA A VIRUS A/DARWIN/6/2021 IVR-227 (H3N2) ANTIGEN (FORMALDEHYDE INACTIVATED), INFLUENZA B VIRUS B/AUSTRIA/1359417/2021 BVR-26 ANTIGEN (FORMALDEHYDE INACTIVATED), INFLUENZA B VIRUS B/PHUKET/3073/2013 BVR-1B ANTIGEN (FORMALDEHYDE INACTIVATED) 0.5 ML: 15; 15; 15; 15 INJECTION, SUSPENSION INTRAMUSCULAR at 15:24

## 2022-11-21 RX ADMIN — HEPATITIS A VACCINE 1440 UNITS: 1440 INJECTION, SUSPENSION INTRAMUSCULAR at 15:15

## 2022-11-21 RX ADMIN — DIPHTHERIA AND TETANUS TOXOIDS AND ACELLULAR PERTUSSIS ADSORBED AND INACTIVATED POLIOVIRUS VACCINE 0.5 ML: 15; 5; 20; 20; 3; 5; 40; 8; 32 INJECTION, SUSPENSION INTRAMUSCULAR at 15:22

## 2022-11-21 RX ADMIN — HAEMOPHILUS B CONJUGATE VACCINE (MENINGOCOCCAL PROTEIN CONJUGATE) 7.5 MCG: 7.5 INJECTION, SUSPENSION INTRAMUSCULAR at 15:18

## 2022-11-21 NOTE — PROGRESS NOTES
[facility]. [facility]        Pt Name: Ned Jones: 1955  Date of evaluation: 11/21/2022  Primary Care Physician: Marley Garcia MD  Reason for evaluation:   Chief Complaint   Patient presents with    Other     Light Chain  (AL) amyloidosis    Follow-up          Subjective:   Here for follow-up. Follows at [facility];  S/P Stem Cell Transplant November 22,2021. OBJECTIVE:  VITALS:  height is 5' 9\" (1.753 m) and weight is 145 lb 8 oz (66 kg). His temperature is 97.7 °F (36.5 °C). His blood pressure is 106/75 and his pulse is 89. His oxygen saturation is 100%. Physical Exam:  Performance Status: 0  Well developed, well nourished male  General: AAO to person, place, time, cooperative, in no acute distress. Head and neck: PERRLA, EOMI. Sclera non icteric. Oropharynx:  Clear. Neck: no JVD,  no adenopathy. Heart: Regular rate and regular rhythm, no murmur. Lungs: Clear to auscultation. Abdomen: Soft, non-tender;no masses, no organomegaly. Extremities: No edema,no cyanosis, no clubbing. Neurologic:Cranial nerves grossly intact. No focal motor or sensory deficits. Skin:  No rash. Medications  Prior to Admission medications    Medication Sig Start Date End Date Taking?  Authorizing Provider   diclofenac sodium (VOLTAREN) 1 % GEL Apply 2 g topically daily 4/21/22  Yes Marley Garcia MD   acyclovir (ZOVIRAX) 400 MG tablet Take 400 mg by mouth 2 times daily   Yes Historical Provider, MD   VITAMIN E PO Take 1 capsule by mouth daily   Yes Historical Provider, MD   Multiple Vitamins-Minerals (THERAPEUTIC MULTIVITAMIN-MINERALS) tablet Take 1 tablet by mouth daily   Yes Historical Provider, MD   Ascorbic Acid (VITAMIN C PO) Take 1 tablet by mouth daily   Yes Historical Provider, MD   VITAMIN D PO Take 1,000 Units by mouth in the morning and at bedtime    Yes Historical Provider, MD   aspirin 81 MG tablet Take 81 mg by mouth daily   Yes Historical Provider, MD   fexofenadine-pseudoephedrine (ALLEGRA-D 12 HOUR)  MG per tablet Take 1 tablet by mouth 2 times daily as needed. 2/5/13  Yes Juan Gonzales MD   lenalidomide (REVLIMID) 5 MG chemo capsule Take 1 capsule by mouth daily for 21 days Take one 5 mg capsule daily by mouth for 21 days and off 7 days. [facility] [phone]   ADULT MALE 10/27/22 11/17/22  Lizeth Rodriguez APRN - CNP   rosuvastatin (CRESTOR) 40 MG tablet TAKE ONE TABLET BY MOUTH DAILY  Patient not taking: Reported on 11/21/2022 4/21/22   Ana Cristina Killian MD    Scheduled Meds:  Continuous Infusions:  PRN Meds:.        Recent Laboratory Data-     Lab Results   Component Value Date    WBC 4.5 11/18/2022    HGB 14.0 11/18/2022    HCT 45.1 11/18/2022    MCV 90.6 11/18/2022     11/18/2022    LYMPHOPCT 36.0 11/18/2022    RBC 4.98 11/18/2022    MCH 28.1 11/18/2022    MCHC 31.0 (L) 11/18/2022    RDW 14.4 11/18/2022    NEUTOPHILPCT 45.8 11/18/2022    MONOPCT 10.2 11/18/2022    BASOPCT 2.7 (H) 11/18/2022    NEUTROABS 2.06 11/18/2022    LYMPHSABS 1.62 11/18/2022    MONOSABS 0.46 11/18/2022    EOSABS 0.23 11/18/2022    BASOSABS 0.12 11/18/2022       Lab Results   Component Value Date     11/18/2022    K 4.4 11/18/2022     11/18/2022    CO2 29 11/18/2022    BUN 15 11/18/2022    CREATININE 1.0 11/18/2022    GLUCOSE 93 11/18/2022    CALCIUM 9.2 11/18/2022    PROT 6.4 11/18/2022    LABALBU 3.4 (L) 11/18/2022    BILITOT 0.4 11/18/2022    ALKPHOS 98 11/18/2022    AST 20 11/18/2022    ALT 21 11/18/2022    LABGLOM >60 11/18/2022    GFRAA >60 09/26/2022           Radiology-  No results found. 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      His work-up will be completed to include the following:     CBC, CMP, LDH, B2 microglobulin, CRP, serum protein electrophoresis with reflex to immunofixation, serum free kappa and lambda light chains with ratio, quantitative immunoglobulins, 24-hour urine collection for quantitative immunoglobulins and immunofixation. Serum troponin, proBNP and 2D echo will be done to exclude cardiac amyloidosis. TSH  Factor X assay as well as PT and APTT will be done     Will be scheduled for a bone marrow aspirate and biopsy     Once work-up completed options of therapy with bortezomib/daratumumab will be addressed     He is encouraged to receive the COVID-19 vaccine        3/15/2021  Awaiting his bone marrow aspirate and biopsy and his 2D echo which are both pending. His CBC was in the normal range with a hemoglobin of 15.3, normal WBC count of 6.2, normal MCV of 90 with a normal platelet count of 197. His TSH was normal at 3.88  Serum LDH was elevated at 266. B2 microglobulin was markedly elevated at 13.6. Quantitative immunoglobulins showed suppression of IgG with normal IgA and IgM. Serum electrophoresis showed an M spike faint measuring 0.1. Serum FLC's showed markedly elevated free lambda light chain at 311 with abnormal ratio of 0.05  Serum troponin was normal but proBNP was elevated at 491. 24-hour urine collection showed nephrotic range proteinuria with total proteins of 6.6 g in a 24-hour period with a faint M spike in the urine measuring 1263 mg in a 24-hour timeframe     He will be recommended induction with Daratumumab/Velcade/dexamethasone  His bone marrow aspirate and biopsy will be reviewed. All potential side effects were discussed. 3/29/2021  He will be given acyclovir for VZV prophylaxis and Bactrim DS for PCP prophylaxis and as needed Compazine for nausea. He will be initiated today on daratumumab/Velcade/cyclophosphamide/dexamethasone regimen. All potential side effects were discussed. His baby aspirin has been held. 4/05/2021  He tolerated his treatment with daratumumab well. He reports constipation.   Few days later he had some chills and body aches and it is likely related to his second dose of the Covid vaccine. No signs of peripheral neuropathy. 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 He will be given as needed tramadol for pain and omeprazole 20 mg daily for heartburn. To receive week 3 of chemotherapy regimen consisting of daratumumab/Velcade/dexamethasone/Cytoxan        4/19/2021  He has been experiencing significant side effects from daratumumab 2 to 3 days later in terms of headaches myalgias arthralgias. He will be given prednisone 20 mg daily starting on 4/21/2021 and he will skip it Monday the day he gets the high-dose steroids prior to his Darzalex Faspro. He will take as needed Tylenol or Advil and he will continue on his omeprazole daily. 4/26/2021  He has gained few pounds and has fluid retention from steroids and will be initiated on Maxide 37.5 mg daily. He will continue on prednisone except on Mondays. Received Darzalex Faspro today. 5/03/2021  He diuresed very well on Maxide and lost 6 pounds and it has been discontinued. He has had no reactions from Darzalex while maintained on prednisone  To receive week 6 of Darzalex Faspro today        5/10/2021  To receive week 7 of Darzalex Faspro . Continue Prednisone. 5/17/2021  To receive week 8 of Darzalex Faspro. He will have a repeat myeloma panel and urinary collection after week 9.        5/24/2021  To receive week 9 of Darzalex Faspro. He will continue on Cytoxan and Velcade     He will have a repeat myeloma panel and urinary collection after week 9. He will return for follow-up in 2 weeks.         6/07/2021  To receive Week #10 of Darzalex/Faspro. and Velcade   On Cytoxan        6/21/2021  To receive Week #11 of Darzalex/Faspro. and Velcade   On Cytoxan   Lab work reviewed, okay to proceed with chemo treatment. To return 7/7/2021 for  Week #12 of Darzalex/Faspro. and Velcade   On Cytoxan   Labs on 7/6/2021 7/7/2021  His repeat myeloma panel shows normalization of free kappa and free lambda light chains with normal FLC ratio indicating an excellent response to present therapy regimen  His serum beta-2 microglobulin is down in the normal range. Quantitative immunoglobulins still show low IgA, low IgG and low IgM. His 24-hour urine collection showed still nephrotic range proteinuria with 3.8 g of proteins in a 24-hour collection  Immunofixation showed faint free monoclonal lambda light chains. His last SPEP from 4/26 had shown an M spike of 0.1 but results from June not available and will be repeated  Back in March his 24-hour UPEP showed 6.6 g of protein in 24 hours. He has achieved an excellent clinical response  To continue on present regimen  He will now go on biweekly Gregoria      7/19/2021  To receive daratumumab/Velcade/Dex  Cytoxan to be DC'd    He will be referred to a tertiary care center for consideration of autologous stem cell transplant. He will return in 2 weeks for chemo      8/02/2021  To receive Day #1 Cycle #5 daratumumab/Velcade/Dex    To return 8/09/2021 for Day #8 Velcade  Cycle #5   Daratumumab will be now on every 4 weeks basis  He is to be tapered off prednisone  Pretransplant work-up will be initiated with anticipation of stem cell transplant at [address] in November 8/09/2021   To receive Day #8 Cycle #5 Velcade. Continue on weekly dexamethasone.     He will have 1 last dose of daratumumab in early September   He will start stem cell collection in October with anticipation of stem cell transplant in November    to return 8/16/2021 for Day #15 Cycle #5 Velcade      8/16/2021  To receive Day #15 Cycle #5 Velcade today. Continue on weekly dexamethasone. He will have 1 last dose of daratumumab on 8/30/2021  He will start stem cell collection in October with anticipation of stem cell transplant in November    To return 8/23/2021 for Day #22 Cycle #5 Velcade. He will return on 8/30/2021 for Velcade and daratumumab    We will see me back on 9/13/2021  Will be resumed on prednisone tapering schedule for his rash and joint aches. 8/23/2021  To receive Day #22 Cycle #5 Velcade today. Lab work reviewed, okay to proceed with chemo treatment. Continue on weekly dexamethasone. To return 8/30/2021 for last dose of daratumumab Day #1 Cycle #6  Labs on same day. He will start stem cell collection in October with anticipation of stem cell transplant in November. 8/30/2021  To receive Day #1 Cycle #6 Daratumumab/Velcade/Dex  Lab work reviewed, okay to proceed with chemo treatment. Return on 9/13/2021 for OV with Dr. Laurel Novak,  Day #8 Velcade. Labs on 9/10/2021.        9/13/2021  To receive  Day #8 Cycle #6  Velcade today. His treatment will be discontinued after today    He will start stem cell collection in October with anticipation of stem cell transplant in November. 10/6/2020  Status post dental extraction few days ago  Reports occasional dry cough  He will start his injections on Friday in anticipation of stem cell collection early next week at [facility]      His stem cell transplant is scheduled for early November. 3/16/2022  S/P stem cell transplant  10/22/2021   Recovered very well post transplant.   Most recent bone marrow showed no plasmacytosis  His labs show a faint M spike with slight elevation in FLC's  He did do very well post transplant and he is now around day 140 post transplant    He is scheduled for his vaccinations to start 6-month post transplant around May    He will be initiated on maintenance Revlimid 5 mg daily 3 weeks on and 1 week off and all potential side effects were discussed. He is cleared to travel on vacation to [facility]. His management will be coordinated with Dr. Kayli Juarez. 5/25/2022  Patient went to [facility] in April and came back and then his wife contracted COVID and both had cough myalgias and fatigue but apparently he tested negative and he started his Revlimid and then he stopped it blaming the cough as a side effect of Revlimid. It was explained to him that he likely had COVID even though he tested negative and he is willing to resume Revlimid next week and we will closely monitor him for potential side effects he will continue on his baby aspirin. He will start his immunization in 2 to 3 weeks which would be 6-month post transplant. 6/8/2022  Patient has been intolerant from Revlimid and and since he resumed it he has been complaining of recurrent sinusitis congestion cough and excessive mucus and phlegm. He also reports myalgias and muscle cramps. No diarrhea or skin rash. On exam he has scattered rhonchi and wheezing. He will be given Medrol Dosepak along with Mucinex and will be given [employer] for his cough. He will be given as needed muscle relaxers with tizanidine for his muscle cramps. He will be go off Revlimid for 2 weeks and will be reevaluated clinically in 2 weeks. His vaccines will be postponed till his return in 2 weeks. 6/20/2022  Has had recurrent rash despite the fact he did not resume his Revlimid yet  Medrol Dosepak helped clear the rash it was very itchy and generalized erythematous maculopapular mostly over extremities and trunk. He stopped all his vitamins. At this time he is only on his acyclovir which he has been on for several months as well as Crestor Zanaflex and omeprazole. He is to resume on Thursday Revlimid and if he develops recurrent cough and rash it will be permanently discontinued.   Post transplant vaccinations remain on hold. 6/27/2022  Resumed Revlimid 5 days ago and so far tolerating it very well without any cough or rash. He continues only on vitamin D. He remains on acyclovir for VZV prophylaxis. If everything goes well without side effects he will be maintained on Revlimid along with his baby aspirin. He will return for follow-up on 7/18/2022 and he will start then his posttransplant immunization program.      7/18/2022  Very well. His cough and rash have entirely resolved. He is concerned about his mild weight loss. He has been tolerating Revlimid without any diarrhea. He is to start his immunization post stem cell transplant. He will be maintained on Revlimid 5 mg 3 weeks on and 1 week off along with baby aspirin. 8/15/2022  Very well and tolerating low-dose Revlimid. No diarrhea. No skin rash. No cough or joint aches. It was felt that his prior symptoms were related to COVID. He has further recovered from [facility]. He did receive his first batch of posttransplant immunization and he will return for his second session of immunization the week of September 11. He has some easy bruising attributed to his baby aspirin. He will have a follow-up appointment with Dr. Pranav Payton at [facility] in November. To continue present dose of Revlimid. 9/26/2022  He completed his immunizations but he experienced pain and tenderness and local reaction in the left forearm at the site of injections. He is feeling better now. He has been tolerating low-dose Revlimid well. No diarrhea. No weight loss. He denies any back pain. He will have a repeat myeloma panel today. He is scheduled for follow-up appointment in November with Dr. Pranav Payton at [facility] and he will have MRD test done. 11/21/2022  Patient is doing remarkably well. He had local reaction to immunization with pain in the left shoulder and he underwent rehab and he feels much better.   He also had severe myalgias attributed to his statin and his myalgias resolved with discontinuation of his statin. His lipid panel had shown a good HDL LDL ratio. The patient will be receiving his flu vaccine today. His most recent myeloma panel done in September shows ongoing remission with no M spike in the serum urine and normal FLC's. He was seen at [facility] by Dr. Lacey Coe and his MRD test was negative and undetectable. At this time he is in remission from his AL     Will continue on low-dose Revlimid maintenance along with baby aspirin      Kareem Hernandez. Ravi Jaramillo M.D., F.A.C.P.   Electronically signed 11/21/2022 at 1:45 PM

## 2022-11-21 NOTE — TELEPHONE ENCOUNTER
The patient called to report he has been off of  his cholesterol medications as you discussed with him  for 3 weeks. He feels better without that . He saw Dr Julius Nolan today and he advised to also keep off of the medication.   Patient wanted you to be aware of this and to contact him with any questions

## 2022-11-22 ENCOUNTER — TREATMENT (OUTPATIENT)
Dept: PHYSICAL THERAPY | Age: 67
End: 2022-11-22
Payer: MEDICARE

## 2022-11-22 DIAGNOSIS — M79.10 MUSCLE ACHE: Primary | ICD-10-CM

## 2022-11-22 PROCEDURE — 97110 THERAPEUTIC EXERCISES: CPT | Performed by: PHYSICAL THERAPIST

## 2022-11-22 NOTE — PROGRESS NOTES
Physical Therapy Daily Treatment Note    Date: 2022  Patient Name: Joselo Isaac  : 1955   MRN: [id number]  [facility]: insidious onset R , L 2022   DOSx: -   Referring Provider: Rudy Daley MD  [address] [place] [address],  [address] [place] [address]     Medical Diagnosis:     M79.10 (ICD-10-CM) - Muscle ache    Outcome Measure:  Quick DASH 32% disability        X = TO BE PERFORMED NEXT VISIT  > = PROGRESS TO THIS    S: feels a little better  O: Discussed anatomy, physiology, body mechanics, principles of loading, and progressive loading/activity. Reviewed home exercise program extensively; written copy provided. Access Code: [email or web address]  URL: [email or web address]  Date: 2022  Prepared by: Stacy Marrero    Exercises  Supine Shoulder Press with Dowel - 2 x daily - 7 x weekly - 2 sets - 15 reps  Supine Shoulder Flexion with Dowel - 2 x daily - 7 x weekly - 2 sets - 15 reps  Supine Shoulder External Rotation with Dowel - 2 x daily - 7 x weekly - 2 sets - 15 reps  Standing Bilateral Shoulder Internal Rotation AAROM with Dowel - 2 x daily - 7 x weekly - 2 sets - 15 reps        Time 1072-7788     Visit 4 Repeat outcome measure at mid point and end. Pain Pain at rest 1/10     ROM Right Shoulder:  AROM: 135° Forward elevation,  80° ER in scapular plane,  IR to L4  Left Shoulder:  AROM: 135° Forward elevation,  90° ER in scapular plane,  IR to L4 EVAL  Right Shoulder:  AROM: 125° Forward elevation,  75° ER,  IR to L5  Left Shoulder:  AROM: 125° Forward elevation,  75° ER,  IR to S1    Modalities       Use sparingly if at all.   Prefer an active program.  MO   Manual         MT         Stretch      Table slides   TE   Wall Flexion   TE   Wall ER stretch   TE   Towel IR stretch   TE   IR reaching behind back   TE   Exercise      Shoulder scaption isometrics      Shoulder ER isometrics       Shrugs AROM   TE   Pendulum Ex   TE   Pulleys - flex 4 min in painfree ROM  TE   Pulleys-IR   TE   Supine wand chest press 2 x 15  TE   Supine wand flex 2 x 15  TE   Supine wand ER/IR 2 x 15  TE   Standing wand behind back IR 2 x 15  TE   Supine flexion   TE   S-lying ER   TE   Standing wand press 4 x 10  TE   Standing ER 4 x 10  TE   ROWS: H   TA   ROWS: M   TA   ROWS: L   TA   ER   TE   IR   TE               A:  Tolerated well.     P: Continue with rehab plan  Carmina Betancourt PT    Treatment Charges: Mins Units   Initial Evaluation     Re-Evaluation     Ther Exercise         TE 40 3   Manual Therapy     MT     Ther Activities        TA     Gait Training          GT     Neuro Re-education NR     Modalities     Non-Billable Service Time     Other     Total Time/Units 40 3

## 2022-11-22 NOTE — TELEPHONE ENCOUNTER
Dw/d. Getting better, will hold statin, we-eval @ 3mos followup. Per Dr Gabby Barron, patient in remission.

## 2022-11-28 DIAGNOSIS — E85.81 LIGHT CHAIN (AL) AMYLOIDOSIS (HCC): ICD-10-CM

## 2022-11-28 RX ORDER — LENALIDOMIDE 5 MG/1
5 CAPSULE ORAL DAILY
Qty: 21 CAPSULE | Refills: 0 | Status: ACTIVE | OUTPATIENT
Start: 2022-11-28 | End: 2022-12-19

## 2022-12-06 ENCOUNTER — TREATMENT (OUTPATIENT)
Dept: PHYSICAL THERAPY | Age: 67
End: 2022-12-06

## 2022-12-06 DIAGNOSIS — M79.10 MUSCLE ACHE: Primary | ICD-10-CM

## 2022-12-06 NOTE — PROGRESS NOTES
Physical Therapy Daily Treatment Note    Date: 2022  Patient Name: Mariel Steward  : 1955   MRN: [id number]  DOInjury: insidious onset R 2020, L 2022   DOSx: -   Referring Provider: Andrea Anthony MD  [address] [place] [address],  [address] [place] [address] Diagnosis:     M79.10 (ICD-10-CM) - Muscle ache    Outcome Measure:  Quick DASH 32% disability        X = TO BE PERFORMED NEXT VISIT  > = PROGRESS TO THIS    S: feels a little better  O: Discussed anatomy, physiology, body mechanics, principles of loading, and progressive loading/activity. Reviewed home exercise program extensively; written copy provided. Access Code: [email or web address]  URL: [email or web address]  Date: 2022  Prepared by: Lana Led    Exercises  Supine Shoulder Press with Dowel - 2 x daily - 7 x weekly - 2 sets - 15 reps  Supine Shoulder Flexion with Dowel - 2 x daily - 7 x weekly - 2 sets - 15 reps  Supine Shoulder External Rotation with Dowel - 2 x daily - 7 x weekly - 2 sets - 15 reps  Standing Bilateral Shoulder Internal Rotation AAROM with Dowel - 2 x daily - 7 x weekly - 2 sets - 15 reps        Time 1393-3542     Visit 4 Repeat outcome measure at mid point and end. Pain Pain at rest 1/10     ROM Right Shoulder:  AROM: 155° Forward elevation,  80° ER in scapular plane,  IR to L4  Left Shoulder:  AROM: 155° Forward elevation,  90° ER in scapular plane,  IR to L4 EVAL  Right Shoulder:  AROM: 125° Forward elevation,  75° ER,  IR to L5  Left Shoulder:  AROM: 125° Forward elevation,  75° ER,  IR to S1    Modalities       Use sparingly if at all.   Prefer an active program.  MO   Manual         MT         Stretch      Table slides   TE   Wall Flexion   TE   Wall ER stretch   TE   Towel IR stretch   TE   IR reaching behind back   TE   Exercise      Shoulder scaption isometrics      Shoulder ER isometrics       Shrugs AROM   TE   Pendulum Ex   TE   Pulleys - flex 4 min in painfree ROM  TE   Pulleys-IR   TE   Supine wand chest press 2 x 15  TE   Supine wand flex 2 x 15  TE   Supine wand ER/IR 2 x 15  TE   Standing wand behind back IR 2 x 15  TE   Supine flexion   TE   S-lying ER   TE   Standing wand press 4 x 10  TE   Standing ER 4 x 10  TE   ROWS: H   TA   ROWS: M   TA   ROWS: L   TA   ER   TE   IR   TE               A:  Tolerated well.     P: Continue with rehab plan  Jed Brown PT    Treatment Charges: Mins Units   Initial Evaluation     Re-Evaluation     Ther Exercise         TE 40 3   Manual Therapy     MT     Ther Activities        TA     Gait Training          GT     Neuro Re-education NR     Modalities     Non-Billable Service Time     Other     Total Time/Units 40 3

## 2022-12-09 ENCOUNTER — TREATMENT (OUTPATIENT)
Dept: PHYSICAL THERAPY | Age: 67
End: 2022-12-09

## 2022-12-09 DIAGNOSIS — M79.10 MUSCLE ACHE: Primary | ICD-10-CM

## 2022-12-09 NOTE — PROGRESS NOTES
Physical Therapy Daily Treatment Note    Date: 2022  Patient Name: Shahbaz Cohen  : 1955   MRN: [id number]  DOInjury: insidious onset R 2020, L 2022   DOSx: -   Referring Provider: Indio Dick MD  [address] [place] [address],  [address] [place] [address] Diagnosis:     M79.10 (ICD-10-CM) - Muscle ache    Outcome Measure:  Quick DASH 32% disability  End score 2022= 18%         X = TO BE PERFORMED NEXT VISIT  > = PROGRESS TO THIS    S: pt reports still feels a little better And  would like today to be his last visit    O: Discussed anatomy, physiology, body mechanics, principles of loading, and progressive loading/activity. Reviewed home exercise program extensively; written copy provided. Access Code: [email or web address]  URL: [email or web address]  Date: 2022  Prepared by: Kellie Perez    Exercises  Supine Shoulder Press with Dowel - 2 x daily - 7 x weekly - 2 sets - 15 reps  Supine Shoulder Flexion with Dowel - 2 x daily - 7 x weekly - 2 sets - 15 reps  Supine Shoulder External Rotation with Dowel - 2 x daily - 7 x weekly - 2 sets - 15 reps  Standing Bilateral Shoulder Internal Rotation AAROM with Dowel - 2 x daily - 7 x weekly - 2 sets - 15 reps        Time 7040-7200 am     Visit 8 / Repeat outcome measure at mid point and end. Pain Pain at rest 1/10     ROM Right Shoulder:  AROM: 155° Forward elevation,  80° ER in scapular plane,  IR to L4  Left Shoulder:  AROM: 155° Forward elevation,  90° ER in scapular plane,  IR to L4 EVAL  Right Shoulder:  AROM: 125° Forward elevation,  75° ER,  IR to L5  Left Shoulder:  AROM: 125° Forward elevation,  75° ER,  IR to S1    Modalities       Use sparingly if at all.   Prefer an active program.  MO   Manual         MT         Stretch      Table slides   TE   Wall Flexion   TE   Wall ER stretch   TE   Towel IR stretch   TE   IR reaching behind back   TE   Exercise      Shoulder scaption isometrics      Shoulder ER isometrics       Shrugs AROM   TE Pendulum Ex   TE   Pulleys - flex 4 min in painfree ROM  TE   Pulleys-IR   TE   Supine wand chest press 2 x 15  TE   Supine wand flex 2 x 15  TE   Supine wand ER/IR 2 x 15  TE   Standing wand behind back IR 2 x 15  TE   Supine flexion   TE   S-lying ER   TE   Standing wand press 4 x 10  TE   Standing ER 4 x 10  TE   ROWS: H   TA   ROWS: M   TA   ROWS: L   TA   ER   TE   IR   TE               A:  Tolerated well. Pt able to reach all STG's/LTG's . P:  Last treatment session. Pt to continue with given written HEP due to progression  .   Marielena Mac PTA    Treatment Charges: Mins Units   Initial Evaluation     Re-Evaluation     Ther Exercise         TE 40 3   Manual Therapy     MT     Ther Activities        TA     Gait Training          GT     Neuro Re-education NR     Modalities     Non-Billable Service Time     Other     Total Time/Units 40 3

## 2022-12-27 DIAGNOSIS — E85.81 LIGHT CHAIN (AL) AMYLOIDOSIS (HCC): ICD-10-CM

## 2022-12-27 RX ORDER — LENALIDOMIDE 5 MG/1
5 CAPSULE ORAL DAILY
Qty: 21 CAPSULE | Refills: 0 | Status: SHIPPED | OUTPATIENT
Start: 2022-12-27 | End: 2023-01-23 | Stop reason: SDUPTHER

## 2023-01-23 ENCOUNTER — TELEPHONE (OUTPATIENT)
Dept: INFUSION THERAPY | Age: 68
End: 2023-01-23

## 2023-01-23 DIAGNOSIS — E85.81 LIGHT CHAIN (AL) AMYLOIDOSIS (HCC): ICD-10-CM

## 2023-01-23 RX ORDER — LENALIDOMIDE 5 MG/1
5 CAPSULE ORAL DAILY
Qty: 21 CAPSULE | Refills: 0 | Status: ACTIVE | OUTPATIENT
Start: 2023-01-23 | End: 2023-02-16 | Stop reason: SDUPTHER

## 2023-01-23 NOTE — TELEPHONE ENCOUNTER
Received a call from Patients wife stating that patient was unable to order medication because patients enrollment ran out. Calls [facility] and spoke to [facility]. Informed Elena that new terra was sent [id number]/82022. [facility] states that she is escalating this to a  and we should have an answer by tomorrow. Spoke to patients wife who states that she will try to call and reorder medication tomorrow.

## 2023-02-10 ENCOUNTER — HOSPITAL ENCOUNTER (OUTPATIENT)
Dept: INFUSION THERAPY | Age: 68
Discharge: HOME | End: 2023-02-10
Payer: MEDICARE

## 2023-02-10 DIAGNOSIS — E85.81 LIGHT CHAIN (AL) AMYLOIDOSIS (HCC): ICD-10-CM

## 2023-02-10 LAB
ALBUMIN SERPL-MCNC: 3.6 G/DL (ref 3.5–5.2)
ALP BLD-CCNC: 90 U/L (ref 40–129)
ALT SERPL-CCNC: 31 U/L (ref 0–40)
ANION GAP SERPL CALCULATED.3IONS-SCNC: 9 MMOL/L (ref 7–16)
AST SERPL-CCNC: 28 U/L (ref 0–39)
BASOPHILS ABSOLUTE: 0.09 E9/L (ref 0–0.2)
BASOPHILS RELATIVE PERCENT: 1.9 % (ref 0–2)
BILIRUB SERPL-MCNC: 0.2 MG/DL (ref 0–1.2)
BUN BLDV-MCNC: 19 MG/DL (ref 6–23)
CALCIUM SERPL-MCNC: 9.2 MG/DL (ref 8.6–10.2)
CHLORIDE BLD-SCNC: 103 MMOL/L (ref 98–107)
CO2: 29 MMOL/L (ref 22–29)
CREAT SERPL-MCNC: 1.1 MG/DL (ref 0.7–1.2)
EOSINOPHILS ABSOLUTE: 0.16 E9/L (ref 0.05–0.5)
EOSINOPHILS RELATIVE PERCENT: 3.4 % (ref 0–6)
GFR SERPL CREATININE-BSD FRML MDRD: >60 ML/MIN/1.73
GLUCOSE BLD-MCNC: 80 MG/DL (ref 74–99)
HCT VFR BLD CALC: 46.4 % (ref 37–54)
HEMOGLOBIN: 14.8 G/DL (ref 12.5–16.5)
IMMATURE GRANULOCYTES #: 0.02 E9/L
IMMATURE GRANULOCYTES %: 0.4 % (ref 0–5)
LACTATE DEHYDROGENASE: 178 U/L (ref 135–225)
LYMPHOCYTES ABSOLUTE: 1.52 E9/L (ref 1.5–4)
LYMPHOCYTES RELATIVE PERCENT: 32.8 % (ref 20–42)
MCH RBC QN AUTO: 29.1 PG (ref 26–35)
MCHC RBC AUTO-ENTMCNC: 31.9 % (ref 32–34.5)
MCV RBC AUTO: 91.2 FL (ref 80–99.9)
MONOCYTES ABSOLUTE: 0.47 E9/L (ref 0.1–0.95)
MONOCYTES RELATIVE PERCENT: 10.1 % (ref 2–12)
NEUTROPHILS ABSOLUTE: 2.38 E9/L (ref 1.8–7.3)
NEUTROPHILS RELATIVE PERCENT: 51.4 % (ref 43–80)
PDW BLD-RTO: 13.6 FL (ref 11.5–15)
PLATELET # BLD: 231 E9/L (ref 130–450)
PMV BLD AUTO: 9.3 FL (ref 7–12)
POTASSIUM SERPL-SCNC: 4.4 MMOL/L (ref 3.5–5)
RBC # BLD: 5.09 E12/L (ref 3.8–5.8)
SODIUM BLD-SCNC: 141 MMOL/L (ref 132–146)
WBC # BLD: 4.6 E9/L (ref 4.5–11.5)

## 2023-02-10 PROCEDURE — 80053 COMPREHEN METABOLIC PANEL: CPT

## 2023-02-10 PROCEDURE — 85025 COMPLETE CBC W/AUTO DIFF WBC: CPT

## 2023-02-10 PROCEDURE — 36415 COLL VENOUS BLD VENIPUNCTURE: CPT

## 2023-02-10 PROCEDURE — 84165 PROTEIN E-PHORESIS SERUM: CPT

## 2023-02-10 PROCEDURE — 86334 IMMUNOFIX E-PHORESIS SERUM: CPT

## 2023-02-10 PROCEDURE — 83883 ASSAY NEPHELOMETRY NOT SPEC: CPT

## 2023-02-10 PROCEDURE — 83615 LACTATE (LD) (LDH) ENZYME: CPT

## 2023-02-10 PROCEDURE — 82784 ASSAY IGA/IGD/IGG/IGM EACH: CPT

## 2023-02-13 LAB
KAPPA FREE LIGHT CHAINS QNT: 26.12 MG/L (ref 3.3–19.4)
KAPPA/LAMBDA FREE LIGHT CHAIN RATIO: 1.63 (ref 0.26–1.65)
LAMBDA FREE LIGHT CHAINS QNT: 16.03 MG/L (ref 5.71–26.3)

## 2023-02-14 LAB
ALBUMIN SERPL-MCNC: 2.3 G/DL (ref 3.5–4.7)
ALPHA-1-GLOBULIN: 0.3 G/DL (ref 0.2–0.4)
ALPHA-2-GLOBULIN: 1 G/DL (ref 0.5–1)
BETA GLOBULIN: 1.5 G/DL (ref 0.8–1.3)
ELECTROPHORESIS: ABNORMAL
GAMMA GLOBULIN: 1.1 G/DL (ref 0.7–1.6)
IGA: 209 MG/DL (ref 70–400)
IGG: 939 MG/DL (ref 700–1600)
IGM: 61 MG/DL (ref 40–230)
IMMUNOFIXATION RESULT, SERUM: NORMAL
TOTAL PROTEIN: 6.2 G/DL (ref 6.4–8.3)

## 2023-02-16 DIAGNOSIS — E85.81 LIGHT CHAIN (AL) AMYLOIDOSIS (HCC): ICD-10-CM

## 2023-02-16 RX ORDER — LENALIDOMIDE 5 MG/1
5 CAPSULE ORAL DAILY
Qty: 21 CAPSULE | Refills: 0 | Status: ACTIVE | OUTPATIENT
Start: 2023-02-16 | End: 2023-03-20 | Stop reason: SDUPTHER

## 2023-02-17 NOTE — PROGRESS NOTES
[address] [facility] [address] [facility]        Pt Name: Sigifredo Sparks: 1955  Date of evaluation: 2/20/2023  Primary Care Physician: Timothy Osborn MD  Reason for evaluation:   Chief Complaint   Patient presents with    Follow-up     myelofibrosis    Other     Light Chain  (AL) amyloidosis            Subjective:   Here for follow-up. Follows at [facility];  S/P Stem Cell Transplant November 22,2021. OBJECTIVE:  VITALS:  height is 5' 9\" (1.753 m) and weight is 152 lb 6.4 oz (69.1 kg). His temperature is 98.8 °F (37.1 °C). His blood pressure is 105/71 and his pulse is 90. His oxygen saturation is 96%. Physical Exam:  Performance Status: 0  Well developed, well nourished male  General: AAO to person, place, time, cooperative, in no acute distress. Head and neck: PERRLA, EOMI. Sclera non icteric. Oropharynx:  Clear. Neck: no JVD,  no adenopathy. Heart: Regular rate and rhythm, no murmur. Lungs: Clear to auscultation. Abdomen: Soft, non-tender;no masses, no organomegaly. Extremities: No edema. Neurologic:Cranial nerves grossly intact. No focal motor or sensory deficits. Skin:  No rash. Medications  Prior to Admission medications    Medication Sig Start Date End Date Taking? Authorizing Provider   lenalidomide (REVLIMID) 5 MG chemo capsule Take 1 capsule by mouth daily for 21 days Take one 5 mg capsule daily by mouth for 21 days and off 7 days. ( 28 day Cycle)   Jigna Mcmahan [id number]  ADULT MALE 2/16/23 3/9/23 Yes VICTOR MANUEL Gutiérrez - CNP   diclofenac sodium (VOLTAREN) 1 % GEL Apply 2 g topically daily 4/21/22  Yes Timothy Osborn MD   VITAMIN E PO Take 1 capsule by mouth daily   Yes Historical Provider, MD   Multiple Vitamins-Minerals (THERAPEUTIC MULTIVITAMIN-MINERALS) tablet Take 1 tablet by mouth daily   Yes Historical Provider, MD   Ascorbic Acid (VITAMIN C PO) Take 1 tablet by mouth daily   Yes Historical Provider, MD   VITAMIN D PO Take 1,000 Units by mouth in the morning and at bedtime    Yes Historical Provider, MD   aspirin 81 MG tablet Take 81 mg by mouth daily   Yes Historical Provider, MD   fexofenadine-pseudoephedrine (ALLEGRA-D 12 HOUR)  MG per tablet Take 1 tablet by mouth 2 times daily as needed. 2/5/13  Yes Rosana Schmidt MD   rosuvastatin (CRESTOR) 40 MG tablet TAKE ONE TABLET BY MOUTH DAILY  Patient not taking: Reported on 2/20/2023 4/21/22   Candelario Barrera MD   acyclovir (ZOVIRAX) 400 MG tablet Take 400 mg by mouth 2 times daily  Patient not taking: Reported on 2/20/2023    Historical Provider, MD    Scheduled Meds:  Continuous Infusions:  PRN Meds:.        Recent Laboratory Data-     Lab Results   Component Value Date    WBC 4.6 02/10/2023    HGB 14.8 02/10/2023    HCT 46.4 02/10/2023    MCV 91.2 02/10/2023     02/10/2023    LYMPHOPCT 32.8 02/10/2023    RBC 5.09 02/10/2023    MCH 29.1 02/10/2023    MCHC 31.9 (L) 02/10/2023    RDW 13.6 02/10/2023    NEUTOPHILPCT 51.4 02/10/2023    MONOPCT 10.1 02/10/2023    BASOPCT 1.9 02/10/2023    NEUTROABS 2.38 02/10/2023    LYMPHSABS 1.52 02/10/2023    MONOSABS 0.47 02/10/2023    EOSABS 0.16 02/10/2023    BASOSABS 0.09 02/10/2023       Lab Results   Component Value Date     02/10/2023    K 4.4 02/10/2023     02/10/2023    CO2 29 02/10/2023    BUN 19 02/10/2023    CREATININE 1.1 02/10/2023    GLUCOSE 80 02/10/2023    CALCIUM 9.2 02/10/2023    PROT 6.2 (L) 02/10/2023    LABALBU 2.3 (L) 02/10/2023    LABALBU 3.6 02/10/2023    BILITOT 0.2 02/10/2023    ALKPHOS 90 02/10/2023    AST 28 02/10/2023    ALT 31 02/10/2023    LABGLOM >60 02/10/2023    GFRAA >60 09/26/2022           Radiology-  No results found. 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      His work-up will be completed to include the following:     CBC, CMP, LDH, B2 microglobulin, CRP, serum protein electrophoresis with reflex to immunofixation, serum free kappa and lambda light chains with ratio, quantitative immunoglobulins, 24-hour urine collection for quantitative immunoglobulins and immunofixation. Serum troponin, proBNP and 2D echo will be done to exclude cardiac amyloidosis. TSH  Factor X assay as well as PT and APTT will be done     Will be scheduled for a bone marrow aspirate and biopsy     Once work-up completed options of therapy with bortezomib/daratumumab will be addressed     He is encouraged to receive the COVID-19 vaccine        3/15/2021  Awaiting his bone marrow aspirate and biopsy and his 2D echo which are both pending. His CBC was in the normal range with a hemoglobin of 15.3, normal WBC count of 6.2, normal MCV of 90 with a normal platelet count of 107. His TSH was normal at 3.88  Serum LDH was elevated at 266. B2 microglobulin was markedly elevated at 13.6. Quantitative immunoglobulins showed suppression of IgG with normal IgA and IgM. Serum electrophoresis showed an M spike faint measuring 0.1. Serum FLC's showed markedly elevated free lambda light chain at 311 with abnormal ratio of 0.05  Serum troponin was normal but proBNP was elevated at 491. 24-hour urine collection showed nephrotic range proteinuria with total proteins of 6.6 g in a 24-hour period with a faint M spike in the urine measuring 1263 mg in a 24-hour timeframe     He will be recommended induction with Daratumumab/Velcade/dexamethasone  His bone marrow aspirate and biopsy will be reviewed. All potential side effects were discussed. 3/29/2021  He will be given acyclovir for VZV prophylaxis and Bactrim DS for PCP prophylaxis and as needed Compazine for nausea. He will be initiated today on daratumumab/Velcade/cyclophosphamide/dexamethasone regimen. All potential side effects were discussed. His baby aspirin has been held.         4/05/2021  He tolerated his treatment with daratumumab well. He reports constipation. Few days later he had some chills and body aches and it is likely related to his second dose of the Covid vaccine. No signs of peripheral neuropathy. 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 He will be given as needed tramadol for pain and omeprazole 20 mg daily for heartburn. To receive week 3 of chemotherapy regimen consisting of daratumumab/Velcade/dexamethasone/Cytoxan        4/19/2021  He has been experiencing significant side effects from daratumumab 2 to 3 days later in terms of headaches myalgias arthralgias. He will be given prednisone 20 mg daily starting on 4/21/2021 and he will skip it Monday the day he gets the high-dose steroids prior to his Darzalex Faspro. He will take as needed Tylenol or Advil and he will continue on his omeprazole daily. 4/26/2021  He has gained few pounds and has fluid retention from steroids and will be initiated on Maxide 37.5 mg daily. He will continue on prednisone except on Mondays. Received Darzalex Faspro today. 5/03/2021  He diuresed very well on Maxide and lost 6 pounds and it has been discontinued. He has had no reactions from Darzalex while maintained on prednisone  To receive week 6 of Darzalex Faspro today        5/10/2021  To receive week 7 of Darzalex Faspro . Continue Prednisone. 5/17/2021  To receive week 8 of Darzalex Faspro. He will have a repeat myeloma panel and urinary collection after week 9.        5/24/2021  To receive week 9 of Darzalex Faspro.   He will continue on Cytoxan and Velcade     He will have a repeat myeloma panel and urinary collection after week 9.  He will return for follow-up in 2 weeks. 6/07/2021  To receive Week #10 of Darzalex/Faspro. and Velcade   On Cytoxan        6/21/2021  To receive Week #11 of Darzalex/Faspro. and Velcade   On Cytoxan   Lab work reviewed, okay to proceed with chemo treatment. To return 7/7/2021 for  Week #12 of Darzalex/Faspro. and Velcade   On Cytoxan   Labs on 7/6/2021 7/7/2021  His repeat myeloma panel shows normalization of free kappa and free lambda light chains with normal FLC ratio indicating an excellent response to present therapy regimen  His serum beta-2 microglobulin is down in the normal range. Quantitative immunoglobulins still show low IgA, low IgG and low IgM. His 24-hour urine collection showed still nephrotic range proteinuria with 3.8 g of proteins in a 24-hour collection  Immunofixation showed faint free monoclonal lambda light chains. His last SPEP from 4/26 had shown an M spike of 0.1 but results from June not available and will be repeated  Back in March his 24-hour UPEP showed 6.6 g of protein in 24 hours. He has achieved an excellent clinical response  To continue on present regimen  He will now go on biweekly Gregoria      7/19/2021  To receive daratumumab/Velcade/Dex  Cytoxan to be DC'd    He will be referred to a tertiary care center for consideration of autologous stem cell transplant. He will return in 2 weeks for chemo      8/02/2021  To receive Day #1 Cycle #5 daratumumab/Velcade/Dex    To return 8/09/2021 for Day #8 Velcade  Cycle #5   Daratumumab will be now on every 4 weeks basis  He is to be tapered off prednisone  Pretransplant work-up will be initiated with anticipation of stem cell transplant at [address] in November 8/09/2021   To receive Day #8 Cycle #5 Velcade. Continue on weekly dexamethasone.     He will have 1 last dose of daratumumab in early September   He will start stem cell collection in October with anticipation of stem cell transplant in November    to return 8/16/2021 for Day #15 Cycle #5 Velcade      8/16/2021  To receive Day #15 Cycle #5 Velcade today. Continue on weekly dexamethasone. He will have 1 last dose of daratumumab on 8/30/2021  He will start stem cell collection in October with anticipation of stem cell transplant in November    To return 8/23/2021 for Day #22 Cycle #5 Velcade. He will return on 8/30/2021 for Velcade and daratumumab    We will see me back on 9/13/2021  Will be resumed on prednisone tapering schedule for his rash and joint aches. 8/23/2021  To receive Day #22 Cycle #5 Velcade today. Lab work reviewed, okay to proceed with chemo treatment. Continue on weekly dexamethasone. To return 8/30/2021 for last dose of daratumumab Day #1 Cycle #6  Labs on same day. He will start stem cell collection in October with anticipation of stem cell transplant in November. 8/30/2021  To receive Day #1 Cycle #6 Daratumumab/Velcade/Dex  Lab work reviewed, okay to proceed with chemo treatment. Return on 9/13/2021 for OV with Dr. Suma Stanley,  Day #8 Velcade. Labs on 9/10/2021.        9/13/2021  To receive  Day #8 Cycle #6  Velcade today. His treatment will be discontinued after today    He will start stem cell collection in October with anticipation of stem cell transplant in November. 10/6/2020  Status post dental extraction few days ago  Reports occasional dry cough  He will start his injections on Friday in anticipation of stem cell collection early next week at [facility]      His stem cell transplant is scheduled for early November. 3/16/2022  S/P stem cell transplant  10/22/2021   Recovered very well post transplant.   Most recent bone marrow showed no plasmacytosis  His labs show a faint M spike with slight elevation in FLC's  He did do very well post transplant and he is now around day 140 post transplant    He is scheduled for his vaccinations to start 6-month post transplant around May    He will be initiated on maintenance Revlimid 5 mg daily 3 weeks on and 1 week off and all potential side effects were discussed. He is cleared to travel on vacation to [facility]. His management will be coordinated with Dr. Taina Perea. 5/25/2022  Patient went to [facility] in April and came back and then his wife contracted COVID and both had cough myalgias and fatigue but apparently he tested negative and he started his Revlimid and then he stopped it blaming the cough as a side effect of Revlimid. It was explained to him that he likely had COVID even though he tested negative and he is willing to resume Revlimid next week and we will closely monitor him for potential side effects he will continue on his baby aspirin. He will start his immunization in 2 to 3 weeks which would be 6-month post transplant. 6/8/2022  Patient has been intolerant from Revlimid and and since he resumed it he has been complaining of recurrent sinusitis congestion cough and excessive mucus and phlegm. He also reports myalgias and muscle cramps. No diarrhea or skin rash. On exam he has scattered rhonchi and wheezing. He will be given Medrol Dosepak along with Mucinex and will be given [employer] for his cough. He will be given as needed muscle relaxers with tizanidine for his muscle cramps. He will be go off Revlimid for 2 weeks and will be reevaluated clinically in 2 weeks. His vaccines will be postponed till his return in 2 weeks. 6/20/2022  Has had recurrent rash despite the fact he did not resume his Revlimid yet  Medrol Dosepak helped clear the rash it was very itchy and generalized erythematous maculopapular mostly over extremities and trunk. He stopped all his vitamins. At this time he is only on his acyclovir which he has been on for several months as well as Crestor Zanaflex and omeprazole.   He is to resume on Thursday Revlimid and if he develops recurrent cough and rash it will be permanently discontinued. Post transplant vaccinations remain on hold. 6/27/2022  Resumed Revlimid 5 days ago and so far tolerating it very well without any cough or rash. He continues only on vitamin D. He remains on acyclovir for VZV prophylaxis. If everything goes well without side effects he will be maintained on Revlimid along with his baby aspirin. He will return for follow-up on 7/18/2022 and he will start then his posttransplant immunization program.      7/18/2022  Very well. His cough and rash have entirely resolved. He is concerned about his mild weight loss. He has been tolerating Revlimid without any diarrhea. He is to start his immunization post stem cell transplant. He will be maintained on Revlimid 5 mg 3 weeks on and 1 week off along with baby aspirin. 8/15/2022  Very well and tolerating low-dose Revlimid. No diarrhea. No skin rash. No cough or joint aches. It was felt that his prior symptoms were related to COVID. He has further recovered from [facility]. He did receive his first batch of posttransplant immunization and he will return for his second session of immunization the week of September 11. He has some easy bruising attributed to his baby aspirin. He will have a follow-up appointment with Dr. Susanne Tay at [facility] in November. To continue present dose of Revlimid. 9/26/2022  He completed his immunizations but he experienced pain and tenderness and local reaction in the left forearm at the site of injections. He is feeling better now. He has been tolerating low-dose Revlimid well. No diarrhea. No weight loss. He denies any back pain. He will have a repeat myeloma panel today. He is scheduled for follow-up appointment in November with Dr. Susanne Tay at [facility] and he will have MRD test done. 11/21/2022  Patient is doing remarkably well.   He had local reaction to immunization with pain in the left shoulder and he underwent rehab and he feels much better. He also had severe myalgias attributed to his statin and his myalgias resolved with discontinuation of his statin. His lipid panel had shown a good HDL LDL ratio. The patient will be receiving his flu vaccine today. His most recent myeloma panel done in September shows ongoing remission with no M spike in the serum urine and normal FLC's. He was seen at [facility] by Dr. Laney Lee and his MRD test was negative and undetectable. 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 2/20/2023  Doing well overall except that he is experiencing side effects from Revlimid mainly mild skin rash which waxes and wanes as well as some altered taste and nasal dripping for which she has been taking antihistamines. No significant diarrhea. He continues on baby aspirin for DVT prophylaxis. He has been on rosuvastatin for hyperlipidemia and is likely to go back on it after consultation with his PCP  His most recent myeloma panel done in September shows ongoing remission with no M spike in the serum urine and normal FLC's. He was seen at [facility] by Dr. Laney Lee and his MRD test was negative and undetectable. 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 Daniella Cruz. Oumar Diallo M.D., F.A.C.P.   Electronically signed 2/20/2023 at 1:36 PM

## 2023-02-20 ENCOUNTER — OFFICE VISIT (OUTPATIENT)
Dept: ONCOLOGY | Age: 68
End: 2023-02-20
Payer: MEDICARE

## 2023-02-20 VITALS
DIASTOLIC BLOOD PRESSURE: 71 MMHG | SYSTOLIC BLOOD PRESSURE: 105 MMHG | TEMPERATURE: 98.8 F | HEART RATE: 90 BPM | WEIGHT: 152.4 LBS | OXYGEN SATURATION: 96 % | BODY MASS INDEX: 22.57 KG/M2 | HEIGHT: 69 IN

## 2023-02-20 DIAGNOSIS — E85.81 LIGHT CHAIN (AL) AMYLOIDOSIS (HCC): Primary | ICD-10-CM

## 2023-02-20 PROCEDURE — 99212 OFFICE O/P EST SF 10 MIN: CPT

## 2023-03-20 DIAGNOSIS — E85.81 LIGHT CHAIN (AL) AMYLOIDOSIS (HCC): ICD-10-CM

## 2023-03-20 RX ORDER — LENALIDOMIDE 5 MG/1
5 CAPSULE ORAL DAILY
Qty: 21 CAPSULE | Refills: 0 | Status: ACTIVE | OUTPATIENT
Start: 2023-03-20 | End: 2023-04-13 | Stop reason: SDUPTHER

## 2023-03-24 DIAGNOSIS — E78.5 HYPERLIPIDEMIA, UNSPECIFIED HYPERLIPIDEMIA TYPE: Primary | ICD-10-CM

## 2023-03-27 SDOH — ECONOMIC STABILITY: INCOME INSECURITY: HOW HARD IS IT FOR YOU TO PAY FOR THE VERY BASICS LIKE FOOD, HOUSING, MEDICAL CARE, AND HEATING?: NOT HARD AT ALL

## 2023-03-27 SDOH — ECONOMIC STABILITY: HOUSING INSECURITY
IN THE LAST 12 MONTHS, WAS THERE A TIME WHEN YOU DID NOT HAVE A STEADY PLACE TO SLEEP OR SLEPT IN A SHELTER (INCLUDING NOW)?: NO

## 2023-03-27 SDOH — ECONOMIC STABILITY: FOOD INSECURITY: WITHIN THE PAST 12 MONTHS, YOU WORRIED THAT YOUR FOOD WOULD RUN OUT BEFORE YOU GOT MONEY TO BUY MORE.: NEVER TRUE

## 2023-03-27 SDOH — ECONOMIC STABILITY: FOOD INSECURITY: WITHIN THE PAST 12 MONTHS, THE FOOD YOU BOUGHT JUST DIDN'T LAST AND YOU DIDN'T HAVE MONEY TO GET MORE.: NEVER TRUE

## 2023-03-29 ENCOUNTER — HOSPITAL ENCOUNTER (OUTPATIENT)
Age: 68
Discharge: HOME | End: 2023-03-29
Payer: MEDICARE

## 2023-03-29 DIAGNOSIS — E78.5 HYPERLIPIDEMIA, UNSPECIFIED HYPERLIPIDEMIA TYPE: ICD-10-CM

## 2023-03-29 LAB
CHOLESTEROL, TOTAL: 381 MG/DL (ref 0–199)
CK SERPL-CCNC: 88 U/L (ref 20–200)
HDLC SERPL-MCNC: 61 MG/DL
LDLC SERPL CALC-MCNC: 286 MG/DL (ref 0–99)
TRIGL SERPL-MCNC: 169 MG/DL (ref 0–149)
VLDLC SERPL CALC-MCNC: 34 MG/DL

## 2023-03-29 PROCEDURE — 36415 COLL VENOUS BLD VENIPUNCTURE: CPT

## 2023-03-29 PROCEDURE — 82550 ASSAY OF CK (CPK): CPT

## 2023-03-29 PROCEDURE — 80061 LIPID PANEL: CPT

## 2023-03-30 ENCOUNTER — OFFICE VISIT (OUTPATIENT)
Dept: FAMILY MEDICINE CLINIC | Age: 68
End: 2023-03-30
Payer: MEDICARE

## 2023-03-30 VITALS
RESPIRATION RATE: 16 BRPM | WEIGHT: 151 LBS | OXYGEN SATURATION: 98 % | HEART RATE: 74 BPM | SYSTOLIC BLOOD PRESSURE: 131 MMHG | HEIGHT: 70 IN | BODY MASS INDEX: 21.62 KG/M2 | TEMPERATURE: 98 F | DIASTOLIC BLOOD PRESSURE: 85 MMHG

## 2023-03-30 DIAGNOSIS — M15.9 PRIMARY OSTEOARTHRITIS INVOLVING MULTIPLE JOINTS: ICD-10-CM

## 2023-03-30 DIAGNOSIS — S46.911S STRAIN OF RIGHT SHOULDER, SEQUELA: Primary | ICD-10-CM

## 2023-03-30 DIAGNOSIS — M25.511 RIGHT SHOULDER PAIN, UNSPECIFIED CHRONICITY: ICD-10-CM

## 2023-03-30 DIAGNOSIS — E78.5 HYPERLIPIDEMIA, UNSPECIFIED HYPERLIPIDEMIA TYPE: ICD-10-CM

## 2023-03-30 DIAGNOSIS — E85.81 LIGHT CHAIN (AL) AMYLOIDOSIS (HCC): ICD-10-CM

## 2023-03-30 PROCEDURE — 99214 OFFICE O/P EST MOD 30 MIN: CPT | Performed by: FAMILY MEDICINE

## 2023-03-30 PROCEDURE — G8484 FLU IMMUNIZE NO ADMIN: HCPCS | Performed by: FAMILY MEDICINE

## 2023-03-30 PROCEDURE — 1036F TOBACCO NON-USER: CPT | Performed by: FAMILY MEDICINE

## 2023-03-30 PROCEDURE — 1123F ACP DISCUSS/DSCN MKR DOCD: CPT | Performed by: FAMILY MEDICINE

## 2023-03-30 PROCEDURE — G8427 DOCREV CUR MEDS BY ELIG CLIN: HCPCS | Performed by: FAMILY MEDICINE

## 2023-03-30 PROCEDURE — G8420 CALC BMI NORM PARAMETERS: HCPCS | Performed by: FAMILY MEDICINE

## 2023-03-30 PROCEDURE — 3017F COLORECTAL CA SCREEN DOC REV: CPT | Performed by: FAMILY MEDICINE

## 2023-03-30 RX ORDER — FLUVASTATIN 40 MG/1
40 CAPSULE ORAL NIGHTLY
Qty: 30 CAPSULE | Refills: 3 | Status: SHIPPED | OUTPATIENT
Start: 2023-03-30

## 2023-03-30 SDOH — ECONOMIC STABILITY: INCOME INSECURITY: HOW HARD IS IT FOR YOU TO PAY FOR THE VERY BASICS LIKE FOOD, HOUSING, MEDICAL CARE, AND HEATING?: NOT HARD AT ALL

## 2023-03-30 SDOH — ECONOMIC STABILITY: FOOD INSECURITY: WITHIN THE PAST 12 MONTHS, YOU WORRIED THAT YOUR FOOD WOULD RUN OUT BEFORE YOU GOT MONEY TO BUY MORE.: NEVER TRUE

## 2023-03-30 SDOH — ECONOMIC STABILITY: FOOD INSECURITY: WITHIN THE PAST 12 MONTHS, THE FOOD YOU BOUGHT JUST DIDN'T LAST AND YOU DIDN'T HAVE MONEY TO GET MORE.: NEVER TRUE

## 2023-03-30 ASSESSMENT — PATIENT HEALTH QUESTIONNAIRE - PHQ9
SUM OF ALL RESPONSES TO PHQ QUESTIONS 1-9: 0
SUM OF ALL RESPONSES TO PHQ QUESTIONS 1-9: 0
1. LITTLE INTEREST OR PLEASURE IN DOING THINGS: 0
SUM OF ALL RESPONSES TO PHQ QUESTIONS 1-9: 0
SUM OF ALL RESPONSES TO PHQ9 QUESTIONS 1 & 2: 0
SUM OF ALL RESPONSES TO PHQ QUESTIONS 1-9: 0
2. FEELING DOWN, DEPRESSED OR HOPELESS: 0

## 2023-03-30 NOTE — PROGRESS NOTES
Eli Posey is a 79 y.o. male who presents today for No chief complaint on file. HPI:    1) amyloidosis -currently on 21-day maintenance of Revlimid. With 7-day off cycles to maintain remission. Follows with Dr. Guerline Fox. He has had some side effects from his chemo and is planning to bring that up again with Dr. Guerline Fox and his oncologist that he had seen in [place] he still has persistent nail changes occasionally intermittent neuropathy feels unwell during the cycles. He reports he was able to put about 10 pounds on and is working out more and eating better      2) RIGHT Shoulder pain - still pain w/ elevation of shoulder. Had this previously evaluated that did show an injury at this shoulder as it was sore more recently and he did improve with physical therapy however his symptoms recurred pretty quickly after. He is interested in a referral to Dr. Karrie Antunez      #3 hyperlipidemia his LDL is significantly elevated his HDL was also elevated he had no elevation in his CK. 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    Situational syncope 12/2/2010       Current Outpatient Medications   Medication Instructions    Ascorbic Acid (VITAMIN C PO) 1 tablet, Oral, DAILY    aspirin 81 mg, Oral, DAILY    fexofenadine-pseudoephedrine (ALLEGRA-D 12 HOUR)  MG per tablet 1 tablet, Oral, 2 TIMES DAILY PRN    lenalidomide (REVLIMID) 5 mg, Oral, DAILY, Take one 5 mg capsule daily by mouth for 21 days and off 7 days. ( 28 day Cycle)   [facility] [id number]  ADULT MALE    Multiple Vitamins-Minerals (THERAPEUTIC MULTIVITAMIN-MINERALS) tablet 1 tablet, Oral, DAILY    VITAMIN D PO 1,000 Units, Oral, 2 times daily    VITAMIN E PO 1 capsule, Oral, DAILY       Allergies   Allergen Reactions    Codeine Itching    Lipitor      Muscle cramps    Vytorin

## 2023-05-10 DIAGNOSIS — E85.81 LIGHT CHAIN (AL) AMYLOIDOSIS (HCC): ICD-10-CM

## 2023-05-10 RX ORDER — LENALIDOMIDE 5 MG/1
5 CAPSULE ORAL DAILY
Qty: 21 CAPSULE | Refills: 0 | Status: ACTIVE | OUTPATIENT
Start: 2023-05-10 | End: 2023-06-29

## 2023-05-23 ENCOUNTER — HOSPITAL ENCOUNTER (OUTPATIENT)
Dept: INFUSION THERAPY | Age: 68
Discharge: HOME | End: 2023-05-23
Payer: MEDICARE

## 2023-05-23 DIAGNOSIS — E85.81 LIGHT CHAIN (AL) AMYLOIDOSIS (HCC): ICD-10-CM

## 2023-05-23 LAB
ALBUMIN SERPL-MCNC: 3.6 G/DL (ref 3.5–5.2)
ALP SERPL-CCNC: 97 U/L (ref 40–129)
ALT SERPL-CCNC: 22 U/L (ref 0–40)
ANION GAP SERPL CALCULATED.3IONS-SCNC: 10 MMOL/L (ref 7–16)
AST SERPL-CCNC: 33 U/L (ref 0–39)
BASOPHILS # BLD: 0.09 E9/L (ref 0–0.2)
BASOPHILS NFR BLD: 1.8 % (ref 0–2)
BILIRUB SERPL-MCNC: 0.3 MG/DL (ref 0–1.2)
BUN SERPL-MCNC: 19 MG/DL (ref 6–23)
CALCIUM SERPL-MCNC: 8.8 MG/DL (ref 8.6–10.2)
CHLORIDE SERPL-SCNC: 104 MMOL/L (ref 98–107)
CO2 SERPL-SCNC: 23 MMOL/L (ref 22–29)
CREAT SERPL-MCNC: 1 MG/DL (ref 0.7–1.2)
EOSINOPHIL # BLD: 0.61 E9/L (ref 0.05–0.5)
EOSINOPHIL NFR BLD: 11.9 % (ref 0–6)
ERYTHROCYTE [DISTWIDTH] IN BLOOD BY AUTOMATED COUNT: 13.1 FL (ref 11.5–15)
GLUCOSE SERPL-MCNC: 110 MG/DL (ref 74–99)
HCT VFR BLD AUTO: 41.1 % (ref 37–54)
HGB BLD-MCNC: 13.8 G/DL (ref 12.5–16.5)
IMM GRANULOCYTES # BLD: 0.01 E9/L
IMM GRANULOCYTES NFR BLD: 0.2 % (ref 0–5)
LYMPHOCYTES # BLD: 1.42 E9/L (ref 1.5–4)
LYMPHOCYTES NFR BLD: 27.8 % (ref 20–42)
MCH RBC QN AUTO: 30.3 PG (ref 26–35)
MCHC RBC AUTO-ENTMCNC: 33.6 % (ref 32–34.5)
MCV RBC AUTO: 90.1 FL (ref 80–99.9)
MONOCYTES # BLD: 0.64 E9/L (ref 0.1–0.95)
MONOCYTES NFR BLD: 12.5 % (ref 2–12)
NEUTROPHILS # BLD: 2.34 E9/L (ref 1.8–7.3)
NEUTS SEG NFR BLD: 45.8 % (ref 43–80)
PLATELET # BLD AUTO: 238 E9/L (ref 130–450)
PMV BLD AUTO: 8.7 FL (ref 7–12)
POTASSIUM SERPL-SCNC: 4.2 MMOL/L (ref 3.5–5)
RBC # BLD AUTO: 4.56 E12/L (ref 3.8–5.8)
SODIUM SERPL-SCNC: 137 MMOL/L (ref 132–146)
WBC # BLD: 5.1 E9/L (ref 4.5–11.5)

## 2023-05-23 PROCEDURE — 82784 ASSAY IGA/IGD/IGG/IGM EACH: CPT

## 2023-05-23 PROCEDURE — 83883 ASSAY NEPHELOMETRY NOT SPEC: CPT

## 2023-05-23 PROCEDURE — 80053 COMPREHEN METABOLIC PANEL: CPT

## 2023-05-23 PROCEDURE — 86334 IMMUNOFIX E-PHORESIS SERUM: CPT

## 2023-05-23 PROCEDURE — 85025 COMPLETE CBC W/AUTO DIFF WBC: CPT

## 2023-05-23 PROCEDURE — 84165 PROTEIN E-PHORESIS SERUM: CPT

## 2023-05-23 PROCEDURE — 36415 COLL VENOUS BLD VENIPUNCTURE: CPT

## 2023-05-24 ENCOUNTER — OFFICE VISIT (OUTPATIENT)
Dept: ONCOLOGY | Age: 68
End: 2023-05-24
Payer: MEDICARE

## 2023-05-24 VITALS
HEIGHT: 70 IN | HEART RATE: 88 BPM | TEMPERATURE: 97.1 F | WEIGHT: 149.9 LBS | BODY MASS INDEX: 21.46 KG/M2 | OXYGEN SATURATION: 98 % | DIASTOLIC BLOOD PRESSURE: 79 MMHG | SYSTOLIC BLOOD PRESSURE: 105 MMHG

## 2023-05-24 DIAGNOSIS — E85.81 LIGHT CHAIN (AL) AMYLOIDOSIS (HCC): Primary | ICD-10-CM

## 2023-05-24 LAB
ALBUMIN SERPL-MCNC: 2.9 G/DL (ref 3.5–4.7)
ALPHA1 GLOB SERPL ELPH-MCNC: 0.2 G/DL (ref 0.2–0.4)
ALPHA2 GLOB SERPL ELPH-MCNC: 0.9 G/DL (ref 0.5–1)
B-GLOBULIN SERPL ELPH-MCNC: 1.1 G/DL (ref 0.8–1.3)
ELECTROPHORESIS: ABNORMAL
GAMMA GLOB SERPL ELPH-MCNC: 1 G/DL (ref 0.7–1.6)
IGA SERPL-MCNC: 200 MG/DL (ref 70–400)
IGG SERPL-MCNC: 926 MG/DL (ref 700–1600)
IGM SERPL-MCNC: 67 MG/DL (ref 40–230)
IMMUNOFIXATION RESULT, SERUM: NORMAL
PROT SERPL-MCNC: 6.2 G/DL (ref 6.4–8.3)

## 2023-05-24 PROCEDURE — 99212 OFFICE O/P EST SF 10 MIN: CPT

## 2023-05-24 RX ORDER — VITAMIN B COMPLEX
TABLET ORAL
COMMUNITY

## 2023-05-24 RX ORDER — LENALIDOMIDE 2.5 MG/1
2.5 CAPSULE ORAL DAILY
Qty: 21 CAPSULE | Refills: 3 | Status: ACTIVE | OUTPATIENT
Start: 2023-05-24 | End: 2023-06-14

## 2023-05-24 NOTE — PROGRESS NOTES
vitamin D. He remains on acyclovir for VZV prophylaxis. If everything goes well without side effects he will be maintained on Revlimid along with his baby aspirin. He will return for follow-up on 7/18/2022 and he will start then his posttransplant immunization program.      7/18/2022  Very well. His cough and rash have entirely resolved. He is concerned about his mild weight loss. He has been tolerating Revlimid without any diarrhea. He is to start his immunization post stem cell transplant. He will be maintained on Revlimid 5 mg 3 weeks on and 1 week off along with baby aspirin. 8/15/2022  Very well and tolerating low-dose Revlimid. No diarrhea. No skin rash. No cough or joint aches. It was felt that his prior symptoms were related to COVID. [facility] He did receive his first batch of posttransplant immunization and he will return for his second session of immunization the week of September 11. He has some easy bruising attributed to his baby aspirin. He will have a follow-up appointment with Dr. Karol Martinez at [facility] in November. To continue present dose of Revlimid. 9/26/2022  He completed his immunizations but he experienced pain and tenderness and local reaction in the left forearm at the site of injections. He is feeling better now. He has been tolerating low-dose Revlimid well. No diarrhea. No weight loss. He denies any back pain. He will have a repeat myeloma panel today. He is scheduled for follow-up appointment in November with Dr. Karol Martinez at [facility] and he will have MRD test done. 11/21/2022  Patient is doing remarkably well. He had local reaction to immunization with pain in the left shoulder and he underwent rehab and he feels much better. He also had severe myalgias attributed to his statin and his myalgias resolved with discontinuation of his statin. His lipid panel had shown a good HDL LDL ratio.   The patient will be

## 2023-05-25 LAB
DEPRECATED KAPPA LC FREE/LAMBDA SER: 1.36 {RATIO} (ref 0.26–1.65)
KAPPA LC FREE SER-MCNC: 28.89 MG/L (ref 3.3–19.4)
LAMBDA LC FREE SERPL-MCNC: 21.18 MG/L (ref 5.71–26.3)

## 2023-05-30 ENCOUNTER — TELEPHONE (OUTPATIENT)
Dept: INFUSION THERAPY | Age: 68
End: 2023-05-30

## 2023-05-30 NOTE — TELEPHONE ENCOUNTER
[facility], [phone], with regards to patient having a new lower dose of Revlimid and needing a new authorization. Staff allowed completion of this and patient's new authorization number for the [employer] program is [id number]. Attempted to return call to [facility] several times but no answer. Left them a message to please return call to [phone]. Awaiting a return call.

## 2023-05-30 NOTE — TELEPHONE ENCOUNTER
[facility], Akhil, the pharmacist, returned call. Updated him of the new authorization number for [employer]. He voiced understanding, and they will ship the new, lower dose of Revlimid ASAP. Voiced understanding.

## 2023-06-29 ENCOUNTER — OFFICE VISIT (OUTPATIENT)
Dept: FAMILY MEDICINE CLINIC | Age: 68
End: 2023-06-29
Payer: MEDICARE

## 2023-06-29 VITALS
WEIGHT: 151 LBS | OXYGEN SATURATION: 96 % | HEART RATE: 88 BPM | BODY MASS INDEX: 21.62 KG/M2 | HEIGHT: 70 IN | TEMPERATURE: 98.1 F | SYSTOLIC BLOOD PRESSURE: 131 MMHG | DIASTOLIC BLOOD PRESSURE: 70 MMHG

## 2023-06-29 DIAGNOSIS — E78.5 HYPERLIPIDEMIA, UNSPECIFIED HYPERLIPIDEMIA TYPE: Chronic | ICD-10-CM

## 2023-06-29 DIAGNOSIS — R05.9 COUGH, UNSPECIFIED TYPE: ICD-10-CM

## 2023-06-29 DIAGNOSIS — E85.81 LIGHT CHAIN (AL) AMYLOIDOSIS (HCC): Primary | ICD-10-CM

## 2023-06-29 PROCEDURE — G8427 DOCREV CUR MEDS BY ELIG CLIN: HCPCS | Performed by: FAMILY MEDICINE

## 2023-06-29 PROCEDURE — G8420 CALC BMI NORM PARAMETERS: HCPCS | Performed by: FAMILY MEDICINE

## 2023-06-29 PROCEDURE — 1123F ACP DISCUSS/DSCN MKR DOCD: CPT | Performed by: FAMILY MEDICINE

## 2023-06-29 PROCEDURE — 1036F TOBACCO NON-USER: CPT | Performed by: FAMILY MEDICINE

## 2023-06-29 PROCEDURE — 3017F COLORECTAL CA SCREEN DOC REV: CPT | Performed by: FAMILY MEDICINE

## 2023-06-29 PROCEDURE — 99214 OFFICE O/P EST MOD 30 MIN: CPT | Performed by: FAMILY MEDICINE

## 2023-06-29 RX ORDER — FLUVASTATIN 40 MG/1
40 CAPSULE ORAL NIGHTLY
Qty: 90 CAPSULE | Refills: 3 | Status: SHIPPED | OUTPATIENT
Start: 2023-06-29

## 2023-06-29 RX ORDER — AZELASTINE 1 MG/ML
1 SPRAY, METERED NASAL 2 TIMES DAILY
Qty: 30 ML | Refills: 5 | Status: SHIPPED | OUTPATIENT
Start: 2023-06-29

## 2023-06-30 ENCOUNTER — HOSPITAL ENCOUNTER (OUTPATIENT)
Age: 68
End: 2023-06-30
Payer: MEDICARE

## 2023-06-30 ENCOUNTER — HOSPITAL ENCOUNTER (OUTPATIENT)
Dept: GENERAL RADIOLOGY | Age: 68
End: 2023-06-30
Payer: MEDICARE

## 2023-06-30 DIAGNOSIS — R05.9 COUGH, UNSPECIFIED TYPE: ICD-10-CM

## 2023-06-30 PROCEDURE — 71046 X-RAY EXAM CHEST 2 VIEWS: CPT

## 2023-07-10 DIAGNOSIS — E85.81 LIGHT CHAIN (AL) AMYLOIDOSIS (HCC): Primary | ICD-10-CM

## 2023-07-10 RX ORDER — LENALIDOMIDE 2.5 MG/1
2.5 CAPSULE ORAL DAILY
Qty: 21 CAPSULE | Refills: 0 | Status: ACTIVE | OUTPATIENT
Start: 2023-07-10 | End: 2023-08-03 | Stop reason: SDUPTHER

## 2023-08-03 DIAGNOSIS — E85.81 LIGHT CHAIN (AL) AMYLOIDOSIS (HCC): Primary | ICD-10-CM

## 2023-08-03 RX ORDER — LENALIDOMIDE 2.5 MG/1
2.5 CAPSULE ORAL DAILY
Qty: 21 CAPSULE | Refills: 0 | Status: ACTIVE | OUTPATIENT
Start: 2023-08-03 | End: 2023-09-01 | Stop reason: SDUPTHER

## 2023-08-22 ENCOUNTER — HOSPITAL ENCOUNTER (OUTPATIENT)
Dept: INFUSION THERAPY | Age: 68
Discharge: HOME | End: 2023-08-22
Payer: MEDICARE

## 2023-08-22 DIAGNOSIS — E85.81 LIGHT CHAIN (AL) AMYLOIDOSIS (HCC): ICD-10-CM

## 2023-08-22 LAB
ALBUMIN SERPL-MCNC: 3.6 G/DL (ref 3.5–5.2)
ALP SERPL-CCNC: 88 U/L (ref 40–129)
ALT SERPL-CCNC: 18 U/L (ref 0–40)
ANION GAP SERPL CALCULATED.3IONS-SCNC: 8 MMOL/L (ref 7–16)
AST SERPL-CCNC: 20 U/L (ref 0–39)
BASOPHILS # BLD: 0.09 K/UL (ref 0–0.2)
BASOPHILS NFR BLD: 2 % (ref 0–2)
BILIRUB SERPL-MCNC: 0.3 MG/DL (ref 0–1.2)
BUN SERPL-MCNC: 18 MG/DL (ref 6–23)
CALCIUM SERPL-MCNC: 9 MG/DL (ref 8.6–10.2)
CHLORIDE SERPL-SCNC: 105 MMOL/L (ref 98–107)
CO2 SERPL-SCNC: 25 MMOL/L (ref 22–29)
CREAT SERPL-MCNC: 1 MG/DL (ref 0.7–1.2)
EOSINOPHIL # BLD: 0.23 K/UL (ref 0.05–0.5)
EOSINOPHILS RELATIVE PERCENT: 5 % (ref 0–6)
ERYTHROCYTE [DISTWIDTH] IN BLOOD BY AUTOMATED COUNT: 13.5 % (ref 11.5–15)
GFR SERPL CREATININE-BSD FRML MDRD: >60 ML/MIN/1.73M2
GLUCOSE SERPL-MCNC: 123 MG/DL (ref 74–99)
HCT VFR BLD AUTO: 41.5 % (ref 37–54)
HGB BLD-MCNC: 13.6 G/DL (ref 12.5–16.5)
IMM GRANULOCYTES # BLD AUTO: 0.03 K/UL (ref 0–0.58)
IMM GRANULOCYTES NFR BLD: 1 % (ref 0–5)
LDH SERPL-CCNC: 267 U/L (ref 135–225)
LYMPHOCYTES NFR BLD: 1.26 K/UL (ref 1.5–4)
LYMPHOCYTES RELATIVE PERCENT: 27 % (ref 20–42)
MCH RBC QN AUTO: 30 PG (ref 26–35)
MCHC RBC AUTO-ENTMCNC: 32.8 G/DL (ref 32–34.5)
MCV RBC AUTO: 91.4 FL (ref 80–99.9)
MONOCYTES NFR BLD: 0.68 K/UL (ref 0.1–0.95)
MONOCYTES NFR BLD: 15 % (ref 2–12)
NEUTROPHILS NFR BLD: 50 % (ref 43–80)
NEUTS SEG NFR BLD: 2.31 K/UL (ref 1.8–7.3)
PLATELET # BLD AUTO: 193 K/UL (ref 130–450)
PMV BLD AUTO: 9.2 FL (ref 7–12)
POTASSIUM SERPL-SCNC: 4.3 MMOL/L (ref 3.5–5)
PROT SERPL-MCNC: 6.4 G/DL (ref 6.4–8.3)
RBC # BLD AUTO: 4.54 M/UL (ref 3.8–5.8)
SODIUM SERPL-SCNC: 138 MMOL/L (ref 132–146)
WBC OTHER # BLD: 4.6 K/UL (ref 4.5–11.5)

## 2023-08-22 PROCEDURE — 84165 PROTEIN E-PHORESIS SERUM: CPT

## 2023-08-22 PROCEDURE — 83521 IG LIGHT CHAINS FREE EACH: CPT

## 2023-08-22 PROCEDURE — 84155 ASSAY OF PROTEIN SERUM: CPT

## 2023-08-22 PROCEDURE — 36415 COLL VENOUS BLD VENIPUNCTURE: CPT

## 2023-08-22 PROCEDURE — 85025 COMPLETE CBC W/AUTO DIFF WBC: CPT

## 2023-08-22 PROCEDURE — 82232 ASSAY OF BETA-2 PROTEIN: CPT

## 2023-08-22 PROCEDURE — 82784 ASSAY IGA/IGD/IGG/IGM EACH: CPT

## 2023-08-22 PROCEDURE — 80053 COMPREHEN METABOLIC PANEL: CPT

## 2023-08-22 PROCEDURE — 83615 LACTATE (LD) (LDH) ENZYME: CPT

## 2023-08-23 ENCOUNTER — OFFICE VISIT (OUTPATIENT)
Dept: ONCOLOGY | Age: 68
End: 2023-08-23
Payer: MEDICARE

## 2023-08-23 VITALS
TEMPERATURE: 96.8 F | OXYGEN SATURATION: 100 % | SYSTOLIC BLOOD PRESSURE: 108 MMHG | HEART RATE: 76 BPM | BODY MASS INDEX: 21.24 KG/M2 | RESPIRATION RATE: 18 BRPM | DIASTOLIC BLOOD PRESSURE: 73 MMHG | WEIGHT: 148 LBS

## 2023-08-23 DIAGNOSIS — E85.81 LIGHT CHAIN (AL) AMYLOIDOSIS (HCC): Primary | ICD-10-CM

## 2023-08-23 LAB
ALBUMIN SERPL-MCNC: 2.4 G/DL (ref 3.5–4.7)
ALPHA1 GLOB SERPL ELPH-MCNC: 0.2 G/DL (ref 0.2–0.4)
ALPHA2 GLOB SERPL ELPH-MCNC: 0.9 G/DL (ref 0.5–1)
B-GLOBULIN SERPL ELPH-MCNC: 1.3 G/DL (ref 0.8–1.3)
GAMMA GLOB SERPL ELPH-MCNC: 1.1 G/DL (ref 0.7–1.6)
IGA SERPL-MCNC: 199 MG/DL (ref 70–400)
IGG SERPL-MCNC: 906 MG/DL (ref 700–1600)
IGM SERPL-MCNC: 43 MG/DL (ref 40–230)
PATHOLOGIST: ABNORMAL
PROT PATTERN SERPL ELPH-IMP: ABNORMAL
PROT SERPL-MCNC: 6 G/DL (ref 6.4–8.3)

## 2023-08-23 PROCEDURE — 99212 OFFICE O/P EST SF 10 MIN: CPT

## 2023-08-23 RX ORDER — MONTELUKAST SODIUM 10 MG/1
10 TABLET ORAL NIGHTLY
Qty: 30 TABLET | Refills: 0 | Status: SHIPPED | OUTPATIENT
Start: 2023-08-23

## 2023-08-23 NOTE — PROGRESS NOTES
CLINICAL PHARMACY NOTE: MEDS TO BEDS    Total # of Prescriptions Filled: 1   The following medications were delivered to the patient:  Montelukast 10 mg    Additional Documentation:

## 2023-08-23 NOTE — PROGRESS NOTES
receiving his flu vaccine today. His most recent myeloma panel done in September shows ongoing remission with no M spike in the serum urine and normal FLC's. He was seen at [facility] by Dr. Milly Alex and his MRD test was negative and undetectable. 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 2/20/2023  Doing well overall except that he is experiencing side effects from Revlimid mainly mild skin rash which waxes and wanes as well as some altered taste and nasal dripping for which she has been taking antihistamines. No significant diarrhea. He continues on baby aspirin for DVT prophylaxis. He has been on rosuvastatin for hyperlipidemia and is likely to go back on it after consultation with his PCP  His most recent myeloma panel done in September shows ongoing remission with no M spike in the serum urine and normal FLC's. He was seen at [facility] by Dr. Milly Alex and his MRD test was negative and undetectable. 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 5/24/2023  Patient has been complaining of significant persistent side effects even though his Revlimid was lowered to 5 mg he continues to experience worsening skin rashes with itching, myalgias, severe muscle cramps, dyspepsia and epigastric discomfort and he feels much better on the week off. He also reports loose stools and diarrhea  Dose to be decreased to 2.5 mg daily 3 weeks on and 1 week off. He will continue on baby aspirin for DVT prophylaxis. He will be seeing Dr. Milly Alex at [facility] for MRD testing and repeat bone marrow biopsy in September. 8/23/23  Very well. 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   His dose has been reduced to 2.5 mg daily 3 weeks on and 1 week off

## 2023-08-25 LAB
B2 MICROGLOB SERPL IA-MCNC: 1.9 MG/L (ref 0.6–2.4)
FREE KAPPA/LAMBDA RATIO: 1.25 (ref 0.26–1.65)
KAPPA LC FREE SER-MCNC: 20.5 MG/L (ref 3.7–19.4)
LAMBDA LC FREE SERPL-MCNC: 16.4 MG/L (ref 5.7–26.3)

## 2023-09-01 DIAGNOSIS — E85.81 LIGHT CHAIN (AL) AMYLOIDOSIS (HCC): ICD-10-CM

## 2023-09-01 RX ORDER — LENALIDOMIDE 2.5 MG/1
2.5 CAPSULE ORAL DAILY
Qty: 21 CAPSULE | Refills: 0 | Status: ACTIVE | OUTPATIENT
Start: 2023-09-01 | End: 2023-10-02 | Stop reason: SDUPTHER

## 2023-09-06 PROBLEM — I48.91 ATRIAL FIBRILLATION (MULTI): Status: ACTIVE | Noted: 2023-09-06

## 2023-09-06 RX ORDER — TRIAMTERENE AND HYDROCHLOROTHIAZIDE 37.5; 25 MG/1; MG/1
1 CAPSULE ORAL DAILY
COMMUNITY
Start: 2021-09-13

## 2023-09-06 RX ORDER — OMEPRAZOLE 20 MG/1
TABLET, ORALLY DISINTEGRATING, DELAYED RELEASE ORAL
COMMUNITY
Start: 2021-09-13

## 2023-09-06 RX ORDER — ASPIRIN 81 MG/1
1 TABLET ORAL DAILY
COMMUNITY

## 2023-09-06 RX ORDER — IBUPROFEN 600 MG/1
1 TABLET ORAL 3 TIMES DAILY PRN
COMMUNITY
Start: 2021-09-13

## 2023-09-06 RX ORDER — SULFAMETHOXAZOLE AND TRIMETHOPRIM 800; 160 MG/1; MG/1
1 TABLET ORAL
COMMUNITY
Start: 2021-09-13 | End: 2023-11-07 | Stop reason: ALTCHOICE

## 2023-09-06 RX ORDER — CHOLECALCIFEROL (VITAMIN D3) 25 MCG
1 TABLET ORAL DAILY
COMMUNITY
Start: 2021-09-13 | End: 2023-11-07 | Stop reason: ALTCHOICE

## 2023-09-06 RX ORDER — ACYCLOVIR 400 MG/1
1 TABLET ORAL 2 TIMES DAILY
COMMUNITY
Start: 2021-09-10

## 2023-09-06 RX ORDER — ROSUVASTATIN CALCIUM 40 MG/1
1 TABLET, COATED ORAL DAILY
COMMUNITY
Start: 2021-09-13

## 2023-09-06 RX ORDER — FEXOFENADINE HCL AND PSEUDOEPHEDRINE HCI 60; 120 MG/1; MG/1
TABLET, EXTENDED RELEASE ORAL
COMMUNITY
Start: 2021-09-10

## 2023-09-06 RX ORDER — PROCHLORPERAZINE MALEATE 10 MG
1 TABLET ORAL EVERY 6 HOURS PRN
COMMUNITY
Start: 2021-09-13

## 2023-09-06 RX ORDER — MULTIVITAMIN
1 TABLET ORAL DAILY
COMMUNITY
Start: 2021-09-13

## 2023-09-06 RX ORDER — ACETAMINOPHEN 500 MG
50 TABLET ORAL 2 TIMES DAILY
COMMUNITY

## 2023-09-06 RX ORDER — PREDNISONE 20 MG/1
TABLET ORAL
COMMUNITY
Start: 2021-09-13 | End: 2023-11-07 | Stop reason: ALTCHOICE

## 2023-10-02 DIAGNOSIS — E85.81 LIGHT CHAIN (AL) AMYLOIDOSIS (HCC): ICD-10-CM

## 2023-10-02 RX ORDER — LENALIDOMIDE 2.5 MG/1
2.5 CAPSULE ORAL DAILY
Qty: 21 CAPSULE | Refills: 0 | Status: ACTIVE | OUTPATIENT
Start: 2023-10-02 | End: 2023-10-23 | Stop reason: SDUPTHER

## 2023-10-11 ENCOUNTER — LAB (OUTPATIENT)
Dept: LAB | Facility: HOSPITAL | Age: 68
End: 2023-10-11
Payer: MEDICARE

## 2023-10-11 ENCOUNTER — TREATMENT (OUTPATIENT)
Dept: OTHER | Facility: HOSPITAL | Age: 68
End: 2023-10-11
Payer: MEDICARE

## 2023-10-11 VITALS
TEMPERATURE: 97.5 F | SYSTOLIC BLOOD PRESSURE: 122 MMHG | HEART RATE: 81 BPM | WEIGHT: 150.35 LBS | RESPIRATION RATE: 18 BRPM | DIASTOLIC BLOOD PRESSURE: 82 MMHG | BODY MASS INDEX: 22.79 KG/M2 | HEIGHT: 68 IN | OXYGEN SATURATION: 98 %

## 2023-10-11 DIAGNOSIS — C90.01 MULTIPLE MYELOMA IN REMISSION (MULTI): ICD-10-CM

## 2023-10-11 DIAGNOSIS — C90.01 MULTIPLE MYELOMA IN REMISSION (MULTI): Primary | ICD-10-CM

## 2023-10-11 LAB
BASOPHILS # BLD AUTO: 0.11 X10*3/UL (ref 0–0.1)
BASOPHILS NFR BLD AUTO: 2.3 %
EOSINOPHIL # BLD AUTO: 0.29 X10*3/UL (ref 0–0.7)
EOSINOPHIL NFR BLD AUTO: 6 %
ERYTHROCYTE [DISTWIDTH] IN BLOOD BY AUTOMATED COUNT: 13 % (ref 11.5–14.5)
HCT VFR BLD AUTO: 42.8 % (ref 41–52)
HGB BLD-MCNC: 14.3 G/DL (ref 13.5–17.5)
IMM GRANULOCYTES # BLD AUTO: 0.01 X10*3/UL (ref 0–0.7)
IMM GRANULOCYTES NFR BLD AUTO: 0.2 % (ref 0–0.9)
LYMPHOCYTES # BLD AUTO: 1.57 X10*3/UL (ref 1.2–4.8)
LYMPHOCYTES NFR BLD AUTO: 32.6 %
MCH RBC QN AUTO: 30.2 PG (ref 26–34)
MCHC RBC AUTO-ENTMCNC: 33.4 G/DL (ref 32–36)
MCV RBC AUTO: 91 FL (ref 80–100)
MONOCYTES # BLD AUTO: 0.64 X10*3/UL (ref 0.1–1)
MONOCYTES NFR BLD AUTO: 13.3 %
NEUTROPHILS # BLD AUTO: 2.19 X10*3/UL (ref 1.2–7.7)
NEUTROPHILS NFR BLD AUTO: 45.6 %
NRBC BLD-RTO: 0 /100 WBCS (ref 0–0)
PLATELET # BLD AUTO: 187 X10*3/UL (ref 150–450)
PMV BLD AUTO: 8.5 FL (ref 7.5–11.5)
RBC # BLD AUTO: 4.73 X10*6/UL (ref 4.5–5.9)
WBC # BLD AUTO: 4.8 X10*3/UL (ref 4.4–11.3)

## 2023-10-11 PROCEDURE — 88185 FLOWCYTOMETRY/TC ADD-ON: CPT | Mod: TC | Performed by: NURSE PRACTITIONER

## 2023-10-11 PROCEDURE — 88237 TISSUE CULTURE BONE MARROW: CPT | Performed by: NURSE PRACTITIONER

## 2023-10-11 PROCEDURE — 36415 COLL VENOUS BLD VENIPUNCTURE: CPT

## 2023-10-11 PROCEDURE — 88341 IMHCHEM/IMCYTCHM EA ADD ANTB: CPT | Performed by: PATHOLOGY

## 2023-10-11 PROCEDURE — 85097 BONE MARROW INTERPRETATION: CPT | Mod: TC | Performed by: NURSE PRACTITIONER

## 2023-10-11 PROCEDURE — 88365 INSITU HYBRIDIZATION (FISH): CPT | Mod: TC,SUR | Performed by: NURSE PRACTITIONER

## 2023-10-11 PROCEDURE — 88184 FLOWCYTOMETRY/ TC 1 MARKER: CPT | Mod: TC | Performed by: NURSE PRACTITIONER

## 2023-10-11 PROCEDURE — 88189 FLOWCYTOMETRY/READ 16 & >: CPT | Performed by: PATHOLOGY

## 2023-10-11 PROCEDURE — 88311 DECALCIFY TISSUE: CPT | Performed by: PATHOLOGY

## 2023-10-11 PROCEDURE — 88187 FLOWCYTOMETRY/READ 2-8: CPT | Performed by: PATHOLOGY

## 2023-10-11 PROCEDURE — 85025 COMPLETE CBC W/AUTO DIFF WBC: CPT

## 2023-10-11 PROCEDURE — 88365 INSITU HYBRIDIZATION (FISH): CPT | Performed by: PATHOLOGY

## 2023-10-11 PROCEDURE — 38222 DX BONE MARROW BX & ASPIR: CPT | Performed by: NURSE PRACTITIONER

## 2023-10-11 PROCEDURE — 88305 TISSUE EXAM BY PATHOLOGIST: CPT | Performed by: PATHOLOGY

## 2023-10-11 PROCEDURE — 85097 BONE MARROW INTERPRETATION: CPT | Mod: TC,CMCLAB | Performed by: NURSE PRACTITIONER

## 2023-10-11 PROCEDURE — 88342 IMHCHEM/IMCYTCHM 1ST ANTB: CPT | Performed by: PATHOLOGY

## 2023-10-11 PROCEDURE — 88313 SPECIAL STAINS GROUP 2: CPT | Performed by: PATHOLOGY

## 2023-10-11 NOTE — PROGRESS NOTES
Patient ID: Rusty Woodard is a 68 y.o. male.    Biopsy    Date/Time: 10/11/2023 10:52 AM    Performed by: RYANNE Field  Authorized by: RYANNE Field    Consent:     Consent obtained:  Verbal    Consent given by:  Patient    Risks, benefits, and alternatives were discussed: yes      Risks discussed:  Bleeding, infection and pain  Universal protocol:     Procedure explained and questions answered to patient or proxy's satisfaction: yes      Test results available: yes      Site/side marked: yes      Immediately prior to procedure, a time out was called: yes      Patient identity confirmed:  Verbally with patient and provided demographic data  Indications:     Indications:  MM  Pre-procedure details:     Skin preparation:  Povidone-iodine  Sedation:     Sedation type:  None  Anesthesia:     Anesthesia method:  Local infiltration    Local anesthetic:  Lidocaine 1% w/o epi  Procedure specific details:      Bone Marrow Biopsy and Aspiration Procedure Note     Informed consent was obtained and potential risks including bleeding, infection and pain were reviewed with the patient.     The left posterior iliac crest was prepped with chlorhexidine.     15 ml of lidocaine 1% local anesthesia infiltrated into the subcutaneous tissue.    Left bone marrow biopsy and left bone marrow aspirate was obtained.     The procedure was tolerated well and there were no complications.    Specimens sent for: Heme path protocol    Procedure completed by: RYANNE Field

## 2023-10-11 NOTE — PROGRESS NOTES
Patient arrived to ASCT unit via self-ambulation for bone marrow biopsy. She is alert and oriented x3, denies pain or any discomfort. Vital signs obtained. Blood drawn prior to arrival. NP performed procedure. Dressing is clean, dry and intact. Education provided and PI sheet given. No adverse reactions, no complaints and no assistance needed.

## 2023-10-18 LAB
CELL COUNT (BLOOD): 5.82 X10*3/UL
CELL POPULATIONS: NORMAL
CHROM ANALY OVERALL INTERP-IMP: NORMAL
DIAGNOSIS: NORMAL
ELECTRONICALLY SIGNED BY CYTOGENETICS: NORMAL
FLOW DIFFERENTIAL: NORMAL
FLOW TEST ORDERED: NORMAL
LAB TEST METHOD: NORMAL
NUMBER OF CELLS COLLECTED: NORMAL PER TUBE
PATH REPORT.COMMENTS IMP SPEC: NORMAL
PATH REPORT.FINAL DX SPEC: NORMAL
PATH REPORT.GROSS SPEC: NORMAL
PATH REPORT.MICROSCOPIC SPEC OTHER STN: NORMAL
PATH REPORT.RELEVANT HX SPEC: NORMAL
PATH REPORT.TOTAL CANCER: NORMAL
PATH REPORT.TOTAL CANCER: NORMAL
SIGNATURE COMMENT: NORMAL
SPECIMEN VIABILITY: NORMAL

## 2023-10-26 NOTE — PROGRESS NOTES
Patient came over from [facility] to special procedures for bone marrow biopsy and aspiration. Patient positioned supine on Cat Scan table @  0815 with O2 and monitoring devices attached. Per Dr. Lisa Pepe, he will put his H&P and sedation in Epic. Patient had biopsy on 2/15/2021 at [facility]. E.  Patient was an RRT from being administered too much sedation. Dr. Lisa Pepe stated that patient will be given 25mg IV of benadryl because of previous reaction. Patient scanned and images reviewed by Dr Lisa Pepe    Patient prepped and draped. 0831 Procedure start /78 102 18 100% 2 liters nasal canula    With the guidance of Cat Scan, needle inserted  and core biopsy x3 taken by Dr. Lisa Pepe    Patient re-scanned and images reviewed by Dr. Lisa Pepe . Puncture site cleansed and dry dressing applied. 1055 Procedure completed   /76 107 17 100% 2 liters nasal canula    Biopsy sample taken to laboratory.       0226 Nurse to nurse called to [facility] spoke with Yana Danielle RN    Patient transported back to [facility] patient

## 2023-11-02 ENCOUNTER — TELEPHONE (OUTPATIENT)
Dept: FAMILY MEDICINE CLINIC | Age: 68
End: 2023-11-02

## 2023-11-02 NOTE — TELEPHONE ENCOUNTER
Ernestine Robins called in and she is stating that Lazarus Tamez has a large red lump on his chest. They are both concerned giving his his medical problems.     Please advise    Thank you

## 2023-11-03 ENCOUNTER — TELEPHONE (OUTPATIENT)
Dept: FAMILY MEDICINE CLINIC | Age: 68
End: 2023-11-03

## 2023-11-04 NOTE — TELEPHONE ENCOUNTER
Dw via phone. 1-2cm red raised lesion on chest near nipple. Unsure of consistency. Concerned about infection in setting of hx of revlimid use / hx AL / amyloidosis. No systemic sx. Discussed abx vs observation. Will plan to see Monday in office & eval. If worsening could be s/sx cellulitis.

## 2023-11-06 ENCOUNTER — OFFICE VISIT (OUTPATIENT)
Dept: FAMILY MEDICINE CLINIC | Age: 68
End: 2023-11-06
Payer: MEDICARE

## 2023-11-06 VITALS
HEART RATE: 80 BPM | TEMPERATURE: 98.1 F | OXYGEN SATURATION: 96 % | WEIGHT: 151 LBS | SYSTOLIC BLOOD PRESSURE: 108 MMHG | BODY MASS INDEX: 22.36 KG/M2 | RESPIRATION RATE: 16 BRPM | DIASTOLIC BLOOD PRESSURE: 72 MMHG | HEIGHT: 69 IN

## 2023-11-06 DIAGNOSIS — Z23 NEED FOR INFLUENZA VACCINATION: ICD-10-CM

## 2023-11-06 DIAGNOSIS — L02.91 CUTANEOUS ABSCESS, UNSPECIFIED SITE: Primary | ICD-10-CM

## 2023-11-06 PROCEDURE — 10060 I&D ABSCESS SIMPLE/SINGLE: CPT | Performed by: FAMILY MEDICINE

## 2023-11-06 PROCEDURE — 1123F ACP DISCUSS/DSCN MKR DOCD: CPT | Performed by: FAMILY MEDICINE

## 2023-11-06 PROCEDURE — G8427 DOCREV CUR MEDS BY ELIG CLIN: HCPCS | Performed by: FAMILY MEDICINE

## 2023-11-06 PROCEDURE — 99214 OFFICE O/P EST MOD 30 MIN: CPT | Performed by: FAMILY MEDICINE

## 2023-11-06 PROCEDURE — 1036F TOBACCO NON-USER: CPT | Performed by: FAMILY MEDICINE

## 2023-11-06 PROCEDURE — G8420 CALC BMI NORM PARAMETERS: HCPCS | Performed by: FAMILY MEDICINE

## 2023-11-06 PROCEDURE — G8484 FLU IMMUNIZE NO ADMIN: HCPCS | Performed by: FAMILY MEDICINE

## 2023-11-06 PROCEDURE — 3017F COLORECTAL CA SCREEN DOC REV: CPT | Performed by: FAMILY MEDICINE

## 2023-11-06 RX ORDER — SULFAMETHOXAZOLE AND TRIMETHOPRIM 800; 160 MG/1; MG/1
2 TABLET ORAL 2 TIMES DAILY
Qty: 28 TABLET | Refills: 0 | Status: SHIPPED | OUTPATIENT
Start: 2023-11-06 | End: 2023-11-13

## 2023-11-06 ASSESSMENT — PATIENT HEALTH QUESTIONNAIRE - PHQ9
1. LITTLE INTEREST OR PLEASURE IN DOING THINGS: NOT AT ALL
8. MOVING OR SPEAKING SO SLOWLY THAT OTHER PEOPLE COULD HAVE NOTICED. OR THE OPPOSITE, BEING SO FIGETY OR RESTLESS THAT YOU HAVE BEEN MOVING AROUND A LOT MORE THAN USUAL: 0
4. FEELING TIRED OR HAVING LITTLE ENERGY: NOT AT ALL
6. FEELING BAD ABOUT YOURSELF - OR THAT YOU ARE A FAILURE OR HAVE LET YOURSELF OR YOUR FAMILY DOWN: NOT AT ALL
4. FEELING TIRED OR HAVING LITTLE ENERGY: 0
1. LITTLE INTEREST OR PLEASURE IN DOING THINGS: 0
9. THOUGHTS THAT YOU WOULD BE BETTER OFF DEAD, OR OF HURTING YOURSELF: 0
5. POOR APPETITE OR OVEREATING: NOT AT ALL
3. TROUBLE FALLING OR STAYING ASLEEP OR SLEEPING TOO MUCH: 0
6. FEELING BAD ABOUT YOURSELF - OR THAT YOU ARE A FAILURE OR HAVE LET YOURSELF OR YOUR FAMILY DOWN: NOT AT ALL
2. FEELING DOWN, DEPRESSED, IRRITABLE, OR HOPELESS: 0
5. POOR APPETITE OR OVEREATING: NOT AT ALL
9. THOUGHTS THAT YOU WOULD BE BETTER OFF DEAD, OR OF HURTING YOURSELF: NOT AT ALL
4. FEELING TIRED OR HAVING LITTLE ENERGY: NOT AT ALL
6. FEELING BAD ABOUT YOURSELF - OR THAT YOU ARE A FAILURE OR HAVE LET YOURSELF OR YOUR FAMILY DOWN: 0
8. MOVING OR SPEAKING SO SLOWLY THAT OTHER PEOPLE COULD HAVE NOTICED. OR THE OPPOSITE, BEING SO FIGETY OR RESTLESS THAT YOU HAVE BEEN MOVING AROUND A LOT MORE THAN USUAL: NOT AT ALL
7. TROUBLE CONCENTRATING ON THINGS, SUCH AS READING THE NEWSPAPER OR WATCHING TELEVISION: NOT AT ALL
3. TROUBLE FALLING OR STAYING ASLEEP OR SLEEPING TOO MUCH: NOT AT ALL
3. TROUBLE FALLING OR STAYING ASLEEP OR SLEEPING TOO MUCH: NOT AT ALL
7. TROUBLE CONCENTRATING ON THINGS, SUCH AS READING THE NEWSPAPER OR WATCHING TELEVISION: 0
SUM OF ALL RESPONSES TO PHQ QUESTIONS 1-9: 0
2. FEELING DOWN, DEPRESSED OR HOPELESS: NOT AT ALL
9. THOUGHTS THAT YOU WOULD BE BETTER OFF DEAD, OR OF HURTING YOURSELF: NOT AT ALL
5. POOR APPETITE OR OVEREATING: 0
2. FEELING DOWN, DEPRESSED, IRRITABLE, OR HOPELESS: NOT AT ALL
SUM OF ALL RESPONSES TO PHQ QUESTIONS 1-9: 0
1. LITTLE INTEREST OR PLEASURE IN DOING THINGS: NOT AT ALL
7. TROUBLE CONCENTRATING ON THINGS, SUCH AS READING THE NEWSPAPER OR WATCHING TELEVISION: NOT AT ALL
8. MOVING OR SPEAKING SO SLOWLY THAT OTHER PEOPLE COULD HAVE NOTICED. OR THE OPPOSITE, BEING SO FIGETY OR RESTLESS THAT YOU HAVE BEEN MOVING AROUND A LOT MORE THAN USUAL: NOT AT ALL

## 2023-11-06 NOTE — PROGRESS NOTES
Franklin Born is a 79 y.o. male who presents today for   Chief Complaint   Patient presents with    Breast Mass     Right pectoria        HPI: 73yo male w/ PMHX Light chain Amyloidosis presents for a raised skin lesion on chest. 1-2cm, raised, red , moderately tender, present x1 month. Has not drained any fluid. He has not taken any abx or has been seen for it. He has useda heating pad to help it which did not result in any drainage. No systemic sx, no fever.      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    Situational syncope 12/2/2010       Current Outpatient Medications   Medication Instructions    Ascorbic Acid (VITAMIN C PO) 1 tablet, Oral, DAILY    aspirin 81 mg, Oral, DAILY    azelastine (ASTELIN) 0.1 % nasal spray 1 spray, Nasal, 2 TIMES DAILY, Use in each nostril as directed    Coenzyme Q10 (COQ10) 100 MG CAPS Oral    fexofenadine-pseudoephedrine (ALLEGRA-D 12 HOUR)  MG per tablet 1 tablet, Oral, 2 TIMES DAILY PRN    fluvastatin (LESCOL) 40 mg, Oral, NIGHTLY    lenalidomide (REVLIMID) 2.5 mg, Oral, DAILY, THEN  OFF 7 Days  28 day Cycle  ADULT MALE  AUTH# [id number]    Multiple Vitamins-Minerals (THERAPEUTIC MULTIVITAMIN-MINERALS) tablet 1 tablet, Oral, DAILY    VITAMIN D PO 1,000 Units, Oral, NIGHTLY    VITAMIN E PO 1 capsule, Oral, DAILY       Allergies   Allergen Reactions    Codeine Itching    Lipitor      Muscle cramps    Vytorin [Ezetimibe-Simvastatin]      Muscle cramps         Family History   Problem Relation Age of Onset    Cancer Mother         breast    Heart Disease Mother     Heart Disease Father     Stroke Father        Past Surgical History:   Procedure Laterality Date    CT BIOPSY RENAL  2/15/2021    CT BIOPSY RENAL 2/15/2021 Florentino Cook MD [facility]    KNEE SURGERY Right 02/19/2016    SHOULDER ARTHROSCOPY Left

## 2023-11-07 ENCOUNTER — OFFICE VISIT (OUTPATIENT)
Dept: HEMATOLOGY/ONCOLOGY | Facility: HOSPITAL | Age: 68
End: 2023-11-07
Payer: MEDICARE

## 2023-11-07 ENCOUNTER — LAB (OUTPATIENT)
Dept: LAB | Facility: HOSPITAL | Age: 68
End: 2023-11-07
Payer: MEDICARE

## 2023-11-07 VITALS
WEIGHT: 150.57 LBS | RESPIRATION RATE: 17 BRPM | TEMPERATURE: 97.3 F | DIASTOLIC BLOOD PRESSURE: 81 MMHG | BODY MASS INDEX: 23.09 KG/M2 | SYSTOLIC BLOOD PRESSURE: 125 MMHG | HEART RATE: 86 BPM | OXYGEN SATURATION: 98 %

## 2023-11-07 DIAGNOSIS — E85.81 AL AMYLOIDOSIS (MULTI): ICD-10-CM

## 2023-11-07 DIAGNOSIS — R21 RASH: ICD-10-CM

## 2023-11-07 DIAGNOSIS — E85.81 AL AMYLOIDOSIS (MULTI): Primary | ICD-10-CM

## 2023-11-07 DIAGNOSIS — Z94.81 STATUS POST AUTOLOGOUS BONE MARROW TRANSPLANT (MULTI): ICD-10-CM

## 2023-11-07 DIAGNOSIS — Z51.11 CHEMOTHERAPY MANAGEMENT, ENCOUNTER FOR: ICD-10-CM

## 2023-11-07 DIAGNOSIS — G62.2 POLYNEUROPATHY DUE TO OTHER TOXIC AGENTS (MULTI): ICD-10-CM

## 2023-11-07 PROBLEM — D47.2 MONOCLONAL GAMMOPATHY: Status: ACTIVE | Noted: 2021-02-15

## 2023-11-07 PROBLEM — M15.0 PRIMARY OSTEOARTHRITIS INVOLVING MULTIPLE JOINTS: Status: ACTIVE | Noted: 2020-12-28

## 2023-11-07 PROBLEM — M15.9 PRIMARY OSTEOARTHRITIS INVOLVING MULTIPLE JOINTS: Status: ACTIVE | Noted: 2020-12-28

## 2023-11-07 LAB
ALBUMIN SERPL BCP-MCNC: 3.8 G/DL (ref 3.4–5)
ALP SERPL-CCNC: 70 U/L (ref 33–136)
ALT SERPL W P-5'-P-CCNC: 22 U/L (ref 10–52)
ANION GAP SERPL CALC-SCNC: 10 MMOL/L (ref 10–20)
AST SERPL W P-5'-P-CCNC: 21 U/L (ref 9–39)
BASOPHILS # BLD AUTO: 0.07 X10*3/UL (ref 0–0.1)
BASOPHILS NFR BLD AUTO: 1.4 %
BILIRUB SERPL-MCNC: 0.5 MG/DL (ref 0–1.2)
BUN SERPL-MCNC: 20 MG/DL (ref 6–23)
CALCIUM SERPL-MCNC: 9.4 MG/DL (ref 8.6–10.3)
CHLORIDE SERPL-SCNC: 106 MMOL/L (ref 98–107)
CO2 SERPL-SCNC: 28 MMOL/L (ref 21–32)
CREAT SERPL-MCNC: 0.95 MG/DL (ref 0.5–1.3)
EOSINOPHIL # BLD AUTO: 0.17 X10*3/UL (ref 0–0.7)
EOSINOPHIL NFR BLD AUTO: 3.4 %
ERYTHROCYTE [DISTWIDTH] IN BLOOD BY AUTOMATED COUNT: 12.8 % (ref 11.5–14.5)
GFR SERPL CREATININE-BSD FRML MDRD: 88 ML/MIN/1.73M*2
GLUCOSE SERPL-MCNC: 83 MG/DL (ref 74–99)
HCT VFR BLD AUTO: 42.5 % (ref 41–52)
HGB BLD-MCNC: 14.4 G/DL (ref 13.5–17.5)
IGA SERPL-MCNC: 228 MG/DL (ref 70–400)
IGG SERPL-MCNC: 1030 MG/DL (ref 700–1600)
IGM SERPL-MCNC: 36 MG/DL (ref 40–230)
IMM GRANULOCYTES # BLD AUTO: 0.01 X10*3/UL (ref 0–0.7)
IMM GRANULOCYTES NFR BLD AUTO: 0.2 % (ref 0–0.9)
LYMPHOCYTES # BLD AUTO: 1.74 X10*3/UL (ref 1.2–4.8)
LYMPHOCYTES NFR BLD AUTO: 35.2 %
MCH RBC QN AUTO: 30.8 PG (ref 26–34)
MCHC RBC AUTO-ENTMCNC: 33.9 G/DL (ref 32–36)
MCV RBC AUTO: 91 FL (ref 80–100)
MONOCYTES # BLD AUTO: 0.67 X10*3/UL (ref 0.1–1)
MONOCYTES NFR BLD AUTO: 13.6 %
NEUTROPHILS # BLD AUTO: 2.28 X10*3/UL (ref 1.2–7.7)
NEUTROPHILS NFR BLD AUTO: 46.2 %
NRBC BLD-RTO: 0 /100 WBCS (ref 0–0)
PLATELET # BLD AUTO: 188 X10*3/UL (ref 150–450)
POTASSIUM SERPL-SCNC: 4.3 MMOL/L (ref 3.5–5.3)
PROT SERPL-MCNC: 6.6 G/DL (ref 6.4–8.2)
PROT SERPL-MCNC: 6.6 G/DL (ref 6.4–8.2)
RBC # BLD AUTO: 4.67 X10*6/UL (ref 4.5–5.9)
SODIUM SERPL-SCNC: 140 MMOL/L (ref 136–145)
WBC # BLD AUTO: 4.9 X10*3/UL (ref 4.4–11.3)

## 2023-11-07 PROCEDURE — 1036F TOBACCO NON-USER: CPT

## 2023-11-07 PROCEDURE — 36415 COLL VENOUS BLD VENIPUNCTURE: CPT

## 2023-11-07 PROCEDURE — 85025 COMPLETE CBC W/AUTO DIFF WBC: CPT

## 2023-11-07 PROCEDURE — 82784 ASSAY IGA/IGD/IGG/IGM EACH: CPT

## 2023-11-07 PROCEDURE — 84155 ASSAY OF PROTEIN SERUM: CPT | Mod: 59

## 2023-11-07 PROCEDURE — 84165 PROTEIN E-PHORESIS SERUM: CPT

## 2023-11-07 PROCEDURE — 1160F RVW MEDS BY RX/DR IN RCRD: CPT

## 2023-11-07 PROCEDURE — 86320 SERUM IMMUNOELECTROPHORESIS: CPT

## 2023-11-07 PROCEDURE — 1126F AMNT PAIN NOTED NONE PRSNT: CPT

## 2023-11-07 PROCEDURE — 99215 OFFICE O/P EST HI 40 MIN: CPT

## 2023-11-07 PROCEDURE — 80053 COMPREHEN METABOLIC PANEL: CPT

## 2023-11-07 PROCEDURE — 99215 OFFICE O/P EST HI 40 MIN: CPT | Mod: 25

## 2023-11-07 PROCEDURE — 1159F MED LIST DOCD IN RCRD: CPT

## 2023-11-07 PROCEDURE — 83521 IG LIGHT CHAINS FREE EACH: CPT

## 2023-11-07 RX ORDER — LENALIDOMIDE 2.5 MG/1
2.5 CAPSULE ORAL DAILY
Qty: 21 CAPSULE | Refills: 0 | Status: CANCELLED | OUTPATIENT
Start: 2023-11-07 | End: 2023-11-28

## 2023-11-07 ASSESSMENT — ENCOUNTER SYMPTOMS
ALLERGIC/IMMUNOLOGIC NEGATIVE: 1
RESPIRATORY NEGATIVE: 1
HEMATOLOGIC/LYMPHATIC NEGATIVE: 1
OCCASIONAL FEELINGS OF UNSTEADINESS: 0
ENDOCRINE NEGATIVE: 1
GASTROINTESTINAL NEGATIVE: 1
NUMBNESS: 1
PSYCHIATRIC NEGATIVE: 1
MUSCULOSKELETAL NEGATIVE: 1
LOSS OF SENSATION IN FEET: 0
CONSTITUTIONAL NEGATIVE: 1
CARDIOVASCULAR NEGATIVE: 1
DEPRESSION: 0

## 2023-11-07 ASSESSMENT — PAIN SCALES - GENERAL: PAINLEVEL: 0-NO PAIN

## 2023-11-08 ENCOUNTER — LAB REQUISITION (OUTPATIENT)
Dept: LAB | Facility: CLINIC | Age: 68
End: 2023-11-08
Payer: MEDICARE

## 2023-11-08 DIAGNOSIS — E85.81 LIGHT CHAIN (AL) AMYLOIDOSIS (MULTI): ICD-10-CM

## 2023-11-08 LAB
KAPPA LC SERPL-MCNC: 2.3 MG/DL (ref 0.33–1.94)
KAPPA LC/LAMBDA SER: 1.64 {RATIO} (ref 0.26–1.65)
LAMBDA LC SERPL-MCNC: 1.4 MG/DL (ref 0.57–2.63)

## 2023-11-08 NOTE — ASSESSMENT & PLAN NOTE
AL Amyloidosis:  - S/p autologus PBSCT on 10/22/21.   - BMBX negative, not able to ID so no MRD  - Mass spec positive  - Revlimid 5mg daily for maintenance  - Follows with Dr. Whitney, he will get post transplant vaccines there   - Revlimid decreased to 2.5mg daily  - Notes rash on face/scalp  - Plan to see derm     Infection prophylaxis.  - Continue acyclovir BID  - Aspirin 81mg    A fib with RVR  - ECHO on 11/12/21 notes EF 65%.  - Seen by Cardiology 11/17/21-- Jb Puga PA-C. Started on 2 week Ziopatch monitoring (completes 12/1). Recommendations made to continue apixaban 2.5 twice daily and metoprolol 25 mg twice daily  for now.   - Further determination of AC. Dr Colvin recommends Eliquis 2.5mg twice a day when platelet count >75k.        Started Eliquis on 11/11/21.   - Hold further Maxide.  - HR regular today (11/24)  - BP (11/18) lower at 95/64 (usually low 100s systolic is baseline for pt); pt asymptomatic and continues to feel    improved. No signs of acute GI losses. Discussed daily monitoring of BP, has cuff at home.   - Reviewed (11/24) BP log for past week -  most SBP are in the low 100's to low teens. Today bp is 112/68 & he    again has no c/o of lightheadedness/dizziness with positional changes or heart palp.     Cont current Metoprolol dose.  - Previously Metoprolol dose was reduced to 25 mg twice daily on 11/8.   - Returned Ziopatch 11/30  - Ziopatch results showed no a.fib, Eliquis and Metoprolol discontinued         Vitamin D insufficiency  - 22 ng/mL on 10/29  - Currently on 1,000 International Unit(s) daily  - Will increase vit D to 2,000 units daily      RTC   October 2022 s/p BMBX and mass spec

## 2023-11-08 NOTE — PROGRESS NOTES
Patient ID: Rusty Woodard is a 68 y.o. male.  Referring Physician: No referring provider defined for this encounter.  Primary Care Provider: No primary care provider on file.    Date of Service:  11/7/2023    Mr. Woodard has been referred from Dr. Whitney's office due to AL Amyloidosis.      He was in usual state of health until around 1.5 years ago when he had slight numbness and tingling in his hands and feet, later on in Jan 2021 was noticed to have swelling around ankles and had 5.27 gr/d nephrotic range proteinuria which prompted a kidney  biopsy noting AL Amyloidosis, lambda light chains. Bone marrow with 25% light chain restricted plasma cells with trisomy 13 and standard risk myeloma. Echo showed normal EF with mild TR and mild Pul HTN.      He started induction treatment with  Yolanda/CyBorD and reportedly he had nice reduction with in light chain as well as nephrotic range proteinuria The neuropathy is the same as before starting therapy, Cytoxan was held 7/10/21.  Last dose of Velcade was  9/13/21 and last dose of yolanda was 8/30/21 according to patients wife.       Restaging bmbx 9/27/21 4% lambda positive cells compared to 25% pretreatment and he has no detectable free light chains in the blood.       10/20/21 Admission for autologous PBSCT  (T=0 on 10/22/21) preparative regimen with Melphalan 200 mg. Course complicated by A fib RVR for which Cardiology was consulted.  He was started on Metoprolol and will follow up with Cardiology.  Additional posttransplant complications  included nausea, vomiting, diarrhea and poor appetite. On day of discharge diarrhea had  resolved and oral intake was improving with Zofran before meals.      Received Neupogen on T+5 until 11/3/21, first day of recovered ANC (=>1000) on 11/3/21. On day of discharge his trialysis line was removed.      Post transplant Mass Spectrometry was positive. For Maintenance he will be on Revlimid 5mg daily.     Due to multiple issues his Revlimid was  decreased by Dr. Whitney to 2.5mg daily.    ASSESSMENT and PLAN:    No problem-specific Assessment & Plan notes found for this encounter.            SUBJECTIVE:  History of Present Illness:  Gurwinder presents to clinic 11/7/23 with his wife Pat for a follow up visit.     Overall he is doing well.     Notes ongoing sinus problems. Has a constant runny nose and has tried various remedies with minima relief.    Has ongoing neuropathy in his lips and bilateral hands. No interventions.    Rash noted on face/scalp. Planning to see dermatology.         Review of Systems   Constitutional: Negative.    HENT:  Positive for congestion and postnasal drip.    Respiratory: Negative.     Cardiovascular: Negative.    Gastrointestinal: Negative.    Endocrine: Negative.    Musculoskeletal: Negative.    Skin:  Positive for rash.   Allergic/Immunologic: Negative.    Neurological:  Positive for numbness.   Hematological: Negative.    Psychiatric/Behavioral: Negative.         OBJECTIVE:  KPS: Karnofsky Score: 100 - Fully active, able to carry on all pre-disease performed without restriction   VS:  /81   Pulse 86   Temp 36.3 °C (97.3 °F)   Resp 17   Wt 68.3 kg (150 lb 9.2 oz)   SpO2 98%   BMI 23.09 kg/m²   BSA: 1.81 meters squared    Physical Exam  Vitals reviewed.   Constitutional:       Appearance: Normal appearance. He is normal weight.   HENT:      Head: Normocephalic.      Nose: Congestion present.      Mouth/Throat:      Mouth: Mucous membranes are moist.      Pharynx: Oropharynx is clear.   Eyes:      Extraocular Movements: Extraocular movements intact.      Conjunctiva/sclera: Conjunctivae normal.      Pupils: Pupils are equal, round, and reactive to light.   Cardiovascular:      Rate and Rhythm: Normal rate and regular rhythm.      Pulses: Normal pulses.      Heart sounds: Normal heart sounds.   Pulmonary:      Effort: Pulmonary effort is normal.      Breath sounds: Normal breath sounds.   Abdominal:      General:  Abdomen is flat. Bowel sounds are normal.      Palpations: Abdomen is soft.   Musculoskeletal:         General: Normal range of motion.      Cervical back: Normal range of motion and neck supple.   Skin:     General: Skin is warm and dry.      Findings: Rash present.      Comments: Rash on scalp and face   Neurological:      General: No focal deficit present.      Mental Status: He is alert and oriented to person, place, and time. Mental status is at baseline.   Psychiatric:         Mood and Affect: Mood normal.         Behavior: Behavior normal.         Thought Content: Thought content normal.         Judgment: Judgment normal.       Laboratory:  The pertinent laboratory results were reviewed and discussed with the patient.    Lab Results   Component Value Date    WBC 4.9 11/07/2023    HCT 42.5 11/07/2023    HGB 14.4 11/07/2023     11/07/2023    ANC 1.68 11/09/2021    K 4.3 11/07/2023    CALCIUM 9.4 11/07/2023     11/07/2023    MG 2.08 11/24/2021    ALT 22 11/07/2023    AST 21 11/07/2023    BUN 20 11/07/2023    CREATININE 0.95 11/07/2023    PHOS 3.5 11/04/2021    KAPPA 3.36 (H) 10/19/2022    LAMBDA 2.08 10/19/2022    SPEP ABNORMAL 10/19/2022    IEPIN NORMAL 02/09/2022    IGG 1,030 11/07/2023    IGM 36 (L) 11/07/2023     11/07/2023      Note: for a comprehensive list of the patient's lab results, access the Results Review activity.    RTC:  Follow with Dr. Whitney, and PRN at [facility]     Bonifacio Navarrete, APRN-CNP

## 2023-11-09 LAB
CULTURE: ABNORMAL
CULTURE: ABNORMAL
DIRECT EXAM: ABNORMAL
Lab: ABNORMAL
SPECIMEN DESCRIPTION: ABNORMAL

## 2023-11-11 LAB
ALBUMIN: 3.9 G/DL (ref 3.4–5)
ALPHA 1 GLOBULIN: 0.3 G/DL (ref 0.2–0.6)
ALPHA 2 GLOBULIN: 0.7 G/DL (ref 0.4–1.1)
BETA GLOBULIN: 0.8 G/DL (ref 0.5–1.2)
GAMMA GLOBULIN: 0.9 G/DL (ref 0.5–1.4)
IMMUNOFIXATION COMMENT: NORMAL
PATH REVIEW - SERUM IMMUNOFIXATION: NORMAL
PATH REVIEW-SERUM PROTEIN ELECTROPHORESIS: NORMAL
PROTEIN ELECTROPHORESIS COMMENT: NORMAL

## 2023-11-13 SDOH — HEALTH STABILITY: PHYSICAL HEALTH: ON AVERAGE, HOW MANY MINUTES DO YOU ENGAGE IN EXERCISE AT THIS LEVEL?: 40 MIN

## 2023-11-13 SDOH — HEALTH STABILITY: PHYSICAL HEALTH: ON AVERAGE, HOW MANY DAYS PER WEEK DO YOU ENGAGE IN MODERATE TO STRENUOUS EXERCISE (LIKE A BRISK WALK)?: 6 DAYS

## 2023-11-13 ASSESSMENT — PATIENT HEALTH QUESTIONNAIRE - PHQ9
SUM OF ALL RESPONSES TO PHQ QUESTIONS 1-9: 0
1. LITTLE INTEREST OR PLEASURE IN DOING THINGS: 0
2. FEELING DOWN, DEPRESSED OR HOPELESS: 0
SUM OF ALL RESPONSES TO PHQ9 QUESTIONS 1 & 2: 0
SUM OF ALL RESPONSES TO PHQ QUESTIONS 1-9: 0

## 2023-11-14 ENCOUNTER — TELEPHONE (OUTPATIENT)
Dept: FAMILY MEDICINE CLINIC | Age: 68
End: 2023-11-14

## 2023-11-14 NOTE — TELEPHONE ENCOUNTER
Visit canceld 2/2 water leak. Planning to discuss covid/flu shot w/ Dr Sparks . Chronic leg cramps, has tried B compelx vitamin. Encouraged stretching. Will f/u in office if not better consider labs. Skin abscess improving. Tolerating Abx OK.

## 2023-11-15 LAB — SCAN RESULT: NORMAL

## 2023-11-21 ENCOUNTER — HOSPITAL ENCOUNTER (OUTPATIENT)
Dept: INFUSION THERAPY | Age: 68
Discharge: HOME | End: 2023-11-21
Payer: MEDICARE

## 2023-11-21 DIAGNOSIS — E85.81 LIGHT CHAIN (AL) AMYLOIDOSIS (HCC): ICD-10-CM

## 2023-11-21 LAB
ALBUMIN SERPL-MCNC: 3.7 G/DL (ref 3.5–5.2)
ALP SERPL-CCNC: 88 U/L (ref 40–129)
ALT SERPL-CCNC: 24 U/L (ref 0–40)
ANION GAP SERPL CALCULATED.3IONS-SCNC: 5 MMOL/L (ref 7–16)
AST SERPL-CCNC: 23 U/L (ref 0–39)
BASOPHILS # BLD: 0.11 K/UL (ref 0–0.2)
BASOPHILS NFR BLD: 2 % (ref 0–2)
BILIRUB SERPL-MCNC: 0.2 MG/DL (ref 0–1.2)
BUN SERPL-MCNC: 23 MG/DL (ref 6–23)
CALCIUM SERPL-MCNC: 9.3 MG/DL (ref 8.6–10.2)
CHLORIDE SERPL-SCNC: 102 MMOL/L (ref 98–107)
CO2 SERPL-SCNC: 30 MMOL/L (ref 22–29)
CREAT SERPL-MCNC: 1.1 MG/DL (ref 0.7–1.2)
EOSINOPHIL # BLD: 0.21 K/UL (ref 0.05–0.5)
EOSINOPHILS RELATIVE PERCENT: 4 % (ref 0–6)
ERYTHROCYTE [DISTWIDTH] IN BLOOD BY AUTOMATED COUNT: 12.6 % (ref 11.5–15)
GFR SERPL CREATININE-BSD FRML MDRD: >60 ML/MIN/1.73M2
GLUCOSE SERPL-MCNC: 71 MG/DL (ref 74–99)
HCT VFR BLD AUTO: 46.3 % (ref 37–54)
HGB BLD-MCNC: 15.2 G/DL (ref 12.5–16.5)
IMM GRANULOCYTES # BLD AUTO: <0.03 K/UL (ref 0–0.58)
IMM GRANULOCYTES NFR BLD: 0 % (ref 0–5)
LYMPHOCYTES NFR BLD: 1.69 K/UL (ref 1.5–4)
LYMPHOCYTES RELATIVE PERCENT: 29 % (ref 20–42)
MCH RBC QN AUTO: 30.9 PG (ref 26–35)
MCHC RBC AUTO-ENTMCNC: 32.8 G/DL (ref 32–34.5)
MCV RBC AUTO: 94.1 FL (ref 80–99.9)
MONOCYTES NFR BLD: 0.54 K/UL (ref 0.1–0.95)
MONOCYTES NFR BLD: 9 % (ref 2–12)
NEUTROPHILS NFR BLD: 55 % (ref 43–80)
NEUTS SEG NFR BLD: 3.18 K/UL (ref 1.8–7.3)
PLATELET # BLD AUTO: 217 K/UL (ref 130–450)
PMV BLD AUTO: 8.9 FL (ref 7–12)
POTASSIUM SERPL-SCNC: 4.6 MMOL/L (ref 3.5–5)
PROT SERPL-MCNC: 6.7 G/DL (ref 6.4–8.3)
RBC # BLD AUTO: 4.92 M/UL (ref 3.8–5.8)
SODIUM SERPL-SCNC: 137 MMOL/L (ref 132–146)
WBC OTHER # BLD: 5.7 K/UL (ref 4.5–11.5)

## 2023-11-21 PROCEDURE — 86334 IMMUNOFIX E-PHORESIS SERUM: CPT

## 2023-11-21 PROCEDURE — 83521 IG LIGHT CHAINS FREE EACH: CPT

## 2023-11-21 PROCEDURE — 36415 COLL VENOUS BLD VENIPUNCTURE: CPT

## 2023-11-21 PROCEDURE — 84165 PROTEIN E-PHORESIS SERUM: CPT

## 2023-11-21 PROCEDURE — 85025 COMPLETE CBC W/AUTO DIFF WBC: CPT

## 2023-11-21 PROCEDURE — 82232 ASSAY OF BETA-2 PROTEIN: CPT

## 2023-11-21 PROCEDURE — 82784 ASSAY IGA/IGD/IGG/IGM EACH: CPT

## 2023-11-21 PROCEDURE — 84155 ASSAY OF PROTEIN SERUM: CPT

## 2023-11-21 PROCEDURE — 80053 COMPREHEN METABOLIC PANEL: CPT

## 2023-11-21 RX ORDER — LENALIDOMIDE 2.5 MG/1
2.5 CAPSULE ORAL DAILY
Qty: 21 CAPSULE | Refills: 0 | Status: ACTIVE | OUTPATIENT
Start: 2023-11-21 | End: 2023-12-19 | Stop reason: SDUPTHER

## 2023-11-22 ENCOUNTER — OFFICE VISIT (OUTPATIENT)
Dept: ONCOLOGY | Age: 68
End: 2023-11-22
Payer: MEDICARE

## 2023-11-22 VITALS
DIASTOLIC BLOOD PRESSURE: 76 MMHG | HEIGHT: 69 IN | BODY MASS INDEX: 22.77 KG/M2 | TEMPERATURE: 97.6 F | HEART RATE: 86 BPM | OXYGEN SATURATION: 99 % | SYSTOLIC BLOOD PRESSURE: 115 MMHG | WEIGHT: 153.7 LBS

## 2023-11-22 DIAGNOSIS — E85.81 LIGHT CHAIN (AL) AMYLOIDOSIS (HCC): Primary | ICD-10-CM

## 2023-11-22 LAB
ALBUMIN SERPL-MCNC: 3 G/DL (ref 3.5–4.7)
ALPHA1 GLOB SERPL ELPH-MCNC: 0.2 G/DL (ref 0.2–0.4)
ALPHA2 GLOB SERPL ELPH-MCNC: 1 G/DL (ref 0.5–1)
B-GLOBULIN SERPL ELPH-MCNC: 1.3 G/DL (ref 0.8–1.3)
GAMMA GLOB SERPL ELPH-MCNC: 1.1 G/DL (ref 0.7–1.6)
IGA SERPL-MCNC: 214 MG/DL (ref 70–400)
IGG SERPL-MCNC: 961 MG/DL (ref 700–1600)
IGM SERPL-MCNC: 41 MG/DL (ref 40–230)
INTERPRETATION SERPL IFE-IMP: NORMAL
PATH REV: NORMAL
PATHOLOGIST: ABNORMAL
PROT PATTERN SERPL ELPH-IMP: ABNORMAL
PROT SERPL-MCNC: 6.6 G/DL (ref 6.4–8.3)

## 2023-11-22 PROCEDURE — 99212 OFFICE O/P EST SF 10 MIN: CPT

## 2023-11-22 RX ORDER — GABAPENTIN 300 MG/1
300 CAPSULE ORAL NIGHTLY
Qty: 90 CAPSULE | Refills: 1 | Status: SHIPPED | OUTPATIENT
Start: 2023-11-22 | End: 2024-05-20

## 2023-11-22 NOTE — PROGRESS NOTES
November    to return 8/16/2021 for Day #15 Cycle #5 Velcade      8/16/2021  To receive Day #15 Cycle #5 Velcade today. Continue on weekly dexamethasone. He will have 1 last dose of daratumumab on 8/30/2021  He will start stem cell collection in October with anticipation of stem cell transplant in November    To return 8/23/2021 for Day #22 Cycle #5 Velcade. He will return on 8/30/2021 for Velcade and daratumumab    We will see me back on 9/13/2021  Will be resumed on prednisone tapering schedule for his rash and joint aches. 8/23/2021  To receive Day #22 Cycle #5 Velcade today. Lab work reviewed, okay to proceed with chemo treatment. Continue on weekly dexamethasone. To return 8/30/2021 for last dose of daratumumab Day #1 Cycle #6  Labs on same day. He will start stem cell collection in October with anticipation of stem cell transplant in November. 8/30/2021  To receive Day #1 Cycle #6 Daratumumab/Velcade/Dex  Lab work reviewed, okay to proceed with chemo treatment. Return on 9/13/2021 for OV with Dr. Librado Maher,  Day #8 Velcade. Labs on 9/10/2021.        9/13/2021  To receive  Day #8 Cycle #6  Velcade today. His treatment will be discontinued after today    He will start stem cell collection in October with anticipation of stem cell transplant in November. 10/6/2020  Status post dental extraction few days ago  Reports occasional dry cough  He will start his injections on Friday in anticipation of stem cell collection early next week at [facility]      His stem cell transplant is scheduled for early November. 3/16/2022  S/P stem cell transplant  10/22/2021   Recovered very well post transplant.   Most recent bone marrow showed no plasmacytosis  His labs show a faint M spike with slight elevation in FLC's  He did do very well post transplant and he is now around day 140 post transplant    He is scheduled for his vaccinations to start 6-month post
seeing Dr. Stacy Saucedo at [facility] for MRD testing and repeat bone marrow biopsy in September. 8/23/23  Very well. 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 His dose has been reduced to 2.5 mg daily 3 weeks on and 1 week off and he is tolerating it much better without any rash diarrhea or myalgias. He continues on aspirin 81 mg daily for DVT prophylaxis. His most recent myeloma panel had shown ongoing remission without M spike. FLC's and ratio are still pending. He is scheduled in October to have a bone marrow aspirate and biopsy at [facility] for MRD testing. He will be given a trial of montelukast for his asthma in addition to his Allegra as needed    11/22/23  Despite the fact that his Revlimid has been reduced to 2.5 mg he continues to experience side effects including skin rash, tingling and numbness in his fingers and toes as well as muscle cramps. He continues on aspirin 81 mg daily for DVT prophylaxis. His most recent myeloma panel had shown ongoing remission without M spike. FLC's and the ratio were in the normal range. He had a bone marrow aspirate and biopsy at [facility] and apparently his MRD was very low. He has completed his immunizations. He will continue on present dose of Revlimid. He is to see dermatology for his recurrent skin rash. He will be given a trial of gabapentin at bedtime for his peripheral neuropathy. Kristan Samuels. Pam Palacios M.D., F.A.C.P.   Electronically signed 11/22/2023 at 3:22 PM

## 2023-11-23 LAB
FREE KAPPA/LAMBDA RATIO: 1.29 (ref 0.26–1.65)
KAPPA LC FREE SER-MCNC: 19.5 MG/L (ref 3.7–19.4)
LAMBDA LC FREE SERPL-MCNC: 15.1 MG/L (ref 5.7–26.3)

## 2023-11-24 NOTE — PROGRESS NOTES
CLINICAL PHARMACY NOTE: MEDS TO BEDS    Total # of Prescriptions Filled: 1   The following medications were delivered to the patient:  Gabapentin 300 mg    Additional Documentation:

## 2023-11-27 LAB — B2 MICROGLOB SERPL IA-MCNC: 2.1 MG/L (ref 0.6–2.4)

## 2023-12-04 SDOH — HEALTH STABILITY: PHYSICAL HEALTH: ON AVERAGE, HOW MANY DAYS PER WEEK DO YOU ENGAGE IN MODERATE TO STRENUOUS EXERCISE (LIKE A BRISK WALK)?: 6 DAYS

## 2023-12-04 SDOH — HEALTH STABILITY: PHYSICAL HEALTH: ON AVERAGE, HOW MANY MINUTES DO YOU ENGAGE IN EXERCISE AT THIS LEVEL?: 40 MIN

## 2023-12-04 ASSESSMENT — PATIENT HEALTH QUESTIONNAIRE - PHQ9
SUM OF ALL RESPONSES TO PHQ QUESTIONS 1-9: 0
SUM OF ALL RESPONSES TO PHQ QUESTIONS 1-9: 0
2. FEELING DOWN, DEPRESSED OR HOPELESS: 0
SUM OF ALL RESPONSES TO PHQ QUESTIONS 1-9: 0
SUM OF ALL RESPONSES TO PHQ QUESTIONS 1-9: 0
1. LITTLE INTEREST OR PLEASURE IN DOING THINGS: 0
SUM OF ALL RESPONSES TO PHQ9 QUESTIONS 1 & 2: 0

## 2023-12-04 ASSESSMENT — LIFESTYLE VARIABLES
HOW OFTEN DO YOU HAVE SIX OR MORE DRINKS ON ONE OCCASION: 1
HOW MANY STANDARD DRINKS CONTAINING ALCOHOL DO YOU HAVE ON A TYPICAL DAY: 0
HOW MANY STANDARD DRINKS CONTAINING ALCOHOL DO YOU HAVE ON A TYPICAL DAY: PATIENT DOES NOT DRINK
HOW OFTEN DO YOU HAVE A DRINK CONTAINING ALCOHOL: NEVER
HOW OFTEN DO YOU HAVE A DRINK CONTAINING ALCOHOL: 1

## 2023-12-07 ENCOUNTER — OFFICE VISIT (OUTPATIENT)
Dept: FAMILY MEDICINE CLINIC | Age: 68
End: 2023-12-07
Payer: MEDICARE

## 2023-12-07 VITALS
WEIGHT: 152 LBS | RESPIRATION RATE: 18 BRPM | TEMPERATURE: 98.1 F | OXYGEN SATURATION: 96 % | SYSTOLIC BLOOD PRESSURE: 113 MMHG | HEART RATE: 84 BPM | HEIGHT: 69 IN | DIASTOLIC BLOOD PRESSURE: 63 MMHG | BODY MASS INDEX: 22.51 KG/M2

## 2023-12-07 DIAGNOSIS — Z00.00 INITIAL MEDICARE ANNUAL WELLNESS VISIT: Primary | ICD-10-CM

## 2023-12-07 PROCEDURE — G8484 FLU IMMUNIZE NO ADMIN: HCPCS | Performed by: FAMILY MEDICINE

## 2023-12-07 PROCEDURE — G0438 PPPS, INITIAL VISIT: HCPCS | Performed by: FAMILY MEDICINE

## 2023-12-07 PROCEDURE — 3017F COLORECTAL CA SCREEN DOC REV: CPT | Performed by: FAMILY MEDICINE

## 2023-12-07 PROCEDURE — 1123F ACP DISCUSS/DSCN MKR DOCD: CPT | Performed by: FAMILY MEDICINE

## 2023-12-07 RX ORDER — KETOTIFEN FUMARATE 0.25 MG/ML
1 SOLUTION/ DROPS OPHTHALMIC 2 TIMES DAILY
Qty: 1 ML | Refills: 0 | Status: SHIPPED | OUTPATIENT
Start: 2023-12-07 | End: 2023-12-17

## 2023-12-19 DIAGNOSIS — E85.81 LIGHT CHAIN (AL) AMYLOIDOSIS (HCC): ICD-10-CM

## 2023-12-19 RX ORDER — LENALIDOMIDE 2.5 MG/1
2.5 CAPSULE ORAL DAILY
Qty: 21 CAPSULE | Refills: 0 | Status: ACTIVE | OUTPATIENT
Start: 2023-12-19 | End: 2024-01-16 | Stop reason: SDUPTHER

## 2024-01-16 DIAGNOSIS — E85.81 LIGHT CHAIN (AL) AMYLOIDOSIS (HCC): ICD-10-CM

## 2024-01-16 RX ORDER — LENALIDOMIDE 2.5 MG/1
2.5 CAPSULE ORAL DAILY
Qty: 21 CAPSULE | Refills: 0 | Status: SHIPPED | OUTPATIENT
Start: 2024-01-16 | End: 2024-02-01 | Stop reason: SDUPTHER

## 2024-01-23 ENCOUNTER — CLINICAL DOCUMENTATION (OUTPATIENT)
Facility: HOSPITAL | Age: 69
End: 2024-01-23

## 2024-01-23 NOTE — PROGRESS NOTES
Spoke to Jenna from [facility] who states she is going to escalate patients application as patient is running out of medication.  Called patient and left VM letting him know.

## 2024-01-31 ENCOUNTER — CLINICAL DOCUMENTATION (OUTPATIENT)
Facility: HOSPITAL | Age: 69
End: 2024-01-31

## 2024-01-31 NOTE — PROGRESS NOTES
Spoke to patient at length regarding revlimid.  Patient states that he is out of medication and should be starting the next cycle today.  Patient states that he reached out to [facility] and they state that they are missing information.  Called [facility] spoke to Samir who states that they are missing information, informed macario that I verbally gave this information to Erlinda yesterday, upon looking macario states that she does see this and she will escalate this to management but that it could take another 48 hours to complete.  Expressed need for expedition due to patient being completely out.  Macario verbalizes understanding.  Spoke to patient to update on this.  Patient verbalizes understanding.

## 2024-02-01 ENCOUNTER — CLINICAL DOCUMENTATION (OUTPATIENT)
Facility: HOSPITAL | Age: 69
End: 2024-02-01

## 2024-02-01 DIAGNOSIS — E85.81 LIGHT CHAIN (AL) AMYLOIDOSIS (HCC): ICD-10-CM

## 2024-02-01 RX ORDER — LENALIDOMIDE 2.5 MG/1
2.5 CAPSULE ORAL DAILY
Qty: 21 CAPSULE | Refills: 0 | Status: SHIPPED | OUTPATIENT
Start: 2024-02-01 | End: 2024-02-22

## 2024-02-01 NOTE — PROGRESS NOTES
Spoke to [facility] who state that patient is denied for free drug due to having to spend 3% of gross income on prescriptions.  Spoke to Tamara Mcguire at [facility] who states that patients co pay would be around $4100 which is over 3% of patients income.  Called [facility] back and inquired about this to which they stated that we could appeal with a letter of medical Necessity and a copy of the claim stating how much medication cost would be but that there is no guarantee that patient would be approved.  Will update

## 2024-02-09 ENCOUNTER — TELEPHONE (OUTPATIENT)
Dept: INFUSION THERAPY | Age: 69
End: 2024-02-09

## 2024-02-09 NOTE — TELEPHONE ENCOUNTER
Per Arely MANDEL, patient's urgent appeal for his Revlimid free drug application is still in progress due to his co-pay of $4100. Voiced understanding and await a determination.

## 2024-02-19 ENCOUNTER — OFFICE VISIT (OUTPATIENT)
Dept: ONCOLOGY | Age: 69
End: 2024-02-19
Payer: MEDICARE

## 2024-02-19 VITALS
HEART RATE: 78 BPM | BODY MASS INDEX: 22.57 KG/M2 | HEIGHT: 69 IN | WEIGHT: 152.4 LBS | SYSTOLIC BLOOD PRESSURE: 136 MMHG | TEMPERATURE: 97.6 F | DIASTOLIC BLOOD PRESSURE: 94 MMHG | OXYGEN SATURATION: 98 %

## 2024-02-19 DIAGNOSIS — E85.81 LIGHT CHAIN (AL) AMYLOIDOSIS (HCC): Primary | ICD-10-CM

## 2024-02-19 PROCEDURE — 99212 OFFICE O/P EST SF 10 MIN: CPT

## 2024-02-19 RX ORDER — VITAMIN B COMPLEX
1 CAPSULE ORAL DAILY
COMMUNITY

## 2024-02-19 RX ORDER — GABAPENTIN 300 MG/1
300 CAPSULE ORAL 2 TIMES DAILY
Qty: 180 CAPSULE | Refills: 1 | Status: SHIPPED | OUTPATIENT
Start: 2024-02-19 | End: 2024-08-17

## 2024-02-19 NOTE — PROGRESS NOTES
[facility]  [address] [place] [address]        Pt Name: Danny Tadeo  YOB: 1955  Date of evaluation: 2/19/2024  Primary Care Physician: Rik Rose MD  Reason for evaluation:   Chief Complaint   Patient presents with    Discuss Labs    Follow-up     myelofibrosis            Subjective:   Here for follow-up.          Follows at ;  S/P Stem Cell Transplant November 22,2021.         OBJECTIVE:  VITALS:  height is 1.753 m (5' 9\") and weight is 69.1 kg (152 lb 6.4 oz). His temperature is 97.6 °F (36.4 °C). His blood pressure is 136/94 (abnormal) and his pulse is 78. His oxygen saturation is 98%.   Physical Exam:  Performance Status: 0  Well developed, well nourished male  General: AAO to person, place, time, cooperative, in no acute distress.  Head and neck: PERRLA, EOMI. Sclera non icteric.  Oropharynx:  Clear.  Neck: no JVD,  no adenopathy.  Heart: Regular rate and rhythm, no murmur.  Lungs: Clear to auscultation.   Abdomen: Soft, non-tender;no masses, no organomegaly.  Extremities: No edema.   Neurologic:Cranial nerves grossly intact. No focal motor or sensory deficits.   Skin:  No rash.        Medications  Prior to Admission medications    Medication Sig Start Date End Date Taking? Authorizing Provider   b complex vitamins capsule Take 1 capsule by mouth daily   Yes Roberto Del Real MD   Cyanocobalamin (VITAMIN B12 PO) Take 1 each by mouth daily   Yes Roberto Del Real MD   gabapentin (NEURONTIN) 300 MG capsule Take 1 capsule by mouth nightly for 180 days. Intended supply: 90 days 11/22/23 5/20/24 Yes Yoan Monroy MD   Coenzyme Q10 (COQ10) 100 MG CAPS Take by mouth   Yes Roberto Del Real MD   VITAMIN E PO Take 1 capsule by mouth daily   Yes Roberto Del Real MD   Multiple Vitamins-Minerals (THERAPEUTIC MULTIVITAMIN-MINERALS) tablet Take 1 tablet by mouth daily   Yes Roberto Del Real MD   Ascorbic Acid (VITAMIN C PO) Take 1 tablet by mouth

## 2024-02-29 ENCOUNTER — TELEPHONE (OUTPATIENT)
Dept: INFUSION THERAPY | Age: 69
End: 2024-02-29

## 2024-02-29 NOTE — TELEPHONE ENCOUNTER
Vinicius, from [facility] returned call and they are unable to assist with patient's co-pay for his Revlimid. Voiced understanding.    What Type Of Note Output Would You Prefer (Optional)?: Standard Output How Severe Is Your Skin Lesion?: moderate Has Your Skin Lesion Been Treated?: not been treated Is This A New Presentation, Or A Follow-Up?: Growths

## 2024-03-19 ENCOUNTER — HOSPITAL ENCOUNTER (OUTPATIENT)
Dept: INFUSION THERAPY | Age: 69
Discharge: HOME | End: 2024-03-19
Payer: MEDICARE

## 2024-03-19 ENCOUNTER — CLINICAL DOCUMENTATION (OUTPATIENT)
Facility: HOSPITAL | Age: 69
End: 2024-03-19

## 2024-03-19 DIAGNOSIS — E85.81 LIGHT CHAIN (AL) AMYLOIDOSIS (HCC): ICD-10-CM

## 2024-03-19 LAB
ALBUMIN SERPL-MCNC: 4 G/DL (ref 3.5–5.2)
ALP SERPL-CCNC: 87 U/L (ref 40–129)
ALT SERPL-CCNC: 20 U/L (ref 0–40)
ANION GAP SERPL CALCULATED.3IONS-SCNC: 6 MMOL/L (ref 7–16)
AST SERPL-CCNC: 24 U/L (ref 0–39)
BASOPHILS # BLD: 0.04 K/UL (ref 0–0.2)
BASOPHILS NFR BLD: 1 % (ref 0–2)
BILIRUB SERPL-MCNC: 0.4 MG/DL (ref 0–1.2)
BUN SERPL-MCNC: 19 MG/DL (ref 6–23)
CALCIUM SERPL-MCNC: 9.2 MG/DL (ref 8.6–10.2)
CHLORIDE SERPL-SCNC: 105 MMOL/L (ref 98–107)
CO2 SERPL-SCNC: 30 MMOL/L (ref 22–29)
CREAT SERPL-MCNC: 1.1 MG/DL (ref 0.7–1.2)
EOSINOPHIL # BLD: 0.1 K/UL (ref 0.05–0.5)
EOSINOPHILS RELATIVE PERCENT: 2 % (ref 0–6)
ERYTHROCYTE [DISTWIDTH] IN BLOOD BY AUTOMATED COUNT: 12.9 % (ref 11.5–15)
GFR SERPL CREATININE-BSD FRML MDRD: >60 ML/MIN/1.73M2
GLUCOSE SERPL-MCNC: 73 MG/DL (ref 74–99)
HCT VFR BLD AUTO: 42.3 % (ref 37–54)
HGB BLD-MCNC: 14.2 G/DL (ref 12.5–16.5)
IMM GRANULOCYTES # BLD AUTO: <0.03 K/UL (ref 0–0.58)
IMM GRANULOCYTES NFR BLD: 0 % (ref 0–5)
LDH SERPL-CCNC: 194 U/L (ref 135–225)
LYMPHOCYTES NFR BLD: 1.68 K/UL (ref 1.5–4)
LYMPHOCYTES RELATIVE PERCENT: 37 % (ref 20–42)
MCH RBC QN AUTO: 30.8 PG (ref 26–35)
MCHC RBC AUTO-ENTMCNC: 33.6 G/DL (ref 32–34.5)
MCV RBC AUTO: 91.8 FL (ref 80–99.9)
MONOCYTES NFR BLD: 0.49 K/UL (ref 0.1–0.95)
MONOCYTES NFR BLD: 11 % (ref 2–12)
NEUTROPHILS NFR BLD: 49 % (ref 43–80)
NEUTS SEG NFR BLD: 2.25 K/UL (ref 1.8–7.3)
PLATELET # BLD AUTO: 188 K/UL (ref 130–450)
PMV BLD AUTO: 9 FL (ref 7–12)
POTASSIUM SERPL-SCNC: 4.2 MMOL/L (ref 3.5–5)
PROT SERPL-MCNC: 6.9 G/DL (ref 6.4–8.3)
RBC # BLD AUTO: 4.61 M/UL (ref 3.8–5.8)
SODIUM SERPL-SCNC: 141 MMOL/L (ref 132–146)
WBC OTHER # BLD: 4.6 K/UL (ref 4.5–11.5)

## 2024-03-19 PROCEDURE — 80053 COMPREHEN METABOLIC PANEL: CPT

## 2024-03-19 PROCEDURE — 84165 PROTEIN E-PHORESIS SERUM: CPT

## 2024-03-19 PROCEDURE — 83521 IG LIGHT CHAINS FREE EACH: CPT

## 2024-03-19 PROCEDURE — 36415 COLL VENOUS BLD VENIPUNCTURE: CPT

## 2024-03-19 PROCEDURE — 85025 COMPLETE CBC W/AUTO DIFF WBC: CPT

## 2024-03-19 PROCEDURE — 83615 LACTATE (LD) (LDH) ENZYME: CPT

## 2024-03-19 PROCEDURE — 84155 ASSAY OF PROTEIN SERUM: CPT

## 2024-03-19 PROCEDURE — 86334 IMMUNOFIX E-PHORESIS SERUM: CPT

## 2024-03-19 PROCEDURE — 82784 ASSAY IGA/IGD/IGG/IGM EACH: CPT

## 2024-03-19 NOTE — PROGRESS NOTES
Patient Assistance    Met with: Spoke with patient about revilmid script,  is out of office will have NP send over new script.                Additional notes:

## 2024-03-20 ENCOUNTER — OFFICE VISIT (OUTPATIENT)
Dept: ONCOLOGY | Age: 69
End: 2024-03-20
Payer: MEDICARE

## 2024-03-20 VITALS
DIASTOLIC BLOOD PRESSURE: 92 MMHG | OXYGEN SATURATION: 99 % | WEIGHT: 154.5 LBS | SYSTOLIC BLOOD PRESSURE: 124 MMHG | HEART RATE: 78 BPM | BODY MASS INDEX: 22.82 KG/M2 | TEMPERATURE: 97.3 F

## 2024-03-20 DIAGNOSIS — E85.81 LIGHT CHAIN (AL) AMYLOIDOSIS (HCC): Primary | ICD-10-CM

## 2024-03-20 LAB
ALBUMIN SERPL-MCNC: 3.4 G/DL (ref 3.5–4.7)
ALPHA1 GLOB SERPL ELPH-MCNC: 0.2 G/DL (ref 0.2–0.4)
ALPHA2 GLOB SERPL ELPH-MCNC: 0.7 G/DL (ref 0.5–1)
B-GLOBULIN SERPL ELPH-MCNC: 1.1 G/DL (ref 0.8–1.3)
GAMMA GLOB SERPL ELPH-MCNC: 1 G/DL (ref 0.7–1.6)
IGA SERPL-MCNC: 204 MG/DL (ref 70–400)
IGG SERPL-MCNC: 933 MG/DL (ref 700–1600)
IGM SERPL-MCNC: 43 MG/DL (ref 40–230)
INTERPRETATION SERPL IFE-IMP: NORMAL
PATH REV: NORMAL
PATHOLOGIST: ABNORMAL
PROT PATTERN SERPL ELPH-IMP: ABNORMAL
PROT SERPL-MCNC: 6.4 G/DL (ref 6.4–8.3)

## 2024-03-20 PROCEDURE — 99212 OFFICE O/P EST SF 10 MIN: CPT

## 2024-03-20 NOTE — PROGRESS NOTES
[facility]  [address] [place] [address].   [place] [address]        Pt Name: Danny Tadeo  YOB: 1955  Date of evaluation: 3/20/2024  Primary Care Physician: Rik Rose MD  Reason for evaluation:   No chief complaint on file.           Subjective:   Here for follow-up.          Follows at ;  S/P Stem Cell Transplant November 22,2021.         OBJECTIVE:  VITALS:  weight is 70.1 kg (154 lb 8 oz). His temperature is 97.3 °F (36.3 °C). His blood pressure is 124/92 (abnormal) and his pulse is 78. His oxygen saturation is 99%.   Physical Exam:  Performance Status: 0  Well developed, well nourished male  General: AAO to person, place, time, cooperative, in no acute distress.  Head and neck: PERRLA, EOMI. Sclera non icteric.  Oropharynx:  Clear.  Neck: no JVD,  no adenopathy.  Heart: Regular rate and rhythm, no murmur.  Lungs: Clear to auscultation.   Abdomen: Soft, non-tender;no masses, no organomegaly.  Extremities: No edema.   Neurologic:Cranial nerves grossly intact. No focal motor or sensory deficits.   Skin:  No rash.        Medications  Prior to Admission medications    Medication Sig Start Date End Date Taking? Authorizing Provider   b complex vitamins capsule Take 1 capsule by mouth daily    ProviderRoberto MD   gabapentin (NEURONTIN) 300 MG capsule Take 1 capsule by mouth 2 times daily for 180 days. Intended supply: 90 days 2/19/24 8/17/24  Yoan Monroy MD   lenalidomide (REVLIMID) 2.5 MG chemo capsule Take 1 capsule by mouth daily for 21 days THEN  OFF 7 Days  28 day Cycle  ADULT MALE  AUTH# [id number]  Patient not taking: Reported on 2/19/2024 2/1/24 2/22/24  Brenda Berg, APRN - CNP   Cyanocobalamin (VITAMIN B12 PO) Take 1 each by mouth daily    ProviderRoberto MD   gabapentin (NEURONTIN) 300 MG capsule Take 1 capsule by mouth nightly for 180 days. Intended supply: 90 days 11/22/23 5/20/24  Yoan Monroy MD   azelastine (ASTELIN) 0.1 % nasal spray 1 spray by

## 2024-03-22 LAB
FREE KAPPA/LAMBDA RATIO: 1.25 (ref 0.22–1.74)
KAPPA LC FREE SER-MCNC: 18.8 MG/L
LAMBDA LC FREE SERPL-MCNC: 15.1 MG/L (ref 4.2–27.7)

## 2024-04-16 ENCOUNTER — CLINICAL DOCUMENTATION (OUTPATIENT)
Facility: HOSPITAL | Age: 69
End: 2024-04-16

## 2024-04-16 NOTE — PROGRESS NOTES
[place] [facility] [place] spoke to Anjelica who states that patient was approved on 3/11/2024-12/31/2025 Anjelica states that [facility] pharmacy would be the dispensing pharmacy.  Cover my meds in [place] states that patient is not on a program but Anjelica states that the cover my meds we need to be using is in [place].  [facility] notified.

## 2024-06-04 ENCOUNTER — CLINICAL DOCUMENTATION (OUTPATIENT)
Facility: HOSPITAL | Age: 69
End: 2024-06-04

## 2024-06-04 NOTE — PROGRESS NOTES
Patient calls in and states that he needs a reorder on his revlimid.  Information forwarded to [facility].

## 2024-06-10 DIAGNOSIS — E78.5 HYPERLIPIDEMIA, UNSPECIFIED HYPERLIPIDEMIA TYPE: Primary | ICD-10-CM

## 2024-06-10 SDOH — ECONOMIC STABILITY: FOOD INSECURITY: WITHIN THE PAST 12 MONTHS, THE FOOD YOU BOUGHT JUST DIDN'T LAST AND YOU DIDN'T HAVE MONEY TO GET MORE.: NEVER TRUE

## 2024-06-10 SDOH — ECONOMIC STABILITY: FOOD INSECURITY: WITHIN THE PAST 12 MONTHS, YOU WORRIED THAT YOUR FOOD WOULD RUN OUT BEFORE YOU GOT MONEY TO BUY MORE.: NEVER TRUE

## 2024-06-10 SDOH — ECONOMIC STABILITY: INCOME INSECURITY: HOW HARD IS IT FOR YOU TO PAY FOR THE VERY BASICS LIKE FOOD, HOUSING, MEDICAL CARE, AND HEATING?: NOT HARD AT ALL

## 2024-06-10 ASSESSMENT — PATIENT HEALTH QUESTIONNAIRE - PHQ9
SUM OF ALL RESPONSES TO PHQ9 QUESTIONS 1 & 2: 0
SUM OF ALL RESPONSES TO PHQ QUESTIONS 1-9: 0
SUM OF ALL RESPONSES TO PHQ9 QUESTIONS 1 & 2: 0
2. FEELING DOWN, DEPRESSED OR HOPELESS: NOT AT ALL
2. FEELING DOWN, DEPRESSED OR HOPELESS: NOT AT ALL
SUM OF ALL RESPONSES TO PHQ QUESTIONS 1-9: 0
1. LITTLE INTEREST OR PLEASURE IN DOING THINGS: NOT AT ALL
1. LITTLE INTEREST OR PLEASURE IN DOING THINGS: NOT AT ALL

## 2024-06-12 ENCOUNTER — HOSPITAL ENCOUNTER (OUTPATIENT)
Age: 69
Discharge: HOME | End: 2024-06-12
Payer: MEDICARE

## 2024-06-12 DIAGNOSIS — E78.5 HYPERLIPIDEMIA, UNSPECIFIED HYPERLIPIDEMIA TYPE: ICD-10-CM

## 2024-06-12 LAB
CHOLEST SERPL-MCNC: 375 MG/DL
HDLC SERPL-MCNC: 62 MG/DL
LDLC SERPL CALC-MCNC: 289 MG/DL
TRIGL SERPL-MCNC: 120 MG/DL
VLDLC SERPL CALC-MCNC: 24 MG/DL

## 2024-06-12 PROCEDURE — 83721 ASSAY OF BLOOD LIPOPROTEIN: CPT

## 2024-06-12 PROCEDURE — 80061 LIPID PANEL: CPT

## 2024-06-12 PROCEDURE — 36415 COLL VENOUS BLD VENIPUNCTURE: CPT

## 2024-06-13 ENCOUNTER — OFFICE VISIT (OUTPATIENT)
Dept: FAMILY MEDICINE CLINIC | Age: 69
End: 2024-06-13
Payer: MEDICARE

## 2024-06-13 VITALS
SYSTOLIC BLOOD PRESSURE: 112 MMHG | OXYGEN SATURATION: 97 % | BODY MASS INDEX: 23.62 KG/M2 | WEIGHT: 159.5 LBS | HEIGHT: 69 IN | HEART RATE: 88 BPM | RESPIRATION RATE: 18 BRPM | DIASTOLIC BLOOD PRESSURE: 72 MMHG | TEMPERATURE: 97 F

## 2024-06-13 DIAGNOSIS — K11.7 XEROSTOMIA: Primary | ICD-10-CM

## 2024-06-13 DIAGNOSIS — C44.90 SKIN CANCER: ICD-10-CM

## 2024-06-13 DIAGNOSIS — E78.5 HYPERLIPIDEMIA, UNSPECIFIED HYPERLIPIDEMIA TYPE: Chronic | ICD-10-CM

## 2024-06-13 DIAGNOSIS — Z78.9 STATIN INTOLERANCE: ICD-10-CM

## 2024-06-13 PROCEDURE — G2211 COMPLEX E/M VISIT ADD ON: HCPCS | Performed by: FAMILY MEDICINE

## 2024-06-13 PROCEDURE — 99214 OFFICE O/P EST MOD 30 MIN: CPT | Performed by: FAMILY MEDICINE

## 2024-06-13 PROCEDURE — G8420 CALC BMI NORM PARAMETERS: HCPCS | Performed by: FAMILY MEDICINE

## 2024-06-13 PROCEDURE — G8427 DOCREV CUR MEDS BY ELIG CLIN: HCPCS | Performed by: FAMILY MEDICINE

## 2024-06-13 PROCEDURE — 1036F TOBACCO NON-USER: CPT | Performed by: FAMILY MEDICINE

## 2024-06-13 PROCEDURE — 1123F ACP DISCUSS/DSCN MKR DOCD: CPT | Performed by: FAMILY MEDICINE

## 2024-06-13 PROCEDURE — 3017F COLORECTAL CA SCREEN DOC REV: CPT | Performed by: FAMILY MEDICINE

## 2024-06-13 RX ORDER — PILOCARPINE HYDROCHLORIDE 5 MG/1
5 TABLET, FILM COATED ORAL 3 TIMES DAILY
Qty: 90 TABLET | Refills: 1 | Status: SHIPPED | OUTPATIENT
Start: 2024-06-13

## 2024-06-13 RX ORDER — NIACINAMIDE 500 MG
500 TABLET ORAL 2 TIMES DAILY WITH MEALS
Qty: 30 TABLET | Refills: 3 | Status: SHIPPED | OUTPATIENT
Start: 2024-06-13

## 2024-06-13 NOTE — PROGRESS NOTES
Danny is a 69 y.o. male who presents today for   Chief Complaint   Patient presents with    6 Month Follow-Up     Patient presents today for a 6 month follow up.       HPI: 67yo male w/ PMHX Light chain Amyloidosis presenting for followup.     1) Cholesterol - intolerant of statins discussed management at length and med options.     2) Skin cancer - excision via mohs surgery. Likely SCC. Hasn't been taking nicotinamide yet.     3) Dry Mouth - likely relates to revlimid. Had improvement while off medication. Use biotene mouthwash daily, sees dentist regularly. Interested in medication if would improve.     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    Situational syncope 12/2/2010       Current Outpatient Medications   Medication Instructions    Ascorbic Acid (VITAMIN C PO) 1 tablet, Oral, DAILY    aspirin 81 mg, Oral, DAILY    b complex vitamins capsule 1 capsule, Oral, DAILY    Coenzyme Q10 (COQ10) 100 MG CAPS Oral    Cyanocobalamin (VITAMIN B12 PO) 1 each, Oral, DAILY    fexofenadine-pseudoephedrine (ALLEGRA-D 12 HOUR)  MG per tablet 1 tablet, Oral, 2 TIMES DAILY PRN    gabapentin (NEURONTIN) 300 mg, Oral, 2 TIMES DAILY, Intended supply: 90 days    Multiple Vitamins-Minerals (THERAPEUTIC MULTIVITAMIN-MINERALS) tablet 1 tablet, Oral, DAILY    VITAMIN D PO 1,000 Units, Oral, NIGHTLY    VITAMIN E PO 1 capsule, Oral, DAILY       Allergies   Allergen Reactions    Codeine Itching    Lipitor      Muscle cramps    Vytorin [Ezetimibe-Simvastatin]      Muscle cramps         Family History   Problem Relation Age of Onset    Cancer Mother         breast    Heart Disease Mother     Heart Disease Father     Stroke Father        Past Surgical History:   Procedure Laterality Date    CT BIOPSY RENAL  2/15/2021    CT BIOPSY RENAL 2/15/2021 Christin BYRD

## 2024-06-14 LAB — LDLC SERPL DIRECT ASSAY-MCNC: 295 MG/DL

## 2024-06-18 ENCOUNTER — HOSPITAL ENCOUNTER (OUTPATIENT)
Dept: INFUSION THERAPY | Age: 69
Discharge: HOME | End: 2024-06-18
Payer: MEDICARE

## 2024-06-18 DIAGNOSIS — E85.81 LIGHT CHAIN (AL) AMYLOIDOSIS (HCC): ICD-10-CM

## 2024-06-18 LAB
ALBUMIN SERPL-MCNC: 3.7 G/DL (ref 3.5–5.2)
ALP SERPL-CCNC: 89 U/L (ref 40–129)
ALT SERPL-CCNC: 20 U/L (ref 0–40)
ANION GAP SERPL CALCULATED.3IONS-SCNC: 8 MMOL/L (ref 7–16)
AST SERPL-CCNC: 25 U/L (ref 0–39)
BASOPHILS # BLD: 0.1 K/UL (ref 0–0.2)
BASOPHILS NFR BLD: 2 % (ref 0–2)
BILIRUB SERPL-MCNC: 0.3 MG/DL (ref 0–1.2)
BUN SERPL-MCNC: 18 MG/DL (ref 6–23)
CALCIUM SERPL-MCNC: 9.2 MG/DL (ref 8.6–10.2)
CHLORIDE SERPL-SCNC: 106 MMOL/L (ref 98–107)
CO2 SERPL-SCNC: 25 MMOL/L (ref 22–29)
CREAT SERPL-MCNC: 1 MG/DL (ref 0.7–1.2)
EOSINOPHIL # BLD: 0.45 K/UL (ref 0.05–0.5)
EOSINOPHILS RELATIVE PERCENT: 10 % (ref 0–6)
ERYTHROCYTE [DISTWIDTH] IN BLOOD BY AUTOMATED COUNT: 12.4 % (ref 11.5–15)
GFR, ESTIMATED: 82 ML/MIN/1.73M2
GLUCOSE SERPL-MCNC: 78 MG/DL (ref 74–99)
HCT VFR BLD AUTO: 41.2 % (ref 37–54)
HGB BLD-MCNC: 13.7 G/DL (ref 12.5–16.5)
IMM GRANULOCYTES # BLD AUTO: <0.03 K/UL (ref 0–0.58)
IMM GRANULOCYTES NFR BLD: 0 % (ref 0–5)
LDH SERPL-CCNC: 196 U/L (ref 135–225)
LYMPHOCYTES NFR BLD: 1.2 K/UL (ref 1.5–4)
LYMPHOCYTES RELATIVE PERCENT: 28 % (ref 20–42)
MCH RBC QN AUTO: 30.4 PG (ref 26–35)
MCHC RBC AUTO-ENTMCNC: 33.3 G/DL (ref 32–34.5)
MCV RBC AUTO: 91.4 FL (ref 80–99.9)
MONOCYTES NFR BLD: 0.76 K/UL (ref 0.1–0.95)
MONOCYTES NFR BLD: 18 % (ref 2–12)
NEUTROPHILS NFR BLD: 42 % (ref 43–80)
NEUTS SEG NFR BLD: 1.8 K/UL (ref 1.8–7.3)
PLATELET # BLD AUTO: 207 K/UL (ref 130–450)
PMV BLD AUTO: 9.1 FL (ref 7–12)
POTASSIUM SERPL-SCNC: 4.3 MMOL/L (ref 3.5–5)
PROT SERPL-MCNC: 6.4 G/DL (ref 6.4–8.3)
RBC # BLD AUTO: 4.51 M/UL (ref 3.8–5.8)
SODIUM SERPL-SCNC: 139 MMOL/L (ref 132–146)
WBC OTHER # BLD: 4.3 K/UL (ref 4.5–11.5)

## 2024-06-18 PROCEDURE — 85025 COMPLETE CBC W/AUTO DIFF WBC: CPT

## 2024-06-18 PROCEDURE — 80053 COMPREHEN METABOLIC PANEL: CPT

## 2024-06-18 PROCEDURE — 84165 PROTEIN E-PHORESIS SERUM: CPT

## 2024-06-18 PROCEDURE — 83615 LACTATE (LD) (LDH) ENZYME: CPT

## 2024-06-18 PROCEDURE — 84155 ASSAY OF PROTEIN SERUM: CPT

## 2024-06-18 PROCEDURE — 36415 COLL VENOUS BLD VENIPUNCTURE: CPT

## 2024-06-18 PROCEDURE — 83521 IG LIGHT CHAINS FREE EACH: CPT

## 2024-06-18 PROCEDURE — 86334 IMMUNOFIX E-PHORESIS SERUM: CPT

## 2024-06-18 PROCEDURE — 82784 ASSAY IGA/IGD/IGG/IGM EACH: CPT

## 2024-06-19 ENCOUNTER — OFFICE VISIT (OUTPATIENT)
Dept: ONCOLOGY | Age: 69
End: 2024-06-19
Payer: MEDICARE

## 2024-06-19 VITALS
BODY MASS INDEX: 22.74 KG/M2 | TEMPERATURE: 97.4 F | DIASTOLIC BLOOD PRESSURE: 66 MMHG | OXYGEN SATURATION: 95 % | SYSTOLIC BLOOD PRESSURE: 107 MMHG | HEART RATE: 95 BPM | WEIGHT: 154 LBS

## 2024-06-19 DIAGNOSIS — E85.81 LIGHT CHAIN (AL) AMYLOIDOSIS (HCC): Primary | ICD-10-CM

## 2024-06-19 LAB
ALBUMIN SERPL-MCNC: 2.8 G/DL (ref 3.5–4.7)
ALPHA1 GLOB SERPL ELPH-MCNC: 0.2 G/DL (ref 0.2–0.4)
ALPHA2 GLOB SERPL ELPH-MCNC: 0.8 G/DL (ref 0.5–1)
B-GLOBULIN SERPL ELPH-MCNC: 1.1 G/DL (ref 0.8–1.3)
GAMMA GLOB SERPL ELPH-MCNC: 1 G/DL (ref 0.7–1.6)
IGA SERPL-MCNC: 198 MG/DL (ref 70–400)
IGG SERPL-MCNC: 927 MG/DL (ref 700–1600)
IGM SERPL-MCNC: 45 MG/DL (ref 40–230)
INTERPRETATION SERPL IFE-IMP: NORMAL
PATH REV: NORMAL
PATHOLOGIST: ABNORMAL
PROT PATTERN SERPL ELPH-IMP: ABNORMAL
PROT SERPL-MCNC: 5.9 G/DL (ref 6.4–8.3)

## 2024-06-19 PROCEDURE — 99212 OFFICE O/P EST SF 10 MIN: CPT

## 2024-06-19 NOTE — PROGRESS NOTES
marrow biopsy in September.    8/23/23  Very well.  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  His dose has been reduced to 2.5 mg daily 3 weeks on and 1 week off and he is tolerating it much better without any rash diarrhea or myalgias.  He continues on aspirin 81 mg daily for DVT prophylaxis.  His most recent myeloma panel had shown ongoing remission without M spike.  FLC's and ratio are still pending.  He is scheduled in October to have a bone marrow aspirate and biopsy at [facility] for MRD testing.  He will be given a trial of montelukast for his asthma in addition to his Allegra as needed    11/22/23  Despite the fact that his Revlimid has been reduced to 2.5 mg he continues to experience side effects including skin rash, tingling and numbness in his fingers and toes as well as muscle cramps.  He continues on aspirin 81 mg daily for DVT prophylaxis.  His most recent myeloma panel had shown ongoing remission without M spike.  FLC's and the ratio were in the normal range.  He had a bone marrow aspirate and biopsy at [facility] and apparently his MRD was very low.  He has completed his immunizations.  He will continue on present dose of Revlimid.  He is to see dermatology for his recurrent skin rash.  He will be given a trial of gabapentin at bedtime for his peripheral neuropathy.    2/19/2024  Patient has been unable to refill his Revlimid which has been denied by his insurance.  He had been on low-dose due to intolerance at 2.5 mg.  He was experiencing tingling numbness in his fingers as well as skin rash and muscle cramps.  His most recent myeloma panel had shown ongoing remission without M spike.  FLC's and ratio were in the normal range.  He had a bone marrow aspirate and biopsy at [facility] and apparently his MRD was very low.  He had completed his posttransplant immunization.  His denial to be

## 2024-06-20 LAB
FREE KAPPA/LAMBDA RATIO: 1.21 (ref 0.22–1.74)
KAPPA LC FREE SER-MCNC: 27 MG/L
LAMBDA LC FREE SERPL-MCNC: 22.4 MG/L (ref 4.2–27.7)

## 2024-06-25 ENCOUNTER — TELEPHONE (OUTPATIENT)
Dept: FAMILY MEDICINE CLINIC | Age: 69
End: 2024-06-25

## 2024-06-25 NOTE — TELEPHONE ENCOUNTER
Pt spouse called to state insurance won't pay for cholesterol medication.  They have appealed it, and insurance still won't pay for it.  Pt would like to talk about changing medication

## 2024-07-01 ENCOUNTER — TELEPHONE (OUTPATIENT)
Dept: FAMILY MEDICINE CLINIC | Age: 69
End: 2024-07-01

## 2024-07-01 RX ORDER — ICOSAPENT ETHYL 0.5 G/1
2 CAPSULE ORAL 2 TIMES DAILY
Qty: 240 CAPSULE | Refills: 2 | Status: SHIPPED | OUTPATIENT
Start: 2024-07-01

## 2024-07-01 RX ORDER — EZETIMIBE 10 MG/1
10 TABLET ORAL DAILY
Qty: 90 TABLET | Refills: 3 | Status: SHIPPED | OUTPATIENT
Start: 2024-07-01

## 2024-07-01 NOTE — TELEPHONE ENCOUNTER
Reviewed insurance noncoverage of nexlitol. Trial vascepa/zetia. Reviewed SE   F/u 3 mos labs    Wife tolerating statin, maybe low grade joint.muscle aches, she will mnitor and update. Benjamín

## 2024-07-08 ENCOUNTER — CLINICAL DOCUMENTATION (OUTPATIENT)
Facility: HOSPITAL | Age: 69
End: 2024-07-08

## 2024-07-08 NOTE — PROGRESS NOTES
Pt called and lm 2x regarding needing a new Rx for his revlimid to be sent in, I called over to [facility] and marisela Gomez she stated that this was a [facility] patient and that she would send the message over to them, I explained that the pt was wanting a call to confirm that the Rx was indeed handled. She was going to pass that along.

## 2024-07-10 ENCOUNTER — TELEPHONE (OUTPATIENT)
Dept: INFUSION THERAPY | Age: 69
End: 2024-07-10

## 2024-07-10 NOTE — TELEPHONE ENCOUNTER
Called patient and his wife, with regards to patient's Revlimid. They stated they spoke to his pharmacy and this med is due to arrive today. Voiced understanding. If patient and or family have any further concerns they may return call to [phone].

## 2024-08-08 ENCOUNTER — APPOINTMENT (OUTPATIENT)
Dept: GENERAL RADIOLOGY | Age: 69
End: 2024-08-08
Payer: MEDICARE

## 2024-08-08 ENCOUNTER — APPOINTMENT (OUTPATIENT)
Dept: CT IMAGING | Age: 69
End: 2024-08-08
Payer: MEDICARE

## 2024-08-08 ENCOUNTER — HOSPITAL ENCOUNTER (EMERGENCY)
Age: 69
Discharge: HOME | End: 2024-08-09
Attending: EMERGENCY MEDICINE
Payer: MEDICARE

## 2024-08-08 DIAGNOSIS — S00.81XA FOREHEAD ABRASION, INITIAL ENCOUNTER: ICD-10-CM

## 2024-08-08 DIAGNOSIS — S01.511A LIP LACERATION, INITIAL ENCOUNTER: ICD-10-CM

## 2024-08-08 DIAGNOSIS — R55 SYNCOPE AND COLLAPSE: Primary | ICD-10-CM

## 2024-08-08 LAB
ANION GAP SERPL CALCULATED.3IONS-SCNC: 6 MMOL/L (ref 7–16)
BASOPHILS # BLD: 0.05 K/UL (ref 0–0.2)
BASOPHILS NFR BLD: 1 % (ref 0–2)
BUN SERPL-MCNC: 20 MG/DL (ref 6–23)
CALCIUM SERPL-MCNC: 8.1 MG/DL (ref 8.6–10.2)
CHLORIDE SERPL-SCNC: 105 MMOL/L (ref 98–107)
CO2 SERPL-SCNC: 25 MMOL/L (ref 22–29)
CREAT SERPL-MCNC: 1.1 MG/DL (ref 0.7–1.2)
D-DIMER QUANTITATIVE: 286 NG/ML DDU (ref 0–230)
EOSINOPHIL # BLD: 0.13 K/UL (ref 0.05–0.5)
EOSINOPHILS RELATIVE PERCENT: 3 % (ref 0–6)
ERYTHROCYTE [DISTWIDTH] IN BLOOD BY AUTOMATED COUNT: 12.8 % (ref 11.5–15)
GFR, ESTIMATED: 72 ML/MIN/1.73M2
GLUCOSE SERPL-MCNC: 112 MG/DL (ref 74–99)
HCT VFR BLD AUTO: 39.5 % (ref 37–54)
HGB BLD-MCNC: 13.3 G/DL (ref 12.5–16.5)
IMM GRANULOCYTES # BLD AUTO: <0.03 K/UL (ref 0–0.58)
IMM GRANULOCYTES NFR BLD: 0 % (ref 0–5)
LYMPHOCYTES NFR BLD: 1.15 K/UL (ref 1.5–4)
LYMPHOCYTES RELATIVE PERCENT: 29 % (ref 20–42)
MCH RBC QN AUTO: 30.6 PG (ref 26–35)
MCHC RBC AUTO-ENTMCNC: 33.7 G/DL (ref 32–34.5)
MCV RBC AUTO: 91 FL (ref 80–99.9)
MONOCYTES NFR BLD: 0.12 K/UL (ref 0.1–0.95)
MONOCYTES NFR BLD: 3 % (ref 2–12)
NEUTROPHILS NFR BLD: 63 % (ref 43–80)
NEUTS SEG NFR BLD: 2.46 K/UL (ref 1.8–7.3)
PLATELET # BLD AUTO: 150 K/UL (ref 130–450)
PMV BLD AUTO: 8.7 FL (ref 7–12)
POTASSIUM SERPL-SCNC: 3.9 MMOL/L (ref 3.5–5)
RBC # BLD AUTO: 4.34 M/UL (ref 3.8–5.8)
SODIUM SERPL-SCNC: 136 MMOL/L (ref 132–146)
TROPONIN I SERPL HS-MCNC: 9 NG/L (ref 0–11)
WBC OTHER # BLD: 3.9 K/UL (ref 4.5–11.5)

## 2024-08-08 PROCEDURE — 80048 BASIC METABOLIC PNL TOTAL CA: CPT

## 2024-08-08 PROCEDURE — 71045 X-RAY EXAM CHEST 1 VIEW: CPT

## 2024-08-08 PROCEDURE — 2580000003 HC RX 258: Performed by: EMERGENCY MEDICINE

## 2024-08-08 PROCEDURE — 85025 COMPLETE CBC W/AUTO DIFF WBC: CPT

## 2024-08-08 PROCEDURE — 70450 CT HEAD/BRAIN W/O DYE: CPT

## 2024-08-08 PROCEDURE — 93005 ELECTROCARDIOGRAM TRACING: CPT | Performed by: EMERGENCY MEDICINE

## 2024-08-08 PROCEDURE — 84484 ASSAY OF TROPONIN QUANT: CPT

## 2024-08-08 PROCEDURE — 99285 EMERGENCY DEPT VISIT HI MDM: CPT

## 2024-08-08 PROCEDURE — 85379 FIBRIN DEGRADATION QUANT: CPT

## 2024-08-08 PROCEDURE — 72125 CT NECK SPINE W/O DYE: CPT

## 2024-08-08 RX ORDER — 0.9 % SODIUM CHLORIDE 0.9 %
1000 INTRAVENOUS SOLUTION INTRAVENOUS ONCE
Status: COMPLETED | OUTPATIENT
Start: 2024-08-08 | End: 2024-08-09

## 2024-08-08 RX ADMIN — SODIUM CHLORIDE 1000 ML: 9 INJECTION, SOLUTION INTRAVENOUS at 23:41

## 2024-08-08 ASSESSMENT — PAIN - FUNCTIONAL ASSESSMENT: PAIN_FUNCTIONAL_ASSESSMENT: NONE - DENIES PAIN

## 2024-08-09 ENCOUNTER — APPOINTMENT (OUTPATIENT)
Dept: CT IMAGING | Age: 69
End: 2024-08-09
Payer: MEDICARE

## 2024-08-09 ENCOUNTER — TELEPHONE (OUTPATIENT)
Dept: FAMILY MEDICINE CLINIC | Age: 69
End: 2024-08-09

## 2024-08-09 VITALS
DIASTOLIC BLOOD PRESSURE: 88 MMHG | BODY MASS INDEX: 22.51 KG/M2 | OXYGEN SATURATION: 97 % | HEART RATE: 94 BPM | WEIGHT: 152 LBS | SYSTOLIC BLOOD PRESSURE: 144 MMHG | HEIGHT: 69 IN | TEMPERATURE: 98.7 F | RESPIRATION RATE: 15 BRPM

## 2024-08-09 LAB
BILIRUB UR QL STRIP: NEGATIVE
CLARITY UR: CLEAR
COLOR UR: YELLOW
GLUCOSE UR STRIP-MCNC: NEGATIVE MG/DL
HGB UR QL STRIP.AUTO: NEGATIVE
KETONES UR STRIP-MCNC: NEGATIVE MG/DL
LEUKOCYTE ESTERASE UR QL STRIP: NEGATIVE
NITRITE UR QL STRIP: NEGATIVE
PH UR STRIP: 6.5 [PH] (ref 5–9)
PROT UR STRIP-MCNC: NEGATIVE MG/DL
RBC #/AREA URNS HPF: NORMAL /HPF
SP GR UR STRIP: 1.01 (ref 1–1.03)
TROPONIN I SERPL HS-MCNC: 10 NG/L (ref 0–11)
UROBILINOGEN UR STRIP-ACNC: 0.2 EU/DL (ref 0–1)
WBC #/AREA URNS HPF: NORMAL /HPF

## 2024-08-09 PROCEDURE — 81001 URINALYSIS AUTO W/SCOPE: CPT

## 2024-08-09 PROCEDURE — 84484 ASSAY OF TROPONIN QUANT: CPT

## 2024-08-09 PROCEDURE — 6360000004 HC RX CONTRAST MEDICATION: Performed by: RADIOLOGY

## 2024-08-09 PROCEDURE — 71275 CT ANGIOGRAPHY CHEST: CPT

## 2024-08-09 RX ADMIN — IOPAMIDOL 80 ML: 755 INJECTION, SOLUTION INTRAVENOUS at 00:45

## 2024-08-09 ASSESSMENT — PAIN - FUNCTIONAL ASSESSMENT: PAIN_FUNCTIONAL_ASSESSMENT: NONE - DENIES PAIN

## 2024-08-09 NOTE — ED PROVIDER NOTES
2001    left    VENTRAL HERNIA REPAIR         CURRENTMEDICATIONS       Previous Medications    ASCORBIC ACID (VITAMIN C PO)    Take 1 tablet by mouth daily    ASPIRIN 81 MG TABLET    Take 1 tablet by mouth daily    B COMPLEX VITAMINS CAPSULE    Take 1 capsule by mouth daily    COENZYME Q10 (COQ10) 100 MG CAPS    Take by mouth    CYANOCOBALAMIN (VITAMIN B12 PO)    Take 1 each by mouth daily    EZETIMIBE (ZETIA) 10 MG TABLET    Take 1 tablet by mouth daily    FEXOFENADINE-PSEUDOEPHEDRINE (ALLEGRA-D 12 HOUR)  MG PER TABLET    Take 1 tablet by mouth 2 times daily as needed.    GABAPENTIN (NEURONTIN) 300 MG CAPSULE    Take 1 capsule by mouth 2 times daily for 180 days. Intended supply: 90 days    ICOSAPENT ETHYL (VASCEPA) 0.5 G CAPS    Take 2 g by mouth in the morning and at bedtime    MULTIPLE VITAMINS-MINERALS (THERAPEUTIC MULTIVITAMIN-MINERALS) TABLET    Take 1 tablet by mouth daily    NIACINAMIDE 500 MG TABLET    Take 1 tablet by mouth 2 times daily (with meals)    PILOCARPINE (SALAGEN) 5 MG TABLET    Take 1 tablet by mouth 3 times daily    VITAMIN D PO    Take 1,000 Units by mouth nightly    VITAMIN E PO    Take 1 capsule by mouth daily       ALLERGIES     Codeine, Lipitor, and Vytorin [ezetimibe-simvastatin]    FAMILYHISTORY       Family History   Problem Relation Age of Onset    Cancer Mother         breast    Heart Disease Mother     Heart Disease Father     Stroke Father         SOCIAL HISTORY       Social History     Tobacco Use    Smoking status: Former     Current packs/day: 0.00     Average packs/day: 0.1 packs/day for 10.0 years (1.0 ttl pk-yrs)     Types: Cigars, Cigarettes     Start date: 1980     Quit date: 1990     Years since quittin.9    Smokeless tobacco: Never   Substance Use Topics    Alcohol use: Yes     Comment: occasionally    Drug use: No       SCREENINGS        [place] Coma Scale  Eye Opening: Spontaneous  Best Verbal Response: Oriented  Best Motor Response:

## 2024-08-10 LAB
EKG ATRIAL RATE: 82 BPM
EKG P AXIS: 68 DEGREES
EKG P-R INTERVAL: 180 MS
EKG Q-T INTERVAL: 370 MS
EKG QRS DURATION: 78 MS
EKG QTC CALCULATION (BAZETT): 432 MS
EKG R AXIS: 18 DEGREES
EKG T AXIS: 58 DEGREES
EKG VENTRICULAR RATE: 82 BPM

## 2024-08-10 PROCEDURE — 93010 ELECTROCARDIOGRAM REPORT: CPT | Performed by: INTERNAL MEDICINE

## 2024-08-13 ENCOUNTER — HOSPITAL ENCOUNTER (OUTPATIENT)
Dept: INFUSION THERAPY | Age: 69
Discharge: HOME | End: 2024-08-13
Payer: MEDICARE

## 2024-08-13 DIAGNOSIS — E85.81 LIGHT CHAIN (AL) AMYLOIDOSIS (HCC): ICD-10-CM

## 2024-08-13 LAB
ALBUMIN SERPL-MCNC: 3.7 G/DL (ref 3.5–5.2)
ALP SERPL-CCNC: 95 U/L (ref 40–129)
ALT SERPL-CCNC: 24 U/L (ref 0–40)
ANION GAP SERPL CALCULATED.3IONS-SCNC: 6 MMOL/L (ref 7–16)
AST SERPL-CCNC: 24 U/L (ref 0–39)
BASOPHILS # BLD: 0.11 K/UL (ref 0–0.2)
BASOPHILS NFR BLD: 3 % (ref 0–2)
BILIRUB SERPL-MCNC: 0.4 MG/DL (ref 0–1.2)
BUN SERPL-MCNC: 15 MG/DL (ref 6–23)
CALCIUM SERPL-MCNC: 9.3 MG/DL (ref 8.6–10.2)
CHLORIDE SERPL-SCNC: 103 MMOL/L (ref 98–107)
CO2 SERPL-SCNC: 28 MMOL/L (ref 22–29)
CREAT SERPL-MCNC: 1.1 MG/DL (ref 0.7–1.2)
EOSINOPHIL # BLD: 0.32 K/UL (ref 0.05–0.5)
EOSINOPHILS RELATIVE PERCENT: 8 % (ref 0–6)
ERYTHROCYTE [DISTWIDTH] IN BLOOD BY AUTOMATED COUNT: 12.8 % (ref 11.5–15)
GFR, ESTIMATED: 75 ML/MIN/1.73M2
GLUCOSE SERPL-MCNC: 82 MG/DL (ref 74–99)
HCT VFR BLD AUTO: 44.7 % (ref 37–54)
HGB BLD-MCNC: 14.9 G/DL (ref 12.5–16.5)
IGA SERPL-MCNC: 227 MG/DL (ref 70–400)
IGG SERPL-MCNC: 1001 MG/DL (ref 700–1600)
IGM SERPL-MCNC: 44 MG/DL (ref 40–230)
IMM GRANULOCYTES # BLD AUTO: <0.03 K/UL (ref 0–0.58)
IMM GRANULOCYTES NFR BLD: 0 % (ref 0–5)
LYMPHOCYTES NFR BLD: 1.45 K/UL (ref 1.5–4)
LYMPHOCYTES RELATIVE PERCENT: 36 % (ref 20–42)
MCH RBC QN AUTO: 30.1 PG (ref 26–35)
MCHC RBC AUTO-ENTMCNC: 33.3 G/DL (ref 32–34.5)
MCV RBC AUTO: 90.3 FL (ref 80–99.9)
MONOCYTES NFR BLD: 0.73 K/UL (ref 0.1–0.95)
MONOCYTES NFR BLD: 18 % (ref 2–12)
NEUTROPHILS NFR BLD: 36 % (ref 43–80)
NEUTS SEG NFR BLD: 1.47 K/UL (ref 1.8–7.3)
PLATELET # BLD AUTO: 210 K/UL (ref 130–450)
PMV BLD AUTO: 9.2 FL (ref 7–12)
POTASSIUM SERPL-SCNC: 4.2 MMOL/L (ref 3.5–5)
PROT SERPL-MCNC: 6.8 G/DL (ref 6.4–8.3)
RBC # BLD AUTO: 4.95 M/UL (ref 3.8–5.8)
SODIUM SERPL-SCNC: 137 MMOL/L (ref 132–146)
WBC OTHER # BLD: 4.1 K/UL (ref 4.5–11.5)

## 2024-08-13 PROCEDURE — 36415 COLL VENOUS BLD VENIPUNCTURE: CPT

## 2024-08-13 PROCEDURE — 85025 COMPLETE CBC W/AUTO DIFF WBC: CPT

## 2024-08-13 PROCEDURE — 82784 ASSAY IGA/IGD/IGG/IGM EACH: CPT

## 2024-08-13 PROCEDURE — 86334 IMMUNOFIX E-PHORESIS SERUM: CPT

## 2024-08-13 PROCEDURE — 84165 PROTEIN E-PHORESIS SERUM: CPT

## 2024-08-13 PROCEDURE — 84155 ASSAY OF PROTEIN SERUM: CPT

## 2024-08-13 PROCEDURE — 80053 COMPREHEN METABOLIC PANEL: CPT

## 2024-08-13 PROCEDURE — 83521 IG LIGHT CHAINS FREE EACH: CPT

## 2024-08-14 ENCOUNTER — OFFICE VISIT (OUTPATIENT)
Dept: ONCOLOGY | Age: 69
End: 2024-08-14
Payer: MEDICARE

## 2024-08-14 VITALS
RESPIRATION RATE: 18 BRPM | WEIGHT: 150.7 LBS | HEART RATE: 79 BPM | BODY MASS INDEX: 22.25 KG/M2 | TEMPERATURE: 98 F | DIASTOLIC BLOOD PRESSURE: 82 MMHG | OXYGEN SATURATION: 98 % | SYSTOLIC BLOOD PRESSURE: 126 MMHG

## 2024-08-14 DIAGNOSIS — E85.81 LIGHT CHAIN (AL) AMYLOIDOSIS (HCC): Primary | ICD-10-CM

## 2024-08-14 LAB
ALBUMIN SERPL-MCNC: 3.1 G/DL (ref 3.5–4.7)
ALPHA1 GLOB SERPL ELPH-MCNC: 0.2 G/DL (ref 0.2–0.4)
ALPHA2 GLOB SERPL ELPH-MCNC: 0.9 G/DL (ref 0.5–1)
B-GLOBULIN SERPL ELPH-MCNC: 1.2 G/DL (ref 0.8–1.3)
GAMMA GLOB SERPL ELPH-MCNC: 1.1 G/DL (ref 0.7–1.6)
INTERPRETATION SERPL IFE-IMP: NORMAL
PATH REV: NORMAL
PATHOLOGIST: ABNORMAL
PROT PATTERN SERPL ELPH-IMP: ABNORMAL
PROT SERPL-MCNC: 6.5 G/DL (ref 6.4–8.3)

## 2024-08-14 PROCEDURE — 99212 OFFICE O/P EST SF 10 MIN: CPT

## 2024-08-14 RX ORDER — GABAPENTIN 300 MG/1
300 CAPSULE ORAL 2 TIMES DAILY
Qty: 180 CAPSULE | Refills: 1 | Status: SHIPPED | OUTPATIENT
Start: 2024-08-14 | End: 2025-02-10

## 2024-08-14 NOTE — PROGRESS NOTES
08/13/2024    AST 24 08/13/2024    ALT 24 08/13/2024    LABGLOM 75 08/13/2024    GFRAA >60 09/26/2022           Radiology-  No results found.            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     His work-up will be completed to include the following:     CBC, CMP, LDH, B2 microglobulin, CRP, serum protein electrophoresis with reflex to immunofixation, serum free kappa and lambda light chains with ratio, quantitative immunoglobulins, 24-hour urine collection for quantitative immunoglobulins and immunofixation.     Serum troponin, proBNP and 2D echo will be done to exclude cardiac amyloidosis.  TSH  Factor X assay as well as PT and APTT will be done     Will be scheduled for a bone marrow aspirate and biopsy     Once work-up completed options of therapy with bortezomib/daratumumab will be addressed     He is encouraged to receive the COVID-19 vaccine        3/15/2021  Awaiting his bone marrow aspirate and biopsy and his 2D echo which are both pending.  His CBC was in the normal range with a hemoglobin of 15.3, normal WBC count of 6.2, normal MCV of 90 with a normal platelet count of 310.  His TSH was normal at 3.88  Serum LDH was elevated at 266.  B2 microglobulin was markedly elevated at 13.6.  Quantitative immunoglobulins showed suppression of IgG with normal IgA and IgM.  Serum electrophoresis showed an M spike faint measuring 0.1.  Serum FLC's showed markedly elevated free lambda light chain at 311 with abnormal ratio of 0.05  Serum troponin was normal but proBNP was elevated at 491.  24-hour urine collection showed nephrotic range proteinuria with total proteins of 6.6 g in a 24-hour period with a faint M spike in the urine measuring 1263 mg in a 24-hour timeframe     He will be recommended induction with Daratumumab/Velcade/dexamethasone  His bone marrow

## 2024-08-15 LAB
FREE KAPPA/LAMBDA RATIO: 1.31 (ref 0.22–1.74)
KAPPA LC FREE SER-MCNC: 27.4 MG/L
LAMBDA LC FREE SERPL-MCNC: 20.9 MG/L (ref 4.2–27.7)

## 2024-08-19 NOTE — PROGRESS NOTES
to discuss fall immunization w Dr Monroy.         Advised to be adherent to the treatment plans discussed today, and please call with any questions or concerns, letting the office know of any reasons that the plans may not be followed.  The risks of untreated conditions include worsening illness, injury, disability, and possibly, death. Please call if symptoms change in any way, worsen, or fail to completely resolve, as this could necessitate a change to treatment plans. Patient and/or caregiver expressed understanding. Call or go to ED immediately if symptoms severely worsen or persist. Indications and proper use of medication(s) reviewed. Potential side-effects and risks of medication(s) also explained.     Health risk factors discussed and addressed. As requested, Educational materials printed for patient's review and were included in patient instructions on his/her After Visit Summary and given to patient at the end of visit. Reviewed age and gender appropriate health screening exams and vaccinations.  Advised patient regarding importance of keeping up with recommended health maintenance and to schedule as soon as possible if overdue, as this is important in assessing for undiagnosed pathology, especially cancer, as well as protecting against potentially harmful/life threatening disease.   Rik Rose MD  08/19/24

## 2024-08-20 ENCOUNTER — OFFICE VISIT (OUTPATIENT)
Dept: FAMILY MEDICINE CLINIC | Age: 69
End: 2024-08-20
Payer: MEDICARE

## 2024-08-20 VITALS
OXYGEN SATURATION: 97 % | HEIGHT: 69 IN | HEART RATE: 78 BPM | DIASTOLIC BLOOD PRESSURE: 74 MMHG | WEIGHT: 150 LBS | TEMPERATURE: 98 F | RESPIRATION RATE: 16 BRPM | SYSTOLIC BLOOD PRESSURE: 114 MMHG | BODY MASS INDEX: 22.22 KG/M2

## 2024-08-20 DIAGNOSIS — E85.81 LIGHT CHAIN (AL) AMYLOIDOSIS (HCC): ICD-10-CM

## 2024-08-20 DIAGNOSIS — K11.7 XEROSTOMIA: Primary | ICD-10-CM

## 2024-08-20 DIAGNOSIS — M79.10 MUSCLE ACHE: ICD-10-CM

## 2024-08-20 DIAGNOSIS — E78.5 HYPERLIPIDEMIA, UNSPECIFIED HYPERLIPIDEMIA TYPE: Chronic | ICD-10-CM

## 2024-08-20 PROCEDURE — 1123F ACP DISCUSS/DSCN MKR DOCD: CPT | Performed by: FAMILY MEDICINE

## 2024-08-20 PROCEDURE — G8420 CALC BMI NORM PARAMETERS: HCPCS | Performed by: FAMILY MEDICINE

## 2024-08-20 PROCEDURE — G8427 DOCREV CUR MEDS BY ELIG CLIN: HCPCS | Performed by: FAMILY MEDICINE

## 2024-08-20 PROCEDURE — 3017F COLORECTAL CA SCREEN DOC REV: CPT | Performed by: FAMILY MEDICINE

## 2024-08-20 PROCEDURE — 1036F TOBACCO NON-USER: CPT | Performed by: FAMILY MEDICINE

## 2024-08-20 PROCEDURE — 99214 OFFICE O/P EST MOD 30 MIN: CPT | Performed by: FAMILY MEDICINE

## 2024-08-20 PROCEDURE — G2211 COMPLEX E/M VISIT ADD ON: HCPCS | Performed by: FAMILY MEDICINE

## 2024-09-06 ENCOUNTER — TELEPHONE (OUTPATIENT)
Dept: HEMATOLOGY/ONCOLOGY | Facility: HOSPITAL | Age: 69
End: 2024-09-06
Payer: MEDICARE

## 2024-09-06 NOTE — TELEPHONE ENCOUNTER
Pt last had labs/bone marrow biopsy in Oct 2023 and follow up with Morgan BOWERS NP Nov 2023. Per NP note follow up with Dr. Whitney, and PRN at [facility]. Secure message sent to Morgan BOWERS NP.

## 2024-09-07 DIAGNOSIS — E85.81 AL AMYLOIDOSIS (MULTI): Primary | ICD-10-CM

## 2024-09-07 NOTE — TELEPHONE ENCOUNTER
Called and spoke with Gurwinder and his wife. Discussed that we can do a BMBx and schedule a visit with Dr. Osman. Placed scheduling requests for 10/17.

## 2024-10-12 NOTE — PROGRESS NOTES
Patient ID: Rusty Woodard is a 69 y.o. male.  Referring Physician: No referring provider defined for this encounter.  Primary Care Provider: No primary care provider on file.    Interval hx:  He was previously followed by Morgan Navarrete.   Since last visit he has had some symptoms while on revlimid, including: fatigue, constipation, itching, occasional hives, numbness and tingling of his hands and feet. All of these symptoms cole the weeks off revlimid (he has held the revlimid x 4-5 weeks).     Pt denies having had unexplained wt loss, fevers, chills, sweats, palpable CATRACHO, cough, nausea, vomiting, diarrhea, mouth sores, skin rashes, chest/abd/back pains, headaches, nosebleeds, GI or  bleeding, vision or hearing complaints or feeling depressed.    PMHx:  1) Multiple myeloma (with amyloidosis, see below)  2) AFib  3) HLD  4) Sciatica    SocHx:   with 3 kids. Lives in [place] [facility] [place], Retired, was in maintenance. No  exposures. No hx of cigarette, EtOH or illicit drug use    FamHx:  No hx of myeloma; mom  age 64 of breast ca    Meds:  Reviewed    All:  CODEINE    Physical Exam  General: Ambulatory, looked comfortable, not requiring supplemental oxygen    HEENT: no oral lesions, tonsillar or tongue enlargement; Neck: no masses; Lymph nodes: no palpable cervical, supraclavicular, axillary, epitrochear or inguinal nodes; Heart: no murmurs; Lungs: clear; Abd: soft, +bowel sounds, non-tender, no palpable liver or spleen; Skin: no rashes; Ext's: No LE edema; Neuro: alert, answered questions appropriately, 5/5 motor strength upper and lower extremities    Performance Status:  Symptomatic; fully ambulatory      Assessment/recommendations:  69 year old man with a  hx of Afib, Multiple Myeloma and AL Amyloidosis [numbness and tingling in his hands and feet and LE edema in 2021 had 5.27 gr/d nephrotic range proteinuria which prompted a kidney  biopsy noting AL Amyloidosis, lambda light chains. Bone marrow with 25%  light chain restricted plasma cells with trisomy 13 and standard risk myeloma; Echo showed normal EF with mild TR and mild Pul HTN; was treated with Yolanda/CyBorD and reportedly he had nice reduction with in light chain as w s nephrotic range proteinuria; restaging bmbx 9/27/21 4% lambda positive cells and no detectable serum free light chains, then s/p autologous PBSCT  (T=0 on 10/22/21) preparative regimen with [id number] mg; course complicated by A fib RVR; post transplant Mass Spectrometry was positive; then was placed on maintenance Revlimid 5mg daily, then later due to multiple issues his Revlimid was decreased by Dr. Whitney to 2.5mg daily] here for follow-up.    Await repeat labs and marrow results, including MRD, from today. I told him and his wife I will call them w.the results: if MRD is negative then will discuss stopping maintenance revlimid, particularly given the improvement in symptoms when off revlimid.

## 2024-10-17 ENCOUNTER — PROCEDURE VISIT (OUTPATIENT)
Dept: HEMATOLOGY/ONCOLOGY | Facility: HOSPITAL | Age: 69
End: 2024-10-17
Payer: MEDICARE

## 2024-10-17 ENCOUNTER — LAB (OUTPATIENT)
Dept: LAB | Facility: HOSPITAL | Age: 69
End: 2024-10-17
Payer: MEDICARE

## 2024-10-17 ENCOUNTER — OFFICE VISIT (OUTPATIENT)
Dept: HEMATOLOGY/ONCOLOGY | Facility: HOSPITAL | Age: 69
End: 2024-10-17
Payer: MEDICARE

## 2024-10-17 VITALS
WEIGHT: 153.88 LBS | OXYGEN SATURATION: 95 % | BODY MASS INDEX: 23.59 KG/M2 | TEMPERATURE: 98.4 F | DIASTOLIC BLOOD PRESSURE: 82 MMHG | RESPIRATION RATE: 18 BRPM | SYSTOLIC BLOOD PRESSURE: 120 MMHG | HEART RATE: 80 BPM

## 2024-10-17 VITALS — BODY MASS INDEX: 23.67 KG/M2 | WEIGHT: 154.4 LBS

## 2024-10-17 DIAGNOSIS — E85.81 AL AMYLOIDOSIS (MULTI): ICD-10-CM

## 2024-10-17 LAB
BASOPHILS # BLD AUTO: 0.04 X10*3/UL (ref 0–0.1)
BASOPHILS NFR BLD AUTO: 0.9 %
EOSINOPHIL # BLD AUTO: 0.14 X10*3/UL (ref 0–0.7)
EOSINOPHIL NFR BLD AUTO: 3 %
ERYTHROCYTE [DISTWIDTH] IN BLOOD BY AUTOMATED COUNT: 13.5 % (ref 11.5–14.5)
HCT VFR BLD AUTO: 43.2 % (ref 41–52)
HGB BLD-MCNC: 14.5 G/DL (ref 13.5–17.5)
IMM GRANULOCYTES # BLD AUTO: 0.01 X10*3/UL (ref 0–0.7)
IMM GRANULOCYTES NFR BLD AUTO: 0.2 % (ref 0–0.9)
LYMPHOCYTES # BLD AUTO: 1.31 X10*3/UL (ref 1.2–4.8)
LYMPHOCYTES NFR BLD AUTO: 28.2 %
MCH RBC QN AUTO: 30.9 PG (ref 26–34)
MCHC RBC AUTO-ENTMCNC: 33.6 G/DL (ref 32–36)
MCV RBC AUTO: 92 FL (ref 80–100)
MONOCYTES # BLD AUTO: 0.6 X10*3/UL (ref 0.1–1)
MONOCYTES NFR BLD AUTO: 12.9 %
NEUTROPHILS # BLD AUTO: 2.55 X10*3/UL (ref 1.2–7.7)
NEUTROPHILS NFR BLD AUTO: 54.8 %
NRBC BLD-RTO: 0 /100 WBCS (ref 0–0)
PLATELET # BLD AUTO: 188 X10*3/UL (ref 150–450)
RBC # BLD AUTO: 4.69 X10*6/UL (ref 4.5–5.9)
WBC # BLD AUTO: 4.7 X10*3/UL (ref 4.4–11.3)

## 2024-10-17 PROCEDURE — 88185 FLOWCYTOMETRY/TC ADD-ON: CPT | Mod: TC | Performed by: PHYSICIAN ASSISTANT

## 2024-10-17 PROCEDURE — 85097 BONE MARROW INTERPRETATION: CPT | Performed by: PHYSICIAN ASSISTANT

## 2024-10-17 PROCEDURE — 85025 COMPLETE CBC W/AUTO DIFF WBC: CPT

## 2024-10-17 PROCEDURE — 99214 OFFICE O/P EST MOD 30 MIN: CPT | Performed by: INTERNAL MEDICINE

## 2024-10-17 PROCEDURE — 36415 COLL VENOUS BLD VENIPUNCTURE: CPT

## 2024-10-17 ASSESSMENT — PAIN SCALES - GENERAL
PAINLEVEL_OUTOF10: 0-NO PAIN
PAINLEVEL_OUTOF10: 0-NO PAIN

## 2024-10-17 NOTE — PROGRESS NOTES
Patient ID: Rusty Woodard is a 69 y.o. male.    Bone marrow aspirate & biopsy    Date/Time: 10/17/2024 9:25 AM    Performed by: Lizandro Melendez PA-C  Authorized by: Lizandro Melendez PA-C    Consent:     Consent obtained:  Verbal and written    Consent given by:  Patient    Risks, benefits, and alternatives were discussed: yes      Risks discussed:  Bleeding, infection and pain  Universal protocol:     Procedure explained and questions answered to patient or proxy's satisfaction: yes      Relevant documents present and verified: yes      Test results available: yes      Imaging studies available: yes      Required blood products, implants, devices, and special equipment available: yes      Site/side marked: yes      Immediately prior to procedure, a time out was called: yes      Patient identity confirmed:  Verbally with patient  Indications:     Indications:  Amyloidosis.  Disease assessment  Pre-procedure details:     Skin preparation:  Chlorhexidine    Preparation: Patient was prepped and draped in the usual sterile fashion    Sedation:     Sedation type:  None  Anesthesia:     Anesthesia method:  Local infiltration    Local anesthetic:  Lidocaine 1% w/o epi  Procedure specific details:      The procedure was explained & potential complications reviewed with the patient including the risks for bleeding, infection, & discomfort at the bone marrow biopsy site. The patient was given time for questions regarding the procedure. The patient agreed to proceed & electronic signed consent was obtained.  The patient's most recent history & physical exam were reviewed & relevant findings were noted.  The patient's allergy history was reviewed.  Next, a pre-procedure time out was performed to properly identify the patient & the procedure to be performed.  The patient was placed in the prone position & the left posterior iliac crest bone marrow biopsy site was identified & marked.  Next, the skin at the marked bone marrow  biopsy site was cleansed with Chlorhexidine solution & sterile drapes were placed.  The skin, subcutaneous tissue, & periosteum below the marked bone marrow biopsy site were anesthetized using 6 ml of 1% Lidocaine solution.  Next, a Jamshidi needle was inserted at the marked biopsy site & slowly advanced into the posterior iliac crest.  Marrow aspirate & core biopsy were obtained & sent to Pathology for review & testing.  The needle was removed & sterile dressing was applied to the biopsy site.  The patient tolerated the procedure well without incident.  Prior to discharge, the biopsy site was inspected & the patient was given written post procedure care instructions.   Post-procedure details:     Procedure completion:  Tolerated well, no immediate complications

## 2024-10-18 LAB
CELL COUNT (BLOOD): 10.12 X10*3/UL
CELL POPULATIONS: NORMAL
DIAGNOSIS: NORMAL
FLOW DIFFERENTIAL: NORMAL
FLOW TEST ORDERED: NORMAL
LAB TEST METHOD: NORMAL
NUMBER OF CELLS COLLECTED: NORMAL PER TUBE
PATH REPORT.TOTAL CANCER: NORMAL
SIGNATURE COMMENT: NORMAL
SPECIMEN VIABILITY: NORMAL

## 2024-10-22 ENCOUNTER — HOSPITAL ENCOUNTER (OUTPATIENT)
Dept: INFUSION THERAPY | Age: 69
Discharge: HOME | End: 2024-10-22
Payer: MEDICARE

## 2024-10-22 DIAGNOSIS — E85.81 LIGHT CHAIN (AL) AMYLOIDOSIS (HCC): ICD-10-CM

## 2024-10-22 LAB
ALBUMIN SERPL-MCNC: 3.9 G/DL (ref 3.5–5.2)
ALP SERPL-CCNC: 93 U/L (ref 40–129)
ALT SERPL-CCNC: 21 U/L (ref 0–40)
ANION GAP SERPL CALCULATED.3IONS-SCNC: 10 MMOL/L (ref 7–16)
AST SERPL-CCNC: 24 U/L (ref 0–39)
BASOPHILS # BLD: 0.06 K/UL (ref 0–0.2)
BASOPHILS NFR BLD: 1 % (ref 0–2)
BILIRUB SERPL-MCNC: 0.5 MG/DL (ref 0–1.2)
BUN SERPL-MCNC: 17 MG/DL (ref 6–23)
CALCIUM SERPL-MCNC: 8.9 MG/DL (ref 8.6–10.2)
CHLORIDE SERPL-SCNC: 102 MMOL/L (ref 98–107)
CHROM ANALY OVERALL INTERP-IMP: NORMAL
CO2 SERPL-SCNC: 26 MMOL/L (ref 22–29)
CREAT SERPL-MCNC: 1 MG/DL (ref 0.7–1.2)
ELECTRONICALLY SIGNED BY CYTOGENETICS: NORMAL
EOSINOPHIL # BLD: 0.21 K/UL (ref 0.05–0.5)
EOSINOPHILS RELATIVE PERCENT: 4 % (ref 0–6)
ERYTHROCYTE [DISTWIDTH] IN BLOOD BY AUTOMATED COUNT: 13.5 % (ref 11.5–15)
GFR, ESTIMATED: 79 ML/MIN/1.73M2
GLUCOSE SERPL-MCNC: 95 MG/DL (ref 74–99)
HCT VFR BLD AUTO: 43 % (ref 37–54)
HGB BLD-MCNC: 14.7 G/DL (ref 12.5–16.5)
IGA SERPL-MCNC: 231 MG/DL (ref 70–400)
IGG SERPL-MCNC: 978 MG/DL (ref 700–1600)
IGM SERPL-MCNC: 45 MG/DL (ref 40–230)
IMM GRANULOCYTES # BLD AUTO: <0.03 K/UL (ref 0–0.58)
IMM GRANULOCYTES NFR BLD: 0 % (ref 0–5)
LYMPHOCYTES NFR BLD: 1.6 K/UL (ref 1.5–4)
LYMPHOCYTES RELATIVE PERCENT: 32 % (ref 20–42)
MCH RBC QN AUTO: 31.5 PG (ref 26–35)
MCHC RBC AUTO-ENTMCNC: 34.2 G/DL (ref 32–34.5)
MCV RBC AUTO: 92.1 FL (ref 80–99.9)
MONOCYTES NFR BLD: 0.77 K/UL (ref 0.1–0.95)
MONOCYTES NFR BLD: 15 % (ref 2–12)
NEUTROPHILS NFR BLD: 47 % (ref 43–80)
NEUTS SEG NFR BLD: 2.34 K/UL (ref 1.8–7.3)
PATH REPORT.COMMENTS IMP SPEC: NORMAL
PATH REPORT.FINAL DX SPEC: NORMAL
PATH REPORT.GROSS SPEC: NORMAL
PATH REPORT.MICROSCOPIC SPEC OTHER STN: NORMAL
PATH REPORT.RELEVANT HX SPEC: NORMAL
PATH REPORT.TOTAL CANCER: NORMAL
PLATELET # BLD AUTO: 196 K/UL (ref 130–450)
PMV BLD AUTO: 8.6 FL (ref 7–12)
POTASSIUM SERPL-SCNC: 4.1 MMOL/L (ref 3.5–5)
PROT SERPL-MCNC: 6.8 G/DL (ref 6.4–8.3)
RBC # BLD AUTO: 4.67 M/UL (ref 3.8–5.8)
SODIUM SERPL-SCNC: 138 MMOL/L (ref 132–146)
WBC OTHER # BLD: 5 K/UL (ref 4.5–11.5)

## 2024-10-22 PROCEDURE — 80053 COMPREHEN METABOLIC PANEL: CPT

## 2024-10-22 PROCEDURE — 85025 COMPLETE CBC W/AUTO DIFF WBC: CPT

## 2024-10-22 PROCEDURE — 82784 ASSAY IGA/IGD/IGG/IGM EACH: CPT

## 2024-10-22 PROCEDURE — 83521 IG LIGHT CHAINS FREE EACH: CPT

## 2024-10-22 PROCEDURE — 82232 ASSAY OF BETA-2 PROTEIN: CPT

## 2024-10-22 PROCEDURE — 84165 PROTEIN E-PHORESIS SERUM: CPT

## 2024-10-22 PROCEDURE — 36415 COLL VENOUS BLD VENIPUNCTURE: CPT

## 2024-10-22 PROCEDURE — 84155 ASSAY OF PROTEIN SERUM: CPT

## 2024-10-23 ENCOUNTER — OFFICE VISIT (OUTPATIENT)
Dept: ONCOLOGY | Age: 69
End: 2024-10-23
Payer: MEDICARE

## 2024-10-23 VITALS
WEIGHT: 155.1 LBS | HEART RATE: 85 BPM | HEIGHT: 69 IN | BODY MASS INDEX: 22.97 KG/M2 | DIASTOLIC BLOOD PRESSURE: 83 MMHG | TEMPERATURE: 97.5 F | OXYGEN SATURATION: 99 % | SYSTOLIC BLOOD PRESSURE: 126 MMHG

## 2024-10-23 DIAGNOSIS — E85.81 LIGHT CHAIN (AL) AMYLOIDOSIS (HCC): Primary | ICD-10-CM

## 2024-10-23 LAB
ALBUMIN SERPL-MCNC: 3.1 G/DL (ref 3.5–4.7)
ALPHA1 GLOB SERPL ELPH-MCNC: 0.2 G/DL (ref 0.2–0.4)
ALPHA2 GLOB SERPL ELPH-MCNC: 0.8 G/DL (ref 0.5–1)
B-GLOBULIN SERPL ELPH-MCNC: 1.2 G/DL (ref 0.8–1.3)
GAMMA GLOB SERPL ELPH-MCNC: 1 G/DL (ref 0.7–1.6)
PATHOLOGIST: ABNORMAL
PROT PATTERN SERPL ELPH-IMP: ABNORMAL
PROT SERPL-MCNC: 6.3 G/DL (ref 6.4–8.3)

## 2024-10-23 PROCEDURE — 99212 OFFICE O/P EST SF 10 MIN: CPT

## 2024-10-23 NOTE — PROGRESS NOTES
[facility]  [address] [place] [id number]        Pt Name: Danny Tadeo  YOB: 1955  Date of evaluation: 10/23/2024  Primary Care Physician: Rik Rose MD  Reason for evaluation:   Chief Complaint   Patient presents with    myelofibrosis    Follow-up            Subjective:   Here for follow-up.          Follows at ;  S/P Stem Cell Transplant November 22,2021.         OBJECTIVE:  VITALS:  height is 1.753 m (5' 9\") and weight is 70.4 kg (155 lb 1.6 oz). His temperature is 97.5 °F (36.4 °C). His blood pressure is 126/83 and his pulse is 85. His oxygen saturation is 99%.   Physical Exam:  Performance Status: 0  Well developed, well nourished male  General: AAO to person, place, time, cooperative, in no acute distress.  Head and neck: PERRLA, EOMI. Sclera non icteric.  Oropharynx:  Clear.  Neck: no JVD,  no adenopathy.  Heart: Regular rate and rhythm, no murmur.  Lungs: Clear to auscultation.   Abdomen: Soft, non-tender;no masses, no organomegaly.  Extremities: No edema.   Neurologic:Cranial nerves grossly intact. No focal motor or sensory deficits.   Skin:  No rash.        Medications  Prior to Admission medications    Medication Sig Start Date End Date Taking? Authorizing Provider   gabapentin (NEURONTIN) 300 MG capsule Take 1 capsule by mouth 2 times daily for 180 days. Intended supply: 90 days 8/14/24 2/10/25 Yes Yoan Monroy MD   pilocarpine (SALAGEN) 5 MG tablet Take 1 tablet by mouth 3 times daily  Patient taking differently: Take 1 tablet by mouth 3 times daily As needed 6/13/24  Yes Rik Rose MD   b complex vitamins capsule Take 1 capsule by mouth daily   Yes Roberto Del Real MD   Cyanocobalamin (VITAMIN B12 PO) Take 1 each by mouth daily   Yes Roberto Del Real MD   Coenzyme Q10 (COQ10) 100 MG CAPS Take by mouth   Yes Roberto Del Real MD   VITAMIN E PO Take 1 capsule by mouth daily   Yes Roberto Del Real MD   Multiple Vitamins-Minerals

## 2024-10-24 LAB
B2 MICROGLOB SERPL IA-MCNC: 2 MG/L
FREE KAPPA/LAMBDA RATIO: 1.35 (ref 0.22–1.74)
KAPPA LC FREE SER-MCNC: 28.5 MG/L
LAMBDA LC FREE SERPL-MCNC: 21.1 MG/L (ref 4.2–27.7)

## 2024-11-01 ENCOUNTER — TELEPHONE (OUTPATIENT)
Dept: HEMATOLOGY/ONCOLOGY | Facility: HOSPITAL | Age: 69
End: 2024-11-01
Payer: MEDICARE

## 2024-11-07 DIAGNOSIS — E85.81 AL AMYLOIDOSIS (MULTI): Primary | ICD-10-CM

## 2024-11-14 ENCOUNTER — HOSPITAL ENCOUNTER (OUTPATIENT)
Age: 69
Discharge: HOME | End: 2024-11-14
Payer: MEDICARE

## 2024-11-14 DIAGNOSIS — E85.81 AL AMYLOIDOSIS (MULTI): ICD-10-CM

## 2024-11-14 LAB
SEND OUT REPORT: NORMAL
TEST NAME: NORMAL

## 2024-11-14 PROCEDURE — 36415 COLL VENOUS BLD VENIPUNCTURE: CPT

## 2024-11-19 LAB
COMMENTS - MP RESULT TYPE: NORMAL
SCAN RESULT: NORMAL

## 2024-11-20 ENCOUNTER — TELEPHONE (OUTPATIENT)
Dept: HEMATOLOGY/ONCOLOGY | Facility: HOSPITAL | Age: 69
End: 2024-11-20
Payer: MEDICARE

## 2024-11-20 NOTE — TELEPHONE ENCOUNTER
Called and spoke to Roxane at [facility]. Because this is a send out test for [facility], [facility] does not have a contract with [facility] and patient will need to come to a  lab to have test drawn. Mr. Woodard agreed to come to [facility] tomorrow or Friday to have test drawn.

## 2024-11-21 ENCOUNTER — LAB (OUTPATIENT)
Dept: LAB | Facility: HOSPITAL | Age: 69
End: 2024-11-21
Payer: MEDICARE

## 2024-11-21 DIAGNOSIS — E85.81 LIGHT CHAIN (AL) AMYLOIDOSIS (MULTI): ICD-10-CM

## 2024-11-21 DIAGNOSIS — E85.81 AL AMYLOIDOSIS (MULTI): ICD-10-CM

## 2024-11-21 DIAGNOSIS — D47.2 MONOCLONAL GAMMOPATHY: ICD-10-CM

## 2024-11-21 LAB
ALBUMIN SERPL BCP-MCNC: 3.7 G/DL (ref 3.4–5)
ALP SERPL-CCNC: 68 U/L (ref 33–136)
ALT SERPL W P-5'-P-CCNC: 20 U/L (ref 10–52)
ANION GAP SERPL CALC-SCNC: 12 MMOL/L (ref 10–20)
AST SERPL W P-5'-P-CCNC: 18 U/L (ref 9–39)
B2 MICROGLOB SERPL-MCNC: 2 MG/L (ref 0.7–2.2)
BASOPHILS # BLD AUTO: 0.09 X10*3/UL (ref 0–0.1)
BASOPHILS NFR BLD AUTO: 1.9 %
BILIRUB SERPL-MCNC: 0.5 MG/DL (ref 0–1.2)
BUN SERPL-MCNC: 16 MG/DL (ref 6–23)
CALCIUM SERPL-MCNC: 8.8 MG/DL (ref 8.6–10.3)
CHLORIDE SERPL-SCNC: 107 MMOL/L (ref 98–107)
CO2 SERPL-SCNC: 26 MMOL/L (ref 21–32)
CREAT SERPL-MCNC: 1.01 MG/DL (ref 0.5–1.3)
EGFRCR SERPLBLD CKD-EPI 2021: 82 ML/MIN/1.73M*2
EOSINOPHIL # BLD AUTO: 0.33 X10*3/UL (ref 0–0.7)
EOSINOPHIL NFR BLD AUTO: 6.9 %
ERYTHROCYTE [DISTWIDTH] IN BLOOD BY AUTOMATED COUNT: 13.2 % (ref 11.5–14.5)
GLUCOSE SERPL-MCNC: 83 MG/DL (ref 74–99)
HCT VFR BLD AUTO: 41.1 % (ref 41–52)
HGB BLD-MCNC: 13.9 G/DL (ref 13.5–17.5)
IGA SERPL-MCNC: 221 MG/DL (ref 70–400)
IGG SERPL-MCNC: 1010 MG/DL (ref 700–1600)
IGM SERPL-MCNC: 41 MG/DL (ref 40–230)
IMM GRANULOCYTES # BLD AUTO: 0.02 X10*3/UL (ref 0–0.7)
IMM GRANULOCYTES NFR BLD AUTO: 0.4 % (ref 0–0.9)
LDH SERPL L TO P-CCNC: 151 U/L (ref 84–246)
LYMPHOCYTES # BLD AUTO: 1.38 X10*3/UL (ref 1.2–4.8)
LYMPHOCYTES NFR BLD AUTO: 28.9 %
MCH RBC QN AUTO: 30.7 PG (ref 26–34)
MCHC RBC AUTO-ENTMCNC: 33.8 G/DL (ref 32–36)
MCV RBC AUTO: 91 FL (ref 80–100)
MONOCYTES # BLD AUTO: 0.76 X10*3/UL (ref 0.1–1)
MONOCYTES NFR BLD AUTO: 15.9 %
NEUTROPHILS # BLD AUTO: 2.19 X10*3/UL (ref 1.2–7.7)
NEUTROPHILS NFR BLD AUTO: 46 %
NRBC BLD-RTO: 0 /100 WBCS (ref 0–0)
PLATELET # BLD AUTO: 169 X10*3/UL (ref 150–450)
POTASSIUM SERPL-SCNC: 4.2 MMOL/L (ref 3.5–5.3)
PROT SERPL-MCNC: 5.8 G/DL (ref 6.4–8.2)
PROT SERPL-MCNC: 6.4 G/DL (ref 6.4–8.2)
RBC # BLD AUTO: 4.53 X10*6/UL (ref 4.5–5.9)
SODIUM SERPL-SCNC: 141 MMOL/L (ref 136–145)
WBC # BLD AUTO: 4.8 X10*3/UL (ref 4.4–11.3)

## 2024-11-21 PROCEDURE — 82784 ASSAY IGA/IGD/IGG/IGM EACH: CPT

## 2024-11-21 PROCEDURE — 85025 COMPLETE CBC W/AUTO DIFF WBC: CPT

## 2024-11-21 PROCEDURE — 86334 IMMUNOFIX E-PHORESIS SERUM: CPT

## 2024-11-21 PROCEDURE — 82232 ASSAY OF BETA-2 PROTEIN: CPT

## 2024-11-21 PROCEDURE — 80053 COMPREHEN METABOLIC PANEL: CPT

## 2024-11-21 PROCEDURE — 84155 ASSAY OF PROTEIN SERUM: CPT

## 2024-11-21 PROCEDURE — 84165 PROTEIN E-PHORESIS SERUM: CPT | Performed by: STUDENT IN AN ORGANIZED HEALTH CARE EDUCATION/TRAINING PROGRAM

## 2024-11-21 PROCEDURE — 83521 IG LIGHT CHAINS FREE EACH: CPT

## 2024-11-21 PROCEDURE — 36415 COLL VENOUS BLD VENIPUNCTURE: CPT

## 2024-11-21 PROCEDURE — 86320 SERUM IMMUNOELECTROPHORESIS: CPT | Performed by: STUDENT IN AN ORGANIZED HEALTH CARE EDUCATION/TRAINING PROGRAM

## 2024-11-21 PROCEDURE — 83615 LACTATE (LD) (LDH) ENZYME: CPT

## 2024-11-22 LAB
KAPPA LC SERPL-MCNC: 2.43 MG/DL (ref 0.33–1.94)
KAPPA LC/LAMBDA SER: 1.46 {RATIO} (ref 0.26–1.65)
LAMBDA LC SERPL-MCNC: 1.67 MG/DL (ref 0.57–2.63)

## 2024-11-25 DIAGNOSIS — E85.81 AL AMYLOIDOSIS (MULTI): Primary | ICD-10-CM

## 2024-11-25 LAB
ALBUMIN: 3.5 G/DL (ref 3.4–5)
ALPHA 1 GLOBULIN: 0.2 G/DL (ref 0.2–0.6)
ALPHA 2 GLOBULIN: 0.6 G/DL (ref 0.4–1.1)
BETA GLOBULIN: 0.7 G/DL (ref 0.5–1.2)
GAMMA GLOBULIN: 0.8 G/DL (ref 0.5–1.4)
IMMUNOFIXATION COMMENT: NORMAL
PATH REVIEW - SERUM IMMUNOFIXATION: NORMAL
PATH REVIEW-SERUM PROTEIN ELECTROPHORESIS: NORMAL
PROTEIN ELECTROPHORESIS COMMENT: NORMAL

## 2024-11-26 ENCOUNTER — LAB (OUTPATIENT)
Dept: LAB | Facility: HOSPITAL | Age: 69
End: 2024-11-26
Payer: MEDICARE

## 2024-11-26 DIAGNOSIS — E85.81 AL AMYLOIDOSIS (MULTI): ICD-10-CM

## 2024-11-26 DIAGNOSIS — E85.81 LIGHT CHAIN (AL) AMYLOIDOSIS (MULTI): ICD-10-CM

## 2024-11-26 DIAGNOSIS — D47.2 MONOCLONAL GAMMOPATHY: ICD-10-CM

## 2024-11-26 LAB
ALBUMIN SERPL BCP-MCNC: 3.9 G/DL (ref 3.4–5)
ALP SERPL-CCNC: 75 U/L (ref 33–136)
ALT SERPL W P-5'-P-CCNC: 21 U/L (ref 10–52)
ANION GAP SERPL CALC-SCNC: 10 MMOL/L (ref 10–20)
AST SERPL W P-5'-P-CCNC: 21 U/L (ref 9–39)
B2 MICROGLOB SERPL-MCNC: 2.2 MG/L (ref 0.7–2.2)
BASOPHILS # BLD AUTO: 0.07 X10*3/UL (ref 0–0.1)
BASOPHILS NFR BLD AUTO: 1.4 %
BILIRUB SERPL-MCNC: 0.6 MG/DL (ref 0–1.2)
BUN SERPL-MCNC: 20 MG/DL (ref 6–23)
CALCIUM SERPL-MCNC: 8.9 MG/DL (ref 8.6–10.3)
CHLORIDE SERPL-SCNC: 105 MMOL/L (ref 98–107)
CO2 SERPL-SCNC: 27 MMOL/L (ref 21–32)
CREAT SERPL-MCNC: 1.04 MG/DL (ref 0.5–1.3)
EGFRCR SERPLBLD CKD-EPI 2021: 79 ML/MIN/1.73M*2
EOSINOPHIL # BLD AUTO: 0.19 X10*3/UL (ref 0–0.7)
EOSINOPHIL NFR BLD AUTO: 3.8 %
ERYTHROCYTE [DISTWIDTH] IN BLOOD BY AUTOMATED COUNT: 12.9 % (ref 11.5–14.5)
GLUCOSE SERPL-MCNC: 84 MG/DL (ref 74–99)
HCT VFR BLD AUTO: 42.9 % (ref 41–52)
HGB BLD-MCNC: 14.4 G/DL (ref 13.5–17.5)
IGA SERPL-MCNC: 235 MG/DL (ref 70–400)
IGG SERPL-MCNC: 1110 MG/DL (ref 700–1600)
IGM SERPL-MCNC: 44 MG/DL (ref 40–230)
IMM GRANULOCYTES # BLD AUTO: 0.01 X10*3/UL (ref 0–0.7)
IMM GRANULOCYTES NFR BLD AUTO: 0.2 % (ref 0–0.9)
LDH SERPL L TO P-CCNC: 152 U/L (ref 84–246)
LYMPHOCYTES # BLD AUTO: 1.68 X10*3/UL (ref 1.2–4.8)
LYMPHOCYTES NFR BLD AUTO: 33.7 %
MCH RBC QN AUTO: 31 PG (ref 26–34)
MCHC RBC AUTO-ENTMCNC: 33.6 G/DL (ref 32–36)
MCV RBC AUTO: 93 FL (ref 80–100)
MONOCYTES # BLD AUTO: 0.71 X10*3/UL (ref 0.1–1)
MONOCYTES NFR BLD AUTO: 14.2 %
NEUTROPHILS # BLD AUTO: 2.33 X10*3/UL (ref 1.2–7.7)
NEUTROPHILS NFR BLD AUTO: 46.7 %
NRBC BLD-RTO: 0 /100 WBCS (ref 0–0)
PLATELET # BLD AUTO: 187 X10*3/UL (ref 150–450)
POTASSIUM SERPL-SCNC: 4.4 MMOL/L (ref 3.5–5.3)
PROT SERPL-MCNC: 6.7 G/DL (ref 6.4–8.2)
RBC # BLD AUTO: 4.64 X10*6/UL (ref 4.5–5.9)
SODIUM SERPL-SCNC: 138 MMOL/L (ref 136–145)
WBC # BLD AUTO: 5 X10*3/UL (ref 4.4–11.3)

## 2024-11-26 PROCEDURE — 85025 COMPLETE CBC W/AUTO DIFF WBC: CPT

## 2024-11-26 PROCEDURE — 83615 LACTATE (LD) (LDH) ENZYME: CPT

## 2024-11-26 PROCEDURE — 82232 ASSAY OF BETA-2 PROTEIN: CPT

## 2024-11-26 PROCEDURE — 36415 COLL VENOUS BLD VENIPUNCTURE: CPT

## 2024-11-26 PROCEDURE — 80053 COMPREHEN METABOLIC PANEL: CPT

## 2024-11-26 PROCEDURE — 82784 ASSAY IGA/IGD/IGG/IGM EACH: CPT

## 2024-11-29 LAB — SCAN RESULT: NORMAL

## 2024-12-11 NOTE — PROGRESS NOTES
Patient ID: Rusty Woodard is a 69 y.o. male.  Referring Physician: No referring provider defined for this encounter.  Primary Care Provider: No primary care provider on file.    Virtual or Telephone Consent    A telephone visit (audio only) between the patient (at the originating site) and the provider (at the distant site) was utilized to provide this telehealth service.   Verbal consent was requested and obtained from Rusty Woodard on this date, 24 for a telehealth visit.    This was a phone appt    Interval hx:  Since last visit he has again had some symptoms while on revlimid, including: fatigue, transient burning of scalp (which last less than a minute but is very bothersome), itching, dry skin, dizziness, scabs on scalp and numbness and tingling of his hands and feet. All of these symptoms cole the weeks off revlimid (and had resolved lastv year when he was off the revlimid x 2.5 months).     Pt denies having had fevers, chills, sweats, palpable CATRACHO, cough, nausea, vomiting, diarrhea, mouth sores, skin rashes, chest/abd/back pains, headaches, nosebleeds, GI or  bleeding.  PMHx:  1) Multiple myeloma (with amyloidosis, see below)  2) Afib (transient w/SCTx)  3) HLD  4) Sciatica    SocHx:   with 3 kids. Lives in [facility], Retired, was in maintenance. No  exposures. No hx of cigarette, EtOH or illicit drug use    FamHx:  No hx of myeloma; mom  age 64 of breast ca    Meds:  Reviewed    All:  CODEINE    Performance Status:  Symptomatic; fully ambulatory      Assessment/recommendations:  69 year old man with a  hx of Afib, Multiple Myeloma and AL Amyloidosis [numbness and tingling in his hands and feet and LE edema in 2021 had 5.27 gr/d nephrotic range proteinuria which prompted a kidney  biopsy noting AL Amyloidosis, lambda light chains. Bone marrow with 25% light chain restricted plasma cells with trisomy 13 and standard risk myeloma; Echo showed normal EF with mild TR and mild Pul HTN;  was treated with Yolanda/CyBorD and reportedly he had nice reduction with in light chain as w s nephrotic range proteinuria; restaging bmbx 9/27/21 4% lambda positive cells and no detectable serum free light chains, then s/p autologous PBSCT  (T=0 on 10/22/21) preparative regimen with [id number] mg; course complicated by A fib RVR; post transplant Mass Spectrometry was positive; then was placed on maintenance Revlimid 5mg daily, then later due to multiple issues his Revlimid was decreased by Dr. Whitney to 2.5mg daily] here for follow-up.    The 10/12/2024 marrow was c/w a pathologic CR but the MRD remained positive, though at a low level (31 cells, out of 510,000 tested); however, the peripheral blood MRD test result (mass spec) came back negative.     I reviewed the above results w/the pt and his wife; I said that there is limited data to quantify the risk for recurrence in situation such as this (discordance between marrow ClonoSeq MRD and peripheral blood MRD, by mass spec) but that the marrow MRD has more data to support its use. However, I also explained that recent data from a large [place] study (MYELOMA XI) revealed that there may not be benefit for continued maintenance revlimid beyond 3  years; in addition, he and his wife feel that the numerous side effects (see above), although each is fairly minor, altogether are really affecting his quality of life in a negative fashion. So, after a long discussion we agreed to stop the revlimid. He plans to follow-up w/Dr. Whitney in [place] and q3 months, getting labs w/him. I suggested that he get a follow-up w/me in 6 months as a phone visit and discuss repeating a marrow for MRD at that time.

## 2024-12-12 ENCOUNTER — TELEMEDICINE (OUTPATIENT)
Dept: HEMATOLOGY/ONCOLOGY | Facility: HOSPITAL | Age: 69
End: 2024-12-12
Payer: MEDICARE

## 2024-12-12 DIAGNOSIS — E85.81 AL AMYLOIDOSIS (MULTI): ICD-10-CM

## 2024-12-12 PROCEDURE — 1157F ADVNC CARE PLAN IN RCRD: CPT | Performed by: INTERNAL MEDICINE

## 2024-12-12 PROCEDURE — 99443 PR PHYS/QHP TELEPHONE EVALUATION 21-30 MIN: CPT | Performed by: INTERNAL MEDICINE

## 2024-12-17 ENCOUNTER — HOSPITAL ENCOUNTER (OUTPATIENT)
Dept: INFUSION THERAPY | Age: 69
Discharge: HOME | End: 2024-12-17
Payer: MEDICARE

## 2024-12-17 DIAGNOSIS — E85.81 LIGHT CHAIN (AL) AMYLOIDOSIS (HCC): ICD-10-CM

## 2024-12-17 LAB
ALBUMIN SERPL-MCNC: 3.9 G/DL (ref 3.5–5.2)
ALP SERPL-CCNC: 93 U/L (ref 40–129)
ALT SERPL-CCNC: 25 U/L (ref 0–40)
ANION GAP SERPL CALCULATED.3IONS-SCNC: 9 MMOL/L (ref 7–16)
AST SERPL-CCNC: 26 U/L (ref 0–39)
BASOPHILS # BLD: 0.1 K/UL (ref 0–0.2)
BASOPHILS NFR BLD: 2 % (ref 0–2)
BILIRUB SERPL-MCNC: 0.5 MG/DL (ref 0–1.2)
BUN SERPL-MCNC: 18 MG/DL (ref 6–23)
CALCIUM SERPL-MCNC: 9.2 MG/DL (ref 8.6–10.2)
CHLORIDE SERPL-SCNC: 104 MMOL/L (ref 98–107)
CO2 SERPL-SCNC: 26 MMOL/L (ref 22–29)
CREAT SERPL-MCNC: 1 MG/DL (ref 0.7–1.2)
EOSINOPHIL # BLD: 0.19 K/UL (ref 0.05–0.5)
EOSINOPHILS RELATIVE PERCENT: 4 % (ref 0–6)
ERYTHROCYTE [DISTWIDTH] IN BLOOD BY AUTOMATED COUNT: 12.3 % (ref 11.5–15)
GFR, ESTIMATED: 79 ML/MIN/1.73M2
GLUCOSE SERPL-MCNC: 90 MG/DL (ref 74–99)
HCT VFR BLD AUTO: 45.1 % (ref 37–54)
HGB BLD-MCNC: 15.2 G/DL (ref 12.5–16.5)
IGA SERPL-MCNC: 236 MG/DL (ref 70–400)
IGG SERPL-MCNC: 1033 MG/DL (ref 700–1600)
IGM SERPL-MCNC: 42 MG/DL (ref 40–230)
IMM GRANULOCYTES # BLD AUTO: <0.03 K/UL (ref 0–0.58)
IMM GRANULOCYTES NFR BLD: 0 % (ref 0–5)
LDH SERPL-CCNC: 214 U/L (ref 135–225)
LYMPHOCYTES NFR BLD: 1.75 K/UL (ref 1.5–4)
LYMPHOCYTES RELATIVE PERCENT: 33 % (ref 20–42)
MCH RBC QN AUTO: 30.9 PG (ref 26–35)
MCHC RBC AUTO-ENTMCNC: 33.7 G/DL (ref 32–34.5)
MCV RBC AUTO: 91.7 FL (ref 80–99.9)
MONOCYTES NFR BLD: 0.7 K/UL (ref 0.1–0.95)
MONOCYTES NFR BLD: 13 % (ref 2–12)
NEUTROPHILS NFR BLD: 48 % (ref 43–80)
NEUTS SEG NFR BLD: 2.57 K/UL (ref 1.8–7.3)
PLATELET # BLD AUTO: 189 K/UL (ref 130–450)
PMV BLD AUTO: 8.7 FL (ref 7–12)
POTASSIUM SERPL-SCNC: 4.3 MMOL/L (ref 3.5–5)
PROT SERPL-MCNC: 6.9 G/DL (ref 6.4–8.3)
RBC # BLD AUTO: 4.92 M/UL (ref 3.8–5.8)
SODIUM SERPL-SCNC: 139 MMOL/L (ref 132–146)
WBC OTHER # BLD: 5.3 K/UL (ref 4.5–11.5)

## 2024-12-17 PROCEDURE — 36415 COLL VENOUS BLD VENIPUNCTURE: CPT

## 2024-12-17 PROCEDURE — 85025 COMPLETE CBC W/AUTO DIFF WBC: CPT

## 2024-12-17 PROCEDURE — 82784 ASSAY IGA/IGD/IGG/IGM EACH: CPT

## 2024-12-17 PROCEDURE — 80053 COMPREHEN METABOLIC PANEL: CPT

## 2024-12-17 PROCEDURE — 84165 PROTEIN E-PHORESIS SERUM: CPT

## 2024-12-17 PROCEDURE — 83521 IG LIGHT CHAINS FREE EACH: CPT

## 2024-12-17 PROCEDURE — 83615 LACTATE (LD) (LDH) ENZYME: CPT

## 2024-12-17 PROCEDURE — 84155 ASSAY OF PROTEIN SERUM: CPT

## 2024-12-17 PROCEDURE — 86334 IMMUNOFIX E-PHORESIS SERUM: CPT

## 2024-12-18 ENCOUNTER — OFFICE VISIT (OUTPATIENT)
Dept: ONCOLOGY | Age: 69
End: 2024-12-18
Payer: MEDICARE

## 2024-12-18 VITALS
DIASTOLIC BLOOD PRESSURE: 86 MMHG | OXYGEN SATURATION: 98 % | BODY MASS INDEX: 22.63 KG/M2 | WEIGHT: 152.8 LBS | SYSTOLIC BLOOD PRESSURE: 136 MMHG | HEIGHT: 69 IN | HEART RATE: 72 BPM | TEMPERATURE: 98.6 F

## 2024-12-18 DIAGNOSIS — E85.81 LIGHT CHAIN (AL) AMYLOIDOSIS (HCC): Primary | ICD-10-CM

## 2024-12-18 LAB
FREE KAPPA/LAMBDA RATIO: 1.32 (ref 0.22–1.74)
KAPPA LC FREE SER-MCNC: 26.5 MG/L
LAMBDA LC FREE SERPL-MCNC: 20.1 MG/L (ref 4.2–27.7)

## 2024-12-18 PROCEDURE — 99212 OFFICE O/P EST SF 10 MIN: CPT

## 2024-12-18 NOTE — PROGRESS NOTES
[facility]  [address] [place] [address].   [place] [address]        Pt Name: Danny Tadeo  YOB: 1955  Date of evaluation: 12/18/2024  Primary Care Physician: Rik Rose MD  Reason for evaluation:   No chief complaint on file.           Subjective:   Here for follow-up.          Follows at ;  S/P Stem Cell Transplant November 22,2021.         OBJECTIVE:  VITALS:  height is 1.753 m (5' 9\") and weight is 69.3 kg (152 lb 12.8 oz). His temperature is 98.6 °F (37 °C). His blood pressure is 136/86 and his pulse is 72. His oxygen saturation is 98%.   Physical Exam:  Performance Status: 0  Well developed, well nourished male  General: AAO to person, place, time, cooperative, in no acute distress.  Head and neck: PERRLA, EOMI. Sclera non icteric.  Oropharynx:  Clear.  Neck: no JVD,  no adenopathy.  Heart: Regular rate and rhythm, no murmur.  Lungs: Clear to auscultation.   Abdomen: Soft, non-tender;no masses, no organomegaly.  Extremities: No edema.   Neurologic:Cranial nerves grossly intact. No focal motor or sensory deficits.   Skin:  No rash.        Medications  Prior to Admission medications    Medication Sig Start Date End Date Taking? Authorizing Provider   gabapentin (NEURONTIN) 300 MG capsule Take 1 capsule by mouth 2 times daily for 180 days. Intended supply: 90 days 8/14/24 2/10/25  Yoan Monroy MD   pilocarpine (SALAGEN) 5 MG tablet Take 1 tablet by mouth 3 times daily  Patient taking differently: Take 1 tablet by mouth 3 times daily As needed 6/13/24   Rik Rose MD   b complex vitamins capsule Take 1 capsule by mouth daily    Roberto Del Real MD   Cyanocobalamin (VITAMIN B12 PO) Take 1 each by mouth daily    Roberto Del Real MD   Coenzyme Q10 (COQ10) 100 MG CAPS Take by mouth    Roberto Del Real MD   VITAMIN E PO Take 1 capsule by mouth daily    Roberto Del Real MD   Multiple Vitamins-Minerals (THERAPEUTIC MULTIVITAMIN-MINERALS) tablet Take 1 tablet by

## 2024-12-21 LAB
ALBUMIN SERPL-MCNC: 3.2 G/DL (ref 3.5–4.7)
ALPHA1 GLOB SERPL ELPH-MCNC: 0.2 G/DL (ref 0.2–0.4)
ALPHA2 GLOB SERPL ELPH-MCNC: 0.8 G/DL (ref 0.5–1)
B-GLOBULIN SERPL ELPH-MCNC: 1.2 G/DL (ref 0.8–1.3)
GAMMA GLOB SERPL ELPH-MCNC: 1.1 G/DL (ref 0.7–1.6)
INTERPRETATION SERPL IFE-IMP: NORMAL
PATH REV: NORMAL
PATHOLOGIST: ABNORMAL
PROT PATTERN SERPL ELPH-IMP: ABNORMAL
PROT SERPL-MCNC: 6.5 G/DL (ref 6.4–8.3)

## 2025-01-28 SDOH — ECONOMIC STABILITY: FOOD INSECURITY: WITHIN THE PAST 12 MONTHS, YOU WORRIED THAT YOUR FOOD WOULD RUN OUT BEFORE YOU GOT MONEY TO BUY MORE.: NEVER TRUE

## 2025-01-28 SDOH — ECONOMIC STABILITY: FOOD INSECURITY: WITHIN THE PAST 12 MONTHS, THE FOOD YOU BOUGHT JUST DIDN'T LAST AND YOU DIDN'T HAVE MONEY TO GET MORE.: NEVER TRUE

## 2025-01-28 SDOH — ECONOMIC STABILITY: INCOME INSECURITY: IN THE LAST 12 MONTHS, WAS THERE A TIME WHEN YOU WERE NOT ABLE TO PAY THE MORTGAGE OR RENT ON TIME?: NO

## 2025-01-28 ASSESSMENT — PATIENT HEALTH QUESTIONNAIRE - PHQ9
SUM OF ALL RESPONSES TO PHQ QUESTIONS 1-9: 0
SUM OF ALL RESPONSES TO PHQ9 QUESTIONS 1 & 2: 0
SUM OF ALL RESPONSES TO PHQ QUESTIONS 1-9: 0
2. FEELING DOWN, DEPRESSED OR HOPELESS: NOT AT ALL
2. FEELING DOWN, DEPRESSED OR HOPELESS: NOT AT ALL
1. LITTLE INTEREST OR PLEASURE IN DOING THINGS: NOT AT ALL
1. LITTLE INTEREST OR PLEASURE IN DOING THINGS: NOT AT ALL
SUM OF ALL RESPONSES TO PHQ QUESTIONS 1-9: 0
SUM OF ALL RESPONSES TO PHQ QUESTIONS 1-9: 0
SUM OF ALL RESPONSES TO PHQ9 QUESTIONS 1 & 2: 0

## 2025-01-30 ENCOUNTER — OFFICE VISIT (OUTPATIENT)
Dept: FAMILY MEDICINE CLINIC | Age: 70
End: 2025-01-30
Payer: MEDICARE

## 2025-01-30 VITALS
SYSTOLIC BLOOD PRESSURE: 102 MMHG | RESPIRATION RATE: 18 BRPM | HEIGHT: 69 IN | BODY MASS INDEX: 22.87 KG/M2 | HEART RATE: 82 BPM | WEIGHT: 154.4 LBS | DIASTOLIC BLOOD PRESSURE: 78 MMHG | TEMPERATURE: 97.6 F | OXYGEN SATURATION: 99 %

## 2025-01-30 DIAGNOSIS — R25.2 LEG CRAMP: ICD-10-CM

## 2025-01-30 DIAGNOSIS — E78.5 HYPERLIPIDEMIA, UNSPECIFIED HYPERLIPIDEMIA TYPE: Primary | Chronic | ICD-10-CM

## 2025-01-30 DIAGNOSIS — E85.81 LIGHT CHAIN (AL) AMYLOIDOSIS (HCC): ICD-10-CM

## 2025-01-30 DIAGNOSIS — Z12.5 SCREENING FOR MALIGNANT NEOPLASM OF PROSTATE: ICD-10-CM

## 2025-01-30 LAB
MAGNESIUM: 2 MG/DL (ref 1.6–2.6)
PROSTATE SPECIFIC ANTIGEN: 0.76 NG/ML (ref 0–4)

## 2025-01-30 PROCEDURE — 1036F TOBACCO NON-USER: CPT | Performed by: FAMILY MEDICINE

## 2025-01-30 PROCEDURE — G8427 DOCREV CUR MEDS BY ELIG CLIN: HCPCS | Performed by: FAMILY MEDICINE

## 2025-01-30 PROCEDURE — G2211 COMPLEX E/M VISIT ADD ON: HCPCS | Performed by: FAMILY MEDICINE

## 2025-01-30 PROCEDURE — 99214 OFFICE O/P EST MOD 30 MIN: CPT | Performed by: FAMILY MEDICINE

## 2025-01-30 PROCEDURE — 3017F COLORECTAL CA SCREEN DOC REV: CPT | Performed by: FAMILY MEDICINE

## 2025-01-30 PROCEDURE — 1159F MED LIST DOCD IN RCRD: CPT | Performed by: FAMILY MEDICINE

## 2025-01-30 PROCEDURE — 1123F ACP DISCUSS/DSCN MKR DOCD: CPT | Performed by: FAMILY MEDICINE

## 2025-01-30 PROCEDURE — G8420 CALC BMI NORM PARAMETERS: HCPCS | Performed by: FAMILY MEDICINE

## 2025-01-30 NOTE — PROGRESS NOTES
MD SEYZ CT    KNEE SURGERY Right 2016    SHOULDER ARTHROSCOPY Left 2001    left    VENTRAL HERNIA REPAIR         Social History     Tobacco Use    Smoking status: Former     Current packs/day: 0.00     Average packs/day: 0.1 packs/day for 10.0 years (1.0 ttl pk-yrs)     Types: Cigars, Cigarettes     Start date: 1980     Quit date: 1990     Years since quittin.4    Smokeless tobacco: Never   Substance Use Topics    Alcohol use: Yes     Comment: occasionally    Drug use: No       ROS:     A Extended Review of symptoms obtained during Physical exam was otherwise unremarkable.         Physical Exam    BP (!) 142/82   Pulse 85   Temp 97.6 °F (36.4 °C)   Resp 18   Ht 1.753 m (5' 9\")   Wt 70 kg (154 lb 6.4 oz)   SpO2 99%   BMI 22.80 kg/m²       Gen: NAD, AAOX3  HEENT: NC/AT, EOMI / PERRL / ANICTERIC, No Facial Droop.   CV: NDPMI, RRR no MRG  RESP: no IWOB, ctab. no w/r.  Abd: ND +BS   EXT: RUIZ Equally grossly. No calf ttp. Irregular nais.   SKIN: Warm / Dry, scalp erythema 2/2 derm cream.       A/P:   Diagnosis Orders   1. Hyperlipidemia, unspecified hyperlipidemia type        2. Light chain (AL) amyloidosis (HCC)        3. Screening for malignant neoplasm of prostate  PSA Screening      4. Leg cramp  Magnesium                1) HLP - per [facility] - started on praluent, 1st dose today. Reviewed video on admin.      2) Prostate CA screening - discussed & ordered PSA. Defer exam w lab draw today.     3) Screening colonoscopy 2016 - Due .     4) Amyloidosis - off revlimid, monitioring for Onc local & [facility].    5) Skin - avoid sun exposure, reviewed sunscreen SPF 50 + Mineral.   F/u 3-4 ms  Advised to discuss fall immunization w Dr Monroy.         Advised to be adherent to the treatment plans discussed today, and please call with any questions or concerns, letting the office know of any reasons that the plans may not be followed.  The risks of untreated conditions include worsening

## 2025-02-10 RX ORDER — GABAPENTIN 300 MG/1
300 CAPSULE ORAL 2 TIMES DAILY
Qty: 180 CAPSULE | Refills: 1 | Status: SHIPPED | OUTPATIENT
Start: 2025-02-10 | End: 2025-08-09

## 2025-03-25 ENCOUNTER — HOSPITAL ENCOUNTER (OUTPATIENT)
Dept: INFUSION THERAPY | Age: 70
Discharge: HOME | End: 2025-03-25
Payer: MEDICARE

## 2025-03-25 DIAGNOSIS — E85.81 LIGHT CHAIN (AL) AMYLOIDOSIS (HCC): ICD-10-CM

## 2025-03-25 LAB
ALBUMIN SERPL-MCNC: 4 G/DL (ref 3.5–5.2)
ALP SERPL-CCNC: 102 U/L (ref 40–129)
ALT SERPL-CCNC: 21 U/L (ref 0–40)
ANION GAP SERPL CALCULATED.3IONS-SCNC: 10 MMOL/L (ref 7–16)
AST SERPL-CCNC: 23 U/L (ref 0–39)
BASOPHILS # BLD: 0.06 K/UL (ref 0–0.2)
BASOPHILS NFR BLD: 1 % (ref 0–2)
BILIRUB SERPL-MCNC: 0.5 MG/DL (ref 0–1.2)
BUN SERPL-MCNC: 19 MG/DL (ref 6–23)
CALCIUM SERPL-MCNC: 9.5 MG/DL (ref 8.6–10.2)
CHLORIDE SERPL-SCNC: 104 MMOL/L (ref 98–107)
CO2 SERPL-SCNC: 26 MMOL/L (ref 22–29)
CREAT SERPL-MCNC: 1.1 MG/DL (ref 0.7–1.2)
EOSINOPHIL # BLD: 0.13 K/UL (ref 0.05–0.5)
EOSINOPHILS RELATIVE PERCENT: 3 % (ref 0–6)
ERYTHROCYTE [DISTWIDTH] IN BLOOD BY AUTOMATED COUNT: 13.2 % (ref 11.5–15)
GFR, ESTIMATED: 77 ML/MIN/1.73M2
GLUCOSE SERPL-MCNC: 123 MG/DL (ref 74–99)
HCT VFR BLD AUTO: 45.9 % (ref 37–54)
HGB BLD-MCNC: 15.1 G/DL (ref 12.5–16.5)
IGA SERPL-MCNC: 233 MG/DL (ref 70–400)
IGG SERPL-MCNC: 943 MG/DL (ref 700–1600)
IGM SERPL-MCNC: 42 MG/DL (ref 40–230)
IMM GRANULOCYTES # BLD AUTO: <0.03 K/UL (ref 0–0.58)
IMM GRANULOCYTES NFR BLD: 0 % (ref 0–5)
LDH SERPL-CCNC: 196 U/L (ref 135–225)
LYMPHOCYTES NFR BLD: 1.46 K/UL (ref 1.5–4)
LYMPHOCYTES RELATIVE PERCENT: 30 % (ref 20–42)
MCH RBC QN AUTO: 30.7 PG (ref 26–35)
MCHC RBC AUTO-ENTMCNC: 32.9 G/DL (ref 32–34.5)
MCV RBC AUTO: 93.3 FL (ref 80–99.9)
MONOCYTES NFR BLD: 0.53 K/UL (ref 0.1–0.95)
MONOCYTES NFR BLD: 11 % (ref 2–12)
NEUTROPHILS NFR BLD: 55 % (ref 43–80)
NEUTS SEG NFR BLD: 2.66 K/UL (ref 1.8–7.3)
PLATELET # BLD AUTO: 183 K/UL (ref 130–450)
PMV BLD AUTO: 8.9 FL (ref 7–12)
POTASSIUM SERPL-SCNC: 4 MMOL/L (ref 3.5–5)
PROT SERPL-MCNC: 7 G/DL (ref 6.4–8.3)
RBC # BLD AUTO: 4.92 M/UL (ref 3.8–5.8)
SODIUM SERPL-SCNC: 140 MMOL/L (ref 132–146)
WBC OTHER # BLD: 4.9 K/UL (ref 4.5–11.5)

## 2025-03-25 PROCEDURE — 85025 COMPLETE CBC W/AUTO DIFF WBC: CPT

## 2025-03-25 PROCEDURE — 83615 LACTATE (LD) (LDH) ENZYME: CPT

## 2025-03-25 PROCEDURE — 82784 ASSAY IGA/IGD/IGG/IGM EACH: CPT

## 2025-03-25 PROCEDURE — 83521 IG LIGHT CHAINS FREE EACH: CPT

## 2025-03-25 PROCEDURE — 36415 COLL VENOUS BLD VENIPUNCTURE: CPT

## 2025-03-25 PROCEDURE — 84165 PROTEIN E-PHORESIS SERUM: CPT

## 2025-03-25 PROCEDURE — 80053 COMPREHEN METABOLIC PANEL: CPT

## 2025-03-25 PROCEDURE — 86334 IMMUNOFIX E-PHORESIS SERUM: CPT

## 2025-03-25 PROCEDURE — 84155 ASSAY OF PROTEIN SERUM: CPT

## 2025-03-26 ENCOUNTER — OFFICE VISIT (OUTPATIENT)
Dept: ONCOLOGY | Age: 70
End: 2025-03-26
Payer: MEDICARE

## 2025-03-26 VITALS
BODY MASS INDEX: 23.61 KG/M2 | TEMPERATURE: 97.6 F | HEIGHT: 69 IN | WEIGHT: 159.4 LBS | OXYGEN SATURATION: 100 % | SYSTOLIC BLOOD PRESSURE: 130 MMHG | DIASTOLIC BLOOD PRESSURE: 80 MMHG | HEART RATE: 72 BPM

## 2025-03-26 DIAGNOSIS — E85.81 LIGHT CHAIN (AL) AMYLOIDOSIS (HCC): Primary | ICD-10-CM

## 2025-03-26 LAB
ALBUMIN SERPL-MCNC: 3.2 G/DL (ref 3.5–4.7)
ALPHA1 GLOB SERPL ELPH-MCNC: 0.2 G/DL (ref 0.2–0.4)
ALPHA2 GLOB SERPL ELPH-MCNC: 0.8 G/DL (ref 0.5–1)
B-GLOBULIN SERPL ELPH-MCNC: 1.2 G/DL (ref 0.8–1.3)
GAMMA GLOB SERPL ELPH-MCNC: 1 G/DL (ref 0.7–1.6)
INTERPRETATION SERPL IFE-IMP: NORMAL
PATH REV: NORMAL
PATHOLOGIST: ABNORMAL
PROT PATTERN SERPL ELPH-IMP: ABNORMAL
PROT SERPL-MCNC: 6.4 G/DL (ref 6.4–8.3)

## 2025-03-26 PROCEDURE — 99212 OFFICE O/P EST SF 10 MIN: CPT

## 2025-03-26 RX ORDER — ALIROCUMAB 75 MG/ML
75 INJECTION, SOLUTION SUBCUTANEOUS
COMMUNITY

## 2025-03-26 NOTE — PROGRESS NOTES
transplant around May    He will be initiated on maintenance Revlimid 5 mg daily 3 weeks on and 1 week off and all potential side effects were discussed.      He is cleared to travel on vacation to [place].  His management will be coordinated with Dr. Wilder.      5/25/2022  Patient went to [place] in April and came back and then his wife contracted COVID and both had cough myalgias and fatigue but apparently he tested negative and he started his Revlimid and then he stopped it blaming the cough as a side effect of Revlimid.  It was explained to him that he likely had COVID even though he tested negative and he is willing to resume Revlimid next week and we will closely monitor him for potential side effects he will continue on his baby aspirin.  He will start his immunization in 2 to 3 weeks which would be 6-month post transplant.      6/8/2022  Patient has been intolerant from Revlimid and and since he resumed it he has been complaining of recurrent sinusitis congestion cough and excessive mucus and phlegm.  He also reports myalgias and muscle cramps.  No diarrhea or skin rash.    On exam he has scattered rhonchi and wheezing.  He will be given Medrol Dosepak along with Mucinex and will be given Tessalon Perles for his cough.  He will be given as needed muscle relaxers with tizanidine for his muscle cramps.  He will be go off Revlimid for 2 weeks and will be reevaluated clinically in 2 weeks.  His vaccines will be postponed till his return in 2 weeks.      6/20/2022  Has had recurrent rash despite the fact he did not resume his Revlimid yet  Medrol Dosepak helped clear the rash it was very itchy and generalized erythematous maculopapular mostly over extremities and trunk.  He stopped all his vitamins.  At this time he is only on his acyclovir which he has been on for several months as well as Crestor Zanaflex and omeprazole.  He is to resume on Thursday Revlimid and if he develops recurrent cough and rash it will be

## 2025-03-27 LAB
FREE KAPPA/LAMBDA RATIO: 1.03 (ref 0.22–1.74)
KAPPA LC FREE SER-MCNC: 15.7 MG/L
LAMBDA LC FREE SERPL-MCNC: 15.3 MG/L (ref 4.2–27.7)

## 2025-04-01 ENCOUNTER — APPOINTMENT (OUTPATIENT)
Dept: CT IMAGING | Age: 70
End: 2025-04-01
Payer: MEDICARE

## 2025-04-01 ENCOUNTER — HOSPITAL ENCOUNTER (EMERGENCY)
Age: 70
Discharge: HOME | End: 2025-04-01
Attending: EMERGENCY MEDICINE
Payer: MEDICARE

## 2025-04-01 VITALS
SYSTOLIC BLOOD PRESSURE: 119 MMHG | RESPIRATION RATE: 21 BRPM | DIASTOLIC BLOOD PRESSURE: 78 MMHG | HEART RATE: 94 BPM | TEMPERATURE: 97.7 F | OXYGEN SATURATION: 95 %

## 2025-04-01 DIAGNOSIS — R25.2 MUSCLE CRAMPS: ICD-10-CM

## 2025-04-01 DIAGNOSIS — R55 SYNCOPE AND COLLAPSE: Primary | ICD-10-CM

## 2025-04-01 LAB
ALBUMIN SERPL-MCNC: 4 G/DL (ref 3.5–5.2)
ALP SERPL-CCNC: 95 U/L (ref 40–129)
ALT SERPL-CCNC: 20 U/L (ref 0–40)
ANION GAP SERPL CALCULATED.3IONS-SCNC: 9 MMOL/L (ref 7–16)
AST SERPL-CCNC: 24 U/L (ref 0–39)
B PARAP IS1001 DNA NPH QL NAA+NON-PROBE: NOT DETECTED
B PERT DNA SPEC QL NAA+PROBE: NOT DETECTED
BASOPHILS # BLD: 0.05 K/UL (ref 0–0.2)
BASOPHILS NFR BLD: 1 % (ref 0–2)
BILIRUB SERPL-MCNC: 0.5 MG/DL (ref 0–1.2)
BILIRUB UR QL STRIP: NEGATIVE
BUN SERPL-MCNC: 18 MG/DL (ref 6–23)
C PNEUM DNA NPH QL NAA+NON-PROBE: NOT DETECTED
CALCIUM SERPL-MCNC: 9.9 MG/DL (ref 8.6–10.2)
CHLORIDE SERPL-SCNC: 102 MMOL/L (ref 98–107)
CK SERPL-CCNC: 147 U/L (ref 20–200)
CLARITY UR: CLEAR
CO2 SERPL-SCNC: 28 MMOL/L (ref 22–29)
COLOR UR: YELLOW
CREAT SERPL-MCNC: 1.2 MG/DL (ref 0.7–1.2)
EKG ATRIAL RATE: 79 BPM
EKG P AXIS: 54 DEGREES
EKG P-R INTERVAL: 148 MS
EKG Q-T INTERVAL: 392 MS
EKG QRS DURATION: 72 MS
EKG QTC CALCULATION (BAZETT): 449 MS
EKG R AXIS: 5 DEGREES
EKG T AXIS: 56 DEGREES
EKG VENTRICULAR RATE: 79 BPM
EOSINOPHIL # BLD: 0.13 K/UL (ref 0.05–0.5)
EOSINOPHILS RELATIVE PERCENT: 2 % (ref 0–6)
ERYTHROCYTE [DISTWIDTH] IN BLOOD BY AUTOMATED COUNT: 13 % (ref 11.5–15)
FLUAV RNA NPH QL NAA+NON-PROBE: NOT DETECTED
FLUAV RNA RESP QL NAA+PROBE: NOT DETECTED
FLUBV RNA NPH QL NAA+NON-PROBE: NOT DETECTED
FLUBV RNA RESP QL NAA+PROBE: NOT DETECTED
GFR, ESTIMATED: 68 ML/MIN/1.73M2
GLUCOSE SERPL-MCNC: 112 MG/DL (ref 74–99)
GLUCOSE UR STRIP-MCNC: NEGATIVE MG/DL
HADV DNA NPH QL NAA+NON-PROBE: NOT DETECTED
HCOV 229E RNA NPH QL NAA+NON-PROBE: NOT DETECTED
HCOV HKU1 RNA NPH QL NAA+NON-PROBE: NOT DETECTED
HCOV NL63 RNA NPH QL NAA+NON-PROBE: NOT DETECTED
HCOV OC43 RNA NPH QL NAA+NON-PROBE: NOT DETECTED
HCT VFR BLD AUTO: 47.3 % (ref 37–54)
HGB BLD-MCNC: 16 G/DL (ref 12.5–16.5)
HGB UR QL STRIP.AUTO: NEGATIVE
HMPV RNA NPH QL NAA+NON-PROBE: NOT DETECTED
HPIV1 RNA NPH QL NAA+NON-PROBE: NOT DETECTED
HPIV2 RNA NPH QL NAA+NON-PROBE: NOT DETECTED
HPIV3 RNA NPH QL NAA+NON-PROBE: NOT DETECTED
HPIV4 RNA NPH QL NAA+NON-PROBE: NOT DETECTED
IMM GRANULOCYTES # BLD AUTO: <0.03 K/UL (ref 0–0.58)
IMM GRANULOCYTES NFR BLD: 0 % (ref 0–5)
KETONES UR STRIP-MCNC: ABNORMAL MG/DL
LEUKOCYTE ESTERASE UR QL STRIP: NEGATIVE
LYMPHOCYTES NFR BLD: 1.47 K/UL (ref 1.5–4)
LYMPHOCYTES RELATIVE PERCENT: 27 % (ref 20–42)
M PNEUMO DNA NPH QL NAA+NON-PROBE: NOT DETECTED
MCH RBC QN AUTO: 31.7 PG (ref 26–35)
MCHC RBC AUTO-ENTMCNC: 33.8 G/DL (ref 32–34.5)
MCV RBC AUTO: 93.8 FL (ref 80–99.9)
MONOCYTES NFR BLD: 0.56 K/UL (ref 0.1–0.95)
MONOCYTES NFR BLD: 10 % (ref 2–12)
NEUTROPHILS NFR BLD: 59 % (ref 43–80)
NEUTS SEG NFR BLD: 3.14 K/UL (ref 1.8–7.3)
NITRITE UR QL STRIP: NEGATIVE
PH UR STRIP: 7 [PH] (ref 5–8)
PLATELET # BLD AUTO: 206 K/UL (ref 130–450)
PMV BLD AUTO: 9.2 FL (ref 7–12)
POTASSIUM SERPL-SCNC: 5.1 MMOL/L (ref 3.5–5)
PROT SERPL-MCNC: 7 G/DL (ref 6.4–8.3)
PROT UR STRIP-MCNC: NEGATIVE MG/DL
RBC # BLD AUTO: 5.04 M/UL (ref 3.8–5.8)
RBC #/AREA URNS HPF: ABNORMAL /HPF
RSV RNA NPH QL NAA+NON-PROBE: NOT DETECTED
RV+EV RNA NPH QL NAA+NON-PROBE: NOT DETECTED
SARS-COV-2 RNA NPH QL NAA+NON-PROBE: NOT DETECTED
SARS-COV-2 RNA RESP QL NAA+PROBE: NOT DETECTED
SODIUM SERPL-SCNC: 139 MMOL/L (ref 132–146)
SOURCE: NORMAL
SP GR UR STRIP: 1.01 (ref 1–1.03)
SPECIMEN DESCRIPTION: NORMAL
SPECIMEN DESCRIPTION: NORMAL
TROPONIN I SERPL HS-MCNC: 8 NG/L (ref 0–11)
TROPONIN I SERPL HS-MCNC: 9 NG/L (ref 0–11)
URATE SERPL-MCNC: 5.3 MG/DL (ref 3.4–7)
UROBILINOGEN UR STRIP-ACNC: 0.2 EU/DL (ref 0–1)
WBC #/AREA URNS HPF: ABNORMAL /HPF
WBC OTHER # BLD: 5.4 K/UL (ref 4.5–11.5)

## 2025-04-01 PROCEDURE — 84550 ASSAY OF BLOOD/URIC ACID: CPT

## 2025-04-01 PROCEDURE — 93010 ELECTROCARDIOGRAM REPORT: CPT | Performed by: INTERNAL MEDICINE

## 2025-04-01 PROCEDURE — 6360000004 HC RX CONTRAST MEDICATION: Performed by: RADIOLOGY

## 2025-04-01 PROCEDURE — 87636 SARSCOV2 & INF A&B AMP PRB: CPT

## 2025-04-01 PROCEDURE — 87086 URINE CULTURE/COLONY COUNT: CPT

## 2025-04-01 PROCEDURE — 93005 ELECTROCARDIOGRAM TRACING: CPT

## 2025-04-01 PROCEDURE — 71275 CT ANGIOGRAPHY CHEST: CPT

## 2025-04-01 PROCEDURE — 2580000003 HC RX 258

## 2025-04-01 PROCEDURE — 0202U NFCT DS 22 TRGT SARS-COV-2: CPT

## 2025-04-01 PROCEDURE — 85025 COMPLETE CBC W/AUTO DIFF WBC: CPT

## 2025-04-01 PROCEDURE — 81001 URINALYSIS AUTO W/SCOPE: CPT

## 2025-04-01 PROCEDURE — 82550 ASSAY OF CK (CPK): CPT

## 2025-04-01 PROCEDURE — 84484 ASSAY OF TROPONIN QUANT: CPT

## 2025-04-01 PROCEDURE — 80053 COMPREHEN METABOLIC PANEL: CPT

## 2025-04-01 PROCEDURE — 99285 EMERGENCY DEPT VISIT HI MDM: CPT

## 2025-04-01 RX ORDER — IOPAMIDOL 755 MG/ML
75 INJECTION, SOLUTION INTRAVASCULAR
Status: COMPLETED | OUTPATIENT
Start: 2025-04-01 | End: 2025-04-01

## 2025-04-01 RX ORDER — 0.9 % SODIUM CHLORIDE 0.9 %
1000 INTRAVENOUS SOLUTION INTRAVENOUS ONCE
Status: COMPLETED | OUTPATIENT
Start: 2025-04-01 | End: 2025-04-01

## 2025-04-01 RX ADMIN — IOPAMIDOL 75 ML: 755 INJECTION, SOLUTION INTRAVENOUS at 11:10

## 2025-04-01 RX ADMIN — SODIUM CHLORIDE 1000 ML: 0.9 INJECTION, SOLUTION INTRAVENOUS at 10:00

## 2025-04-01 NOTE — ED NOTES
Ortho's performed and documented. Ambulated patient to bathroom where patient obtained a urine sample. Urine sample sent. Patient tolerated ambulation and orthostatics well without any complication.

## 2025-04-01 NOTE — ED PROVIDER NOTES
Department of Emergency Medicine     Written by: Dwayne Henson MD  Patient Name: Danny Tadeo  Visit Date: 2025  9:02 AM  MRN: [id number]                   : 1955    ------------------------- CC-------------------------  Chief Complaint   Patient presents with    Loss of Consciousness     Pt has been having leg cramps all night and did not sleep well. Got up this morning to use the restroom and felt like he was going to pass out. Called his wife to the bathroom and pts vision got black and per wife patient was syncopal. During that episode patient was very diaphoretic. Pt came to after a couple minutes. BP was 80/palp for EMS. /64 during triage.      ------------------------- HPI -------------------------    Danny is a 70 y.o. year old male who presents from home for leg cramps and loss of consciousness.  Patient reports that last night started developing cramps in the lower extremities, reports he has felt similar episodes whenever his potassium was was low.  He went to the restroom last night and then went to sit on his couch, felt lightheaded and dizzy and lost consciousness while on the couch, he regained consciousness within a few seconds, his wife evidenced episode that he had called her down.  He denies any head trauma as he was sitting on the chair.  Reports he has been eating and drinking without any problem, denied nausea fevers, fevers or chills.    Reports history of peripheral neuropathy, currently on gabapentin. Reports prior similar reactions to niacin, recently started using IM Praluent which is a monoclonal antibody used to decrease cholesterol.     Status post stem cell transplant for history of amyloidosis, oncology workup by Dr. Monroy.  Last chemo was in December.  Takes baby aspirin daily, no other blood thinners.    There are no family/friends at bedside. The history is provided by ramon, who is felt to be a good historian.    Nursing notes were all reviewed and agreed  [MN]   3482 EKG as read by me.  Heart rate 79, QTc 449.  Normal sinus rhythm, no acute ischemic changes noted, no concerning ST segment elevation or depressions.  EKG from 8/20/2024 showed normal sinus rhythm [MN]      ED Course User Index  [MN] Dwayne Henson MD     Medications administered today:  Medications   sodium chloride 0.9 % bolus 1,000 mL (0 mLs IntraVENous Stopped 4/1/25 1621)   iopamidol (ISOVUE-370) 76 % injection 75 mL (75 mLs IntraVENous Given 4/1/25 1110)     CONSULTS: discussion with bolded \"IP consult\", otherwise consult was likely placed by admitting service  None    PROCEDURES: Unless otherwise listed below, none    SOCIAL DETERMINANTS: None  ------------------------- ADDITIONAL PROVIDER NOTES ---------------------------    I have spoken to the patient and/or family regarding results and the proposed plan for disposition.  They are comfortable with management and treatment plans as discussed.    ----------------- IMPRESSION AND DISPOSITION ---------------------------------  IMPRESSION  1. Syncope and collapse    2. Muscle cramps      DISPOSITION  Disposition: Discharge to home  Patient condition is good    Dwayne Henson MD PGY-1    NOTE: This report was transcribed using voice recognition software. Every effort was made to ensure accuracy; however, inadvertent computerized transcription errors may be present

## 2025-04-03 LAB
MICROORGANISM SPEC CULT: NO GROWTH
SERVICE CMNT-IMP: NORMAL
SPECIMEN DESCRIPTION: NORMAL

## 2025-04-24 ENCOUNTER — PATIENT MESSAGE (OUTPATIENT)
Dept: FAMILY MEDICINE CLINIC | Age: 70
End: 2025-04-24

## 2025-04-24 DIAGNOSIS — R25.2 LEG CRAMP: Primary | ICD-10-CM

## 2025-04-24 DIAGNOSIS — Z78.9 STATIN INTOLERANCE: ICD-10-CM

## 2025-04-24 DIAGNOSIS — E78.5 HYPERLIPIDEMIA, UNSPECIFIED HYPERLIPIDEMIA TYPE: ICD-10-CM

## 2025-04-29 ENCOUNTER — HOSPITAL ENCOUNTER (OUTPATIENT)
Age: 70
Discharge: HOME | End: 2025-04-29
Payer: MEDICARE

## 2025-04-29 ENCOUNTER — RESULTS FOLLOW-UP (OUTPATIENT)
Dept: FAMILY MEDICINE CLINIC | Age: 70
End: 2025-04-29

## 2025-04-29 DIAGNOSIS — E78.5 HYPERLIPIDEMIA, UNSPECIFIED HYPERLIPIDEMIA TYPE: ICD-10-CM

## 2025-04-29 DIAGNOSIS — Z78.9 STATIN INTOLERANCE: ICD-10-CM

## 2025-04-29 DIAGNOSIS — R25.2 LEG CRAMP: ICD-10-CM

## 2025-04-29 LAB
ANION GAP SERPL CALCULATED.3IONS-SCNC: 10 MMOL/L (ref 7–16)
BUN SERPL-MCNC: 14 MG/DL (ref 6–23)
CALCIUM SERPL-MCNC: 9 MG/DL (ref 8.6–10.2)
CHLORIDE SERPL-SCNC: 107 MMOL/L (ref 98–107)
CHOLEST SERPL-MCNC: 236 MG/DL
CO2 SERPL-SCNC: 24 MMOL/L (ref 22–29)
CREAT SERPL-MCNC: 0.9 MG/DL (ref 0.7–1.2)
GFR, ESTIMATED: >90 ML/MIN/1.73M2
GLUCOSE SERPL-MCNC: 94 MG/DL (ref 74–99)
HDLC SERPL-MCNC: 61 MG/DL
LDLC SERPL CALC-MCNC: 162 MG/DL
POTASSIUM SERPL-SCNC: 4 MMOL/L (ref 3.5–5)
SODIUM SERPL-SCNC: 141 MMOL/L (ref 132–146)
TRIGL SERPL-MCNC: 66 MG/DL
VLDLC SERPL CALC-MCNC: 13 MG/DL

## 2025-04-29 PROCEDURE — 36415 COLL VENOUS BLD VENIPUNCTURE: CPT

## 2025-04-29 PROCEDURE — 80061 LIPID PANEL: CPT

## 2025-04-29 PROCEDURE — 80048 BASIC METABOLIC PNL TOTAL CA: CPT

## 2025-05-01 ENCOUNTER — OFFICE VISIT (OUTPATIENT)
Dept: FAMILY MEDICINE CLINIC | Age: 70
End: 2025-05-01
Payer: MEDICARE

## 2025-05-01 VITALS
BODY MASS INDEX: 22.51 KG/M2 | WEIGHT: 152 LBS | HEIGHT: 69 IN | RESPIRATION RATE: 18 BRPM | HEART RATE: 76 BPM | DIASTOLIC BLOOD PRESSURE: 87 MMHG | SYSTOLIC BLOOD PRESSURE: 137 MMHG | OXYGEN SATURATION: 97 % | TEMPERATURE: 97 F

## 2025-05-01 DIAGNOSIS — E78.5 HYPERLIPIDEMIA, UNSPECIFIED HYPERLIPIDEMIA TYPE: ICD-10-CM

## 2025-05-01 DIAGNOSIS — Z86.39 H/O AMYLOIDOSIS: ICD-10-CM

## 2025-05-01 DIAGNOSIS — R53.83 OTHER FATIGUE: ICD-10-CM

## 2025-05-01 DIAGNOSIS — G62.9 NEUROPATHY: Primary | ICD-10-CM

## 2025-05-01 LAB
C-REACTIVE PROTEIN: 3.2 MG/L (ref 0–5)
FOLATE: >40 NG/ML (ref 4.6–34.8)
HAV IGM SER IA-ACNC: NONREACTIVE
HEP B S AGB SURF AG: NONREACTIVE
HEPATITIS B CORE IGM ANTIBODY: NONREACTIVE
HEPATITIS C ANTIBODY: NONREACTIVE
MAGNESIUM: 2.2 MG/DL (ref 1.6–2.4)
SED RATE, AUTOMATED: 20 MM/HR (ref 0–15)
TSH SERPL DL<=0.05 MIU/L-ACNC: 1.33 UIU/ML (ref 0.27–4.2)
VITAMIN B-12: 1804 PG/ML (ref 232–1245)

## 2025-05-01 PROCEDURE — 3017F COLORECTAL CA SCREEN DOC REV: CPT | Performed by: FAMILY MEDICINE

## 2025-05-01 PROCEDURE — 1159F MED LIST DOCD IN RCRD: CPT | Performed by: FAMILY MEDICINE

## 2025-05-01 PROCEDURE — G2211 COMPLEX E/M VISIT ADD ON: HCPCS | Performed by: FAMILY MEDICINE

## 2025-05-01 PROCEDURE — 1036F TOBACCO NON-USER: CPT | Performed by: FAMILY MEDICINE

## 2025-05-01 PROCEDURE — 99214 OFFICE O/P EST MOD 30 MIN: CPT | Performed by: FAMILY MEDICINE

## 2025-05-01 PROCEDURE — 1123F ACP DISCUSS/DSCN MKR DOCD: CPT | Performed by: FAMILY MEDICINE

## 2025-05-01 PROCEDURE — 1160F RVW MEDS BY RX/DR IN RCRD: CPT | Performed by: FAMILY MEDICINE

## 2025-05-01 PROCEDURE — G8427 DOCREV CUR MEDS BY ELIG CLIN: HCPCS | Performed by: FAMILY MEDICINE

## 2025-05-01 PROCEDURE — G8420 CALC BMI NORM PARAMETERS: HCPCS | Performed by: FAMILY MEDICINE

## 2025-05-01 RX ORDER — PREGABALIN 25 MG/1
25 CAPSULE ORAL 2 TIMES DAILY
Qty: 60 CAPSULE | Refills: 1 | Status: SHIPPED | OUTPATIENT
Start: 2025-05-01 | End: 2025-06-30

## 2025-05-01 NOTE — PROGRESS NOTES
Danny is a 70 y.o. male who presents today for   Chief Complaint   Patient presents with    3 Month Follow-Up       HPI: 71yo male w/ PMHX Light chain Amyloidosis presenting for followup.  He is accompanied by his wife today    1) Cholesterol - followed up with [facility]. Started on praluent reports tolerating Praluent well has followed up with [facility] physicians they are getting continue him on the same dose given his already excellent LDL and total cholesterol response we will consider increasing his dose in October he has no side effects    2) Leg Cramps -they do think his clamps have been improved since stopping the Revlimid.  Though he still has rare severe intermittent nighttime leg cramps and some distal neuropathy again that started when he was on chemotherapy it has been about stable.  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    Situational syncope 12/2/2010       Current Outpatient Medications   Medication Instructions    Ascorbic Acid (VITAMIN C PO) 1 tablet, DAILY    aspirin 81 mg, DAILY    b complex vitamins capsule 1 capsule, DAILY    Coenzyme Q10 (COQ10) 100 MG CAPS Take by mouth    Cyanocobalamin (VITAMIN B12 PO) 1 each, DAILY    gabapentin (NEURONTIN) 300 mg, Oral, 2 TIMES DAILY, Intended supply: 90 days    Multiple Vitamins-Minerals (THERAPEUTIC MULTIVITAMIN-MINERALS) tablet 1 tablet, DAILY    Praluent 75 mg, EVERY 14 DAYS    pregabalin (LYRICA) 25 mg, Oral, 2 TIMES DAILY    VITAMIN D PO 1,000 Units, NIGHTLY       Allergies   Allergen Reactions    Niacin And Related Other (See Comments)     syncope    Codeine Itching    Lipitor      Muscle cramps    Vytorin [Ezetimibe-Simvastatin]      Muscle cramps      Zetia [Ezetimibe]

## 2025-05-02 ENCOUNTER — RESULTS FOLLOW-UP (OUTPATIENT)
Dept: FAMILY MEDICINE CLINIC | Age: 70
End: 2025-05-02

## 2025-05-02 LAB — ANTI-NUCLEAR ANTIBODY (ANA): NEGATIVE

## 2025-05-02 NOTE — RESULT ENCOUNTER NOTE
Labs are good overall - nothing suggesting a cause of neuropathy other than the chemo. B12 and folate are in good ranges for now. The Sed rate is very mildly elevated and nonspecific- its actually down from a few years ago which is reassuring. I would discuss the nerve study with PMR doctor at referral and if they think it would help in the treatment of neuropathy. For now lets continue the new Lyrica and see how symptoms go - Thanks DR THOMAS

## 2025-06-05 ENCOUNTER — PATIENT MESSAGE (OUTPATIENT)
Dept: FAMILY MEDICINE CLINIC | Age: 70
End: 2025-06-05

## 2025-06-05 RX ORDER — AZELASTINE HYDROCHLORIDE 137 UG/1
1 SPRAY, METERED NASAL DAILY PRN
Qty: 30 ML | Refills: 5 | Status: SHIPPED | OUTPATIENT
Start: 2025-06-05

## 2025-06-05 RX ORDER — AZELASTINE HYDROCHLORIDE 137 UG/1
1 SPRAY, METERED NASAL DAILY PRN
Qty: 30 ML | Refills: 5 | Status: SHIPPED | OUTPATIENT
Start: 2025-06-05 | End: 2025-06-05

## 2025-06-11 NOTE — PROGRESS NOTES
Patient ID: Rusty Woodard is a 70 y.o. male.  Referring Physician: Justin Osman MD  [address], [address] [place] [address]  Primary Care Provider: No Assigned PCP Generic Provider, MD    Virtual or Telephone Consent    A telephone visit (audio only) between the patient (at the originating site) and the provider (at the distant site) was utilized to provide this telehealth service.   Verbal consent was requested and obtained from Rusty Woodard on this date, 25, for a telehealth visit.    This was a phone appt    Interval hx:  Since last visit he stopped taking revlimid on 24. Since then he is feeling a lot less tired and had far less transient burning of scalp; also, other complaints (itching, dry skin, dizziness, scabs on scalp) have pretty resolved, though still is having  numbness and tingling of his hands and feet, for which he said he will be seeing a spine specialist at [facility] in [place], [place]. Saw Dr. Whitney on 25 and was told his labs were all OK.    Pt denies having had fevers, chills, sweats,  cough, nausea, vomiting, diarrhea, mouth sores, skin rashes, chest/abd/back pains, headaches, nosebleeds, GI or  bleeding.  PMHx:  1) Multiple myeloma (with amyloidosis, see below)  2) Afib (transient w/SCTx)  3) HLD  4) Sciatica    SocHx:   with 3 kids. 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 No hx of cigarette, EtOH or illicit drug use    FamHx:  No hx of myeloma; mom  age 64 of breast ca    Meds:  Reviewed    All:  CODEINE    Performance Status:  Symptomatic; fully ambulatory      Assessment/recommendations:  70 year old man with a  hx of Afib, Multiple Myeloma and AL Amyloidosis [numbness and tingling in his hands and feet and LE edema in 2021 had 5.27 gr/d nephrotic range proteinuria which prompted a kidney  biopsy noting AL Amyloidosis, lambda light chains. Bone marrow with 25% light chain restricted plasma cells with  trisomy 13 and standard risk myeloma; Echo showed normal EF with mild TR and mild Pul HTN; was treated with Yolanda/CyBorD and reportedly he had nice reduction with in light chain as w s nephrotic range proteinuria; restaging bmbx 9/27/21 4% lambda positive cells and no detectable serum free light chains, then s/p autologous PBSCT  (T=0 on 10/22/21) preparative regimen with TOP741 mg; course complicated by Afib RVR; post transplant Mass Spectrometry was positive; then was placed on maintenance Revlimid 5mg daily, then later due to multiple issues his Revlimid was decreased by Dr. Whitney to 2.5mg daily; stopped revlimid in 12/2024 d/t side effects] here for follow-up.    The 10/12/2024 marrow was c/w a pathologic CR but the MRD remained positive, though at a low level (31 cells, out of 510,000 tested); however, the peripheral blood MRD test result (mass spec) came back negative.   Last visit I reviewed the above results w/the pt and his wife; I said that there is limited data to quantify the risk for recurrence in situation such as this (discordance between marrow ClonoSeq MRD and peripheral blood MRD, by mass spec) but that the marrow MRD has more data to support its use. However, I also explained that recent data from a large [place] study (MYELOMA XI) revealed that there may not be benefit for continued maintenance revlimid beyond 3  years; in addition, he and his wife feel that the numerous side effects (see above), although each is fairly minor, altogether are really affecting his quality of life in a negative fashion. So, after a long discussion in December 2024 we agreed to stop the revlimid. Since then he is feeling a lot less tired and had far less transient burning of scalp; also, other complaints (itching, dry skin, dizziness, scabs on scalp) have pretty resolved, though still is having  numbness and tingling of his hands and feet, for which he said he will be seeing a spine specialist at [facility] in  [place]. Saw Dr. Whitney on 6/25/25 and was told his labs were all OK.      Plan to have him get a repeat BM Aspirate/biopsy for MRD by ClonoSeq; unless there are worrisome findings he may follow-up in 6 mo's.    I spent around 25 min w/the pt, all of which were on counseling, coordination of care and addressing his questions.    He plans to follow-up w/Dr. Whitney in [place] and q3 months, getting labs w/him. I suggested that he get a repeat marrow for MRD at this time.

## 2025-06-12 ENCOUNTER — TELEMEDICINE (OUTPATIENT)
Dept: HEMATOLOGY/ONCOLOGY | Facility: HOSPITAL | Age: 70
End: 2025-06-12
Payer: MEDICARE

## 2025-06-12 DIAGNOSIS — E85.81 AL AMYLOIDOSIS (MULTI): ICD-10-CM

## 2025-06-12 PROCEDURE — 1157F ADVNC CARE PLAN IN RCRD: CPT | Performed by: INTERNAL MEDICINE

## 2025-06-12 PROCEDURE — 99213 OFFICE O/P EST LOW 20 MIN: CPT | Performed by: INTERNAL MEDICINE

## 2025-06-13 ENCOUNTER — PATIENT MESSAGE (OUTPATIENT)
Dept: HEMATOLOGY/ONCOLOGY | Facility: HOSPITAL | Age: 70
End: 2025-06-13
Payer: MEDICARE

## 2025-06-13 DIAGNOSIS — E85.81 AL AMYLOIDOSIS (MULTI): Primary | ICD-10-CM

## 2025-06-16 DIAGNOSIS — Z94.81 STATUS POST AUTOLOGOUS BONE MARROW TRANSPLANT: ICD-10-CM

## 2025-06-16 DIAGNOSIS — E85.81 AL AMYLOIDOSIS (MULTI): Primary | ICD-10-CM

## 2025-06-24 ENCOUNTER — HOSPITAL ENCOUNTER (OUTPATIENT)
Dept: INFUSION THERAPY | Age: 70
Discharge: HOME | End: 2025-06-24
Payer: MEDICARE

## 2025-06-24 DIAGNOSIS — E85.81 LIGHT CHAIN (AL) AMYLOIDOSIS (HCC): ICD-10-CM

## 2025-06-24 LAB
ALBUMIN SERPL-MCNC: 3.6 G/DL (ref 3.5–5.2)
ALP SERPL-CCNC: 77 U/L (ref 40–129)
ALT SERPL-CCNC: 19 U/L (ref 0–50)
ANION GAP SERPL CALCULATED.3IONS-SCNC: 9 MMOL/L (ref 7–16)
AST SERPL-CCNC: 22 U/L (ref 0–50)
BASOPHILS # BLD: 0.04 K/UL (ref 0–0.2)
BASOPHILS NFR BLD: 1 % (ref 0–2)
BILIRUB SERPL-MCNC: 0.4 MG/DL (ref 0–1.2)
BUN SERPL-MCNC: 19 MG/DL (ref 8–23)
CALCIUM SERPL-MCNC: 9 MG/DL (ref 8.8–10.2)
CHLORIDE SERPL-SCNC: 106 MMOL/L (ref 98–107)
CO2 SERPL-SCNC: 24 MMOL/L (ref 22–29)
CREAT SERPL-MCNC: 1 MG/DL (ref 0.7–1.2)
EOSINOPHIL # BLD: 0.17 K/UL (ref 0.05–0.5)
EOSINOPHILS RELATIVE PERCENT: 4 % (ref 0–6)
ERYTHROCYTE [DISTWIDTH] IN BLOOD BY AUTOMATED COUNT: 12.1 % (ref 11.5–15)
GFR, ESTIMATED: 85 ML/MIN/1.73M2
GLUCOSE SERPL-MCNC: 133 MG/DL (ref 74–99)
HCT VFR BLD AUTO: 42 % (ref 37–54)
HGB BLD-MCNC: 14.4 G/DL (ref 12.5–16.5)
IMM GRANULOCYTES # BLD AUTO: <0.03 K/UL (ref 0–0.58)
IMM GRANULOCYTES NFR BLD: 0 % (ref 0–5)
LYMPHOCYTES NFR BLD: 1.36 K/UL (ref 1.5–4)
LYMPHOCYTES RELATIVE PERCENT: 28 % (ref 20–42)
MCH RBC QN AUTO: 31.3 PG (ref 26–35)
MCHC RBC AUTO-ENTMCNC: 34.3 G/DL (ref 32–34.5)
MCV RBC AUTO: 91.3 FL (ref 80–99.9)
MONOCYTES NFR BLD: 0.44 K/UL (ref 0.1–0.95)
MONOCYTES NFR BLD: 9 % (ref 2–12)
NEUTROPHILS NFR BLD: 59 % (ref 43–80)
NEUTS SEG NFR BLD: 2.87 K/UL (ref 1.8–7.3)
PLATELET # BLD AUTO: 177 K/UL (ref 130–450)
PMV BLD AUTO: 9 FL (ref 7–12)
POTASSIUM SERPL-SCNC: 3.9 MMOL/L (ref 3.5–5.1)
PROT SERPL-MCNC: 6.2 G/DL (ref 6.4–8.3)
RBC # BLD AUTO: 4.6 M/UL (ref 3.8–5.8)
SODIUM SERPL-SCNC: 139 MMOL/L (ref 136–145)
WBC OTHER # BLD: 4.9 K/UL (ref 4.5–11.5)

## 2025-06-24 PROCEDURE — 85025 COMPLETE CBC W/AUTO DIFF WBC: CPT

## 2025-06-24 PROCEDURE — 36415 COLL VENOUS BLD VENIPUNCTURE: CPT

## 2025-06-24 PROCEDURE — 80053 COMPREHEN METABOLIC PANEL: CPT

## 2025-06-25 ENCOUNTER — OFFICE VISIT (OUTPATIENT)
Dept: ONCOLOGY | Age: 70
End: 2025-06-25

## 2025-06-25 ENCOUNTER — HOSPITAL ENCOUNTER (OUTPATIENT)
Dept: INFUSION THERAPY | Age: 70
Discharge: HOME | End: 2025-06-25
Payer: MEDICARE

## 2025-06-25 VITALS
DIASTOLIC BLOOD PRESSURE: 76 MMHG | OXYGEN SATURATION: 99 % | WEIGHT: 148.4 LBS | HEART RATE: 90 BPM | SYSTOLIC BLOOD PRESSURE: 107 MMHG | HEIGHT: 69 IN | BODY MASS INDEX: 21.98 KG/M2 | TEMPERATURE: 97.8 F

## 2025-06-25 DIAGNOSIS — E85.81 LIGHT CHAIN (AL) AMYLOIDOSIS (HCC): ICD-10-CM

## 2025-06-25 DIAGNOSIS — E85.81 LIGHT CHAIN (AL) AMYLOIDOSIS (HCC): Primary | ICD-10-CM

## 2025-06-25 PROCEDURE — 86335 IMMUNFIX E-PHORSIS/URINE/CSF: CPT

## 2025-06-25 PROCEDURE — 84166 PROTEIN E-PHORESIS/URINE/CSF: CPT

## 2025-06-25 PROCEDURE — 84156 ASSAY OF PROTEIN URINE: CPT

## 2025-06-25 NOTE — PROGRESS NOTES
[facility]  [address] [place] [address].   [place] [address]        Pt Name: Danny Tadeo  YOB: 1955  Date of evaluation: 6/25/2025  Primary Care Physician: Rik Rose MD  Reason for evaluation:   No chief complaint on file.           Subjective:   Here for follow-up.          Follows at ;  S/P Stem Cell Transplant November 22,2021.         OBJECTIVE:  VITALS:  vitals were not taken for this visit.   Physical Exam:  Performance Status: 0  Well developed, well nourished male  General: AAO to person, place, time, cooperative, in no acute distress.  Head and neck: PERRLA, EOMI. Sclera non icteric.  Oropharynx:  Clear.  Neck: no JVD,  no adenopathy.  Heart: Regular rate and rhythm, no murmur.  Lungs: Clear to auscultation.   Abdomen: Soft, non-tender;no masses, no organomegaly.  Extremities: No edema.   Neurologic:Cranial nerves grossly intact. No focal motor or sensory deficits.   Skin:  No rash.        Medications  Prior to Admission medications    Medication Sig Start Date End Date Taking? Authorizing Provider   azelastine (ASTEPRO) 137 MCG/SPRAY nasal spray 1 spray by Each Nostril route daily as needed for Rhinitis 6/5/25   Rik Rose MD   pregabalin (LYRICA) 25 MG capsule Take 1 capsule by mouth 2 times daily for 60 days. Max Daily Amount: 50 mg 5/1/25 6/30/25  Rik Rose MD   alirocumab (PRALUENT) 75 MG/ML SOAJ injection pen Inject 1 mL into the skin every 14 days    Roberto Del Real MD   gabapentin (NEURONTIN) 300 MG capsule Take 1 capsule by mouth 2 times daily for 180 days. Intended supply: 90 days 2/10/25 8/9/25  Yoan Monroy MD   b complex vitamins capsule Take 1 capsule by mouth daily    Roberto Del Real MD   Cyanocobalamin (VITAMIN B12 PO) Take 1 each by mouth daily    Roberto Del Real MD   Coenzyme Q10 (COQ10) 100 MG CAPS Take by mouth    Roberto Del Real MD   Multiple Vitamins-Minerals (THERAPEUTIC MULTIVITAMIN-MINERALS) tablet Take 1

## 2025-06-27 LAB
P E INTERPRETATION, U: NORMAL
PATHOLOGIST: NORMAL
SPECIMEN TYPE: NORMAL
SPECIMEN TYPE: NORMAL
URINE IFX INTERP: NORMAL

## 2025-06-29 ENCOUNTER — RESULTS FOLLOW-UP (OUTPATIENT)
Dept: FAMILY MEDICINE CLINIC | Age: 70
End: 2025-06-29

## 2025-06-29 DIAGNOSIS — G62.9 NEUROPATHY: ICD-10-CM

## 2025-06-30 RX ORDER — PREGABALIN 25 MG/1
25 CAPSULE ORAL 2 TIMES DAILY
Qty: 60 CAPSULE | Refills: 1 | Status: SHIPPED | OUTPATIENT
Start: 2025-06-30 | End: 2025-08-29

## 2025-06-30 NOTE — TELEPHONE ENCOUNTER
Name of Medication(s) Requested:  Requested Prescriptions     Pending Prescriptions Disp Refills    pregabalin (LYRICA) 25 MG capsule 60 capsule 1     Sig: Take 1 capsule by mouth 2 times daily for 60 days. Max Daily Amount: 50 mg       Medication is on current medication list Yes    Dosage and directions were verified? Yes    Quantity verified: 30 day supply     Pharmacy Verified?  Yes    Last Appointment:  5/1/2025    Future appts:  Future Appointments   Date Time Provider Department Center   8/4/2025  3:00 PM Grazyna Hernandez MD [facility] PMR [facility]   8/21/2025 10:00 AM Rik Rose MD [facility] ECC DEP   9/23/2025 10:10 AM [facility] LABS ROOM MEDICAL ONCOLOGY [facility] MED ONC [place]   9/24/2025  1:15 PM Yoan Monroy MD Blood Cancer [facility]        (If no appt send self scheduling link. .REFILLAPPT)  Scheduling request sent?     [] Yes  [x] No    Does patient need updated?  [] Yes  [x] No

## 2025-07-10 ENCOUNTER — LAB (OUTPATIENT)
Dept: LAB | Facility: HOSPITAL | Age: 70
End: 2025-07-10
Payer: MEDICARE

## 2025-07-10 ENCOUNTER — PROCEDURE VISIT (OUTPATIENT)
Dept: HEMATOLOGY/ONCOLOGY | Facility: HOSPITAL | Age: 70
End: 2025-07-10
Payer: MEDICARE

## 2025-07-10 VITALS
BODY MASS INDEX: 22.85 KG/M2 | DIASTOLIC BLOOD PRESSURE: 90 MMHG | WEIGHT: 149.03 LBS | TEMPERATURE: 97.7 F | RESPIRATION RATE: 20 BRPM | OXYGEN SATURATION: 97 % | HEART RATE: 74 BPM | SYSTOLIC BLOOD PRESSURE: 140 MMHG

## 2025-07-10 DIAGNOSIS — E85.81 AL AMYLOIDOSIS (MULTI): ICD-10-CM

## 2025-07-10 DIAGNOSIS — Z94.81 STATUS POST AUTOLOGOUS BONE MARROW TRANSPLANT: ICD-10-CM

## 2025-07-10 LAB
ALBUMIN SERPL BCP-MCNC: 3.9 G/DL (ref 3.4–5)
ALP SERPL-CCNC: 71 U/L (ref 33–136)
ALT SERPL W P-5'-P-CCNC: 21 U/L (ref 10–52)
ANION GAP SERPL CALC-SCNC: 11 MMOL/L (ref 10–20)
AST SERPL W P-5'-P-CCNC: 20 U/L (ref 9–39)
BASOPHILS # BLD AUTO: 0.05 X10*3/UL (ref 0–0.1)
BASOPHILS NFR BLD AUTO: 0.9 %
BILIRUB SERPL-MCNC: 0.6 MG/DL (ref 0–1.2)
BUN SERPL-MCNC: 18 MG/DL (ref 6–23)
CALCIUM SERPL-MCNC: 9.7 MG/DL (ref 8.6–10.3)
CHLORIDE SERPL-SCNC: 106 MMOL/L (ref 98–107)
CO2 SERPL-SCNC: 27 MMOL/L (ref 21–32)
CREAT SERPL-MCNC: 0.94 MG/DL (ref 0.5–1.3)
EGFRCR SERPLBLD CKD-EPI 2021: 88 ML/MIN/1.73M*2
EOSINOPHIL # BLD AUTO: 0.12 X10*3/UL (ref 0–0.7)
EOSINOPHIL NFR BLD AUTO: 2.1 %
ERYTHROCYTE [DISTWIDTH] IN BLOOD BY AUTOMATED COUNT: 11.9 % (ref 11.5–14.5)
GLUCOSE SERPL-MCNC: 77 MG/DL (ref 74–99)
HCT VFR BLD AUTO: 44.7 % (ref 41–52)
HGB BLD-MCNC: 14.7 G/DL (ref 13.5–17.5)
IGA SERPL-MCNC: 216 MG/DL (ref 70–400)
IGG SERPL-MCNC: 894 MG/DL (ref 700–1600)
IGM SERPL-MCNC: 42 MG/DL (ref 40–230)
IMM GRANULOCYTES # BLD AUTO: 0.02 X10*3/UL (ref 0–0.7)
IMM GRANULOCYTES NFR BLD AUTO: 0.3 % (ref 0–0.9)
LYMPHOCYTES # BLD AUTO: 1.6 X10*3/UL (ref 1.2–4.8)
LYMPHOCYTES NFR BLD AUTO: 27.5 %
MCH RBC QN AUTO: 30.4 PG (ref 26–34)
MCHC RBC AUTO-ENTMCNC: 32.9 G/DL (ref 32–36)
MCV RBC AUTO: 93 FL (ref 80–100)
MONOCYTES # BLD AUTO: 0.54 X10*3/UL (ref 0.1–1)
MONOCYTES NFR BLD AUTO: 9.3 %
NEUTROPHILS # BLD AUTO: 3.49 X10*3/UL (ref 1.2–7.7)
NEUTROPHILS NFR BLD AUTO: 59.9 %
NRBC BLD-RTO: 0 /100 WBCS (ref 0–0)
PLATELET # BLD AUTO: 194 X10*3/UL (ref 150–450)
POTASSIUM SERPL-SCNC: 4.4 MMOL/L (ref 3.5–5.3)
PROT SERPL-MCNC: 6.4 G/DL (ref 6.4–8.2)
PROT SERPL-MCNC: 6.7 G/DL (ref 6.4–8.2)
RBC # BLD AUTO: 4.83 X10*6/UL (ref 4.5–5.9)
SODIUM SERPL-SCNC: 140 MMOL/L (ref 136–145)
WBC # BLD AUTO: 5.8 X10*3/UL (ref 4.4–11.3)

## 2025-07-10 PROCEDURE — 36415 COLL VENOUS BLD VENIPUNCTURE: CPT

## 2025-07-10 PROCEDURE — 83521 IG LIGHT CHAINS FREE EACH: CPT

## 2025-07-10 PROCEDURE — 86320 SERUM IMMUNOELECTROPHORESIS: CPT | Performed by: PATHOLOGY

## 2025-07-10 PROCEDURE — 84165 PROTEIN E-PHORESIS SERUM: CPT | Performed by: PATHOLOGY

## 2025-07-10 PROCEDURE — 85025 COMPLETE CBC W/AUTO DIFF WBC: CPT

## 2025-07-10 PROCEDURE — 84165 PROTEIN E-PHORESIS SERUM: CPT

## 2025-07-10 PROCEDURE — 82784 ASSAY IGA/IGD/IGG/IGM EACH: CPT

## 2025-07-10 PROCEDURE — 38222 DX BONE MARROW BX & ASPIR: CPT | Performed by: PHYSICIAN ASSISTANT

## 2025-07-10 PROCEDURE — 84155 ASSAY OF PROTEIN SERUM: CPT | Mod: 59

## 2025-07-10 PROCEDURE — 80053 COMPREHEN METABOLIC PANEL: CPT

## 2025-07-10 PROCEDURE — 88185 FLOWCYTOMETRY/TC ADD-ON: CPT | Mod: TC

## 2025-07-10 NOTE — PROGRESS NOTES
Patient ID: Rusty Woodard is a 70 y.o. male.    Bone marrow aspirate & biopsy    Date/Time: 7/10/2025 10:40 AM    Performed by: Lizandro Melendez PA-C  Authorized by: Lizandro Melendez PA-C    Consent:     Consent obtained:  Verbal and written    Consent given by:  Patient    Risks, benefits, and alternatives were discussed: yes      Risks discussed:  Bleeding, infection and pain  Universal protocol:     Procedure explained and questions answered to patient or proxy's satisfaction: yes      Relevant documents present and verified: yes      Test results available: yes      Imaging studies available: yes      Required blood products, implants, devices, and special equipment available: yes      Site/side marked: yes      Immediately prior to procedure, a time out was called: yes      Patient identity confirmed:  Verbally with patient  Indications:     Indications:  Disease assessment  Pre-procedure details:     Skin preparation:  Chlorhexidine    Preparation: Patient was prepped and draped in the usual sterile fashion    Sedation:     Sedation type:  None  Anesthesia:     Anesthesia method:  Local infiltration    Local anesthetic:  Lidocaine 1% w/o epi  Procedure specific details:      The procedure was explained & potential complications reviewed with the patient including the risks for bleeding, infection, & discomfort at the bone marrow biopsy site. The patient was given time for questions regarding the procedure. The patient agreed to proceed & electronic signed consent was obtained.  The patient's most recent history & physical exam were reviewed & relevant findings were noted.  The patient's allergy history was reviewed.  Next, a pre-procedure time out was performed to properly identify the patient & the procedure to be performed.  The patient was placed in the prone position & the left posterior iliac crest bone marrow biopsy site was identified & marked.  Next, the skin at the marked bone marrow biopsy site was  cleansed with Chlorhexidine solution & sterile drapes were placed.  The skin, subcutaneous tissue, & periosteum below the marked bone marrow biopsy site were anesthetized using 10 ml of 1% Lidocaine solution.  Next, a Jamshidi needle was inserted at the marked biopsy site & slowly advanced into the posterior iliac crest.  Clonoseq collected. Marrow aspirate & core biopsy were obtained & sent to Pathology for review & testing.  The needle was removed & sterile dressing was applied to the biopsy site.  The patient tolerated the procedure well without incident.  Prior to discharge, the biopsy site was inspected & the patient was given written post procedure care instructions.   Post-procedure details:     Procedure completion:  Tolerated well, no immediate complications

## 2025-07-11 LAB
KAPPA LC SERPL-MCNC: 1.46 MG/DL (ref 0.33–1.94)
KAPPA LC/LAMBDA SER: 1.36 {RATIO} (ref 0.26–1.65)
LAMBDA LC SERPL-MCNC: 1.07 MG/DL (ref 0.57–2.63)

## 2025-07-13 LAB
CELL COUNT (BLOOD): 4.73 X10*3/UL
CELL POPULATIONS: NORMAL
DIAGNOSIS: NORMAL
FLOW DIFFERENTIAL: NORMAL
FLOW TEST ORDERED: NORMAL
LAB TEST METHOD: NORMAL
NUMBER OF CELLS COLLECTED: NORMAL PER TUBE
PATH REPORT.TOTAL CANCER: NORMAL
SIGNATURE COMMENT: NORMAL
SPECIMEN VIABILITY: NORMAL

## 2025-07-15 LAB
ALBUMIN: 3.9 G/DL (ref 3.4–5)
ALPHA 1 GLOBULIN: 0.2 G/DL (ref 0.2–0.6)
ALPHA 2 GLOBULIN: 0.6 G/DL (ref 0.4–1.1)
BETA GLOBULIN: 0.8 G/DL (ref 0.5–1.2)
GAMMA GLOBULIN: 0.8 G/DL (ref 0.5–1.4)
IMMUNOFIXATION COMMENT: NORMAL
PATH REVIEW - SERUM IMMUNOFIXATION: NORMAL
PATH REVIEW-SERUM PROTEIN ELECTROPHORESIS: NORMAL
PROTEIN ELECTROPHORESIS COMMENT: NORMAL

## 2025-07-18 LAB
CHROM ANALY OVERALL INTERP-IMP: NORMAL
ELECTRONICALLY SIGNED BY CYTOGENETICS: NORMAL

## 2025-07-22 LAB
PATH REPORT.COMMENTS IMP SPEC: NORMAL
PATH REPORT.FINAL DX SPEC: NORMAL
PATH REPORT.GROSS SPEC: NORMAL
PATH REPORT.MICROSCOPIC SPEC OTHER STN: NORMAL
PATH REPORT.RELEVANT HX SPEC: NORMAL
PATH REPORT.RELEVANT HX SPEC: NORMAL
PATH REPORT.TOTAL CANCER: NORMAL

## 2025-07-23 NOTE — PROGRESS NOTES
Patient ID: Rusty Woodard is a 70 y.o. male.  Referring Physician: No referring provider defined for this encounter.  Primary Care Provider: No Assigned PCP Generic Provider, MD    Virtual or Telephone Consent    A telephone visit (audio only) between the patient (at the originating site) and the provider (at the distant site) was utilized to provide this telehealth service.   Verbal consent was requested and obtained from Rusty Woodard on this date, 25, for a telehealth visit.    This was a phone appt.     Interval hx:  Since last visit he has remained off revlimid (since 24). Since then he is feeling a lot less tired and has not had any new complaints.      PMHx:  1) Multiple myeloma (with amyloidosis, see below)  2) Afib (transient w/SCTx)  3) HLD  4) Sciatica    SocHx:   with 3 kids. Lives in [facility], Retired, was in maintenance. No  exposures. No hx of cigarette, EtOH or illicit drug use    FamHx:  No hx of myeloma; mom  age 64 of breast ca    Meds:  Reviewed    All:  CODEINE    Performance Status:  Symptomatic; fully ambulatory      Assessment/recommendations:  70 year old man with a  hx of Afib, Multiple Myeloma and AL Amyloidosis [numbness and tingling in his hands and feet and LE edema in 2021 had 5.27 gr/d nephrotic range proteinuria which prompted a kidney  biopsy noting AL Amyloidosis, lambda light chains. Bone marrow with 25% light chain restricted plasma cells with trisomy 13 and standard risk myeloma; Echo showed normal EF with mild TR and mild Pul HTN; was treated with Yolanda/CyBorD and reportedly he had nice reduction with in light chain as w s nephrotic range proteinuria; restaging bmbx 21 4% lambda positive cells and no detectable serum free light chains, then s/p autologous PBSCT  (T=0 on 10/22/21) preparative regimen with [id number] mg; course complicated by Afib RVR; post transplant Mass Spectrometry was positive; then was placed on maintenance Revlimid 5mg daily,  "then later due to multiple issues his Revlimid was decreased by Dr. Whitney to 2.5mg daily; stopped revlimid in 12/2024 d/t side effects] here for follow-up.    The 10/12/2024 marrow was c/w a pathologic CR but the MRD remained positive, though at a low level (31 cells, out of 510,000 tested); however, the peripheral blood MRD test result (mass spec) came back negative.   Last visit I reviewed the above results w/the pt and his wife; I said that there is limited data to quantify the risk for recurrence in situation such as this (discordance between marrow ClonoSeq MRD and peripheral blood MRD, by mass spec) but that the marrow MRD has more data to support its use. However, I also explained that recent data from a large [place] study (MYELOMA XI) revealed that there may not be benefit for continued maintenance revlimid beyond 3  years; in addition, he and his wife feel that the numerous side effects (see above), although each is fairly minor, altogether are really affecting his quality of life in a negative fashion. So, after a long discussion in December 2024 we agreed to stop the revlimid. Since then he is feeling a lot less tired and had far less transient burning of scalp; also, other complaints (itching, dry skin, dizziness, scabs on scalp) have pretty resolved, though still is having  numbness and tingling of his hands and feet, for which he said he will be seeing a spine specialist at [facility] in [place], [place].   Labs from 7/10/25 were w/o evidence of recurrence and a repeat BM Aspirate/biopsy on that date was c/w a path CR; MRD by ClonoSeq revealed \"less than 0-2\" cells (out of 2.3 million), c/w an MRD negative result.  Findings were explained to the pt and his wife and the good prognostic implications.    He said he has a follow-up appt w/his primary oncologist, Dr. Larios, in Sept and I told him to follow-up w/me in 1 year. Will likely arrange for a repat BM biopsy for MRD at that time     I spent around 25 " min w/the pt, all of which were on counseling, coordination of care and addressing his questions.

## 2025-07-24 ENCOUNTER — TELEMEDICINE (OUTPATIENT)
Dept: HEMATOLOGY/ONCOLOGY | Facility: HOSPITAL | Age: 70
End: 2025-07-24
Payer: MEDICARE

## 2025-07-24 DIAGNOSIS — E85.81 AL AMYLOIDOSIS (MULTI): Primary | ICD-10-CM

## 2025-07-24 PROCEDURE — 99213 OFFICE O/P EST LOW 20 MIN: CPT | Performed by: INTERNAL MEDICINE

## 2025-08-04 ENCOUNTER — OFFICE VISIT (OUTPATIENT)
Dept: PHYSICAL MEDICINE AND REHAB | Age: 70
End: 2025-08-04
Payer: MEDICARE

## 2025-08-04 VITALS
DIASTOLIC BLOOD PRESSURE: 84 MMHG | SYSTOLIC BLOOD PRESSURE: 125 MMHG | HEART RATE: 87 BPM | BODY MASS INDEX: 22.36 KG/M2 | HEIGHT: 69 IN | WEIGHT: 151 LBS

## 2025-08-04 DIAGNOSIS — M54.42 CHRONIC BILATERAL LOW BACK PAIN WITH LEFT-SIDED SCIATICA: ICD-10-CM

## 2025-08-04 DIAGNOSIS — M79.2 NEUROPATHIC PAIN: Primary | ICD-10-CM

## 2025-08-04 DIAGNOSIS — G89.29 CHRONIC BILATERAL LOW BACK PAIN WITH LEFT-SIDED SCIATICA: ICD-10-CM

## 2025-08-04 DIAGNOSIS — G62.9 NEUROPATHY: ICD-10-CM

## 2025-08-04 PROCEDURE — 1123F ACP DISCUSS/DSCN MKR DOCD: CPT | Performed by: STUDENT IN AN ORGANIZED HEALTH CARE EDUCATION/TRAINING PROGRAM

## 2025-08-04 PROCEDURE — G8420 CALC BMI NORM PARAMETERS: HCPCS | Performed by: STUDENT IN AN ORGANIZED HEALTH CARE EDUCATION/TRAINING PROGRAM

## 2025-08-04 PROCEDURE — 3017F COLORECTAL CA SCREEN DOC REV: CPT | Performed by: STUDENT IN AN ORGANIZED HEALTH CARE EDUCATION/TRAINING PROGRAM

## 2025-08-04 PROCEDURE — 1036F TOBACCO NON-USER: CPT | Performed by: STUDENT IN AN ORGANIZED HEALTH CARE EDUCATION/TRAINING PROGRAM

## 2025-08-04 PROCEDURE — 99204 OFFICE O/P NEW MOD 45 MIN: CPT | Performed by: STUDENT IN AN ORGANIZED HEALTH CARE EDUCATION/TRAINING PROGRAM

## 2025-08-04 PROCEDURE — G8427 DOCREV CUR MEDS BY ELIG CLIN: HCPCS | Performed by: STUDENT IN AN ORGANIZED HEALTH CARE EDUCATION/TRAINING PROGRAM

## 2025-08-04 PROCEDURE — 1159F MED LIST DOCD IN RCRD: CPT | Performed by: STUDENT IN AN ORGANIZED HEALTH CARE EDUCATION/TRAINING PROGRAM

## 2025-08-04 PROCEDURE — 1125F AMNT PAIN NOTED PAIN PRSNT: CPT | Performed by: STUDENT IN AN ORGANIZED HEALTH CARE EDUCATION/TRAINING PROGRAM

## 2025-08-04 RX ORDER — PREGABALIN 25 MG/1
CAPSULE ORAL
Qty: 90 CAPSULE | Refills: 1 | Status: SHIPPED | OUTPATIENT
Start: 2025-08-04 | End: 2025-09-04

## 2025-08-21 ENCOUNTER — OFFICE VISIT (OUTPATIENT)
Dept: FAMILY MEDICINE CLINIC | Age: 70
End: 2025-08-21
Payer: MEDICARE

## 2025-08-21 VITALS
DIASTOLIC BLOOD PRESSURE: 78 MMHG | BODY MASS INDEX: 22.51 KG/M2 | SYSTOLIC BLOOD PRESSURE: 130 MMHG | HEIGHT: 69 IN | RESPIRATION RATE: 18 BRPM | OXYGEN SATURATION: 97 % | TEMPERATURE: 97 F | HEART RATE: 83 BPM | WEIGHT: 152 LBS

## 2025-08-21 DIAGNOSIS — G62.9 NEUROPATHY: ICD-10-CM

## 2025-08-21 DIAGNOSIS — R13.12 OROPHARYNGEAL DYSPHAGIA: Primary | ICD-10-CM

## 2025-08-21 DIAGNOSIS — K11.7 XEROSTOMIA: ICD-10-CM

## 2025-08-21 DIAGNOSIS — E78.49 FAMILIAL HYPERLIPIDEMIA: ICD-10-CM

## 2025-08-21 DIAGNOSIS — E85.81 LIGHT CHAIN (AL) AMYLOIDOSIS (HCC): ICD-10-CM

## 2025-08-21 PROCEDURE — 1036F TOBACCO NON-USER: CPT | Performed by: FAMILY MEDICINE

## 2025-08-21 PROCEDURE — G8428 CUR MEDS NOT DOCUMENT: HCPCS | Performed by: FAMILY MEDICINE

## 2025-08-21 PROCEDURE — 3017F COLORECTAL CA SCREEN DOC REV: CPT | Performed by: FAMILY MEDICINE

## 2025-08-21 PROCEDURE — G8420 CALC BMI NORM PARAMETERS: HCPCS | Performed by: FAMILY MEDICINE

## 2025-08-21 PROCEDURE — 99214 OFFICE O/P EST MOD 30 MIN: CPT | Performed by: FAMILY MEDICINE

## 2025-08-21 PROCEDURE — 1123F ACP DISCUSS/DSCN MKR DOCD: CPT | Performed by: FAMILY MEDICINE

## 2025-08-21 RX ORDER — PREGABALIN 25 MG/1
CAPSULE ORAL
Qty: 270 CAPSULE | Refills: 3 | Status: SHIPPED | OUTPATIENT
Start: 2025-08-21 | End: 2025-09-21

## 2025-08-27 ENCOUNTER — PROCEDURE VISIT (OUTPATIENT)
Dept: PHYSICAL MEDICINE AND REHAB | Age: 70
End: 2025-08-27

## 2025-08-27 VITALS — HEIGHT: 69 IN | WEIGHT: 152 LBS | BODY MASS INDEX: 22.51 KG/M2

## 2025-08-27 DIAGNOSIS — G89.29 CHRONIC BILATERAL LOW BACK PAIN WITH LEFT-SIDED SCIATICA: ICD-10-CM

## 2025-08-27 DIAGNOSIS — M54.42 CHRONIC BILATERAL LOW BACK PAIN WITH LEFT-SIDED SCIATICA: ICD-10-CM

## 2025-08-27 DIAGNOSIS — M79.2 NEUROPATHIC PAIN: ICD-10-CM

## 2025-08-27 DIAGNOSIS — G62.9 NEUROPATHY: Primary | ICD-10-CM

## 2025-10-16 ENCOUNTER — APPOINTMENT (OUTPATIENT)
Dept: HEMATOLOGY/ONCOLOGY | Facility: HOSPITAL | Age: 70
End: 2025-10-16
Payer: MEDICARE